# Patient Record
Sex: FEMALE | Race: WHITE | NOT HISPANIC OR LATINO | Employment: OTHER | URBAN - METROPOLITAN AREA
[De-identification: names, ages, dates, MRNs, and addresses within clinical notes are randomized per-mention and may not be internally consistent; named-entity substitution may affect disease eponyms.]

---

## 2017-01-11 ENCOUNTER — OFFICE VISIT (OUTPATIENT)
Dept: INTERNAL MEDICINE | Facility: CLINIC | Age: 72
End: 2017-01-11
Payer: MEDICARE

## 2017-01-11 VITALS
SYSTOLIC BLOOD PRESSURE: 116 MMHG | HEIGHT: 62 IN | DIASTOLIC BLOOD PRESSURE: 78 MMHG | HEART RATE: 56 BPM | WEIGHT: 155.19 LBS | BODY MASS INDEX: 28.56 KG/M2

## 2017-01-11 DIAGNOSIS — E03.9 PRIMARY HYPOTHYROIDISM: ICD-10-CM

## 2017-01-11 DIAGNOSIS — I25.10 CORONARY ARTERY DISEASE INVOLVING NATIVE CORONARY ARTERY WITHOUT ANGINA PECTORIS, UNSPECIFIED WHETHER NATIVE OR TRANSPLANTED HEART: Primary | ICD-10-CM

## 2017-01-11 DIAGNOSIS — R73.03 PREDIABETES: ICD-10-CM

## 2017-01-11 DIAGNOSIS — E78.5 HYPERLIPIDEMIA, UNSPECIFIED HYPERLIPIDEMIA TYPE: ICD-10-CM

## 2017-01-11 DIAGNOSIS — R73.9 HYPERGLYCEMIA: ICD-10-CM

## 2017-01-11 DIAGNOSIS — Z12.11 SCREEN FOR COLON CANCER: ICD-10-CM

## 2017-01-11 DIAGNOSIS — M19.90 OSTEOARTHRITIS, UNSPECIFIED OSTEOARTHRITIS TYPE, UNSPECIFIED SITE: ICD-10-CM

## 2017-01-11 DIAGNOSIS — Z79.899 ON STATIN THERAPY DUE TO RISK OF FUTURE CARDIOVASCULAR EVENT: ICD-10-CM

## 2017-01-11 DIAGNOSIS — R94.39 POSITIVE CARDIAC STRESS TEST: ICD-10-CM

## 2017-01-11 DIAGNOSIS — M85.80 OSTEOPENIA: ICD-10-CM

## 2017-01-11 DIAGNOSIS — H53.8 BLURRY VISION: ICD-10-CM

## 2017-01-11 PROCEDURE — 99213 OFFICE O/P EST LOW 20 MIN: CPT | Mod: PBBFAC | Performed by: INTERNAL MEDICINE

## 2017-01-11 PROCEDURE — 99214 OFFICE O/P EST MOD 30 MIN: CPT | Mod: S$PBB,,, | Performed by: INTERNAL MEDICINE

## 2017-01-11 PROCEDURE — 99999 PR PBB SHADOW E&M-EST. PATIENT-LVL III: CPT | Mod: PBBFAC,,, | Performed by: INTERNAL MEDICINE

## 2017-01-11 RX ORDER — METFORMIN HYDROCHLORIDE 500 MG/1
500 TABLET, EXTENDED RELEASE ORAL
Qty: 90 TABLET | Refills: 3 | Status: SHIPPED | OUTPATIENT
Start: 2017-01-11 | End: 2018-02-16 | Stop reason: SDUPTHER

## 2017-01-11 NOTE — MR AVS SNAPSHOT
[facility]  [address]  [facility] [address]  Phone: [phone]  Fax: [phone]                  Lissy Palencia   2017 8:00 AM   Office Visit    Description:  Female : 1945   Provider:  Deya Luevano MD   Department:  [facility]           Reason for Visit     Follow-up           Diagnoses this Visit        Comments    Coronary artery disease involving native coronary artery without angina pectoris, unspecified whether native or transplanted heart    -  Primary     Primary hypothyroidism         Prediabetes         Positive cardiac stress test         Osteopenia         Osteoarthritis, unspecified osteoarthritis type, unspecified site         On statin therapy due to risk of future cardiovascular event         Hyperlipidemia, unspecified hyperlipidemia type         Hyperglycemia         Blurry vision         Screen for colon cancer                To Do List           Goals (5 Years of Data)     None      Follow-Up and Disposition     Return in about 6 months (around 2017).       These Medications        Disp Refills Start End    metformin (GLUCOPHAGE-XR) 500 MG 24 hr tablet 90 tablet 3 2017    Take 1 tablet (500 mg total) by mouth daily with breakfast. - Oral    Pharmacy: [facility] - [address] #: [phone]         [facility]     [facility] On Call Nurse Care Line -  Assistance  Registered nurses in the [facility] Center provide clinical advisement, health education, appointment booking, and other advisory services.  Call for this free service at [phone].             Medications           Message regarding Medications     Verify the changes and/or additions to your medication regime listed below are the same as discussed with your clinician today.  If any of these changes or additions are incorrect, please notify your healthcare provider.       "       Verify that the below list of medications is an accurate representation of the medications you are currently taking.  If none reported, the list may be blank. If incorrect, please contact your healthcare provider. Carry this list with you in case of emergency.           Current Medications     aspirin 81 MG Chew Take 81 mg by mouth once daily.      atorvastatin (LIPITOR) 40 MG tablet Take 1 tablet (40 mg total) by mouth once daily.    co-enzyme Q-10 30 mg capsule Take 30 mg by mouth 3 (three) times daily.    conjugated estrogens (PREMARIN) vaginal cream Place 0.5 g vaginally twice a week.    levothyroxine (SYNTHROID) 88 MCG tablet TAKE 1 TABLET EVERY MORNING BEFORE BREAKFAST    melatonin 3 mg Tab Take 1 tablet (3 mg total) by mouth nightly.    metformin (GLUCOPHAGE-XR) 500 MG 24 hr tablet Take 1 tablet (500 mg total) by mouth daily with breakfast.    nitroGLYCERIN (NITROSTAT) 0.4 MG SL tablet Place 1 tablet (0.4 mg total) under the tongue every 5 (five) minutes as needed for Chest pain.           Clinical Reference Information           Vital Signs - Last Recorded  Most recent update: 1/11/2017  8:11 AM by Dunia Simpson MA    BP Pulse Ht Wt BMI    116/78 (!) 56 5' 2" (1.575 m) 70.4 kg (155 lb 3.3 oz) 28.39 kg/m2      Blood Pressure          Most Recent Value    BP  116/78      Allergies as of 1/11/2017     Compazine  [Prochlorperazine Edisylate]    Parafon Forte      Immunizations Administered on Date of Encounter - 1/11/2017     None      Orders Placed During Today's Visit      Normal Orders This Visit    Ambulatory referral to Optometry     Future Labs/Procedures Expected by Expires    Hemoglobin A1c  4/11/2017 3/12/2018      [facility] Sign-Up     Activating your [facility] account is as easy as 1-2-3!     1) Visit [email or web address], select Sign Up Now, enter this activation code and your date of birth, then select Next.  [email or web address]  Expires: 2/25/2017  9:06 AM      2) Create a username and " password to use when you visit [facility] in the future and select a security question in case you lose your password and select Next.    3) Enter your e-mail address and click Sign Up!    Additional Information  If you have questions, please e-mail [email or web address] or call [phone] to talk to our [facility] staff. Remember, [facility] is NOT to be used for urgent needs. For medical emergencies, dial 911.         Instructions      Recognizing a Heart Attack or Angina  If you have risk factors for heart problems, you should always watch for signs of angina or a heart attack. If you have a sudden heart problem, getting treatment right away could save your life.   Risk factors for a heart attack include advanced age, high cholesterol, high blood pressure, having a family member who had a heart attack before the age of 50, diabetes, smoking, and stress. There are other risk factors including eating a high-fat diet and getting minimal exercise.  Understanding angina and heart attack  · Angina is a painful burning, tightness, or pressure in the chest, back, neck, throat, or jaw. It means not enough blood is getting to the heart. This is usually from a blocked artery in the heart. Angina is a sign that you may be having, or are about to have, a heart attack. It needs to be addressed by a healthcare provider right away.  · A heart attack, also known as acute myocardial infarction, or AMI, is what happens when blood can't get to part of the heart muscle. That part of the heart muscle is damaged and starts to die. If enough of the heart is affected, it will severely reduce its ability to provide blood to the rest of the body. It may cause death. It is vital to get help as soon as possible for a heart attack.  Stable angina versus unstable angina  Stable angina, also known as chronic angina, has a typical pattern. It occurs predictably with physical exertion or strong emotion. Nitroglycerin, rest, or both, will easily  relieve stable angina symptoms. Stable angina symptoms will most likely feel the same each time you have them. It is important to discuss these symptoms with your healthcare provider. They can be a warning sign for a future heart attack.  Unstable angina causes unexpected or unpredictable symptoms, commonly occurring at rest, and is a medical emergency. Angina is also considered unstable if resting and nitroglycerin doesn't relieve symptoms, It's also unstable if symptoms are worsening, occurring more often, or are lasting longer. These symptoms suggest a severe blockage or a spasm of a heart artery. Unstable angina is commonly a sign of an active heart attack. Remember the following tips:  · Stable angina symptoms should go away with rest or medicine. If they dont go away, call 911!  · Stable angina symptoms last for only a few minutes. If they last longer than that, or if they go away and come back, you may be having a heart attack. Call 911!  · If you have shortness of breath, cold sweat, nausea, or lightheadedness, call 911!  For new-onset angina, there is only one response: ?call 911! You should never diagnose angina by yourself. If these symptoms are new, or worse than usual, call 911!  Warning signs of a heart attack  If you have symptoms that you cant explain, call 911 right away. Do not drive yourself to the emergency room. The following are warning signs of a possible heart attack:  · Chest discomfort. Most heart attacks involve discomfort in the center of the chest that lasts more than a few minutes, or that goes away and comes back. It can feel like uncomfortable pressure, squeezing, fullness or pain.  · Discomfort in other areas of the upper body. Symptoms can include pain or discomfort in one or both arms, the back, neck, jaw, or stomach.  · Shortness of breath with or without chest discomfort.  · Other signs may include breaking out in a cold sweat, nausea, or lightheadedness.  Note for women: Like  men, women commonly have chest pain or discomfort as a heart attack symptom. But women are somewhat more likely than men to have other common symptoms, such as shortness of breath, nausea and vomiting, back pain, or jaw pain.  Note for older adults: Older people may also have atypical symptoms of a heart attack. These symptoms include fainting, weakness, or confusion. Ignoring these symptoms can lead to critical illness or death. They should be investigated right away.  If you've had a heart attack: People who have had one heart attack are at risk for having another. Your provider may prescribe medicine such as nitroglycerin to take when chest pain starts. Or you may need medicines to lower your heart rate and blood pressure to prevent angina and another heart attack. Remember to take any medicines your provider has given and do not stop them without speaking with him or her first.     If you have diabetes: silent heart problems  Over time, high blood sugar can damage nerves in your body. This may keep you from feeling pain caused by a heart problem, leading to a silent heart problem. If you dont feel symptoms, you are less able to recognize that you may be having a heart attack and get treatment right away. Talk to your healthcare provider about how to lower your risk for silent heart problems.   © 4073-5896 [employer]. [address] [place] [address], [place] [address]. All rights reserved. This information is not intended as a substitute for professional medical care. Always follow your healthcare professional's instructions.        Fast-Acting Nitroglycerin  Nitroglycerin eases chest pain (angina) by getting more blood and oxygen to your heart. Fast-acting nitroglycerin can stop an angina attack. Follow the steps below for taking this medicine. Note: Your healthcare provider may give you slightly different instructions. If so, follow them carefully.  To stop an angina attack    Sit down before you take  your nitroglycerin. The medicine may make you feel dizzy because it lowers your blood pressure rapidly.  If you use tablets  · Place 1 tablet under your tongue. Or place it between your lip and gum. Or put it between your cheek and gum.  · Let the tablet dissolve all the way. Do not swallow or chew the tablet.  · Do not eat, drink, smoke, or chew tobacco as the tablet is dissolving.  If you use spray  · Open your mouth and hold the sprayer just in front of your mouth.  · Press the button on the top. Spray once on or under your tongue. Do not inhale.  · Close your mouth. Then wait a few seconds before you swallow.  After taking 1 tablet or spraying once  · Continue sitting for 5 minutes.   · If the angina goes away completely, rest for a while. Then continue your normal routine.  Call 911 if your angina lasts longer than 5 minutes and 1 tablet or 1 spray has not relieved it. Do not delay. You may be having a heart attack!  After you call 911, take a second tablet, or spray a second time. Wait another 5 minutes. If the angina still does not go away, take a third tablet, or spray a third time. Do not take more than 3 tablets, or spray more than 3 times, within 15 minutes. Stay on the phone with 911 for further instructions.   Precautions  · Limit how much alcohol you drink. Too much alcohol can cause dizziness or fainting.  · Tell your healthcare provider about any medicines, supplements, or herbs you use. Nitroglycerin can interact with other medicines. This can cause serious problems.   · NOTE: Do not take phosphodiesterase inhibitors such as sildenafil. These are medicines that help sexual function in men. The combination of nitroglycerin with these medicines can cause a severe drop in blood pressure. This can lead to dizziness, fainting, heart attack, or stroke.  · Check when your medicine expires. Nitroglycerin can become less effective over time.  · Tell your healthcare provider if your angina attacks last  longer, occur more often, or are more severe.  © 6336-6095 [employer]. [address] [place] [address], [place] [address]. All rights reserved. This information is not intended as a substitute for professional medical care. Always follow your healthcare professional's instructions.

## 2017-01-11 NOTE — PATIENT INSTRUCTIONS
Recognizing a Heart Attack or Angina  If you have risk factors for heart problems, you should always watch for signs of angina or a heart attack. If you have a sudden heart problem, getting treatment right away could save your life.   Risk factors for a heart attack include advanced age, high cholesterol, high blood pressure, having a family member who had a heart attack before the age of 50, diabetes, smoking, and stress. There are other risk factors including eating a high-fat diet and getting minimal exercise.  Understanding angina and heart attack  · Angina is a painful burning, tightness, or pressure in the chest, back, neck, throat, or jaw. It means not enough blood is getting to the heart. This is usually from a blocked artery in the heart. Angina is a sign that you may be having, or are about to have, a heart attack. It needs to be addressed by a healthcare provider right away.  · A heart attack, also known as acute myocardial infarction, or AMI, is what happens when blood can't get to part of the heart muscle. That part of the heart muscle is damaged and starts to die. If enough of the heart is affected, it will severely reduce its ability to provide blood to the rest of the body. It may cause death. It is vital to get help as soon as possible for a heart attack.  Stable angina versus unstable angina  Stable angina, also known as chronic angina, has a typical pattern. It occurs predictably with physical exertion or strong emotion. Nitroglycerin, rest, or both, will easily relieve stable angina symptoms. Stable angina symptoms will most likely feel the same each time you have them. It is important to discuss these symptoms with your healthcare provider. They can be a warning sign for a future heart attack.  Unstable angina causes unexpected or unpredictable symptoms, commonly occurring at rest, and is a medical emergency. Angina is also considered unstable if resting and nitroglycerin doesn't relieve  symptoms, It's also unstable if symptoms are worsening, occurring more often, or are lasting longer. These symptoms suggest a severe blockage or a spasm of a heart artery. Unstable angina is commonly a sign of an active heart attack. Remember the following tips:  · Stable angina symptoms should go away with rest or medicine. If they dont go away, call 911!  · Stable angina symptoms last for only a few minutes. If they last longer than that, or if they go away and come back, you may be having a heart attack. Call 911!  · If you have shortness of breath, cold sweat, nausea, or lightheadedness, call 911!  For new-onset angina, there is only one response: ?call 911! You should never diagnose angina by yourself. If these symptoms are new, or worse than usual, call 911!  Warning signs of a heart attack  If you have symptoms that you cant explain, call 911 right away. Do not drive yourself to the emergency room. The following are warning signs of a possible heart attack:  · Chest discomfort. Most heart attacks involve discomfort in the center of the chest that lasts more than a few minutes, or that goes away and comes back. It can feel like uncomfortable pressure, squeezing, fullness or pain.  · Discomfort in other areas of the upper body. Symptoms can include pain or discomfort in one or both arms, the back, neck, jaw, or stomach.  · Shortness of breath with or without chest discomfort.  · Other signs may include breaking out in a cold sweat, nausea, or lightheadedness.  Note for women: Like men, women commonly have chest pain or discomfort as a heart attack symptom. But women are somewhat more likely than men to have other common symptoms, such as shortness of breath, nausea and vomiting, back pain, or jaw pain.  Note for older adults: Older people may also have atypical symptoms of a heart attack. These symptoms include fainting, weakness, or confusion. Ignoring these symptoms can lead to critical illness or death.  They should be investigated right away.  If you've had a heart attack: People who have had one heart attack are at risk for having another. Your provider may prescribe medicine such as nitroglycerin to take when chest pain starts. Or you may need medicines to lower your heart rate and blood pressure to prevent angina and another heart attack. Remember to take any medicines your provider has given and do not stop them without speaking with him or her first.     If you have diabetes: silent heart problems  Over time, high blood sugar can damage nerves in your body. This may keep you from feeling pain caused by a heart problem, leading to a silent heart problem. If you dont feel symptoms, you are less able to recognize that you may be having a heart attack and get treatment right away. Talk to your healthcare provider about how to lower your risk for silent heart problems.   © 4702-3762 [employer]. [address] [place] [address]. All rights reserved. This information is not intended as a substitute for professional medical care. Always follow your healthcare professional's instructions.        Fast-Acting Nitroglycerin  Nitroglycerin eases chest pain (angina) by getting more blood and oxygen to your heart. Fast-acting nitroglycerin can stop an angina attack. Follow the steps below for taking this medicine. Note: Your healthcare provider may give you slightly different instructions. If so, follow them carefully.  To stop an angina attack    Sit down before you take your nitroglycerin. The medicine may make you feel dizzy because it lowers your blood pressure rapidly.  If you use tablets  · Place 1 tablet under your tongue. Or place it between your lip and gum. Or put it between your cheek and gum.  · Let the tablet dissolve all the way. Do not swallow or chew the tablet.  · Do not eat, drink, smoke, or chew tobacco as the tablet is dissolving.  If you use spray  · Open your mouth and hold  the sprayer just in front of your mouth.  · Press the button on the top. Spray once on or under your tongue. Do not inhale.  · Close your mouth. Then wait a few seconds before you swallow.  After taking 1 tablet or spraying once  · Continue sitting for 5 minutes.   · If the angina goes away completely, rest for a while. Then continue your normal routine.  Call 911 if your angina lasts longer than 5 minutes and 1 tablet or 1 spray has not relieved it. Do not delay. You may be having a heart attack!  After you call 911, take a second tablet, or spray a second time. Wait another 5 minutes. If the angina still does not go away, take a third tablet, or spray a third time. Do not take more than 3 tablets, or spray more than 3 times, within 15 minutes. Stay on the phone with 911 for further instructions.   Precautions  · Limit how much alcohol you drink. Too much alcohol can cause dizziness or fainting.  · Tell your healthcare provider about any medicines, supplements, or herbs you use. Nitroglycerin can interact with other medicines. This can cause serious problems.   · NOTE: Do not take phosphodiesterase inhibitors such as sildenafil. These are medicines that help sexual function in men. The combination of nitroglycerin with these medicines can cause a severe drop in blood pressure. This can lead to dizziness, fainting, heart attack, or stroke.  · Check when your medicine expires. Nitroglycerin can become less effective over time.  · Tell your healthcare provider if your angina attacks last longer, occur more often, or are more severe.  © 4296-1667 The [employer] [address] [place] [address], [place] [address]. All rights reserved. This information is not intended as a substitute for professional medical care. Always follow your healthcare professional's instructions.

## 2017-01-12 ENCOUNTER — TELEPHONE (OUTPATIENT)
Dept: UROGYNECOLOGY | Facility: CLINIC | Age: 72
End: 2017-01-12

## 2017-01-12 NOTE — TELEPHONE ENCOUNTER
Spoke with pt she wanted to be scheduled to see  for her fu visit. Pt was scheduled on preferred day.

## 2017-01-12 NOTE — TELEPHONE ENCOUNTER
----- Message from An Whitney sent at 1/11/2017  9:20 AM CST -----  Patient would like to schedule a follow-up.  Please contact at [phone].  Thanks

## 2017-01-13 NOTE — PROGRESS NOTES
Subjective:       Patient ID: Lissy Palencia is a 71 y.o. female.    Chief Complaint: Follow-up  multiple concerns. CP, afraid to try sl NTG  Eyes blurry, new glasses of no benefit.  Headache, neck pain, shoulder stiffness, but not new    HPI  Review of Systems   Constitutional: Positive for fatigue. Negative for activity change, appetite change, chills, fever and unexpected weight change.   HENT: Negative for dental problem, hearing loss, tinnitus, trouble swallowing and voice change.    Eyes: Positive for visual disturbance.   Respiratory: Negative for cough, chest tightness, shortness of breath and wheezing.    Cardiovascular: Negative for chest pain, palpitations and leg swelling.   Gastrointestinal: Negative for abdominal pain, blood in stool, constipation, diarrhea and nausea.   Genitourinary: Negative for difficulty urinating, dysuria, frequency and urgency.   Musculoskeletal: Positive for arthralgias, neck pain and neck stiffness. Negative for back pain, gait problem, joint swelling and myalgias.   Skin: Negative for rash.   Neurological: Negative for dizziness, tremors, speech difficulty, weakness, light-headedness and headaches.   Psychiatric/Behavioral: Negative for dysphoric mood and sleep disturbance. The patient is not nervous/anxious.        Objective:      Physical Exam   Constitutional: She is oriented to person, place, and time. She appears well-developed and well-nourished. No distress.   HENT:   Head: Normocephalic and atraumatic.   Right Ear: External ear normal.   Left Ear: External ear normal.   Nose: Nose normal.   Mouth/Throat: Oropharynx is clear and moist. No oropharyngeal exudate.   Eyes: Conjunctivae and EOM are normal. Pupils are equal, round, and reactive to light. Right eye exhibits no discharge. No scleral icterus.   Neck: Normal range of motion and full passive range of motion without pain. Neck supple. No spinous process tenderness and no muscular tenderness present. Carotid bruit  is not present. No thyromegaly present.   Cardiovascular: Normal rate, regular rhythm, S1 normal, S2 normal, normal heart sounds and intact distal pulses.  Exam reveals no gallop and no friction rub.    No murmur heard.  Pulmonary/Chest: Effort normal and breath sounds normal. No respiratory distress. She has no wheezes. She has no rales. She exhibits no tenderness.   Abdominal: Soft. Bowel sounds are normal. She exhibits no distension and no mass. There is no tenderness. There is no rebound and no guarding.   Genitourinary: Pelvic exam was performed with patient supine. Uterus is not deviated, not enlarged, not fixed and not tender. Cervix exhibits no motion tenderness, no discharge and no friability. Right adnexum displays no mass, no tenderness and no fullness. Left adnexum displays no mass, no tenderness and no fullness.   Musculoskeletal: Normal range of motion. She exhibits no edema or tenderness.   Lymphadenopathy:        Head (right side): No submental and no submandibular adenopathy present.        Head (left side): No submental and no submandibular adenopathy present.     She has no cervical adenopathy.        Right cervical: No superficial cervical, no deep cervical and no posterior cervical adenopathy present.       Left cervical: No superficial cervical, no deep cervical and no posterior cervical adenopathy present.        Right axillary: No pectoral and no lateral adenopathy present.        Left axillary: No pectoral and no lateral adenopathy present.       Right: No supraclavicular adenopathy present.        Left: No supraclavicular adenopathy present.   Neurological: She is alert and oriented to person, place, and time. She has normal reflexes. No cranial nerve deficit. She exhibits normal muscle tone. Coordination normal.   Skin: Skin is warm and dry. No rash noted.   Psychiatric: She has a normal mood and affect. Her behavior is normal. Her mood appears not anxious. Her speech is not rapid and/or  pressured. She is not agitated. She does not exhibit a depressed mood.   Normal behavior, thought content, insight and judgement.       Assessment:       1. Coronary artery disease involving native coronary artery without angina pectoris, unspecified whether native or transplanted heart    2. Primary hypothyroidism    3. Prediabetes    4. Positive cardiac stress test    5. Osteopenia    6. Osteoarthritis, unspecified osteoarthritis type, unspecified site    7. On statin therapy due to risk of future cardiovascular event    8. Hyperlipidemia, unspecified hyperlipidemia type    9. Hyperglycemia    10. Blurry vision    11. Screen for colon cancer, polyp 2015, next scope 2020        Plan:   Lissy was seen today for follow-up.    Diagnoses and all orders for this visit:    Coronary artery disease involving native coronary artery without angina pectoris, unspecified whether native or transplanted heart. Recent w/u neg for ischemia.    Primary hypothyroidism. compliant    Prediabetes. She is exercising daily and changing her diet  -     Hemoglobin A1c; Future    Osteopenia. Follow.    Osteoarthritis, unspecified osteoarthritis type, unspecified site. Advise aquatic exercise program    On statin therapy due to risk of future cardiovascular event    Hyperlipidemia, unspecified hyperlipidemia type. monitor  -     Hemoglobin A1c; Future    Hyperglycemia. recheck   -     Hemoglobin A1c; Future    Blurry vision  -     Ambulatory referral to Optometry    Screen for colon cancer, polyp 2015, next scope 2020    Other orders. Start   -     metformin (GLUCOPHAGE-XR) 500 MG 24 hr tablet; Take 1 tablet (500 mg total) by mouth daily with breakfast.  Return in about 6 months (around 7/11/2017).

## 2017-01-18 ENCOUNTER — OFFICE VISIT (OUTPATIENT)
Dept: OPTOMETRY | Facility: CLINIC | Age: 72
End: 2017-01-18
Payer: MEDICARE

## 2017-01-18 DIAGNOSIS — H04.123 BILATERAL DRY EYES: Primary | ICD-10-CM

## 2017-01-18 DIAGNOSIS — H25.13 NUCLEAR SCLEROTIC CATARACT OF BOTH EYES: ICD-10-CM

## 2017-01-18 PROCEDURE — 99999 PR PBB SHADOW E&M-EST. PATIENT-LVL II: CPT | Mod: PBBFAC,,, | Performed by: OPTOMETRIST

## 2017-01-18 PROCEDURE — 92012 INTRM OPH EXAM EST PATIENT: CPT | Mod: S$PBB,,, | Performed by: OPTOMETRIST

## 2017-01-18 PROCEDURE — 99212 OFFICE O/P EST SF 10 MIN: CPT | Mod: PBBFAC | Performed by: OPTOMETRIST

## 2017-01-18 NOTE — PROGRESS NOTES
HPI     Patient complains of having blurry near vision with glasses on since pt   got new glasses in October 2016  Pt states that while reading words on the page become blurry and string   together   Pt states that it may happen when she is reading for long periods, pt   states that if she blinks and refocuses things look better but minutes   later vision blurs again  Pt reports vision comes in and out of focus when reading, vision changes   with blinking. This also occurs when she is watching TV.     +Drops: systane gtt in qAM and systane gel QHS OU  -Pain  -Tearing  -Sensitivity to light  -Flashes  -Floaters    OCULAR HISTORY  Last Eye Exam 10/19/16 Dr. Burroughs   (-)eye surgery   (-)eye injury   (+) glaucoma suspect OS  +dry eye   +cataracts     FAMILY HISTORY  (-)Glaucoma none   (-)Macular Degeneration none          Last edited by Brittni Burroughs, OD on 1/18/2017  8:56 AM.         Assessment /Plan     For exam results, see Encounter Report.    Bilateral dry eyes   Ocular health improved with Systane drops qAM and Systane Gel QHS OU, but pt still experiencing vision fluctuation. SRx does not need to be changed.   Recommended pt discontinue Systane Gel. Continue Systane drops, but increase to QID OU.    Nuclear sclerotic cataract of both eyes   Pt may be interested in referral for cataract surgery at next visit in October 2017. Discussed risks/benefits of cataract surgery, surgery not yet indicated at this time.    Improper head position when reading (pt tilting chin down) also contributing to blurred vision. Advised pt to remember to keep chin up when reading. Recommended single vision reading glasses for sustained near tasks (either +4.00 OTC readers or prescription reading glasses); last SRx from 10/2016 re-printed for pt.      RTC October 2017 for annual exam

## 2017-01-18 NOTE — MR AVS SNAPSHOT
[facility]  [address]  [facility] [address]  Phone: [phone]                  Lissy Palencia   2017 8:00 AM   Office Visit    Description:  Female : 1945   Provider:  Brittni Burroughs OD   Department:  [facility]           Reason for Visit     Eye Problem                To Do List           Future Appointments        Provider Department Dept Phone    2017 8:45 AM Rehana Canales MD [facility] [phone]      Goals (5 Years of Data)     None      Follow-Up and Disposition     Return if symptoms worsen or fail to improve.      [facility] On Call     [facility] On Call Nurse Care Line -  Assistance  Registered nurses in the [facility] On Call Center provide clinical advisement, health education, appointment booking, and other advisory services.  Call for this free service at [phone].             Medications           Message regarding Medications     Verify the changes and/or additions to your medication regime listed below are the same as discussed with your clinician today.  If any of these changes or additions are incorrect, please notify your healthcare provider.             Verify that the below list of medications is an accurate representation of the medications you are currently taking.  If none reported, the list may be blank. If incorrect, please contact your healthcare provider. Carry this list with you in case of emergency.           Current Medications     aspirin 81 MG Chew Take 81 mg by mouth once daily.      atorvastatin (LIPITOR) 40 MG tablet Take 1 tablet (40 mg total) by mouth once daily.    co-enzyme Q-10 30 mg capsule Take 30 mg by mouth 3 (three) times daily.    conjugated estrogens (PREMARIN) vaginal cream Place 0.5 g vaginally twice a week.    levothyroxine (SYNTHROID) 88 MCG tablet TAKE 1 TABLET EVERY MORNING BEFORE BREAKFAST    melatonin 3 mg Tab Take 1 tablet (3 mg total) by mouth nightly.    metformin  (GLUCOPHAGE-XR) 500 MG 24 hr tablet Take 1 tablet (500 mg total) by mouth daily with breakfast.    nitroGLYCERIN (NITROSTAT) 0.4 MG SL tablet Place 1 tablet (0.4 mg total) under the tongue every 5 (five) minutes as needed for Chest pain.           Clinical Reference Information           Allergies as of 1/18/2017     Compazine  [Prochlorperazine Edisylate]    Parafon Forte      Immunizations Administered on Date of Encounter - 1/18/2017     None      [facility] Sign-Up     Activating your [facility] account is as easy as 1-2-3!     1) Visit [email or web address], select Sign Up Now, enter this activation code and your date of birth, then select Next.  [email or web address]  Expires: 2/25/2017  9:06 AM      2) Create a username and password to use when you visit [facility] in the future and select a security question in case you lose your password and select Next.    3) Enter your e-mail address and click Sign Up!    Additional Information  If you have questions, please e-mail [email or web address] or call [phone] to talk to our [facility] staff. Remember, [facility] is NOT to be used for urgent needs. For medical emergencies, dial 911.         Instructions    Thank you very much for coming in for your eye examination. It was a pleasure working with you today. Below is some information you might find helpful.     Please use the artificial tears 4 times per day in both eyes and discontinue using the ointment at night-time.    ==============================================    DRY EYES     Dry eyes is a common condition. It can be caused by an insufficient volume of tears, or by tears that do not spread evenly over the cornea. An uneven tear film can also cause vision to blur intermittently.   Dry eyes are often associated with burning, stinging, and/or red eyes.As we age, the eyes usually produce fewer tears and result in decreased normal eye lubrication. Paradoxically, dry eye can make eyes water. When they water, that watery  "production actually makes the dry eye situation worse because the watery tears do not lubricate the eye well at all. Watery tears really serve to flush foreign material out of the eye, not to lubricate. Unfortunately, when the eyes get really dry they become irritated and stimulate the formation of watery tears.   Lubricants, in the form of drops or ointments, are the principal treatment for dry eyes. The following are recommendations for managing your dry eye condition:    1) Use over-the-counter artificial tears or lubricants (such as Systane, Refresh, Blink, or Genteal), 1 drop in each eye 3 to 4 times a day. Please avoid drops that advertise redness relief since these can be unhealthy for your eyes and can cause permanent redness if used long-term.     It is best to take artificial tears on a schedule, like you would for a medication. Taking them routinely at breakfast, lunch, and dinner for example will provide better relief and better use of the tear drop than taking them whenever your eyes "feel" dry.    2) Optional use of over-the-counter lubricating gels (such as Genteal Gel, Systane Gel, or Refresh PM) applied to the inside of the lower lid of both eyes at bedtime. This gel is very thick and may cause blurred vision, so we recommend you use it before bedtime. In the morning you can gently wipe your eyes with a damp washcloth to remove any residual gel.    3) Warm compresses applied to the closed eyelids twice per day, followed with lid massage.    4) Always drink an adequate amount of water daily (4-6 cups a day).    5) 1000 mg of good quality fish oil (labeled DHA and/or EPA). Flaxseed oil can also be beneficial.    6) Use a humidifier in your bedroom.    7) If the dryness continues there may be additional options of punctal plugs and Restasis. Punctal plugs are medical devices inserted into the puncta or the drain of the eye to block some of your natural tears from draining off the eye. Restasis is a " prescription eye drop that, over time, may help your body make more tears naturally.    It is important to maintain this treatment plan until your symptoms have improved. Once improved, you can reduce the frequency of lubricants and warm compresses. If the symptoms recur, then apply as needed for comfort.    Brittni Burroughs, OD

## 2017-01-18 NOTE — PATIENT INSTRUCTIONS
"Thank you very much for coming in for your eye examination. It was a pleasure working with you today. Below is some information you might find helpful.     Please use the artificial tears 4 times per day in both eyes and discontinue using the ointment at night-time.    ==============================================    DRY EYES     Dry eyes is a common condition. It can be caused by an insufficient volume of tears, or by tears that do not spread evenly over the cornea. An uneven tear film can also cause vision to blur intermittently.   Dry eyes are often associated with burning, stinging, and/or red eyes.As we age, the eyes usually produce fewer tears and result in decreased normal eye lubrication. Paradoxically, dry eye can make eyes water. When they water, that watery production actually makes the dry eye situation worse because the watery tears do not lubricate the eye well at all. Watery tears really serve to flush foreign material out of the eye, not to lubricate. Unfortunately, when the eyes get really dry they become irritated and stimulate the formation of watery tears.   Lubricants, in the form of drops or ointments, are the principal treatment for dry eyes. The following are recommendations for managing your dry eye condition:    1) Use over-the-counter artificial tears or lubricants (such as Systane, Refresh, Blink, or Genteal), 1 drop in each eye 3 to 4 times a day. Please avoid drops that advertise redness relief since these can be unhealthy for your eyes and can cause permanent redness if used long-term.     It is best to take artificial tears on a schedule, like you would for a medication. Taking them routinely at breakfast, lunch, and dinner for example will provide better relief and better use of the tear drop than taking them whenever your eyes "feel" dry.    2) Optional use of over-the-counter lubricating gels (such as Genteal Gel, Systane Gel, or Refresh PM) applied to the inside of the lower lid " of both eyes at bedtime. This gel is very thick and may cause blurred vision, so we recommend you use it before bedtime. In the morning you can gently wipe your eyes with a damp washcloth to remove any residual gel.    3) Warm compresses applied to the closed eyelids twice per day, followed with lid massage.    4) Always drink an adequate amount of water daily (4-6 cups a day).    5) 1000 mg of good quality fish oil (labeled DHA and/or EPA). Flaxseed oil can also be beneficial.    6) Use a humidifier in your bedroom.    7) If the dryness continues there may be additional options of punctal plugs and Restasis. Punctal plugs are medical devices inserted into the puncta or the drain of the eye to block some of your natural tears from draining off the eye. Restasis is a prescription eye drop that, over time, may help your body make more tears naturally.    It is important to maintain this treatment plan until your symptoms have improved. Once improved, you can reduce the frequency of lubricants and warm compresses. If the symptoms recur, then apply as needed for comfort.    Brittni Burroughs, OD

## 2017-01-18 NOTE — LETTER
January 18, 2017      Deya Luevano MD  [address]  [facility] [address]           [facility]  [address]  [facility] [address]  Phone: [phone]          Patient: Lissy Palencia   MR Number: [id number]   YOB: 1945   Date of Visit: 1/18/2017       Dear Dr. Deya Luevano:    Thank you for referring Lissy Palencia to me for evaluation. Attached you will find relevant portions of my assessment and plan of care.    If you have questions, please do not hesitate to call me. I look forward to following Lissy Palencia along with you.    Sincerely,    Brittni Burroughs, OD    Enclosure  CC:  No Recipients    If you would like to receive this communication electronically, please contact [email or web address] or [phone] to request more information on Virtual Web Link access.    For providers and/or their staff who would like to refer a patient to [facility], please contact us through our one-stop-shop provider referral line, [facility], at [phone].    If you feel you have received this communication in error or would no longer like to receive these types of communications, please e-mail [email or web address]

## 2017-01-27 ENCOUNTER — TELEPHONE (OUTPATIENT)
Dept: DERMATOLOGY | Facility: CLINIC | Age: 72
End: 2017-01-27

## 2017-01-27 NOTE — TELEPHONE ENCOUNTER
Spoke w/pt's spouse. Informed him that we have an appointment on Monday 1/30/17 at 8:15 am. Pt's spouse accepted that. Confirmed our location with spouse.    ----- Message from Eden Gilman sent at 1/27/2017 11:29 AM CST -----  Contact: pt   NORMApt- pts  is calling to speak with the nurse pt wife has a consult appt on  2-21 pt needs to be seen before pt said she has a lump on her hand that is getting worse its rapidly getting worse pt said she can't touch her hand at all pt said she needs to be seen ASAP. Can you please call pt  Mr. Hector Palencia at [phone].    CHON

## 2017-01-30 ENCOUNTER — OFFICE VISIT (OUTPATIENT)
Dept: DERMATOLOGY | Facility: CLINIC | Age: 72
End: 2017-01-30
Payer: MEDICARE

## 2017-01-30 DIAGNOSIS — D18.00 ANGIOMA: ICD-10-CM

## 2017-01-30 DIAGNOSIS — D22.9 MULTIPLE BENIGN NEVI: ICD-10-CM

## 2017-01-30 DIAGNOSIS — L91.0 KELOID: ICD-10-CM

## 2017-01-30 DIAGNOSIS — L91.8 SKIN TAG: ICD-10-CM

## 2017-01-30 DIAGNOSIS — D48.5 NEOPLASM OF UNCERTAIN BEHAVIOR OF SKIN: Primary | ICD-10-CM

## 2017-01-30 PROCEDURE — 99999 PR PBB SHADOW E&M-EST. PATIENT-LVL III: CPT | Mod: PBBFAC,,, | Performed by: DERMATOLOGY

## 2017-01-30 PROCEDURE — 11900 INJECT SKIN LESIONS </W 7: CPT | Mod: PBBFAC | Performed by: DERMATOLOGY

## 2017-01-30 PROCEDURE — 99214 OFFICE O/P EST MOD 30 MIN: CPT | Mod: 25,S$PBB,, | Performed by: DERMATOLOGY

## 2017-01-30 PROCEDURE — 11100 PR BIOPSY OF SKIN LESION: CPT | Mod: 59,S$PBB,, | Performed by: DERMATOLOGY

## 2017-01-30 PROCEDURE — 88305 TISSUE EXAM BY PATHOLOGIST: CPT | Performed by: PATHOLOGY

## 2017-01-30 PROCEDURE — 11900 INJECT SKIN LESIONS </W 7: CPT | Mod: 59,S$PBB,, | Performed by: DERMATOLOGY

## 2017-01-30 PROCEDURE — 99213 OFFICE O/P EST LOW 20 MIN: CPT | Mod: PBBFAC | Performed by: DERMATOLOGY

## 2017-01-30 PROCEDURE — 11100 PR BIOPSY OF SKIN LESION: CPT | Mod: PBBFAC | Performed by: DERMATOLOGY

## 2017-01-30 RX ADMIN — TRIAMCINOLONE ACETONIDE 10 MG: 10 INJECTION, SUSPENSION INTRA-ARTICULAR; INTRALESIONAL at 08:01

## 2017-01-30 NOTE — LETTER
January 30, 2017      Deya Luevano MD  [address]  [facility] [address]           [facility]  [address]  [facility] [address]  Phone: [phone]  Fax: [phone]          Patient: Lissy Palencia   MR Number: [id number]   YOB: 1945   Date of Visit: 1/30/2017       Dear Dr. Deya Luevano:    Thank you for referring Lissy Palencia to me for evaluation. Attached you will find relevant portions of my assessment and plan of care.    If you have questions, please do not hesitate to call me. I look forward to following Lissy Palencia along with you.    Sincerely,    Rehana Canales MD    Enclosure  CC:  No Recipients    If you would like to receive this communication electronically, please contact [email or web address] or [phone] to request more information on [employer] Link access.    For providers and/or their staff who would like to refer a patient to [facility], please contact us through our one-stop-shop provider referral line, [facility], at [phone].    If you feel you have received this communication in error or would no longer like to receive these types of communications, please e-mail [email or web address]

## 2017-01-30 NOTE — PROGRESS NOTES
Subjective:       Patient ID:  Lissy Palencia is a 71 y.o. female who presents for   Chief Complaint   Patient presents with    Lesion     on R hand x 1 mo     Lesion  - Initial  Affected locations: right hand  Duration: 1 month  Signs / symptoms: pain  Relieving factors/Treatments tried: nothing    C/o painful bump on right hand x 1 month.  States that she thinks she was pricked by a bush in her garden ~ 1 month ago.  Admits to picking at area with needle to try and get suspected splinter out.  States that she has another similar resolving lesion on her left neck.  She would also like to have the scar on her abdomen injected again since it seemed to help last time it was injected    Past Medical History   Diagnosis Date    Allergy     Arthritis     Coronary artery disease      in the past    GERD (gastroesophageal reflux disease)     Hemorrhoids     History of cholelithiasis     Hyperlipidemia     Hypothyroidism     Right shoulder pain 10/9/2012    Trigger finger, right 3rd finger 7/30/2014     Review of Systems   Constitutional: Negative for fever, chills and fatigue.   Skin: Negative for itching, rash, dry skin and recent sunburn.        Objective:    Physical Exam   Constitutional: She appears well-developed and well-nourished. No distress.   Neurological: She is alert and oriented to person, place, and time. She is not disoriented.   Psychiatric: She has a normal mood and affect.   Skin:   Areas Examined (abnormalities noted in diagram):   Scalp / Hair Palpated and Inspected  Head / Face Inspection Performed  Neck Inspection Performed  Chest / Axilla Inspection Performed  Abdomen Inspection Performed  Back Inspection Performed  RUE Inspected  LUE Inspection Performed                           Diagram Legend     Erythematous scaling macule/papule c/w actinic keratosis       Vascular papule c/w angioma      Pigmented verrucoid papule/plaque c/w seborrheic keratosis      Yellow umbilicated papule c/w  sebaceous hyperplasia      Irregularly shaped tan macule c/w lentigo     1-2 mm smooth white papules consistent with Milia      Movable subcutaneous cyst with punctum c/w epidermal inclusion cyst      Subcutaneous movable cyst c/w pilar cyst      Firm pink to brown papule c/w dermatofibroma      Pedunculated fleshy papule(s) c/w skin tag(s)      Evenly pigmented macule c/w junctional nevus     Mildly variegated pigmented, slightly irregular-bordered macule c/w mildly atypical nevus      Flesh colored to evenly pigmented papule c/w intradermal nevus       Pink pearly papule/plaque c/w basal cell carcinoma      Erythematous hyperkeratotic cursted plaque c/w SCC      Surgical scar with no sign of skin cancer recurrence      Open and closed comedones      Inflammatory papules and pustules      Verrucoid papule consistent consistent with wart     Erythematous eczematous patches and plaques     Dystrophic onycholytic nail with subungual debris c/w onychomycosis     Umbilicated papule    Erythematous-base heme-crusted tan verrucoid plaque consistent with inflamed seborrheic keratosis     Erythematous Silvery Scaling Plaque c/w Psoriasis     See annotation      Assessment / Plan:      Pathology Orders:      Normal Orders This Visit    Tissue Specimen To Pathology, Dermatology     Questions:    Directional Terms:  Other(comment)    Clinical information:  r/o NMSC vs foreign body (h/o thorn pentration while working in the garden) vs other    Specific Site:  R dorsal hand        Neoplasm of uncertain behavior of skin  -     Tissue Specimen To Pathology, Dermatology  Shave biopsy procedure note:    Shave biopsy performed after verbal consent including risk of infection, scar, recurrence, need for additional treatment of site. Area prepped with alcohol, anesthetized with approximately 1.0cc of 1% lidocaine with epinephrine. Lesional tissue shaved with razor blade. Hemostasis achieved with application of aluminum chloride. No  complications. Dressing applied. Wound care explained.    Keloid  Intralesional Kenalog 10 mg/cc (0.4 cc total) injected into 1 lesion on the umbilicus today after obtaining verbal consent including risk of surrounding hypopigmentation. Patient tolerated procedure well.    Multiple Benign Nevi  Reassurance    Skin tag  Reassurance    Angioma  This is a benign vascular lesion. Reassurance given. No treatment required.          Return for pending Pathology.

## 2017-02-07 ENCOUNTER — PROCEDURE VISIT (OUTPATIENT)
Dept: DERMATOLOGY | Facility: CLINIC | Age: 72
End: 2017-02-07
Payer: MEDICARE

## 2017-02-07 VITALS
WEIGHT: 155 LBS | SYSTOLIC BLOOD PRESSURE: 127 MMHG | HEIGHT: 62 IN | DIASTOLIC BLOOD PRESSURE: 71 MMHG | HEART RATE: 64 BPM | BODY MASS INDEX: 28.52 KG/M2

## 2017-02-07 DIAGNOSIS — C44.622 SQUAMOUS CELL CARCINOMA OF SKIN OF DORSUM OF RIGHT HAND: Primary | ICD-10-CM

## 2017-02-07 PROCEDURE — 17312 MOHS ADDL STAGE: CPT | Mod: PBBFAC | Performed by: DERMATOLOGY

## 2017-02-07 PROCEDURE — 99499 UNLISTED E&M SERVICE: CPT | Mod: S$PBB,,, | Performed by: DERMATOLOGY

## 2017-02-07 PROCEDURE — 17312 MOHS ADDL STAGE: CPT | Mod: S$PBB,,, | Performed by: DERMATOLOGY

## 2017-02-07 PROCEDURE — 13132 CMPLX RPR F/C/C/M/N/AX/G/H/F: CPT | Mod: PBBFAC | Performed by: DERMATOLOGY

## 2017-02-07 PROCEDURE — 17311 MOHS 1 STAGE H/N/HF/G: CPT | Mod: PBBFAC | Performed by: DERMATOLOGY

## 2017-02-07 PROCEDURE — 13132 CMPLX RPR F/C/C/M/N/AX/G/H/F: CPT | Mod: 51,S$PBB,, | Performed by: DERMATOLOGY

## 2017-02-07 PROCEDURE — 17311 MOHS 1 STAGE H/N/HF/G: CPT | Mod: S$PBB,,, | Performed by: DERMATOLOGY

## 2017-02-07 RX ORDER — OXYCODONE AND ACETAMINOPHEN 5; 325 MG/1; MG/1
1 TABLET ORAL
Qty: 20 TABLET | Refills: 0 | Status: SHIPPED | OUTPATIENT
Start: 2017-02-07 | End: 2017-02-21

## 2017-02-07 NOTE — PROGRESS NOTES
PROCEDURE: Mohs' Micrographic Surgery    INDICATION: Biopsy-proven skin cancer of cosmetically and functionally important areas, including head, neck, genital, hand, foot, or areas known for having difficulty in healing, such as the lower anterior legs. Tumor with ill-defined borders.    REFERRING MD: Rehana Canales MD    CASE NUMBER:     ANESTHETIC: 3.5 cc 0.5% Lidocaine with Epi 1:200,000 mixed 1:1 with 0.5% Bupivacaine    SURGICAL PREP: Hibiclens    SURGEON: Rashad Ospina MD    ASSISTANTS: Juliette Mauricio PA-C, Cami Dallas MA and Michelle Solorazno, Surg Tech    PREOPERATIVE DIAGNOSIS: squamous cell carcinoma    POSTOPERATIVE DIAGNOSIS: squamous cell carcinoma    PATHOLOGIC DIAGNOSIS: squamous cell carcinoma- invasive, well differentiated, superficial    HISTOLOGY OF SPECIMENS IN FIRST STAGE:   Tumor Type: Tumor seen. Superficial squamous cell carcinoma: Proliferation of squamous cells exhibiting atypia and infiltrating within the superficial papillary dermis.   Depth of Invasion: epidermis, dermis and subcutaneous tissue  Perineural Invasion: No    HISTOLOGY OF SPECIMENS IN SUBSEQUENT STAGES:  · Tumor Type: No tumor seen.    STAGES OF MOHS' SURGERY PERFORMED: 2    TUMOR-FREE PLANE ACHIEVED: Yes    HEMOSTASIS: electrocoagulation     SPECIMENS: 3 (2 in stage A and 1 in stage B)    LOCATION: right dorsal hand    INITIAL LESION SIZE: 0.8 x 0.9 cm    FINAL DEFECT SIZE: 1.2 x 1.5 cm    WOUND REPAIR/DISPOSITION: The patient tolerated Mohs' Micrographic Surgery for a squamous cell carcinoma very well. When the tumor was completely removed, a repair of the surgical defect was undertaken.      PROCEDURE: Complex Linear Repair    INDICATION: Status post Mohs' Micrographic Surgery for squamous cell carcinoma.    CASE NUMBER:     SURGEON: Rashad Ospina MD    ASSISTANTS: Juliette Mauricio PA-C and Michelle Solorzano Surg Tech    ANESTHETIC: 2 cc 1% Lidocaine with Epinephrine 1:100,000, buffered    SURGICAL PREP:  "Hibiclens    LOCATION: right dorsal hand    DEFECT SIZE: 1.2 x 1.5 cm    WOUND REPAIR/DISPOSITION:  After the patient's carcinoma had been completely removed with Mohs' Micrographic Surgery, a repair of the surgical defect was undertaken. The patient was returned to the operating suite where the area of right dorsal hand was prepped, draped, and anesthetized in the usual sterile fashion. The wound was widely undermined in all directions. Then, electrocoagulation was used to obtain meticulous hemostasis. 4-0 Vicryl buried vertical mattress sutures were placed into the subcutaneous and dermal plane to close the wound and shawn the cutaneous wound edge. Bilateral dog ears were identified and were removed by a standard Burow's triangle technique. The cutaneous wound edges were closed using interrupted 4-0 Prolene suture.    The patient tolerated the procedure well.    The area was cleaned and dressed appropriately and the patient was given wound care instructions, as well as appointment for follow-up evaluation. Patient was placed on Percocet 5 prn postop pain.    LENGTH OF REPAIR: 3 cm    Vitals:    02/07/17 0800 02/07/17 1002   BP: (!) 150/66 127/71   BP Location: Left arm Left arm   Patient Position: Sitting Sitting   BP Method: Automatic Automatic   Pulse: (!) 51 64   Weight: 70.3 kg (155 lb)    Height: 5' 2" (1.575 m)          "

## 2017-02-07 NOTE — MR AVS SNAPSHOT
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 - Oral    Pharmacy: [facility] - [address] #: [phone]         [facility] On Call     [facility] On Call Nurse Care Line -  Assistance  Registered nurses in the [facility] On Call Center provide clinical advisement, health education, appointment booking, and other advisory services.  Call for this free service at [phone].             Medications           Message regarding Medications     Verify the changes and/or additions to your medication regime listed below are the same as discussed with your clinician today.  If any of these changes or additions are incorrect, please notify your healthcare provider.        START taking these NEW medications        Refills    oxycodone-acetaminophen (PERCOCET) 5-325 mg per tablet 0    Sig: Take 1 tablet by mouth every 4 to 6 hours as needed for Pain.    Class: Print    Route: Oral           Verify that the below list of medications is an accurate representation of the medications you are currently taking.  If none reported, the list may be blank. If incorrect, please contact your healthcare provider. Carry this list with you in case of emergency.           Current Medications      "aspirin 81 MG Chew Take 81 mg by mouth once daily.      atorvastatin (LIPITOR) 40 MG tablet Take 1 tablet (40 mg total) by mouth once daily.    co-enzyme Q-10 30 mg capsule Take 30 mg by mouth 3 (three) times daily.    conjugated estrogens (PREMARIN) vaginal cream Place 0.5 g vaginally twice a week.    levothyroxine (SYNTHROID) 88 MCG tablet TAKE 1 TABLET EVERY MORNING BEFORE BREAKFAST    melatonin 3 mg Tab Take 1 tablet (3 mg total) by mouth nightly.    metformin (GLUCOPHAGE-XR) 500 MG 24 hr tablet Take 1 tablet (500 mg total) by mouth daily with breakfast.    nitroGLYCERIN (NITROSTAT) 0.4 MG SL tablet Place 1 tablet (0.4 mg total) under the tongue every 5 (five) minutes as needed for Chest pain.    oxycodone-acetaminophen (PERCOCET) 5-325 mg per tablet Take 1 tablet by mouth every 4 to 6 hours as needed for Pain.           Clinical Reference Information           Your Vitals Were     BP Pulse Height Weight BMI    127/71 (BP Location: Left arm, Patient Position: Sitting, BP Method: Automatic) 64 5' 2" (1.575 m) 70.3 kg (155 lb) 28.35 kg/m2      Blood Pressure          Most Recent Value    BP  127/71      Allergies as of 2/7/2017     Compazine  [Prochlorperazine Edisylate]    Parafon Forte      Immunizations Administered on Date of Encounter - 2/7/2017     None      [facility] Sign-Up     Activating your [facility] account is as easy as 1-2-3!     1) Visit [email or web address], select Sign Up Now, enter this activation code and your date of birth, then select Next.  [email or web address]  Expires: 2/25/2017  9:06 AM      2) Create a username and password to use when you visit [facility] in the future and select a security question in case you lose your password and select Next.    3) Enter your e-mail address and click Sign Up!    Additional Information  If you have questions, please e-mail [email or web address] or call [phone] to talk to our [facility] staff. Remember, [facility] is NOT to be used for urgent needs. For " medical emergencies, dial 911.         Language Assistance Services     ATTENTION: Language assistance services are available, free of charge. Please call [phone].      ATENCIÓN: Si habla frances, tiene a woody disposición servicios gratuitos de asistencia lingüística. Llame al [phone].     CHÚ Ý: N?u b?n nói Ti?ng Vi?t, có các d?ch v? h? tr? ngôn ng? mi?n phí dành cho b?n. G?i s? [phone].         [facility] complies with applicable Federal civil rights laws and does not discriminate on the basis of race, color, national origin, age, disability, or sex.

## 2017-02-10 RX ORDER — ESOMEPRAZOLE MAGNESIUM 40 MG/1
CAPSULE, DELAYED RELEASE ORAL
Qty: 90 CAPSULE | Refills: 0 | OUTPATIENT
Start: 2017-02-10

## 2017-02-21 ENCOUNTER — OFFICE VISIT (OUTPATIENT)
Dept: DERMATOLOGY | Facility: CLINIC | Age: 72
End: 2017-02-21
Payer: MEDICARE

## 2017-02-21 DIAGNOSIS — Z09 POSTOP CHECK: Primary | ICD-10-CM

## 2017-02-21 PROCEDURE — 99999 PR PBB SHADOW E&M-EST. PATIENT-LVL II: CPT | Mod: PBBFAC,,, | Performed by: DERMATOLOGY

## 2017-02-21 PROCEDURE — 99024 POSTOP FOLLOW-UP VISIT: CPT | Mod: ,,, | Performed by: DERMATOLOGY

## 2017-02-21 PROCEDURE — 99212 OFFICE O/P EST SF 10 MIN: CPT | Mod: PBBFAC | Performed by: DERMATOLOGY

## 2017-02-21 NOTE — PROGRESS NOTES
71 y.o. female patient is here for suture removal following Mohs' surgery.    Patient reports no problems.    WOUND PE:  The R dorsal hand sutures intact. Wound healing well. Good skin edges. No signs or symptoms of infection.    IMPRESSION:  Healing operative site from Mohs' surgery, SCC R dorsal hand s/p Mohs with CLC, postop day #14.    PLAN:  Sutures removed today. Steri-strips applied.  Continue wound care.  Keep moist with Aquaphor.    RTC:  In 3-6 months with Rehana Canales MD for skin check or sooner if new concern arises.

## 2017-05-01 ENCOUNTER — OFFICE VISIT (OUTPATIENT)
Dept: CARDIOLOGY | Facility: CLINIC | Age: 72
End: 2017-05-01
Payer: MEDICARE

## 2017-05-01 VITALS
HEART RATE: 65 BPM | SYSTOLIC BLOOD PRESSURE: 126 MMHG | DIASTOLIC BLOOD PRESSURE: 67 MMHG | WEIGHT: 157.44 LBS | BODY MASS INDEX: 28.97 KG/M2 | HEIGHT: 62 IN

## 2017-05-01 DIAGNOSIS — R53.83 FATIGUE, UNSPECIFIED TYPE: ICD-10-CM

## 2017-05-01 DIAGNOSIS — E78.2 MIXED HYPERLIPIDEMIA: ICD-10-CM

## 2017-05-01 DIAGNOSIS — R29.818 SUSPECTED SLEEP APNEA: ICD-10-CM

## 2017-05-01 DIAGNOSIS — I25.10 CORONARY ARTERY DISEASE INVOLVING NATIVE CORONARY ARTERY WITHOUT ANGINA PECTORIS, UNSPECIFIED WHETHER NATIVE OR TRANSPLANTED HEART: ICD-10-CM

## 2017-05-01 DIAGNOSIS — E03.9 PRIMARY HYPOTHYROIDISM: ICD-10-CM

## 2017-05-01 DIAGNOSIS — R07.9 CHEST PAIN OF UNKNOWN ETIOLOGY: Primary | ICD-10-CM

## 2017-05-01 PROCEDURE — 99214 OFFICE O/P EST MOD 30 MIN: CPT | Mod: S$PBB,,, | Performed by: NURSE PRACTITIONER

## 2017-05-01 PROCEDURE — 99999 PR PBB SHADOW E&M-EST. PATIENT-LVL III: CPT | Mod: PBBFAC,,, | Performed by: NURSE PRACTITIONER

## 2017-05-01 PROCEDURE — 99213 OFFICE O/P EST LOW 20 MIN: CPT | Mod: PBBFAC | Performed by: NURSE PRACTITIONER

## 2017-05-01 RX ORDER — HYDROGEN PEROXIDE 3 %
20 SOLUTION, NON-ORAL MISCELLANEOUS
COMMUNITY
End: 2018-05-11

## 2017-05-01 NOTE — PROGRESS NOTES
"Ms. Palencia is a patient of Dr. White and was last seen in [facility] 8/2/16.    Subjective:   Patient ID:  Lissy Palencia is a 72 y.o. female who presents for follow-up of Coronary Artery Disease (6 month f/u ); Chest Pain (tightness); Fatigue; and Leg Problem (cramping)    HPI   Ms. Palencia is a 72y.o.  female with h/o CAD with atherectomy and possible stent placement in 1997.  Now c/o chestpain starting below breasts and going around to back assoc with gas and flatus. Gets mild dyspnea with it.  Lasts up to an hour and occurs 2-3 times a week. Not associated with other sx or exertion. She does note that stress can make it worse or bring the pain on.  The pain that she had prior to stent was a sharp sticking pain under left breast.  She is exercising 3 times a week, walking a mile and using resistance machines.  Patient denies, palpitations, SOB, VALLE, dizziness, syncope, edema, orthopnea, PND, or claudication.  She had an abnormal stress echo on 7/27/16: "Positive stress echocardiographic study demonstrating an ischemic response involving the mid anteroseptum, apical septum, apical inferior wall, mid inferior wall, basal inferior wall. Non-specific finding demonstrating failure to augment involving the anterolateral wall which may be associated with ischemia; clinical correlation required."  Her follow up PET stress test on 8/16/16 was negative for ischemia.  She reports significant fatigue that she has to push herself to get things done during the day. She can fall asleep watching tv during the day very easily.  says that she snores. She does have a h/o hypothyroidism but her last TSH was normal. She also continues to grieve her mother's passing and reports some regrets.  Her mother passed 2 years ago. Patient became teary with talking about her mother. States that Dr. Luevano did prescribe something for depression a year ago but she didn't take it.  She was also recommended to take 3mg " melatonin for sleep by Dr. Luevano but she was nervous that it was going to be habit forming and hasn't tried it. States that she has no trouble falling asleep but does wake frequently and has difficulty falling back to sleep.      Cardiac risk factors: hyperlipidemia, positive family history, sedentary lifestyle, a history of coronary artery disease    Review of Systems   Constitution: Positive for malaise/fatigue. Negative for decreased appetite, diaphoresis, weakness, weight gain and weight loss.   HENT: Negative for headaches.    Eyes: Negative for visual disturbance.   Cardiovascular: Positive for chest pain. Negative for claudication, dyspnea on exertion, irregular heartbeat, leg swelling, near-syncope, orthopnea, palpitations, paroxysmal nocturnal dyspnea and syncope.        Denies chest pressure   Respiratory: Positive for snoring. Negative for cough, hemoptysis, shortness of breath and sleep disturbances due to breathing.    Endocrine: Negative for cold intolerance and heat intolerance.   Hematologic/Lymphatic: Negative for bleeding problem. Does not bruise/bleed easily.   Musculoskeletal: Negative for myalgias.   Gastrointestinal: Negative for bloating, abdominal pain, anorexia, change in bowel habit, constipation, diarrhea, nausea and vomiting.   Neurological: Positive for excessive daytime sleepiness. Negative for difficulty with concentration, disturbances in coordination, dizziness, light-headedness, loss of balance and numbness.   Psychiatric/Behavioral: The patient does not have insomnia.      Allergies and current medications updated and reviewed:  Review of patient's allergies indicates:   Allergen Reactions    Compazine  [prochlorperazine edisylate]      Other reaction(s): SPASMS    Parafon forte      Other reaction(s): Hives     Current Outpatient Prescriptions   Medication Sig    aspirin 81 MG Chew Take 81 mg by mouth once daily.      atorvastatin (LIPITOR) 40 MG tablet Take 1 tablet (40  "mg total) by mouth once daily.    co-enzyme Q-10 30 mg capsule Take 30 mg by mouth 3 (three) times daily.    esomeprazole (NEXIUM) 20 MG capsule Take 20 mg by mouth before breakfast.    levothyroxine (SYNTHROID) 88 MCG tablet TAKE 1 TABLET EVERY MORNING BEFORE BREAKFAST    metformin (GLUCOPHAGE-XR) 500 MG 24 hr tablet Take 1 tablet (500 mg total) by mouth daily with breakfast.    nitroGLYCERIN (NITROSTAT) 0.4 MG SL tablet Place 1 tablet (0.4 mg total) under the tongue every 5 (five) minutes as needed for Chest pain.    conjugated estrogens (PREMARIN) vaginal cream Place 0.5 g vaginally twice a week.    melatonin 3 mg Tab Take 1 tablet (3 mg total) by mouth nightly.     No current facility-administered medications for this visit.      Objective:     Right Arm BP - Sittin/67 (17 1059)  Left Arm BP - Sittin/67 (17 1059)    /67 (BP Location: Left arm, Patient Position: Sitting, BP Method: Automatic)  Pulse 65  Ht 5' 2" (1.575 m)  Wt 71.4 kg (157 lb 6.5 oz)  BMI 28.79 kg/m2    Physical Exam   Constitutional: She is oriented to person, place, and time. Vital signs are normal. She appears well-developed and well-nourished. She is active. No distress.   HENT:   Head: Normocephalic and atraumatic.   Eyes: Conjunctivae and lids are normal. No scleral icterus.   Neck: Neck supple. Normal carotid pulses, no hepatojugular reflux and no JVD present. Carotid bruit is not present.   Cardiovascular: Normal rate, regular rhythm, S1 normal, S2 normal and intact distal pulses.  PMI is not displaced.  Exam reveals no gallop and no friction rub.    No murmur heard.  Pulses:       Carotid pulses are 2+ on the right side, and 2+ on the left side.       Radial pulses are 2+ on the right side, and 2+ on the left side.        Dorsalis pedis pulses are 2+ on the right side, and 2+ on the left side.        Posterior tibial pulses are 1+ on the right side, and 1+ on the left side.   Pulmonary/Chest: " Effort normal and breath sounds normal. No respiratory distress. She has no decreased breath sounds. She has no wheezes. She has no rhonchi. She has no rales. She exhibits no tenderness.   Abdominal: Soft. Normal appearance and bowel sounds are normal. She exhibits no distension, no fluid wave, no abdominal bruit, no ascites and no pulsatile midline mass. There is no hepatosplenomegaly. There is no tenderness.   Musculoskeletal: She exhibits no edema.   Neurological: She is alert and oriented to person, place, and time. Gait normal.   Skin: Skin is warm, dry and intact. No rash noted. She is not diaphoretic. Nails show no clubbing.   Numerous spider veins and a varicose vein located on left anterior tibialis. Numerous spider veins on right anterior tibialis.    Psychiatric: She has a normal mood and affect. Her speech is normal and behavior is normal. Judgment and thought content normal. Cognition and memory are normal.   Nursing note and vitals reviewed.      Chemistry        Component Value Date/Time     09/28/2016 0702    K 4.7 09/28/2016 0702     09/28/2016 0702    CO2 24 09/28/2016 0702    BUN 20 09/28/2016 0702    CREATININE 0.8 09/28/2016 0702     (H) 09/28/2016 0702        Component Value Date/Time    CALCIUM 9.4 09/28/2016 0702    ALKPHOS 113 09/28/2016 0702    AST 20 09/28/2016 0702    ALT 17 09/28/2016 0702    BILITOT 0.5 09/28/2016 0702          Recent Labs  Lab 09/28/16  0702   WHITE BLOOD CELL COUNT 7.77   HEMOGLOBIN 14.5   HEMATOCRIT 42.2   MCV 83   PLATELETS 172   TSH 1.296   CHOLESTEROL 177   HDL 45   LDL CHOLESTEROL 109.0   TRIGLYCERIDES 115   HDL/CHOLESTEROL RATIO 25.4          Lab Results   Component Value Date    TSH 1.296 09/28/2016     Test(s) Reviewed  I have reviewed the following in detail:  [x] Stress test   [] Angiography   [x] Echocardiogram   [x] Labs   [] Other:       Assessment:     1. Chest pain of unknown etiology  Non-cardiac chest pain. Pain has not changed in  the last year, is not associated with exertion, and she had a normal PET stress test.   2. Coronary artery disease involving native coronary artery without angina pectoris, unspecified whether native or transplanted heart  GDMT: Statin and ASA   3. Mixed hyperlipidemia  Taking high intensity statin therapy, Atorvastatin 40mg. LDL not at goal on 9/28/16, .  LDL goal<100   4. Suspected sleep apnea  Reports multiple sx associated with HEMAL.    5. Fatigue, unspecified type  Likely r/t HEMAL. TSH wnl 9/2016 so it's not likely to be hypothyroidism. Poor sleep hygiene and wakes frequently. Able to get to sleep but not stay asleep.   6. Primary hypothyroidism  stable      Plan:     Chest pain of unknown etiology    Coronary artery disease involving native coronary artery without angina pectoris, unspecified whether native or transplanted heart    Mixed hyperlipidemia    Suspected sleep apnea  -     Ambulatory consult to Sleep Disorders    Fatigue, unspecified type    Primary hypothyroidism    Ms. Palencia is continuing to have intermittent chest pain that has not changed since August 2016.  It is not likely to be cardiac in origin as her PET stress test was negative in 8/2016.  She is on GDMT for her CAD and hyperlipidemia.  She should have a lipid panel and bmp drawn in 6 months when she returns.  Discussed sleep hygiene and use of melatonin to return to sleep.  She was given written information to support teaching. Encouraged to continue to exercise and to increase exercise to 4-5 times a week if possible.  Discussed sleep apnea and need for sleep clinic referral.  Sleep apnea results in sudden drops in blood oxygen levels and increases blood pressure and strains the cardiovascular system.  HEMAL may increase the risk of recurrent heart attack and arrhythmias such as atrial fibrillation.  Stroke risk is increased by untreated HEMAL.  If there's underlying heart disease, these multiple episodes hypoxia or hypoxemia can lead  to sudden death from an irregular heartbeat.  Additionally, the repeated night time awakenings contribute to daytime sleepiness, irritability, and fatigue. Poor sleep contributes to decreased concentration and increased risk for motor vehicle accidents.  Return in about 6 months (around 11/1/2017).

## 2017-05-01 NOTE — MR AVS SNAPSHOT
[facility] - Cardiology  [address]  [facility] [address]  Phone: [phone]                  Lissy Palencia   2017 11:00 AM   Office Visit    Description:  Female : 1945   Provider:  Dai Anaya NP   Department:  [facility]           Reason for Visit     Coronary Artery Disease     Chest Pain     Fatigue     Leg Problem           Diagnoses this Visit        Comments    Suspected sleep apnea    -  Primary     Coronary artery disease involving native coronary artery without angina pectoris, unspecified whether native or transplanted heart         Mixed hyperlipidemia         Chest pain of unknown etiology         Fatigue, unspecified type         Primary hypothyroidism                To Do List           Goals (5 Years of Data)     None      Follow-Up and Disposition     Return in about 6 months (around 2017).      [facility]     [facility] Nurse Care Line -  Assistance  Unless otherwise directed by your provider, please contact [facility], our nurse care line that is available for  assistance.     Registered nurses in the [facility] On Call Center provide: appointment scheduling, clinical advisement, health education, and other advisory services.  Call: [phone] (toll free)               Medications           Message regarding Medications     Verify the changes and/or additions to your medication regime listed below are the same as discussed with your clinician today.  If any of these changes or additions are incorrect, please notify your healthcare provider.             Verify that the below list of medications is an accurate representation of the medications you are currently taking.  If none reported, the list may be blank. If incorrect, please contact your healthcare provider. Carry this list with you in case of emergency.           Current Medications     aspirin 81 MG Chew Take 81 mg by mouth once daily.      atorvastatin (LIPITOR) 40 MG  "tablet Take 1 tablet (40 mg total) by mouth once daily.    co-enzyme Q-10 30 mg capsule Take 30 mg by mouth 3 (three) times daily.    esomeprazole (NEXIUM) 20 MG capsule Take 20 mg by mouth before breakfast.    levothyroxine (SYNTHROID) 88 MCG tablet TAKE 1 TABLET EVERY MORNING BEFORE BREAKFAST    metformin (GLUCOPHAGE-XR) 500 MG 24 hr tablet Take 1 tablet (500 mg total) by mouth daily with breakfast.    nitroGLYCERIN (NITROSTAT) 0.4 MG SL tablet Place 1 tablet (0.4 mg total) under the tongue every 5 (five) minutes as needed for Chest pain.    conjugated estrogens (PREMARIN) vaginal cream Place 0.5 g vaginally twice a week.    melatonin 3 mg Tab Take 1 tablet (3 mg total) by mouth nightly.           Clinical Reference Information           Your Vitals Were     BP Pulse Height Weight BMI    126/67 (BP Location: Left arm, Patient Position: Sitting, BP Method: Automatic) 65 5' 2" (1.575 m) 71.4 kg (157 lb 6.5 oz) 28.79 kg/m2      Blood Pressure          Most Recent Value    Right Arm BP - Sitting  125/67    Left Arm BP - Sitting  126/67    BP  126/67      Allergies as of 5/1/2017     Compazine  [Prochlorperazine Edisylate]    Parafon Forte      Immunizations Administered on Date of Encounter - 5/1/2017     None      Orders Placed During Today's Visit      Normal Orders This Visit    Ambulatory consult to Sleep Disorders       [facility] Sign-Up     Activating your [facility] account is as easy as 1-2-3!     1) Visit [email or web address], select Sign Up Now, enter this activation code and your date of birth, then select Next.  [email or web address]  Expires: 6/15/2017 10:53 AM      2) Create a username and password to use when you visit [facility] in the future and select a security question in case you lose your password and select Next.    3) Enter your e-mail address and click Sign Up!    Additional Information  If you have questions, please e-mail [email or web address] or call [phone] to talk to our [facility] staff. " Remember, [facility] is NOT to be used for urgent needs. For medical emergencies, dial 911.         Language Assistance Services     ATTENTION: Language assistance services are available, free of charge. Please call [phone].      ATENCIÓN: Si habla frances, tiene a woody disposición servicios gratuitos de asistencia lingüística. Llame al [phone].     SHERWIN Ý: N?u b?n nói Ti?ng Vi?t, có các d?ch v? h? tr? ngôn ng? mi?n phí dành cho b?n. G?i s? [phone].         [facility] complies with applicable Federal civil rights laws and does not discriminate on the basis of race, color, national origin, age, disability, or sex.

## 2017-05-23 RX ORDER — ATORVASTATIN CALCIUM 40 MG/1
TABLET, FILM COATED ORAL
Qty: 90 TABLET | Refills: 10 | Status: SHIPPED | OUTPATIENT
Start: 2017-05-23 | End: 2019-01-15 | Stop reason: SDUPTHER

## 2017-06-01 RX ORDER — LEVOTHYROXINE SODIUM 88 UG/1
TABLET ORAL
Qty: 90 TABLET | Refills: 3 | Status: SHIPPED | OUTPATIENT
Start: 2017-06-01 | End: 2018-08-02 | Stop reason: SDUPTHER

## 2017-07-07 ENCOUNTER — TELEPHONE (OUTPATIENT)
Dept: INTERNAL MEDICINE | Facility: CLINIC | Age: 72
End: 2017-07-07

## 2017-07-07 DIAGNOSIS — Z12.31 ENCOUNTER FOR SCREENING MAMMOGRAM FOR MALIGNANT NEOPLASM OF BREAST: Primary | ICD-10-CM

## 2017-07-07 NOTE — TELEPHONE ENCOUNTER
----- Message from Debby Baldwin sent at 7/7/2017 10:04 AM CDT -----  Contact: Bryon/Hector/[phone] home/[phone] cell  Type: Orders Request    What orders/ testing are being requested?Mammogram    Is there a future appointment scheduled for the patient with PCP?Yes    When?8/1/2017    Comments:pt would like to have her Mammogram done the first available Wednesday in the morning. Please call and advise        Thanks!!!

## 2017-07-19 ENCOUNTER — HOSPITAL ENCOUNTER (OUTPATIENT)
Dept: RADIOLOGY | Facility: HOSPITAL | Age: 72
Discharge: HOME | End: 2017-07-19
Attending: INTERNAL MEDICINE
Payer: MEDICARE

## 2017-07-19 VITALS — BODY MASS INDEX: 29.44 KG/M2 | HEIGHT: 62 IN | WEIGHT: 160 LBS

## 2017-07-19 DIAGNOSIS — Z12.31 ENCOUNTER FOR SCREENING MAMMOGRAM FOR MALIGNANT NEOPLASM OF BREAST: ICD-10-CM

## 2017-07-19 PROCEDURE — 77067 SCR MAMMO BI INCL CAD: CPT | Mod: 26,,, | Performed by: RADIOLOGY

## 2017-07-19 PROCEDURE — 77067 SCR MAMMO BI INCL CAD: CPT | Mod: TC

## 2017-07-19 PROCEDURE — 77063 BREAST TOMOSYNTHESIS BI: CPT | Mod: 26,,, | Performed by: RADIOLOGY

## 2017-07-26 ENCOUNTER — OFFICE VISIT (OUTPATIENT)
Dept: SLEEP MEDICINE | Facility: CLINIC | Age: 72
End: 2017-07-26
Payer: MEDICARE

## 2017-07-26 VITALS
HEART RATE: 60 BPM | SYSTOLIC BLOOD PRESSURE: 121 MMHG | HEIGHT: 62 IN | WEIGHT: 160 LBS | DIASTOLIC BLOOD PRESSURE: 72 MMHG | BODY MASS INDEX: 29.44 KG/M2

## 2017-07-26 DIAGNOSIS — G47.30 SLEEP APNEA, UNSPECIFIED TYPE: Primary | ICD-10-CM

## 2017-07-26 PROCEDURE — 1159F MED LIST DOCD IN RCRD: CPT | Mod: ,,, | Performed by: PSYCHIATRY & NEUROLOGY

## 2017-07-26 PROCEDURE — 99213 OFFICE O/P EST LOW 20 MIN: CPT | Mod: PBBFAC,PO | Performed by: PSYCHIATRY & NEUROLOGY

## 2017-07-26 PROCEDURE — 99204 OFFICE O/P NEW MOD 45 MIN: CPT | Mod: S$PBB,,, | Performed by: PSYCHIATRY & NEUROLOGY

## 2017-07-26 PROCEDURE — 1126F AMNT PAIN NOTED NONE PRSNT: CPT | Mod: ,,, | Performed by: PSYCHIATRY & NEUROLOGY

## 2017-07-26 PROCEDURE — 99999 PR PBB SHADOW E&M-EST. PATIENT-LVL III: CPT | Mod: PBBFAC,,, | Performed by: PSYCHIATRY & NEUROLOGY

## 2017-07-26 NOTE — PROGRESS NOTES
Lissy Palencia  was seen at the request of  Dai Anaya NP for sleep evaluation.    07/26/2017 INITIAL HISTORY OF PRESENT ILLNESS:  Lissy Palencia is a 72 y.o. female is here to be evaluated for a sleep disorder.       CHIEF COMPLAINT:      The patient's complaints include excessive daytime sleepiness, excessive daytime fatigue, snoring and interrupted sleep since  Several years ago - worse in the last 2 years.  Reports very interrupted sleep. Gets out of bed when unable to sleep. Bedroom is cool and quit.    Denies  dry mouth and sore throat  Denies nasal congestion   Denies  morning headaches  Reports  interrupted sleep  Denies frequent leg movements  Denies symptoms concerning for parasomnia    The ESS ([place] Sleepiness Score) taken on initial visit is 6 /24    The patient never had tonsillectomy, adenoidectomy or UPPP     Wearing a retainer to keep her teeth straight      SLEEP ROUTINE AND LIFESTYLE 07/26/2017 :    Occupation:    Bed partner: her      Time to bed - wake up time on a workday : 11 PM to 6 AM  Time to bed - wake up time on a day off: 11 PM to  6 AM  Sleep onset latency: 1-2  Disruptions or awakenings: 1-3  Time to fall back into sleep: 1   Perceived sleep quality: 1/5  Perceived total sleep time:  5-6  hours.  Daytime naps: 0 - but would sometimes fall asleep accidentally during the day    Exercise routine: yes  Caffeine:  1 cup in AM and 1 cup 8 PM sometimes     PREVIOUS SLEEP STUDIES:     None      DME:       PAST MEDICAL HISTORY:    Active Ambulatory Problems     Diagnosis Date Noted    Hyperlipidemia, low HDL, high triglycerides.     Chronic low back pain 09/26/2012    Osteoarthritis 04/10/2013    CAD (coronary artery disease), native coronary artery, s/p PCI atherectomy to LAD 1997. 04/10/2013    Hand arthritis 07/30/2014    Trigger finger, right 3rd finger 07/30/2014    Fatigue 02/10/2015    Headache(784.0) 02/10/2015    Right groin pain 06/30/2015    Right sciatic  nerve pain 06/30/2015    Primary hypothyroidism 11/05/2015    Cystocele 01/19/2016    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    Rectocele 01/19/2016    Vaginal atrophy 01/19/2016    Constipation - functional 03/17/2016    Osteopenia 06/22/2016    Positive cardiac stress test 07/27/2016    Chest pain of unknown etiology 07/27/2016    Prediabetes 10/05/2016    Hyperglycemia 01/11/2017    Screen for colon cancer, polyp 2015, next scope 2020 01/11/2017    Suspected sleep apnea 05/01/2017     Resolved Ambulatory Problems     Diagnosis Date Noted    Right shoulder pain 10/09/2012    Hyperglycemia 04/09/2013    Hypothyroidism, subclinical. 04/09/2013    Dry skin 04/10/2013    Screening for breast cancer 04/10/2013    RUQ abdominal pain 07/10/2013    Chronic cholecystitis without calculus 07/22/2013    Biliary colic 08/16/2013    Allergic dermatitis due to poison ivy 02/26/2014    Leg pain 07/30/2014    Scalp itch 07/30/2014    Screen for colon cancer 02/10/2015    Screening 04/10/2015    Annual physical exam 06/30/2015    Need for pneumococcal vaccination 06/30/2015    Breast pain 06/30/2015    Skin lesion 06/30/2015    Nose abnormality 06/30/2015    Screening mammogram for high-risk patient 06/30/2015    Hemorrhoid prolapse 01/19/2016    Chronic constipation 01/19/2016    HLD (hyperlipidemia) 03/16/2016    Prolapsed internal hemorrhoids, grade 3 03/17/2016    Sunburn 05/09/2016    On statin therapy due to risk of future cardiovascular event 10/05/2016     Past Medical History:   Diagnosis Date    Allergy     Arthritis     Coronary artery disease     GERD (gastroesophageal reflux disease)     Hemorrhoids     History of cholelithiasis     Hyperlipidemia     Hypothyroidism     Right shoulder pain 10/9/2012    Trigger finger, right 3rd finger 7/30/2014                PAST SURGICAL HISTORY:    Past Surgical History:   Procedure Laterality Date    ABLATION COLPOCLESIS       ADENOIDECTOMY      CHOLECYSTECTOMY      CORONARY STENT PLACEMENT      heart cath  2005    non-obstructive disease    OOPHORECTOMY      SHOULDER ARTHROSCOPY W/ ROTATOR CUFF REPAIR      Right    TONSILLECTOMY      TOTAL ABDOMINAL HYSTERECTOMY      with BSO    TUBAL LIGATION           FAMILY HISTORY:                Family History   Problem Relation Age of Onset    Breast cancer Neg Hx     Ovarian cancer Neg Hx     Glaucoma Neg Hx     Cataracts Neg Hx     Diabetes Neg Hx     Macular degeneration Neg Hx     Retinal detachment Neg Hx     Melanoma Neg Hx     Cervical cancer Neg Hx     Endometrial cancer Neg Hx     Vaginal cancer Neg Hx        SOCIAL HISTORY:          Tobacco:   History   Smoking Status    Never Smoker   Smokeless Tobacco    Never Used       alcohol use:    History   Alcohol Use No                   ALLERGIES:    Review of patient's allergies indicates:   Allergen Reactions    Compazine  [prochlorperazine edisylate]      Other reaction(s): SPASMS    Parafon forte      Other reaction(s): Hives       CURRENT MEDICATIONS:    Current Outpatient Prescriptions   Medication Sig Dispense Refill    aspirin 81 MG Chew Take 81 mg by mouth once daily.        atorvastatin (LIPITOR) 40 MG tablet TAKE 1 TABLET DAILY 90 tablet 10    co-enzyme Q-10 30 mg capsule Take 30 mg by mouth 3 (three) times daily.      conjugated estrogens (PREMARIN) vaginal cream Place 0.5 g vaginally twice a week. 30 g 12    esomeprazole (NEXIUM) 20 MG capsule Take 20 mg by mouth before breakfast.      levothyroxine (SYNTHROID) 88 MCG tablet TAKE 1 TABLET EVERY MORNING BEFORE BREAKFAST 90 tablet 3    melatonin 3 mg Tab Take 1 tablet (3 mg total) by mouth nightly. 30 tablet 0    metformin (GLUCOPHAGE-XR) 500 MG 24 hr tablet Take 1 tablet (500 mg total) by mouth daily with breakfast. 90 tablet 3    nitroGLYCERIN (NITROSTAT) 0.4 MG SL tablet Place 1 tablet (0.4 mg total) under the tongue every 5 (five) minutes as needed  "for Chest pain. 25 tablet 12     No current facility-administered medications for this visit.                       REVIEW OF SYSTEMS:   Sleep related symptoms as per HPI    reports weight gain  Denies dyspnea  Denies palpitations  Denies acid reflux   Denies polyuria  Denies  mood diturbance  Denies  anemia  Denies  muscle pain  Denies  Gait imbalance    Otherwise, a balance of 10 systems reviewed is negative.    PHYSICAL EXAM:  /72 (BP Location: Left arm, Patient Position: Lying, BP Method: Automatic)   Pulse 60   Ht 5' 2" (1.575 m)   Wt 72.6 kg (160 lb)   BMI 29.26 kg/m²   GENERAL: Overweight body habitus, well groomed.  HEENT:   HEENT:  Conjunctivae are non-erythematous; Pupils equal, round, and reactive to light; Nose is symmetrical; Nasal mucosa is pink and moist; Septum is midline; Inferior turbinates are normal; Nasal airflow is normal; Posterior pharynx is pink; Modified Mallampati:III-IV; Posterior palate is low; Tonsils not visualized; Uvula is wide and elongated;Tongue is enlarged; Dentition is fair; No TMJ tenderness; Jaw opening and protrusion without click and without discomfort.  NECK: Supple. Neck circumference is 16 inches. No thyromegaly. No palpable nodes.     SKIN: On face and neck: No abrasions, no rashes, no lesions.  No subcutaneous nodules are palpable.  RESPIRATORY: Chest is clear to auscultation.  Normal chest expansion and non-labored breathing at rest.  CARDIOVASCULAR: Normal S1, S2.  No murmurs, gallops or rubs. No carotid bruits bilaterally.  No edema. No clubbing. No cyanosis.    NEURO: Oriented to time, place and person. Normal attention span and concentration. Gait normal.    PSYCH: Affect is full. Mood is normal  MUSCULOSKELETAL: Moves 4 extremities. Gait normal.         Using [facility]: pending      ASSESSMENT:    1. Sleep Apnea NEC. The patient symptomatically has  excessive daytime sleepiness, snoring, excessive daytime fatigue and interrupted sleep  with exam " "findings of "a crowded oral airway and elevated body mass index. The patient has medical co-morbidities of hyperlipidemia, borderline blood sugar  which can be worsened by HEMAL. This warrants .  2. Insomnia NEC. Multi-factorial -  excess time in bed, poor sleep hygiene, and likely paradoxical insomnia play a role            PLAN:    Diagnostic: Polysomnogram in lab. The nature of this procedure and its indication was discussed with the patient. she would  like to come discuss PSG results.    Weight loss strategies were discussed in detail       Following recommendations were given in the AVS:     PJ's African Nectar and Hibiscus instead of coffee in the evening, or a glass of hot milk. Can use honey with it. Please avoid coffee after 4 PM.     Put Night Shift on settings -- to go orange after 6 PM or use Orange - blue light filters    Please avoid daylight lamps at night.    SLEEP LAB (Paige or Ricky) will contact you to schedulethe sleep study. Their number is [phone] (ext 1). Please call them if you do not hear from them in 10 business days from now.  The [facility] is located on 7th floor of the [facility]; [place] lab is located in [facility].    SLEEP CLINIC (my assistant) will call you when the sleep study results are ready - if you have not heard from us by 2 weeks from the date of the study, please call [phone] (ext 2) or you can use [facility] to contact me.    You are advised to abstain from driving should you feel sleepy or drowsy.    More than 25 minutes of this 45 minutes visit was spent in counseling: during our discussion today, we talked about the etiology of  HEMAL as well as the potential ramifications of untreated sleep apnea, which could include daytime sleepiness, hypertension, heart disease and/or stroke.  We discussed potential treatment options, which could include weight loss, body positioning, continuous positive airway pressure (CPAP), or referral for surgical " consideration. Meanwhile, she  is urged to avoid supine sleep, weight gain and alcoholic beverages since all of these can worsen HEMAL.     Precautions: The patient was advised to abstain from driving should he feel sleepy or drowsy.    Follow up: MD/NP  after the sleep study has been completed.     Thank you for allowing me the opportunity to participate in the care of your patient.    This visit summary will be sent to referring provider via inbasket

## 2017-07-26 NOTE — PATIENT INSTRUCTIONS
PJ's  Nectar and Hibiscus instead of coffee in the evening, or a glass of hot milk. Can use honey with it. Please avoid coffee after 4 PM.     Put Night Shift on settings -- to go orange after 6 PM or use Orange - blue light filters    Please avoid daylight lamps at night.    SLEEP LAB (Paige or Ricky) will contact you to schedulethe sleep study. Their number is [phone] (ext 1). Please call them if you do not hear from them in 10 business days from now.  The [facility] Lab is located on 7th floor of the [facility]; [place] lab is located in [facility].    SLEEP CLINIC (my assistant) will call you when the sleep study results are ready - if you have not heard from us by 2 weeks from the date of the study, please call [phone] (ext 2) or you can use [facility] to contact me.    You are advised to abstain from driving should you feel sleepy or drowsy.

## 2017-07-31 ENCOUNTER — TELEPHONE (OUTPATIENT)
Dept: SLEEP MEDICINE | Facility: OTHER | Age: 72
End: 2017-07-31

## 2017-08-01 ENCOUNTER — LAB VISIT (OUTPATIENT)
Dept: LAB | Facility: HOSPITAL | Age: 72
End: 2017-08-01
Attending: INTERNAL MEDICINE
Payer: MEDICARE

## 2017-08-01 ENCOUNTER — OFFICE VISIT (OUTPATIENT)
Dept: INTERNAL MEDICINE | Facility: CLINIC | Age: 72
End: 2017-08-01
Payer: MEDICARE

## 2017-08-01 VITALS
BODY MASS INDEX: 30.43 KG/M2 | DIASTOLIC BLOOD PRESSURE: 68 MMHG | WEIGHT: 161.19 LBS | HEIGHT: 61 IN | SYSTOLIC BLOOD PRESSURE: 132 MMHG | HEART RATE: 66 BPM

## 2017-08-01 DIAGNOSIS — R73.03 PREDIABETES: ICD-10-CM

## 2017-08-01 DIAGNOSIS — I25.10 CORONARY ARTERY DISEASE INVOLVING NATIVE CORONARY ARTERY WITHOUT ANGINA PECTORIS, UNSPECIFIED WHETHER NATIVE OR TRANSPLANTED HEART: ICD-10-CM

## 2017-08-01 DIAGNOSIS — R73.03 PREDIABETES: Primary | ICD-10-CM

## 2017-08-01 DIAGNOSIS — R73.9 HYPERGLYCEMIA: ICD-10-CM

## 2017-08-01 DIAGNOSIS — R29.818 SUSPECTED SLEEP APNEA: ICD-10-CM

## 2017-08-01 DIAGNOSIS — E78.2 MIXED HYPERLIPIDEMIA: ICD-10-CM

## 2017-08-01 DIAGNOSIS — R07.9 CHEST PAIN OF UNKNOWN ETIOLOGY: ICD-10-CM

## 2017-08-01 DIAGNOSIS — E55.9 VITAMIN D DEFICIENCY: ICD-10-CM

## 2017-08-01 DIAGNOSIS — E03.9 PRIMARY HYPOTHYROIDISM: ICD-10-CM

## 2017-08-01 LAB
ESTIMATED AVG GLUCOSE: 120 MG/DL
HBA1C MFR BLD HPLC: 5.8 %
TSH SERPL DL<=0.005 MIU/L-ACNC: 0.48 UIU/ML

## 2017-08-01 PROCEDURE — 99999 PR PBB SHADOW E&M-EST. PATIENT-LVL III: CPT | Mod: PBBFAC,,, | Performed by: INTERNAL MEDICINE

## 2017-08-01 PROCEDURE — 1159F MED LIST DOCD IN RCRD: CPT | Mod: ,,, | Performed by: INTERNAL MEDICINE

## 2017-08-01 PROCEDURE — 36415 COLL VENOUS BLD VENIPUNCTURE: CPT

## 2017-08-01 PROCEDURE — 1126F AMNT PAIN NOTED NONE PRSNT: CPT | Mod: ,,, | Performed by: INTERNAL MEDICINE

## 2017-08-01 PROCEDURE — 99214 OFFICE O/P EST MOD 30 MIN: CPT | Mod: S$PBB,,, | Performed by: INTERNAL MEDICINE

## 2017-08-01 PROCEDURE — 84443 ASSAY THYROID STIM HORMONE: CPT

## 2017-08-01 PROCEDURE — 83036 HEMOGLOBIN GLYCOSYLATED A1C: CPT

## 2017-08-01 RX ORDER — NAPROXEN SODIUM 220 MG/1
81 TABLET, FILM COATED ORAL DAILY
Qty: 100 TABLET | Refills: 4 | COMMUNITY
Start: 2017-08-01 | End: 2022-09-12

## 2017-08-01 NOTE — PROGRESS NOTES
Subjective:       Patient ID: Lissy Palencia is a 72 y.o. female.    Chief Complaint: Follow-up  First she said she didn't try metformin, then she said she did and that she has been taking 1 tablet daily for about 2 or 3 months she's not sure.  She still concerned about chest pain although she's had a negative PET stress  She is going to have sleep study done tomorrow  She continues to have pain and occasional swelling in her right ankle which has been a chronic intermittent problem  She's noticed a slight tremor occasionally in her head which she thinks is anxiety related  She did not check her labs before this visit  She had her right breast on a car door, she thinks it's okay and doesn't want a breast exam  HPI  Review of Systems   Constitutional: Negative for activity change, appetite change, chills, fatigue, fever and unexpected weight change.   HENT: Negative for hearing loss.    Eyes: Negative for visual disturbance.   Respiratory: Negative for cough, chest tightness, shortness of breath and wheezing.    Cardiovascular: Negative for chest pain, palpitations and leg swelling.   Gastrointestinal: Negative for abdominal pain, constipation, nausea and vomiting.   Genitourinary: Negative for dysuria, frequency and urgency.   Musculoskeletal: Negative for arthralgias, back pain, gait problem, joint swelling and myalgias.   Skin: Negative for rash.   Neurological: Negative for light-headedness and headaches.   Psychiatric/Behavioral: Negative for dysphoric mood and sleep disturbance. The patient is not nervous/anxious.        Objective:      Physical Exam   Constitutional: She is oriented to person, place, and time. She appears well-developed and well-nourished. No distress.   HENT:   Head: Normocephalic and atraumatic.   Right Ear: External ear normal.   Left Ear: External ear normal.   Nose: Nose normal.   Mouth/Throat: Oropharynx is clear and moist. No oropharyngeal exudate.   Eyes: Conjunctivae and EOM are  normal. Pupils are equal, round, and reactive to light. Right eye exhibits no discharge. No scleral icterus.   Neck: Normal range of motion and full passive range of motion without pain. Neck supple. No spinous process tenderness and no muscular tenderness present. Carotid bruit is not present. No thyromegaly present.   Cardiovascular: Normal rate, regular rhythm, S1 normal, S2 normal, normal heart sounds and intact distal pulses.  Exam reveals no gallop and no friction rub.    No murmur heard.  Pulmonary/Chest: Effort normal and breath sounds normal. No respiratory distress. She has no wheezes. She has no rales. She exhibits no tenderness.   Abdominal: Soft. Bowel sounds are normal. She exhibits no distension and no mass. There is no tenderness. There is no rebound and no guarding.   Genitourinary: Pelvic exam was performed with patient supine. Uterus is not deviated, not enlarged, not fixed and not tender. Cervix exhibits no motion tenderness, no discharge and no friability. Right adnexum displays no mass, no tenderness and no fullness. Left adnexum displays no mass, no tenderness and no fullness.   Musculoskeletal: Normal range of motion. She exhibits no edema or tenderness.   Lymphadenopathy:        Head (right side): No submental and no submandibular adenopathy present.        Head (left side): No submental and no submandibular adenopathy present.     She has no cervical adenopathy.        Right cervical: No superficial cervical, no deep cervical and no posterior cervical adenopathy present.       Left cervical: No superficial cervical, no deep cervical and no posterior cervical adenopathy present.        Right axillary: No pectoral and no lateral adenopathy present.        Left axillary: No pectoral and no lateral adenopathy present.       Right: No supraclavicular adenopathy present.        Left: No supraclavicular adenopathy present.   Neurological: She is alert and oriented to person, place, and time. She  has normal reflexes. No cranial nerve deficit. She exhibits normal muscle tone. Coordination normal.   Skin: Skin is warm and dry. No rash noted.   Psychiatric: She has a normal mood and affect. Her behavior is normal. Her mood appears not anxious. Her speech is not rapid and/or pressured. She is not agitated. She does not exhibit a depressed mood.   Normal behavior, thought content, insight and judgement.       Assessment:       1. Prediabetes    2. Primary hypothyroidism    3. Hyperglycemia    4. Mixed hyperlipidemia    5. Coronary artery disease involving native coronary artery without angina pectoris, unspecified whether native or transplanted heart    6. Chest pain of unknown etiology, negative cardiac PET stress [id number]    7. Suspected sleep apnea    8. Vitamin D deficiency        Plan:   Lissy was seen today for follow-up.    Diagnoses and all orders for this visit:    Prediabetes.  She understands that lifestyle is critical and she is working on this.  Encouraged to take metformin daily and check A1c once every 3 months and schedule these blood tests today.  -     Hemoglobin A1c; Future  -     CBC auto differential; Future  -     Comprehensive metabolic panel; Future  -     Hemoglobin A1c; Future    Primary hypothyroidism.  She is taking levothyroxine.  -     TSH; Future  -     CBC auto differential; Future  -     TSH; Future    Hyperglycemia, As above.  -     Hemoglobin A1c; Future  -     Comprehensive metabolic panel; Future  -     Hemoglobin A1c; Future    Mixed hyperlipidemia.  Statin compliant.  -     Comprehensive metabolic panel; Future  -     Lipid panel; Future    Coronary artery disease involving native coronary artery without angina pectoris, unspecified whether native or transplanted heart.  Taking an aspirin 81 mg daily.  -     CBC auto differential; Future    Chest pain of unknown etiology, negative cardiac PET stress [id number]  -     CBC auto differential; Future    Suspected sleep apnea.   Scheduled for sleep study tomorrow  -     Comprehensive metabolic panel; Future    Vitamin D deficiency.  Encouraged to take daily supplement  -     Vitamin D; Future    Other orders  -     aspirin 81 MG Chew; Take 1 tablet (81 mg total) by mouth once daily.

## 2017-08-02 ENCOUNTER — HOSPITAL ENCOUNTER (OUTPATIENT)
Dept: SLEEP MEDICINE | Facility: HOSPITAL | Age: 72
Discharge: HOME | End: 2017-08-02
Attending: PSYCHIATRY & NEUROLOGY
Payer: MEDICARE

## 2017-08-02 DIAGNOSIS — G47.33 OSA (OBSTRUCTIVE SLEEP APNEA): ICD-10-CM

## 2017-08-02 DIAGNOSIS — G47.30 SLEEP APNEA, UNSPECIFIED TYPE: ICD-10-CM

## 2017-08-02 PROCEDURE — 95810 POLYSOM 6/> YRS 4/> PARAM: CPT

## 2017-08-02 PROCEDURE — 95810 PR POLYSOMNOGRAPHY, 4 OR MORE: ICD-10-PCS | Mod: 26,,, | Performed by: PSYCHIATRY & NEUROLOGY

## 2017-08-02 PROCEDURE — 95810 POLYSOM 6/> YRS 4/> PARAM: CPT | Mod: 26,,, | Performed by: PSYCHIATRY & NEUROLOGY

## 2017-08-16 ENCOUNTER — TELEPHONE (OUTPATIENT)
Dept: SLEEP MEDICINE | Facility: CLINIC | Age: 72
End: 2017-08-16

## 2017-08-16 NOTE — TELEPHONE ENCOUNTER
Please schedule for the follow up with MD / NP  to review sleep study results.  Positive for singificant sleep apnea.    Thanks!

## 2017-11-03 ENCOUNTER — TELEPHONE (OUTPATIENT)
Dept: INTERNAL MEDICINE | Facility: CLINIC | Age: 72
End: 2017-11-03

## 2017-11-03 NOTE — TELEPHONE ENCOUNTER
----- Message from Lynn [place] sent at 11/3/2017 12:04 PM CDT -----  Contact: Hector/ / [phone] cell  Type: Sooner appointment than  is able to schedule    When is the first available appointment? 01/2018    What is the nature of the appointment? Upper respiratory symptoms/ chest congestion    What appointment type: urgent    Comments: Pt has been having symptoms for about 2-3 days and would like access to an appt with her own PCP, if possible.  She has symptoms of congestion, cough, and runny nose.  She would prefer a morning appt.  Please call and advise.    Thank you

## 2017-11-05 ENCOUNTER — OFFICE VISIT (OUTPATIENT)
Dept: INTERNAL MEDICINE | Facility: CLINIC | Age: 72
End: 2017-11-05
Payer: MEDICARE

## 2017-11-05 VITALS
HEART RATE: 68 BPM | HEIGHT: 62 IN | SYSTOLIC BLOOD PRESSURE: 122 MMHG | BODY MASS INDEX: 29.82 KG/M2 | DIASTOLIC BLOOD PRESSURE: 66 MMHG | WEIGHT: 162.06 LBS | OXYGEN SATURATION: 97 %

## 2017-11-05 DIAGNOSIS — J45.40 MODERATE PERSISTENT ASTHMA WITHOUT STATUS ASTHMATICUS WITHOUT COMPLICATION: Primary | ICD-10-CM

## 2017-11-05 PROCEDURE — 99999 PR PBB SHADOW E&M-EST. PATIENT-LVL III: CPT | Mod: PBBFAC,,, | Performed by: FAMILY MEDICINE

## 2017-11-05 PROCEDURE — 99213 OFFICE O/P EST LOW 20 MIN: CPT | Mod: PBBFAC | Performed by: FAMILY MEDICINE

## 2017-11-05 PROCEDURE — 99214 OFFICE O/P EST MOD 30 MIN: CPT | Mod: S$PBB,,, | Performed by: FAMILY MEDICINE

## 2017-11-05 RX ORDER — ALBUTEROL SULFATE 90 UG/1
2 AEROSOL, METERED RESPIRATORY (INHALATION) EVERY 6 HOURS PRN
Qty: 1 EACH | Refills: 11 | Status: SHIPPED | OUTPATIENT
Start: 2017-11-05 | End: 2018-01-03

## 2017-11-05 RX ORDER — METHYLPREDNISOLONE 4 MG/1
TABLET ORAL
Qty: 21 TABLET | Refills: 0 | Status: SHIPPED | OUTPATIENT
Start: 2017-11-05 | End: 2018-05-11

## 2017-11-05 NOTE — PROGRESS NOTES
Subjective:       Patient ID: Lissy Palencia is a 72 y.o. female.    Chief Complaint: Chest Congestion and Cough  Lissy Palencia 72 y.o. 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  Has sinus drip, pressure noted.      HPI  Review of Systems   Constitutional: Negative for activity change, fatigue, fever and unexpected weight change.   HENT: Positive for postnasal drip, rhinorrhea and sinus pressure. Negative for congestion and hearing loss.    Eyes: Negative for redness and visual disturbance.   Respiratory: Positive for cough and shortness of breath. Negative for chest tightness and wheezing.    Cardiovascular: Negative for chest pain, palpitations and leg swelling.   Gastrointestinal: Negative for abdominal distention.   Genitourinary: Negative for decreased urine volume, dysuria, flank pain, hematuria, pelvic pain and urgency.   Musculoskeletal: Negative for back pain, gait problem, joint swelling and neck stiffness.   Skin: Negative for color change, rash and wound.   Neurological: Negative for dizziness, syncope, weakness and headaches.   Psychiatric/Behavioral: Negative for behavioral problems, confusion and sleep disturbance. The patient is not nervous/anxious.        Objective:      Physical Exam   Constitutional: She is oriented to person, place, and time. She appears well-developed and well-nourished. No distress.   HENT:   Head: Normocephalic.   Mouth/Throat: No oropharyngeal exudate.   Eyes: EOM are normal. Pupils are equal, round, and reactive to light. No scleral icterus.   Neck: Neck supple. No JVD present. No thyromegaly present.   Cardiovascular: Normal rate, regular rhythm and normal heart sounds.  Exam reveals no gallop and no friction rub.    No murmur heard.  Pulmonary/Chest: Effort normal. She has wheezes. She has no rales.   Abdominal: Soft. Bowel sounds are  normal. She exhibits no distension and no mass. There is no tenderness. There is no guarding.   Musculoskeletal: Normal range of motion. She exhibits no edema.   Lymphadenopathy:     She has no cervical adenopathy.   Neurological: She is alert and oriented to person, place, and time. She has normal reflexes. She displays normal reflexes. No cranial nerve deficit. She exhibits normal muscle tone.   Skin: Skin is warm. No rash noted. No erythema.   Psychiatric: She has a normal mood and affect. Thought content normal.       Assessment:       1. Moderate persistent asthma without status asthmaticus without complication        Plan:       Lissy was seen today for chest congestion and cough.    Diagnoses and all orders for this visit:    Moderate persistent asthma without status asthmaticus without complication  -     albuterol 90 mcg/actuation inhaler; Inhale 2 puffs into the lungs every 6 (six) hours as needed for Wheezing.  -     methylPREDNISolone (MEDROL DOSEPACK) 4 mg tablet; Take as directed

## 2017-11-06 ENCOUNTER — TELEPHONE (OUTPATIENT)
Dept: INTERNAL MEDICINE | Facility: CLINIC | Age: 72
End: 2017-11-06

## 2017-11-06 NOTE — TELEPHONE ENCOUNTER
Spoke with pt and she stated that she received new medication from a doctor and will give the office a call back to schedule an appt when needed. Pt is aware of her appt in January if she needs one before then she will reschedule

## 2018-01-03 ENCOUNTER — IMMUNIZATION (OUTPATIENT)
Dept: INTERNAL MEDICINE | Facility: CLINIC | Age: 73
End: 2018-01-03
Payer: MEDICARE

## 2018-01-03 ENCOUNTER — LAB VISIT (OUTPATIENT)
Dept: LAB | Facility: HOSPITAL | Age: 73
End: 2018-01-03
Attending: INTERNAL MEDICINE
Payer: MEDICARE

## 2018-01-03 ENCOUNTER — OFFICE VISIT (OUTPATIENT)
Dept: INTERNAL MEDICINE | Facility: CLINIC | Age: 73
End: 2018-01-03
Payer: MEDICARE

## 2018-01-03 VITALS
WEIGHT: 162.25 LBS | DIASTOLIC BLOOD PRESSURE: 72 MMHG | HEART RATE: 76 BPM | BODY MASS INDEX: 29.86 KG/M2 | HEIGHT: 62 IN | SYSTOLIC BLOOD PRESSURE: 138 MMHG

## 2018-01-03 DIAGNOSIS — E03.9 PRIMARY HYPOTHYROIDISM: ICD-10-CM

## 2018-01-03 DIAGNOSIS — R53.83 FATIGUE, UNSPECIFIED TYPE: ICD-10-CM

## 2018-01-03 DIAGNOSIS — M79.642 PAIN IN BOTH HANDS: ICD-10-CM

## 2018-01-03 DIAGNOSIS — M25.569 KNEE PAIN, UNSPECIFIED CHRONICITY, UNSPECIFIED LATERALITY: ICD-10-CM

## 2018-01-03 DIAGNOSIS — R73.03 PREDIABETES: ICD-10-CM

## 2018-01-03 DIAGNOSIS — R29.818 SUSPECTED SLEEP APNEA: ICD-10-CM

## 2018-01-03 DIAGNOSIS — G47.9 SLEEP DISTURBANCE: ICD-10-CM

## 2018-01-03 DIAGNOSIS — J02.9 SORETHROAT: ICD-10-CM

## 2018-01-03 DIAGNOSIS — E78.2 MIXED HYPERLIPIDEMIA: ICD-10-CM

## 2018-01-03 DIAGNOSIS — K76.0 FATTY LIVER: ICD-10-CM

## 2018-01-03 DIAGNOSIS — M79.641 PAIN IN BOTH HANDS: ICD-10-CM

## 2018-01-03 DIAGNOSIS — M19.049 CMC ARTHRITIS: ICD-10-CM

## 2018-01-03 DIAGNOSIS — M19.90 OSTEOARTHRITIS, UNSPECIFIED OSTEOARTHRITIS TYPE, UNSPECIFIED SITE: ICD-10-CM

## 2018-01-03 DIAGNOSIS — Z23 NEEDS FLU SHOT: ICD-10-CM

## 2018-01-03 DIAGNOSIS — R10.9 ABDOMINAL PAIN, UNSPECIFIED ABDOMINAL LOCATION: ICD-10-CM

## 2018-01-03 DIAGNOSIS — M54.31 RIGHT SCIATIC NERVE PAIN: ICD-10-CM

## 2018-01-03 DIAGNOSIS — R10.9 ABDOMINAL PAIN, UNSPECIFIED ABDOMINAL LOCATION: Primary | ICD-10-CM

## 2018-01-03 DIAGNOSIS — Z23 NEED FOR DIPHTHERIA-TETANUS-PERTUSSIS (TDAP) VACCINE: ICD-10-CM

## 2018-01-03 DIAGNOSIS — I25.10 CORONARY ARTERY DISEASE INVOLVING NATIVE CORONARY ARTERY WITHOUT ANGINA PECTORIS, UNSPECIFIED WHETHER NATIVE OR TRANSPLANTED HEART: ICD-10-CM

## 2018-01-03 LAB
ALBUMIN SERPL BCP-MCNC: 4.2 G/DL
ALP SERPL-CCNC: 105 U/L
ALT SERPL W/O P-5'-P-CCNC: 20 U/L
ANION GAP SERPL CALC-SCNC: 9 MMOL/L
AST SERPL-CCNC: 20 U/L
BILIRUB SERPL-MCNC: 1 MG/DL
BUN SERPL-MCNC: 14 MG/DL
CALCIUM SERPL-MCNC: 9.7 MG/DL
CHLORIDE SERPL-SCNC: 105 MMOL/L
CHOLEST SERPL-MCNC: 179 MG/DL
CHOLEST/HDLC SERPL: 3.8 {RATIO}
CO2 SERPL-SCNC: 27 MMOL/L
CREAT SERPL-MCNC: 0.8 MG/DL
EST. GFR  (AFRICAN AMERICAN): >60 ML/MIN/1.73 M^2
EST. GFR  (NON AFRICAN AMERICAN): >60 ML/MIN/1.73 M^2
ESTIMATED AVG GLUCOSE: 117 MG/DL
GLUCOSE SERPL-MCNC: 100 MG/DL
HBA1C MFR BLD HPLC: 5.7 %
HDLC SERPL-MCNC: 47 MG/DL
HDLC SERPL: 26.3 %
LDLC SERPL CALC-MCNC: 84.6 MG/DL
NONHDLC SERPL-MCNC: 132 MG/DL
POTASSIUM SERPL-SCNC: 4.4 MMOL/L
PROT SERPL-MCNC: 7.4 G/DL
SODIUM SERPL-SCNC: 141 MMOL/L
T4 FREE SERPL-MCNC: 1.25 NG/DL
TRIGL SERPL-MCNC: 237 MG/DL
TSH SERPL DL<=0.005 MIU/L-ACNC: 0.38 UIU/ML

## 2018-01-03 PROCEDURE — 90472 IMMUNIZATION ADMIN EACH ADD: CPT | Mod: PBBFAC

## 2018-01-03 PROCEDURE — 90662 IIV NO PRSV INCREASED AG IM: CPT | Mod: PBBFAC

## 2018-01-03 PROCEDURE — 36415 COLL VENOUS BLD VENIPUNCTURE: CPT

## 2018-01-03 PROCEDURE — 99214 OFFICE O/P EST MOD 30 MIN: CPT | Mod: PBBFAC,25 | Performed by: INTERNAL MEDICINE

## 2018-01-03 PROCEDURE — 83036 HEMOGLOBIN GLYCOSYLATED A1C: CPT

## 2018-01-03 PROCEDURE — 84439 ASSAY OF FREE THYROXINE: CPT

## 2018-01-03 PROCEDURE — 99999 PR PBB SHADOW E&M-EST. PATIENT-LVL IV: CPT | Mod: PBBFAC,,, | Performed by: INTERNAL MEDICINE

## 2018-01-03 PROCEDURE — 84443 ASSAY THYROID STIM HORMONE: CPT

## 2018-01-03 PROCEDURE — 99214 OFFICE O/P EST MOD 30 MIN: CPT | Mod: S$PBB,,, | Performed by: INTERNAL MEDICINE

## 2018-01-03 PROCEDURE — 80053 COMPREHEN METABOLIC PANEL: CPT

## 2018-01-03 PROCEDURE — 80061 LIPID PANEL: CPT

## 2018-01-04 NOTE — PROGRESS NOTES
Subjective:       Patient ID: Lissy Palencia is a 72 y.o. female.    Chief Complaint: Abdominal Pain; Knee Pain; and Hand Pain  She has multiple complaints  She's concerned about arthritic pains throughout her body  She's having a lot of trouble with right upper quadrant abdominal pain  She's had no nausea vomiting or weight loss  HPI  Review of Systems   Constitutional: Positive for activity change, fatigue and unexpected weight change. Negative for appetite change, chills and fever.   HENT: Negative for hearing loss.    Eyes: Negative for visual disturbance.   Respiratory: Negative for cough, chest tightness, shortness of breath and wheezing.    Cardiovascular: Negative for chest pain, palpitations and leg swelling.   Gastrointestinal: Negative for abdominal pain, constipation, nausea and vomiting.   Genitourinary: Negative for dysuria, frequency and urgency.   Musculoskeletal: Positive for arthralgias, back pain, gait problem, joint swelling, myalgias, neck pain and neck stiffness.   Skin: Negative for rash.   Neurological: Negative for light-headedness and headaches.   Psychiatric/Behavioral: Negative for dysphoric mood and sleep disturbance. The patient is nervous/anxious.        Objective:      Physical Exam   Constitutional: She is oriented to person, place, and time. She appears well-developed and well-nourished. No distress.   HENT:   Head: Normocephalic and atraumatic.   Right Ear: External ear normal.   Left Ear: External ear normal.   Nose: Nose normal.   Mouth/Throat: Oropharynx is clear and moist. No oropharyngeal exudate.   Eyes: Conjunctivae and EOM are normal. Pupils are equal, round, and reactive to light. Right eye exhibits no discharge. No scleral icterus.   Neck: Normal range of motion and full passive range of motion without pain. Neck supple. No spinous process tenderness and no muscular tenderness present. Carotid bruit is not present. No thyromegaly present.   Cardiovascular: Normal rate,  regular rhythm, S1 normal, S2 normal, normal heart sounds and intact distal pulses.  Exam reveals no gallop and no friction rub.    No murmur heard.  Pulmonary/Chest: Effort normal and breath sounds normal. No respiratory distress. She has no wheezes. She has no rales. She exhibits no tenderness.   Abdominal: Soft. Bowel sounds are normal. She exhibits no distension and no mass. There is no tenderness. There is no rebound and no guarding.   Genitourinary: Pelvic exam was performed with patient supine. Uterus is not deviated, not enlarged, not fixed and not tender. Cervix exhibits no motion tenderness, no discharge and no friability. Right adnexum displays no mass, no tenderness and no fullness. Left adnexum displays no mass, no tenderness and no fullness.   Musculoskeletal: Normal range of motion. She exhibits no edema or tenderness.   Lymphadenopathy:        Head (right side): No submental and no submandibular adenopathy present.        Head (left side): No submental and no submandibular adenopathy present.     She has no cervical adenopathy.        Right cervical: No superficial cervical, no deep cervical and no posterior cervical adenopathy present.       Left cervical: No superficial cervical, no deep cervical and no posterior cervical adenopathy present.        Right axillary: No pectoral and no lateral adenopathy present.        Left axillary: No pectoral and no lateral adenopathy present.       Right: No supraclavicular adenopathy present.        Left: No supraclavicular adenopathy present.   Neurological: She is alert and oriented to person, place, and time. She has normal reflexes. No cranial nerve deficit. She exhibits normal muscle tone. Coordination normal.   Skin: Skin is warm and dry. No rash noted.   Psychiatric: She has a normal mood and affect. Her behavior is normal. Her mood appears not anxious. Her speech is not rapid and/or pressured. She is not agitated. She does not exhibit a depressed mood.    Normal behavior, thought content, insight and judgement.       Assessment:       1. Abdominal pain, unspecified abdominal location    2. Knee pain, unspecified chronicity, unspecified laterality    3. Pain in both hands    4. Fatty liver    5. Right sciatic nerve pain    6. CMC arthritis    7. Sleep disturbance    8. Prediabetes    9. Primary hypothyroidism    10. Mixed hyperlipidemia    11. Suspected sleep apnea    12. Coronary artery disease involving native coronary artery without angina pectoris, unspecified whether native or transplanted heart    13. Fatigue, unspecified type    14. Osteoarthritis, unspecified osteoarthritis type, unspecified site    15. Sorethroat    16. Needs flu shot    17. Need for diphtheria-tetanus-pertussis (Tdap) vaccine        Plan:   Lissy was seen today for abdominal pain, knee pain and hand pain.    Diagnoses and all orders for this visit:    Abdominal pain, unspecified abdominal location  -     Comprehensive metabolic panel; Future  -     US Abdomen Complete; Future    Knee pain, unspecified chronicity, unspecified laterality    Pain in both hands    Fatty liver    Right sciatic nerve pain    CMC arthritis    Sleep disturbance  -     Ambulatory consult to Sleep Disorders    Prediabetes  -     Hemoglobin A1c; Future    Primary hypothyroidism  -     TSH; Future    Mixed hyperlipidemia  -     Lipid panel; Future    Suspected sleep apnea    Coronary artery disease involving native coronary artery without angina pectoris, unspecified whether native or transplanted heart    Fatigue, unspecified type    Osteoarthritis, unspecified osteoarthritis type, unspecified site    Sorethroat    Needs flu shot    Need for diphtheria-tetanus-pertussis (Tdap) vaccine    Other orders  -     (In Office Administered) Tdap Vaccine

## 2018-01-10 ENCOUNTER — HOSPITAL ENCOUNTER (OUTPATIENT)
Dept: RADIOLOGY | Facility: HOSPITAL | Age: 73
Discharge: HOME | End: 2018-01-10
Attending: INTERNAL MEDICINE
Payer: MEDICARE

## 2018-01-10 DIAGNOSIS — R10.9 ABDOMINAL PAIN, UNSPECIFIED ABDOMINAL LOCATION: ICD-10-CM

## 2018-01-10 PROCEDURE — 76700 US EXAM ABDOM COMPLETE: CPT | Mod: 26,GC,, | Performed by: RADIOLOGY

## 2018-01-10 PROCEDURE — 76700 US EXAM ABDOM COMPLETE: CPT | Mod: TC

## 2018-01-14 ENCOUNTER — TELEPHONE (OUTPATIENT)
Dept: INTERNAL MEDICINE | Facility: CLINIC | Age: 73
End: 2018-01-14

## 2018-02-16 RX ORDER — METFORMIN HYDROCHLORIDE 500 MG/1
500 TABLET, EXTENDED RELEASE ORAL
Qty: 90 TABLET | Refills: 3 | Status: SHIPPED | OUTPATIENT
Start: 2018-02-16 | End: 2018-07-10 | Stop reason: SDUPTHER

## 2018-02-16 NOTE — TELEPHONE ENCOUNTER
"----- Message from Paige Rivera sent at 2/16/2018  8:40 AM CST -----  Contact: [phone]  RX request - refill or new RX.  Is this a refill or new RX:  refill  RX name and strength: metformin (GLUCOPHAGE-XR) 500 MG 24 hr tablet   Directions:   Is this a 30 day or 90 day RX:    Pharmacy name and phone # (DON'T enter "on file" or "in chart"): [facility]  [phone] (Phone)  [phone] (Fax)  Comments:          "

## 2018-03-07 ENCOUNTER — OFFICE VISIT (OUTPATIENT)
Dept: SLEEP MEDICINE | Facility: CLINIC | Age: 73
End: 2018-03-07
Payer: MEDICARE

## 2018-03-07 VITALS
HEART RATE: 55 BPM | SYSTOLIC BLOOD PRESSURE: 128 MMHG | WEIGHT: 162 LBS | DIASTOLIC BLOOD PRESSURE: 69 MMHG | HEIGHT: 62 IN | BODY MASS INDEX: 29.81 KG/M2

## 2018-03-07 DIAGNOSIS — M79.2 NEUROPATHIC PAIN: ICD-10-CM

## 2018-03-07 DIAGNOSIS — G47.33 OSA (OBSTRUCTIVE SLEEP APNEA): Primary | ICD-10-CM

## 2018-03-07 PROCEDURE — 99213 OFFICE O/P EST LOW 20 MIN: CPT | Mod: PBBFAC,PO | Performed by: PSYCHIATRY & NEUROLOGY

## 2018-03-07 PROCEDURE — 99214 OFFICE O/P EST MOD 30 MIN: CPT | Mod: S$PBB,,, | Performed by: PSYCHIATRY & NEUROLOGY

## 2018-03-07 PROCEDURE — 99999 PR PBB SHADOW E&M-EST. PATIENT-LVL III: CPT | Mod: PBBFAC,,, | Performed by: PSYCHIATRY & NEUROLOGY

## 2018-03-07 RX ORDER — GABAPENTIN 100 MG/1
CAPSULE ORAL
Qty: 90 CAPSULE | Refills: 0 | Status: SHIPPED | OUTPATIENT
Start: 2018-03-07 | End: 2018-04-18 | Stop reason: SDUPTHER

## 2018-03-07 NOTE — PATIENT INSTRUCTIONS
List of DME    [facility] ([facility]) - Medicare  [address] [place] [address]  [phone] ()  [phone] (fax)  -----------------------------------    DME [facility]:  [phone] - [employer]:  or [phone] (ext [phone])- [place]  -----------------------------------------------------    Gabapentin - 1 pill up to 3 at bedtime as needed for pain    Can take it during the day for pain.

## 2018-03-07 NOTE — LETTER
March 7, 2018      Deya Luevano MD  [address]  [place] [address]           [facility]  [address] [address]  Phone: [phone]  Fax: [phone]          Patient: Lissy Palencia   MR Number: [id number]   YOB: 1945   Date of Visit: 3/7/2018       Dear Dr. Deya Luevano:    Thank you for referring Lissy Palencia to me for evaluation. Attached you will find relevant portions of my assessment and plan of care.    If you have questions, please do not hesitate to call me. I look forward to following Lissy Palencia along with you.    Sincerely,    Maria Del Rosario Diaz MD    Enclosure  CC:  No Recipients    If you would like to receive this communication electronically, please contact [email or web address] or [phone] to request more information on [employer] Link access.    For providers and/or their staff who would like to refer a patient to [facility], please contact us through our one-stop-shop provider referral line, [facility], at [phone].    If you feel you have received this communication in error or would no longer like to receive these types of communications, please e-mail [email or web address]

## 2018-03-07 NOTE — PROGRESS NOTES
Lissy Palencia  was seen at the request of  Deya Luevano MD for sleep evaluation.    07/26/2017 INITIAL HISTORY OF PRESENT ILLNESS:  Lissy Palencia is a 72 y.o. female is here to be evaluated for a sleep disorder.       CHIEF COMPLAINT:      The patient's complaints include excessive daytime sleepiness, excessive daytime fatigue, snoring and interrupted sleep since  Several years ago - worse in the last 2 years.  Reports very interrupted sleep. Gets out of bed when unable to sleep. Bedroom is cool and quit.    Denies  dry mouth and sore throat  Denies nasal congestion   Denies  morning headaches  Reports  interrupted sleep  Denies frequent leg movements  Denies symptoms concerning for parasomnia    The ESS ([place] Sleepiness Score) taken on initial visit is 6 /24    The patient never had tonsillectomy, adenoidectomy or UPPP     Wearing a retainer to keep her teeth straight      INTERVAL HISTORY:    03/07/2018:  The patient has not presented any new complaints since the previous visit. Comes to discuss PSG. Shooting nerve pain at the back and front of her right leg kept her up (this is an old problem but never worked up). Still feeling tired and snores. ESS 12/24. Always falls asleep while reading.  Tried Melatonin since last time.      SLEEP ROUTINE AND LIFESTYLE 03/07/2018 :    Occupation:    Bed partner: her      Time to bed - wake up time on a workday : 11 PM to 6 AM  Time to bed - wake up time on a day off: 11 PM to  6 AM  Sleep onset latency: 1-2  Disruptions or awakenings: 1-3  Time to fall back into sleep: 1   Perceived sleep quality: 1/5  Perceived total sleep time:  5-6  hours.  Daytime naps: 0 - but would sometimes fall asleep accidentally during the day    Exercise routine: yes  Caffeine:  1 cup in AM and 1 cup 8 PM sometimes     PREVIOUS SLEEP STUDIES:     PSG 8/2/17: Significant Obstructive sleep apnea (HEMAL) with AHI (apnea hypopnea Index) of 24.5 and SaO2 of 82% (weight  190  lbs).        DME:       PAST MEDICAL HISTORY:    Active Ambulatory Problems     Diagnosis Date Noted    Hyperlipidemia, low HDL, high triglycerides.     Chronic low back pain 09/26/2012    Osteoarthritis 04/10/2013    CAD (coronary artery disease), native coronary artery, s/p PCI atherectomy to LAD 1997. 04/10/2013    Hand arthritis 07/30/2014    Trigger finger, right 3rd finger 07/30/2014    Fatigue 02/10/2015    Headache(784.0) 02/10/2015    Right groin pain 06/30/2015    Right sciatic nerve pain 06/30/2015    Primary hypothyroidism 11/05/2015    Cystocele 01/19/2016    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    Rectocele 01/19/2016    Vaginal atrophy 01/19/2016    Constipation - functional 03/17/2016    Osteopenia 06/22/2016    Positive cardiac stress test 07/27/2016    Chest pain of unknown etiology, negative cardiac PET stress [id number] 07/27/2016    Prediabetes 10/05/2016    Hyperglycemia 01/11/2017    Screen for colon cancer, polyp 2015, next scope 2020 01/11/2017    Suspected sleep apnea 05/01/2017    HEMAL (obstructive sleep apnea)     Fatty liver 01/03/2018     Resolved Ambulatory Problems     Diagnosis Date Noted    Right shoulder pain 10/09/2012    Hyperglycemia 04/09/2013    Hypothyroidism, subclinical. 04/09/2013    Dry skin 04/10/2013    Screening for breast cancer 04/10/2013    RUQ abdominal pain 07/10/2013    Chronic cholecystitis without calculus 07/22/2013    Biliary colic 08/16/2013    Allergic dermatitis due to poison ivy 02/26/2014    Leg pain 07/30/2014    Scalp itch 07/30/2014    Screen for colon cancer 02/10/2015    Screening 04/10/2015    Annual physical exam 06/30/2015    Need for pneumococcal vaccination 06/30/2015    Breast pain 06/30/2015    Skin lesion 06/30/2015    Nose abnormality 06/30/2015    Screening mammogram for high-risk patient 06/30/2015    Hemorrhoid prolapse 01/19/2016    Chronic constipation 01/19/2016    HLD (hyperlipidemia)  03/16/2016    Prolapsed internal hemorrhoids, grade 3 03/17/2016    Sunburn 05/09/2016    On statin therapy due to risk of future cardiovascular event 10/05/2016     Past Medical History:   Diagnosis Date    Allergy     Arthritis     Coronary artery disease     GERD (gastroesophageal reflux disease)     Hemorrhoids     History of cholelithiasis     Hyperlipidemia     Hypothyroidism     Right shoulder pain 10/9/2012    Trigger finger, right 3rd finger 7/30/2014                PAST SURGICAL HISTORY:    Past Surgical History:   Procedure Laterality Date    ABLATION COLPOCLESIS      ADENOIDECTOMY      CHOLECYSTECTOMY      CORONARY STENT PLACEMENT      heart cath  2005    non-obstructive disease    OOPHORECTOMY      SHOULDER ARTHROSCOPY W/ ROTATOR CUFF REPAIR      Right    TONSILLECTOMY      TOTAL ABDOMINAL HYSTERECTOMY      with BSO    TUBAL LIGATION           FAMILY HISTORY:                Family History   Problem Relation Age of Onset    Breast cancer Neg Hx     Ovarian cancer Neg Hx     Glaucoma Neg Hx     Cataracts Neg Hx     Diabetes Neg Hx     Macular degeneration Neg Hx     Retinal detachment Neg Hx     Melanoma Neg Hx     Cervical cancer Neg Hx     Endometrial cancer Neg Hx     Vaginal cancer Neg Hx        SOCIAL HISTORY:          Tobacco:   History   Smoking Status    Never Smoker   Smokeless Tobacco    Never Used       alcohol use:    History   Alcohol Use No                   ALLERGIES:    Review of patient's allergies indicates:   Allergen Reactions    Compazine  [prochlorperazine edisylate]      Other reaction(s): SPASMS    Parafon forte      Other reaction(s): Hives       CURRENT MEDICATIONS:    Current Outpatient Prescriptions   Medication Sig Dispense Refill    aspirin 81 MG Chew Take 1 tablet (81 mg total) by mouth once daily. 100 tablet 4    atorvastatin (LIPITOR) 40 MG tablet TAKE 1 TABLET DAILY 90 tablet 10    co-enzyme Q-10 30 mg capsule Take 30 mg by mouth 3  "(three) times daily.      conjugated estrogens (PREMARIN) vaginal cream Place 0.5 g vaginally twice a week. 30 g 12    esomeprazole (NEXIUM) 20 MG capsule Take 20 mg by mouth before breakfast.      levothyroxine (SYNTHROID) 88 MCG tablet TAKE 1 TABLET EVERY MORNING BEFORE BREAKFAST 90 tablet 3    melatonin 3 mg Tab Take 1 tablet (3 mg total) by mouth nightly. 30 tablet 0    metFORMIN (GLUCOPHAGE-XR) 500 MG 24 hr tablet Take 1 tablet (500 mg total) by mouth daily with breakfast. 90 tablet 3    methylPREDNISolone (MEDROL DOSEPACK) 4 mg tablet Take as directed 21 tablet 0     No current facility-administered medications for this visit.                       REVIEW OF SYSTEMS:   Sleep related symptoms as per HPI    reports weight gain  Denies dyspnea  Denies palpitations  Denies acid reflux   Denies polyuria  Denies  mood diturbance  Denies  anemia  Denies  muscle pain  Denies  Gait imbalance    Otherwise, a balance of 10 systems reviewed is negative.    PHYSICAL EXAM:  /69   Pulse (!) 55   Ht 5' 2" (1.575 m)   Wt 73.5 kg (162 lb)   BMI 29.63 kg/m²   GENERAL: Overweight body habitus, well groomed.  HEENT:   HEENT:  Conjunctivae are non-erythematous; Pupils equal, round, and reactive to light; Nose is symmetrical; Nasal mucosa is pink and moist; Septum is midline; Inferior turbinates are normal; Nasal airflow is normal; Posterior pharynx is pink; Modified Mallampati:III-IV; Posterior palate is low; Tonsils not visualized; Uvula is wide and elongated;Tongue is enlarged; Dentition is fair; No TMJ tenderness; Jaw opening and protrusion without click and without discomfort.  NECK: Supple. Neck circumference is 16 inches. No thyromegaly. No palpable nodes.     SKIN: On face and neck: No abrasions, no rashes, no lesions.  No subcutaneous nodules are palpable.  RESPIRATORY: Chest is clear to auscultation.  Normal chest expansion and non-labored breathing at rest.  CARDIOVASCULAR: Normal S1, S2.  No murmurs, " "gallops or rubs. No carotid bruits bilaterally.  No edema. No clubbing. No cyanosis.    NEURO: Oriented to time, place and person. Normal attention span and concentration. Gait normal.    PSYCH: Affect is full. Mood is normal  MUSCULOSKELETAL: Moves 4 extremities. Gait normal.         Using [facility]: pending      ASSESSMENT:    1. HEMAL - moderate The patient symptomatically has  excessive daytime sleepiness, snoring, excessive daytime fatigue and interrupted sleep  with exam findings of "a crowded oral airway and elevated body mass index. The patient has medical co-morbidities of hyperlipidemia, borderline blood sugar  which can be worsened by HEMAL. This warrants .  2. Insomnia NEC. Multi-factorial -   sleep disordered breathing and neuropathic pain in her leg are playing a role. Significant improvement in sleep hygiene.  3. Radiating burning and shooting pain down the posterior aspect of her right leg (originating from lower back), calve cramps and significant burning pain in lateral thigh. 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 Will order APAP 5-18 cm H2O  2. Referral to neurology to work up for neuropathy/radiculathy. Will provide Gabapentin 100 mg small dose to take bedtime and PRN daytime.    Refer to Doreen MARTIN    You are advised to abstain from driving should you feel sleepy or drowsy.    More than 25 minutes of this 45 minutes visit was spent in counseling: during our discussion today, we talked about the etiology of  HEMAL as well as the potential ramifications of untreated sleep apnea, which could include daytime sleepiness, hypertension, heart disease and/or stroke.  We discussed potential treatment options, which could include weight loss, body positioning, continuous positive airway pressure (CPAP), or referral for surgical consideration. Meanwhile, she  is urged to avoid supine sleep, weight gain and alcoholic beverages since all of these can " worsen HEMAL.     Precautions: The patient was advised to abstain from driving should he feel sleepy or drowsy.    Follow up: MD/NP  after the sleep study has been completed.     Thank you for allowing me the opportunity to participate in the care of your patient.    This visit summary will be sent to referring provider via inbasket

## 2018-04-18 ENCOUNTER — OFFICE VISIT (OUTPATIENT)
Dept: INTERNAL MEDICINE | Facility: CLINIC | Age: 73
End: 2018-04-18
Payer: MEDICARE

## 2018-04-18 ENCOUNTER — LAB VISIT (OUTPATIENT)
Dept: LAB | Facility: HOSPITAL | Age: 73
End: 2018-04-18
Attending: INTERNAL MEDICINE
Payer: MEDICARE

## 2018-04-18 VITALS
DIASTOLIC BLOOD PRESSURE: 68 MMHG | OXYGEN SATURATION: 96 % | HEIGHT: 62 IN | SYSTOLIC BLOOD PRESSURE: 120 MMHG | HEART RATE: 67 BPM | BODY MASS INDEX: 30.67 KG/M2 | WEIGHT: 166.69 LBS

## 2018-04-18 DIAGNOSIS — H61.21 IMPACTED CERUMEN OF RIGHT EAR: ICD-10-CM

## 2018-04-18 DIAGNOSIS — E03.9 PRIMARY HYPOTHYROIDISM: ICD-10-CM

## 2018-04-18 DIAGNOSIS — K76.0 FATTY LIVER: ICD-10-CM

## 2018-04-18 DIAGNOSIS — Z12.11 SCREEN FOR COLON CANCER: ICD-10-CM

## 2018-04-18 DIAGNOSIS — Z12.39 BREAST CANCER SCREENING: ICD-10-CM

## 2018-04-18 DIAGNOSIS — G25.0 ESSENTIAL TREMOR: ICD-10-CM

## 2018-04-18 DIAGNOSIS — E78.2 MIXED HYPERLIPIDEMIA: ICD-10-CM

## 2018-04-18 DIAGNOSIS — Z11.59 SCREENING FOR VIRAL DISEASE: ICD-10-CM

## 2018-04-18 DIAGNOSIS — R73.9 HYPERGLYCEMIA: ICD-10-CM

## 2018-04-18 DIAGNOSIS — S90.219A CONTUSION OF GREAT TOE WITH DAMAGE TO NAIL, UNSPECIFIED LATERALITY, INITIAL ENCOUNTER: Primary | ICD-10-CM

## 2018-04-18 LAB
ALBUMIN SERPL BCP-MCNC: 4.1 G/DL
ALP SERPL-CCNC: 96 U/L
ALT SERPL W/O P-5'-P-CCNC: 27 U/L
ANION GAP SERPL CALC-SCNC: 10 MMOL/L
AST SERPL-CCNC: 23 U/L
BILIRUB SERPL-MCNC: 1.1 MG/DL
BUN SERPL-MCNC: 13 MG/DL
CALCIUM SERPL-MCNC: 9.7 MG/DL
CHLORIDE SERPL-SCNC: 105 MMOL/L
CHOLEST SERPL-MCNC: 166 MG/DL
CHOLEST/HDLC SERPL: 3.6 {RATIO}
CO2 SERPL-SCNC: 26 MMOL/L
CREAT SERPL-MCNC: 0.9 MG/DL
EST. GFR  (AFRICAN AMERICAN): >60 ML/MIN/1.73 M^2
EST. GFR  (NON AFRICAN AMERICAN): >60 ML/MIN/1.73 M^2
ESTIMATED AVG GLUCOSE: 126 MG/DL
GLUCOSE SERPL-MCNC: 104 MG/DL
HBA1C MFR BLD HPLC: 6 %
HDLC SERPL-MCNC: 46 MG/DL
HDLC SERPL: 27.7 %
LDLC SERPL CALC-MCNC: 81 MG/DL
NONHDLC SERPL-MCNC: 120 MG/DL
POTASSIUM SERPL-SCNC: 4.6 MMOL/L
PROT SERPL-MCNC: 7.3 G/DL
SODIUM SERPL-SCNC: 141 MMOL/L
T4 FREE SERPL-MCNC: 1.27 NG/DL
TRIGL SERPL-MCNC: 195 MG/DL
TSH SERPL DL<=0.005 MIU/L-ACNC: 0.2 UIU/ML

## 2018-04-18 PROCEDURE — 99215 OFFICE O/P EST HI 40 MIN: CPT | Mod: S$PBB,,, | Performed by: INTERNAL MEDICINE

## 2018-04-18 PROCEDURE — 84443 ASSAY THYROID STIM HORMONE: CPT

## 2018-04-18 PROCEDURE — 36415 COLL VENOUS BLD VENIPUNCTURE: CPT

## 2018-04-18 PROCEDURE — 80053 COMPREHEN METABOLIC PANEL: CPT

## 2018-04-18 PROCEDURE — 80061 LIPID PANEL: CPT

## 2018-04-18 PROCEDURE — 84439 ASSAY OF FREE THYROXINE: CPT

## 2018-04-18 PROCEDURE — 99999 PR PBB SHADOW E&M-EST. PATIENT-LVL V: CPT | Mod: PBBFAC,,, | Performed by: INTERNAL MEDICINE

## 2018-04-18 PROCEDURE — 99215 OFFICE O/P EST HI 40 MIN: CPT | Mod: PBBFAC | Performed by: INTERNAL MEDICINE

## 2018-04-18 PROCEDURE — 86803 HEPATITIS C AB TEST: CPT

## 2018-04-18 PROCEDURE — 83036 HEMOGLOBIN GLYCOSYLATED A1C: CPT

## 2018-04-18 RX ORDER — GABAPENTIN 100 MG/1
CAPSULE ORAL
Qty: 270 CAPSULE | Refills: 0 | Status: SHIPPED | OUTPATIENT
Start: 2018-04-18 | End: 2018-10-11

## 2018-04-19 LAB — HCV AB SERPL QL IA: NEGATIVE

## 2018-04-25 NOTE — PROGRESS NOTES
Subjective:       Patient ID: Lissy Palencia is a 73 y.o. female.    Chief Complaint: Follow-up (head twitch) and Toe Pain (toe nails on both feet pain for 6 weeks )  Worried about her big toes.  She dropped something heavy on them.  They hurt.  Her right leg burns and stings.  She tried physical therapy for 4-6 weeks  #3 she is using auto adjustable positive airway pressure at 5-18 cm of water for sleeping.  Not sure if it helps.  Needs a mammogram  Want to review her colonoscopy.  Not due until 2020.  #6 concerned about ear wax.  #7.  Would like to have fasting blood work scheduled.  Would like to effect weight loss  HPI  Review of Systems   Constitutional: Negative for activity change, appetite change, chills, fatigue, fever and unexpected weight change.   HENT: Negative for hearing loss.    Eyes: Negative for visual disturbance.   Respiratory: Negative for cough, chest tightness, shortness of breath and wheezing.    Cardiovascular: Negative for chest pain, palpitations and leg swelling.   Gastrointestinal: Negative for abdominal pain, constipation, nausea and vomiting.   Genitourinary: Negative for dysuria, frequency and urgency.   Musculoskeletal: Negative for arthralgias, back pain, gait problem, joint swelling and myalgias.   Skin: Negative for rash.   Neurological: Negative for light-headedness and headaches.   Psychiatric/Behavioral: Negative for dysphoric mood and sleep disturbance. The patient is not nervous/anxious.        Objective:      Physical Exam   Constitutional: She appears well-nourished.   HENT:   Head: Atraumatic.   Eyes: Conjunctivae are normal. No scleral icterus.   Neck: Neck supple.   Cardiovascular: Normal rate and regular rhythm.    Pulmonary/Chest: Effort normal and breath sounds normal.   Abdominal: Soft. There is no tenderness.   Musculoskeletal: She exhibits no edema.   Lymphadenopathy:     She has no cervical adenopathy.   Neurological: She is alert.   Skin: Skin is warm and dry.    Psychiatric: She has a normal mood and affect. Her behavior is normal.   Nursing note and vitals reviewed.      Assessment:       1. Contusion of great toe with damage to nail, unspecified laterality, initial encounter    2. Essential tremor, slight head vane    3. Screen for colon cancer, polyp 2015, next scope 2020    4. Screen for colon cancer    5. Breast cancer screening    6. Impacted cerumen of right ear    7. Hyperglycemia    8. Mixed hyperlipidemia    9. Fatty liver    10. Primary hypothyroidism    11. Screening for viral disease        Plan:   Lissy was seen today for follow-up and toe pain.    Diagnoses and all orders for this visit:    Contusion of great toe with damage to nail, unspecified laterality, initial encounter.  It looks like she is going to lose the big toenails.  -     Ambulatory referral to Podiatry    Essential tremor, slight head vane, Reassurance it doesn't seem to be progressive.  Don't recommend neurology referral at this time.    Screen for colon cancer, polyp 2015, next scope 2020  -     Case request GI: COLONOSCOPY    Screen for colon cancer  -     Case request GI: COLONOSCOPY    Breast cancer screening  -     Mammo Digital Screening Bilat with CAD; Future    Impacted cerumen of right ear  -     Ambulatory Referral to ENT    Hyperglycemia.  Weight loss strategies discussed.  -     Hemoglobin A1c; Future    Mixed hyperlipidemia  -     Lipid panel; Future    Fatty liver.  Weight loss strategies discussed.  -     Comprehensive metabolic panel; Future    Primary hypothyroidism  -     TSH; Future    Screening for viral disease  -     Hepatitis C antibody; Future    Other orders.  For right leg pain try  -     gabapentin (NEURONTIN) 100 MG capsule; 1 PRN and QHS for right sciatica discomfort up to 3 times a day  Follow-up in about 3 months (around 7/18/2018) for AFTER FASTING BLOOD TESTS.

## 2018-04-30 ENCOUNTER — HOSPITAL ENCOUNTER (OUTPATIENT)
Dept: RADIOLOGY | Facility: HOSPITAL | Age: 73
Discharge: HOME | End: 2018-04-30
Attending: PODIATRIST
Payer: MEDICARE

## 2018-04-30 ENCOUNTER — OFFICE VISIT (OUTPATIENT)
Dept: PODIATRY | Facility: CLINIC | Age: 73
End: 2018-04-30
Payer: MEDICARE

## 2018-04-30 VITALS
SYSTOLIC BLOOD PRESSURE: 126 MMHG | WEIGHT: 167 LBS | BODY MASS INDEX: 30.73 KG/M2 | DIASTOLIC BLOOD PRESSURE: 66 MMHG | HEART RATE: 71 BPM | HEIGHT: 62 IN

## 2018-04-30 DIAGNOSIS — M79.674 TOE PAIN, BILATERAL: ICD-10-CM

## 2018-04-30 DIAGNOSIS — M79.674 TOE PAIN, BILATERAL: Primary | ICD-10-CM

## 2018-04-30 DIAGNOSIS — M79.675 TOE PAIN, BILATERAL: Primary | ICD-10-CM

## 2018-04-30 DIAGNOSIS — M79.675 TOE PAIN, BILATERAL: ICD-10-CM

## 2018-04-30 DIAGNOSIS — S90.219A CONTUSION OF GREAT TOE WITH DAMAGE TO NAIL, UNSPECIFIED LATERALITY, INITIAL ENCOUNTER: ICD-10-CM

## 2018-04-30 PROCEDURE — 73630 X-RAY EXAM OF FOOT: CPT | Mod: 50,TC

## 2018-04-30 PROCEDURE — 99213 OFFICE O/P EST LOW 20 MIN: CPT | Mod: PBBFAC,25 | Performed by: PODIATRIST

## 2018-04-30 PROCEDURE — 73630 X-RAY EXAM OF FOOT: CPT | Mod: 26,50,, | Performed by: RADIOLOGY

## 2018-04-30 PROCEDURE — 99203 OFFICE O/P NEW LOW 30 MIN: CPT | Mod: S$PBB,,, | Performed by: PODIATRIST

## 2018-04-30 PROCEDURE — 99999 PR PBB SHADOW E&M-EST. PATIENT-LVL III: CPT | Mod: PBBFAC,,, | Performed by: PODIATRIST

## 2018-04-30 NOTE — LETTER
May 7, 2018      Deya Luevano MD  [address]  [facility] [address]           [facility] - Podiatry  [address]  [facility] [address]  Phone: [phone]          Patient: Lissy Palencia   MR Number: [id number]   YOB: 1945   Date of Visit: 4/30/2018       Dear Dr. Deya Luevano:    Thank you for referring Lissy Palencia to me for evaluation. Attached you will find relevant portions of my assessment and plan of care.    If you have questions, please do not hesitate to call me. I look forward to following Lissy Palencia along with you.    Sincerely,    Jarocho Olmedo  CC:  No Recipients    If you would like to receive this communication electronically, please contact [email or web address] or [phone] to request more information on [employer] access.    For providers and/or their staff who would like to refer a patient to [facility], please contact us through our one-stop-shop provider referral line, [facility], at [phone].    If you feel you have received this communication in error or would no longer like to receive these types of communications, please e-mail [email or web address]

## 2018-05-07 NOTE — PROGRESS NOTES
Subjective:      Patient ID: Lissy Palencia is a 73 y.o. female.    Chief Complaint: toenail injury (pre diabetic  dr marin 04/18/2018)     Lissy is a 73 y.o. female who presents to the clinic complaining of  discolored toenails on both feet. 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       Review of Systems   Constitution: Negative for chills, fever and malaise/fatigue.   HENT: Negative for hearing loss.    Cardiovascular: Negative for claudication and leg swelling.   Respiratory: Negative for shortness of breath.    Skin: Negative for flushing and rash.   Musculoskeletal: Negative for joint pain and myalgias.   Neurological: Negative for loss of balance, numbness, paresthesias and sensory change.   Psychiatric/Behavioral: Negative for altered mental status.           Objective:      Physical Exam   Constitutional: She is oriented to person, place, and time. She appears well-developed and well-nourished.   Cardiovascular:   Pulses:       Dorsalis pedis pulses are 2+ on the right side, and 2+ on the left side.        Posterior tibial pulses are 2+ on the right side, and 2+ on the left side.   No edema noted to b/L LEs   Musculoskeletal:        Right knee: She exhibits no swelling and no ecchymosis.        Left knee: She exhibits no swelling and no ecchymosis.        Right ankle: She exhibits normal range of motion, no swelling, no ecchymosis and normal pulse. No lateral malleolus, no medial malleolus and no head of 5th metatarsal tenderness found. Achilles tendon exhibits no pain, no defect and normal Rowe's test results.        Left ankle: She exhibits normal range of motion, no swelling, no ecchymosis and normal pulse. No lateral malleolus, no medial malleolus and no head of 5th metatarsal tenderness found. Achilles tendon exhibits no pain and normal Rowe's test results.        Right lower leg: She exhibits no tenderness, no bony tenderness, no  swelling, no edema and no deformity.        Left lower leg: She exhibits no tenderness, no swelling and no edema.        Right foot: There is normal range of motion and no deformity.        Left foot: There is normal range of motion and no deformity.   Adequate joint ROM noted to all lower extremity muscle groups with no pain or crepitation noted. Muscle strength is 5/5 in all groups bilaterally.  Pt has a normal angle and base of gait. Pt does not use an devices to aid in ambulation. No antalgic limp or any other gait abnormalities noted.      Feet:   Right Foot:   Protective Sensation: 10 sites tested. 10 sites sensed.   Left Foot:   Protective Sensation: 10 sites tested. 10 sites sensed.   Neurological: She is alert and oriented to person, place, and time.   Skin: Skin is warm. Capillary refill takes more than 3 seconds. No abrasion, no bruising, no burn and no ecchymosis noted.   B/L hallux nails non-adherent with dried up blood underneath, nails beds intact.   No open lesions noted to b/L lower extremities.   Skin temp is warm to warm from proximal to distal b/L.     Psychiatric: She has a normal mood and affect. Her speech is normal and behavior is normal. She is attentive.             Assessment:       Encounter Diagnosis   Name Primary?    Toe pain, bilateral Yes         Plan:       Lissy was seen today for diabetic foot exam and diabetes mellitus.    Diagnoses and all orders for this visit:    Toe pain, bilateral  -     X-Ray Foot Complete 3 view Bilateral; Future      I counseled the patient on her conditions, their implications and medical management.      X-rays reviewed with pt and pt was advised that there were no fractures found on either of the big toes.  B/L non-adherent hallux nails debrided w/o incident. Pt advised there was no injury to nail bed.   Call or return to clinic prn if these symptoms worsen or fail to improve as anticipated.    .

## 2018-05-11 ENCOUNTER — OFFICE VISIT (OUTPATIENT)
Dept: SLEEP MEDICINE | Facility: CLINIC | Age: 73
End: 2018-05-11
Payer: MEDICARE

## 2018-05-11 VITALS
HEIGHT: 62 IN | HEART RATE: 70 BPM | DIASTOLIC BLOOD PRESSURE: 72 MMHG | BODY MASS INDEX: 31.17 KG/M2 | SYSTOLIC BLOOD PRESSURE: 124 MMHG | WEIGHT: 169.38 LBS

## 2018-05-11 DIAGNOSIS — E66.09 CLASS 1 OBESITY DUE TO EXCESS CALORIES WITH SERIOUS COMORBIDITY AND BODY MASS INDEX (BMI) OF 30.0 TO 30.9 IN ADULT: ICD-10-CM

## 2018-05-11 DIAGNOSIS — G47.33 OSA (OBSTRUCTIVE SLEEP APNEA): ICD-10-CM

## 2018-05-11 DIAGNOSIS — I25.10 CORONARY ARTERY DISEASE INVOLVING NATIVE CORONARY ARTERY WITHOUT ANGINA PECTORIS, UNSPECIFIED WHETHER NATIVE OR TRANSPLANTED HEART: Primary | ICD-10-CM

## 2018-05-11 PROCEDURE — 99214 OFFICE O/P EST MOD 30 MIN: CPT | Mod: S$PBB,,, | Performed by: NURSE PRACTITIONER

## 2018-05-11 PROCEDURE — 99999 PR PBB SHADOW E&M-EST. PATIENT-LVL III: CPT | Mod: PBBFAC,,, | Performed by: NURSE PRACTITIONER

## 2018-05-11 PROCEDURE — 99213 OFFICE O/P EST LOW 20 MIN: CPT | Mod: PBBFAC,PO | Performed by: NURSE PRACTITIONER

## 2018-05-11 NOTE — PROGRESS NOTES
Lissy Palencia  was seen as f/u mgt of HEMAL. Last seen by MD 3/7/18    Since seen got setup apap 4/3/18. Not sure it is helping or how it helps her breathe. Denies pressure intolerance. Denies oral drying. Denies nasal drying. Denies significant mask leaks. Using View which she dislikes, may want to trial nasal mask. Snoring presumably resolved. Falls asleep w/o mask on then goes to bed and may or not put it on. More consistent use initially after setup then limited recent weeks.   Encore review:   DATE RANGE: 4/3/2018 - 5/4/2018     90% tile 10.7      Compliance Summary  Days with Device Usage: 22 days  Percentage of Days >=4 Hours: 46.9%  Average Usage (Days Used): 4 hrs. 43 mins. 13 secs.  Average Usage (All Days): 3 hrs. 14 mins. 43 secs.  Apnea Indices  Average AHI: 5.6    HISTORY  07/26/2017 INITIAL HISTORY OF PRESENT ILLNESS:  Lissy Palencia is a 73 y.o. female is here to be evaluated for a sleep disorder.       CHIEF COMPLAINT:      The patient's complaints include excessive daytime sleepiness, excessive daytime fatigue, snoring and interrupted sleep since  Several years ago - worse in the last 2 years.  Reports very interrupted sleep. Gets out of bed when unable to sleep. Bedroom is cool and quit.    Denies  dry mouth and sore throat  Denies nasal congestion   Denies  morning headaches  Reports  interrupted sleep  Denies frequent leg movements  Denies symptoms concerning for parasomnia    The ESS ([place] Sleepiness Score) taken on initial visit is 6 /24    The patient never had tonsillectomy, adenoidectomy or UPPP     Wearing a retainer to keep her teeth straight      03/07/2018:  The patient has not presented any new complaints since the previous visit. Comes to discuss PSG. Shooting nerve pain at the back and front of her right leg kept her up (this is an old problem but never worked up). Still feeling tired and snores. ESS 12/24. Always falls asleep while reading.  Tried Melatonin since last  "time.      SLEEP ROUTINE AND LIFESTYLE 05/11/2018 :    Occupation:    Bed partner: her      Time to bed - wake up time on a workday : 11 PM to 6 AM  Time to bed - wake up time on a day off: 11 PM to  6 AM  Sleep onset latency: 1-2  Disruptions or awakenings: 1-3  Time to fall back into sleep: 1   Perceived sleep quality: 1/5  Perceived total sleep time:  5-6  hours.  Daytime naps: 0 - but would sometimes fall asleep accidentally during the day    Exercise routine: yes  Caffeine:  1 cup in AM and 1 cup 8 PM sometimes     PREVIOUS SLEEP STUDIES:     PSG 8/2/17: Significant Obstructive sleep apnea (HEMAL) with AHI (apnea hypopnea Index) of 24.5 and SaO2 of 82% (weight  190 lbs).      REVIEW OF SYSTEMS:   Sleep related symptoms as per HPI, 7# gain. Otherwise a balance review of 10-systems is negative.     PHYSICAL EXAM:  /72   Pulse 70   Ht 5' 2.4" (1.585 m)   Wt 76.8 kg (169 lb 6.4 oz)   BMI 30.59 kg/m²   GENERAL: obese body habitus, well groomed.      ASSESSMENT:    1. HEMAL - moderate. Adherent with apap since setup with mild residual ahi/mild mask leak, snoring and sleep continuity resolved. Needs re-education about purpose of therapy and wants alternative mask.   She has medical co-morbidities of hyperlipidemia, obesity, CAD s/p PCI, borderline blood sugar  which can be worsened by HEMAL. This warrants treatment.  2. Insomnia NEC. Multi-factorial -   sleep disordered breathing and neuropathic pain in her leg are playing a role. Significant improvement in sleep hygiene.  3. Radiating burning and shooting pain down the posterior aspect of her right leg (originating from lower back), calve cramps and significant burning pain in lateral thigh. 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 Day new mask hopefully or continue Desiree View and nasal mask in July when eligible  2. Discussed etiology of HEMAL, purpose of " therapy, effectiveness of therapy (improved ahi) and potential ramifications of untreated HEMAL, including heart disease, hypertension, cognitive difficulties, stroke, and diabetes.    3. Encouraged weight loss efforts for potential improvement of HEMAL and overall health benefits  4. See PCP re: lipid control and cardiology H[place]ctor re: CAD  5. RTC 4 wks

## 2018-06-08 ENCOUNTER — OFFICE VISIT (OUTPATIENT)
Dept: SLEEP MEDICINE | Facility: CLINIC | Age: 73
End: 2018-06-08
Payer: MEDICARE

## 2018-06-08 VITALS
SYSTOLIC BLOOD PRESSURE: 127 MMHG | WEIGHT: 170.81 LBS | BODY MASS INDEX: 31.43 KG/M2 | HEIGHT: 62 IN | HEART RATE: 66 BPM | DIASTOLIC BLOOD PRESSURE: 72 MMHG

## 2018-06-08 DIAGNOSIS — G47.33 OBSTRUCTIVE SLEEP APNEA: Primary | ICD-10-CM

## 2018-06-08 PROCEDURE — 99213 OFFICE O/P EST LOW 20 MIN: CPT | Mod: S$PBB,,, | Performed by: NURSE PRACTITIONER

## 2018-06-08 PROCEDURE — 99999 PR PBB SHADOW E&M-EST. PATIENT-LVL III: CPT | Mod: PBBFAC,,, | Performed by: NURSE PRACTITIONER

## 2018-06-08 PROCEDURE — 99213 OFFICE O/P EST LOW 20 MIN: CPT | Mod: PBBFAC,PO | Performed by: NURSE PRACTITIONER

## 2018-06-08 NOTE — PROGRESS NOTES
Lissy Palencia  was seen as f/u mgt of HEMAL. Last seen 5/11/18    Continued use. Sleep more consolidated. Snoring improved. Some mask leaks, equipment use can disrupt her sleep. Nose burning. She has never added water to her unit and has humidity level at 4. Wants to trial nasal mask. Sore under nose from mask has improved.   Encore:   Date Range: 5/8/2018 - 6/6/2018     Hide     Compliance Summary  Apnea Indices    Days with Device Usage:  26 days  Average AHI:  5.0    Percentage of Days >=4 Hours:  76.7%     Average Usage (Days Used):  5 hrs. 57 mins. 35 secs.     Average Usage (All Days):  5 hrs. 9 mins. 54 secs.       90% tile 12.4cm    83-91% mask leak.      HISTORY  07/26/2017 INITIAL HISTORY OF PRESENT ILLNESS:  Lissy Palencia is a 73 y.o. female is here to be evaluated for a sleep disorder.       CHIEF COMPLAINT:      The patient's complaints include excessive daytime sleepiness, excessive daytime fatigue, snoring and interrupted sleep since  Several years ago - worse in the last 2 years.  Reports very interrupted sleep. Gets out of bed when unable to sleep. Bedroom is cool and quit.    Denies  dry mouth and sore throat  Denies nasal congestion   Denies  morning headaches  Reports  interrupted sleep  Denies frequent leg movements  Denies symptoms concerning for parasomnia    The ESS ([place] Sleepiness Score) taken on initial visit is 6 /24    The patient never had tonsillectomy, adenoidectomy or UPPP     Wearing a retainer to keep her teeth straight      03/07/2018:  The patient has not presented any new complaints since the previous visit. Comes to discuss PSG. Shooting nerve pain at the back and front of her right leg kept her up (this is an old problem but never worked up). Still feeling tired and snores. ESS 12/24. Always falls asleep while reading.  Tried Melatonin since last time.      SLEEP ROUTINE AND LIFESTYLE 06/08/2018 :    Occupation:    Bed partner: her      Time to bed - wake up time on  "a workday : 11 PM to 6 AM  Time to bed - wake up time on a day off: 11 PM to  6 AM  Sleep onset latency: 1-2  Disruptions or awakenings: 1-3  Time to fall back into sleep: 1   Perceived sleep quality: 1/5  Perceived total sleep time:  5-6  hours.  Daytime naps: 0 - but would sometimes fall asleep accidentally during the day    Exercise routine: yes  Caffeine:  1 cup in AM and 1 cup 8 PM sometimes    5/11/18:  Since seen got setup apap 4/3/18. Not sure it is helping or how it helps her breathe. Denies pressure intolerance. Denies oral drying. Denies nasal drying. Denies significant mask leaks. Using View which she dislikes, may want to trial nasal mask. Snoring presumably resolved. Falls asleep w/o mask on then goes to bed and may or not put it on. More consistent use initially after setup then limited recent weeks.   Encore review:   DATE RANGE: 4/3/2018 - 5/4/2018     90% tile 10.7      Compliance Summary  Days with Device Usage: 22 days  Percentage of Days >=4 Hours: 46.9%  Average Usage (Days Used): 4 hrs. 43 mins. 13 secs.  Average Usage (All Days): 3 hrs. 14 mins. 43 secs.  Apnea Indices  Average AHI: 5.6      PSG 8/2/17: Significant Obstructive sleep apnea (HEMAL) with AHI (apnea hypopnea Index) of 24.5 and SaO2 of 82% (weight  190 lbs).      REVIEW OF SYSTEMS:   Sleep related symptoms as per HPI, wgt stable. Otherwise a balance review of 10-systems is negative.     PHYSICAL EXAM:  /72   Pulse 66   Ht 5' 2" (1.575 m)   Wt 77.5 kg (170 lb 12.8 oz)   BMI 31.24 kg/m²   GENERAL: obese body habitus, well groomed.      ASSESSMENT:    1. HEMAL - moderate. Adherent with apap since setup with mild residual ahi/mild mask leak, snoring and sleep continuity resolved. Needs re-education about purpose of therapy and wants alternative mask. 6/8/18: Continued adherence, meeting her medicare use guidelines, AHI<5 to mild due to mask leak. Benefiting from therapy. Wants switch to nasal mask  She has medical co-morbidities " of hyperlipidemia, obesity, CAD s/p PCI, borderline blood sugar  which can be worsened by HEMAL. This warrants treatment.  2. Insomnia NEC. Multi-factorial -   sleep disordered breathing and neuropathic pain in her leg are playing a role. Significant improvement in sleep hygiene.     PLAN:    1.APAP continue 8-15cm. Day new mask when eligible,add water to unit and adjust humidity prn  2. Discussed etiology of HEMAL, effectiveness of therapy (improved ahi) and potential ramifications of untreated HEMAL, including heart disease, hypertension, cognitive difficulties, stroke, and diabetes.    3. Encouraged continued weight loss efforts for potential improvement of HEMAL and overall health benefits  4. See PCP re: lipid control and cardiology [place] re: CAD  5. RTC 3-mos

## 2018-06-29 ENCOUNTER — TELEPHONE (OUTPATIENT)
Dept: SPINE | Facility: CLINIC | Age: 73
End: 2018-06-29

## 2018-06-29 ENCOUNTER — OFFICE VISIT (OUTPATIENT)
Dept: SPINE | Facility: CLINIC | Age: 73
End: 2018-06-29
Payer: MEDICARE

## 2018-06-29 VITALS
DIASTOLIC BLOOD PRESSURE: 66 MMHG | BODY MASS INDEX: 31.28 KG/M2 | WEIGHT: 170 LBS | HEART RATE: 62 BPM | HEIGHT: 62 IN | SYSTOLIC BLOOD PRESSURE: 134 MMHG

## 2018-06-29 DIAGNOSIS — M54.14 THORACIC AND LUMBOSACRAL NEURITIS: Primary | ICD-10-CM

## 2018-06-29 DIAGNOSIS — M54.17 THORACIC AND LUMBOSACRAL NEURITIS: Primary | ICD-10-CM

## 2018-06-29 DIAGNOSIS — M54.42 CHRONIC BILATERAL LOW BACK PAIN WITH BILATERAL SCIATICA: ICD-10-CM

## 2018-06-29 DIAGNOSIS — M54.2 CERVICALGIA: Primary | ICD-10-CM

## 2018-06-29 DIAGNOSIS — G89.29 CHRONIC BILATERAL LOW BACK PAIN WITH BILATERAL SCIATICA: ICD-10-CM

## 2018-06-29 DIAGNOSIS — M54.41 CHRONIC BILATERAL LOW BACK PAIN WITH BILATERAL SCIATICA: ICD-10-CM

## 2018-06-29 DIAGNOSIS — M54.6 PAIN IN THORACIC SPINE: ICD-10-CM

## 2018-06-29 PROCEDURE — 99204 OFFICE O/P NEW MOD 45 MIN: CPT | Mod: S$PBB,,, | Performed by: PHYSICIAN ASSISTANT

## 2018-06-29 PROCEDURE — 99214 OFFICE O/P EST MOD 30 MIN: CPT | Mod: PBBFAC | Performed by: PHYSICIAN ASSISTANT

## 2018-06-29 PROCEDURE — 99999 PR PBB SHADOW E&M-EST. PATIENT-LVL IV: CPT | Mod: PBBFAC,,, | Performed by: PHYSICIAN ASSISTANT

## 2018-06-29 RX ORDER — TIZANIDINE 4 MG/1
4 TABLET ORAL EVERY 8 HOURS PRN
Qty: 90 TABLET | Refills: 1 | Status: SHIPPED | OUTPATIENT
Start: 2018-06-29 | End: 2018-10-11

## 2018-06-29 NOTE — PROGRESS NOTES
Subjective:     Patient ID:  Lissy Palencia is a 73 y.o. female.      Chief Complaint: Right leg pain    HPI    Lissy Palencia is a 73 y.o. female who presents with the above CC.  Has had right lateral and anterior thigh pain mainly to the knee and occasionally to the shin for the past 10-15 years.  This has progressively gotten worse and feels like a sunburn and burning senstation.  It is also hypersensitive to touch if something rubs on it.  This comes and goes rated 9/10 and is worse with walking and standing and better with keeping still.  In the last year she has gained 20 pounds.  She has some left lateral leg pain to the knee but very mild.  Back pain across the low back comes and goes and is not a chronic problem.  She has some pain in the bilateral shoulder blade region.    Patient has not had PT or ESIs.  No spine surgery.  Patient is currently taking Neurontin 100mg PRN which does not help.    Patient denies any recent accidents or trauma, no saddle anesthesias, and no bowel or bladder incontinence.      Review of Systems:  Please refer to page three of the spine center intake form for a complete review of systems.    Past Medical History:   Diagnosis Date    Allergy     Arthritis     Coronary artery disease     in the past    GERD (gastroesophageal reflux disease)     Hemorrhoids     History of cholelithiasis     Hyperlipidemia     Hypothyroidism     Right shoulder pain 10/9/2012    Trigger finger, right 3rd finger 7/30/2014     Past Surgical History:   Procedure Laterality Date    ABLATION COLPOCLESIS      ADENOIDECTOMY      CHOLECYSTECTOMY      CORONARY STENT PLACEMENT      heart cath  2005    non-obstructive disease    OOPHORECTOMY      SHOULDER ARTHROSCOPY W/ ROTATOR CUFF REPAIR      Right    TONSILLECTOMY      TOTAL ABDOMINAL HYSTERECTOMY      with BSO    TUBAL LIGATION       Current Outpatient Prescriptions on File Prior to Visit   Medication Sig Dispense Refill    aspirin 81  "MG Chew Take 1 tablet (81 mg total) by mouth once daily. 100 tablet 4    atorvastatin (LIPITOR) 40 MG tablet TAKE 1 TABLET DAILY 90 tablet 10    co-enzyme Q-10 30 mg capsule Take 30 mg by mouth 3 (three) times daily.      conjugated estrogens (PREMARIN) vaginal cream Place 0.5 g vaginally twice a week. 30 g 12    gabapentin (NEURONTIN) 100 MG capsule 1 PRN and QHS for right sciatica discomfort up to 3 times a day 270 capsule 0    levothyroxine (SYNTHROID) 88 MCG tablet TAKE 1 TABLET EVERY MORNING BEFORE BREAKFAST 90 tablet 3    metFORMIN (GLUCOPHAGE-XR) 500 MG 24 hr tablet Take 1 tablet (500 mg total) by mouth daily with breakfast. 90 tablet 3     No current facility-administered medications on file prior to visit.      Review of patient's allergies indicates:   Allergen Reactions    Compazine  [prochlorperazine edisylate]      Other reaction(s): SPASMS    Parafon forte      Other reaction(s): Hives     Social History     Social History    Marital status:      Spouse name: N/A    Number of children: N/A    Years of education: N/A     Occupational History     [employer]     Social History Main Topics    Smoking status: Never Smoker    Smokeless tobacco: Never Used    Alcohol use No    Drug use: No    Sexual activity: Not Currently     Partners: Male     Birth control/ protection: None     Other Topics Concern    Not on file     Social History Narrative    No narrative on file     Family History   Problem Relation Age of Onset    Breast cancer Neg Hx     Ovarian cancer Neg Hx     Glaucoma Neg Hx     Cataracts Neg Hx     Diabetes Neg Hx     Macular degeneration Neg Hx     Retinal detachment Neg Hx     Melanoma Neg Hx     Cervical cancer Neg Hx     Endometrial cancer Neg Hx     Vaginal cancer Neg Hx        Objective:      Vitals:    06/29/18 0841   BP: 134/66   Pulse: 62   Weight: 77.1 kg (170 lb)   Height: 5' 2" (1.575 m)   PainSc:   7   PainLoc: Shoulder         Physical " Exam:    General:  Lissy Palencia is well-developed, well-nourished, appears stated age, in no acute distress, alert and oriented to person, place, and time.    Pulmonary/Chest:  Respiratory effort normal  Abdominal: Exhibits no distension  Psychiatric:  Normal mood and affect.  Behavior is normal.  Judgement and thought content normal    Musculoskeletal:    Patient arises from a sitting to standing position without difficulty.  Patient walks to the door without evidence of limp, pain, or abnormality of gait. Patient is able to walk on heels and toes without difficulty.    Lumbar ROM:   Mild pain in lumbar flexion, extension, right lateral bending, and left lateral bending.    Lumbar Spine Inspection:  Normal with no surgical scars and no visible rashes.    Lumbar Spine Palpation:  No tenderness to low back palpation.    SI Joint Palpation:  Mild tenderness to bilateral SI Joint palpation.    Straight Leg Raise:  Negative right and left SLR.    Neurological: Alert and oriented to person, place, and time    Muscle strength against resistance:     Right Left   Hip flexion  5 / 5 5 / 5   Hip extension 5 / 5 5 / 5   Hip abduction 5 / 5 5 / 5   Hip adduction  5 / 5 5 / 5   Knee extension  5 / 5 5 / 5   Knee flexion 5 / 5 5 / 5   Dorsiflexion  5 / 5 5 / 5   EHL  5 / 5 5 / 5   Plantar flexion  5 / 5 5 / 5   Inversion of the feet 5 / 5 5 / 5   Eversion of the feet  5 / 5 5 / 5     Reflexes:     Right Left   Patellar 2+ 2+   Achilles 2+ 2+     Clonus:  Negative bilaterally  Sensitive to touch in the right anterior and lateral thighs    On gross examination of the bilateral upper extremities, patient has full painfree ROM with no signs of clubbing, cyanosis, edema, or weakness.     No imaging to review      Assessment:          1. Thoracic and lumbosacral neuritis    2. Chronic bilateral low back pain with bilateral sciatica            Plan:          Orders Placed This Encounter    MRI LUMBAR SPINE WITHOUT CONTRAST     tiZANidine (ZANAFLEX) 4 MG tablet       Patient either with right L2-3 radiculopathy or right lateral femoral cutaneous neuropathy    -Will get MRI lumbar spine  -Neurontin 300mg to increase to TID over three weeks.  She will use her 100mg she has at home to do this  -Zanaflex PRN for shoulder blade spasms  -FU after MRIs    Follow-Up:  Follow-up in 2 weeks (on 7/13/2018). If there are any questions prior to this, the patient was instructed to contact the office.       MILA Solis, PA-C  Neurosurgery  [facility]  [facility]

## 2018-07-03 ENCOUNTER — HOSPITAL ENCOUNTER (OUTPATIENT)
Dept: RADIOLOGY | Facility: HOSPITAL | Age: 73
Discharge: HOME | End: 2018-07-03
Attending: PHYSICIAN ASSISTANT
Payer: MEDICARE

## 2018-07-03 DIAGNOSIS — M54.14 THORACIC AND LUMBOSACRAL NEURITIS: ICD-10-CM

## 2018-07-03 DIAGNOSIS — M54.42 CHRONIC BILATERAL LOW BACK PAIN WITH BILATERAL SCIATICA: ICD-10-CM

## 2018-07-03 DIAGNOSIS — M54.17 THORACIC AND LUMBOSACRAL NEURITIS: ICD-10-CM

## 2018-07-03 DIAGNOSIS — G89.29 CHRONIC BILATERAL LOW BACK PAIN WITH BILATERAL SCIATICA: ICD-10-CM

## 2018-07-03 DIAGNOSIS — M54.41 CHRONIC BILATERAL LOW BACK PAIN WITH BILATERAL SCIATICA: ICD-10-CM

## 2018-07-03 PROCEDURE — 72148 MRI LUMBAR SPINE W/O DYE: CPT | Mod: TC

## 2018-07-03 PROCEDURE — 72148 MRI LUMBAR SPINE W/O DYE: CPT | Mod: 26,,, | Performed by: RADIOLOGY

## 2018-07-10 RX ORDER — METFORMIN HYDROCHLORIDE 500 MG/1
500 TABLET, EXTENDED RELEASE ORAL
Qty: 90 TABLET | Refills: 3 | Status: SHIPPED | OUTPATIENT
Start: 2018-07-10 | End: 2018-07-10 | Stop reason: SDUPTHER

## 2018-07-10 RX ORDER — METFORMIN HYDROCHLORIDE 500 MG/1
500 TABLET, EXTENDED RELEASE ORAL
Qty: 90 TABLET | Refills: 3 | Status: SHIPPED | OUTPATIENT
Start: 2018-07-10 | End: 2019-07-02 | Stop reason: SDUPTHER

## 2018-07-10 NOTE — TELEPHONE ENCOUNTER
----- Message from Silvana Julian sent at 7/10/2018  3:32 PM CDT -----  Contact: Hector/   [phone]  Pt has been out of diabetes medication x 3 weeks. She needs a 2 week supply of metformin sent to the local pharmacy until [facility] mails the rx. Please send a 2 week supply to [facility] [place] [phone]    [facility] has been telling pt that this was on the way for over 2 weeks and they didn't even have the rx. Please call this in asap and notify pt.

## 2018-07-10 NOTE — TELEPHONE ENCOUNTER
Contact: Hector/   [phone]  Pt has been out of diabetes medication x 3 weeks. She needs a 2 week supply of metformin sent to the local pharmacy until [facility] mails the rx. Please send a 2 week supply to [facility] [phone]     [facility] has been telling pt that this was on the way for over 2 weeks and they didn't even have the rx. Please call this in asap and notify pt.        Please advise  Patient has been without meds for 3weeks and need a 30day supply sent to [facility] on [address]    Thank you  DIANNE Ledesma

## 2018-07-18 ENCOUNTER — OFFICE VISIT (OUTPATIENT)
Dept: SLEEP MEDICINE | Facility: CLINIC | Age: 73
End: 2018-07-18
Payer: MEDICARE

## 2018-07-18 VITALS
HEART RATE: 69 BPM | DIASTOLIC BLOOD PRESSURE: 65 MMHG | SYSTOLIC BLOOD PRESSURE: 118 MMHG | HEIGHT: 62 IN | BODY MASS INDEX: 31.47 KG/M2 | WEIGHT: 171 LBS

## 2018-07-18 DIAGNOSIS — G47.33 OSA (OBSTRUCTIVE SLEEP APNEA): Primary | ICD-10-CM

## 2018-07-18 PROCEDURE — 99212 OFFICE O/P EST SF 10 MIN: CPT | Mod: PBBFAC,PO | Performed by: PSYCHIATRY & NEUROLOGY

## 2018-07-18 PROCEDURE — 99999 PR PBB SHADOW E&M-EST. PATIENT-LVL II: CPT | Mod: PBBFAC,,, | Performed by: PSYCHIATRY & NEUROLOGY

## 2018-07-18 PROCEDURE — 99214 OFFICE O/P EST MOD 30 MIN: CPT | Mod: S$PBB,,, | Performed by: PSYCHIATRY & NEUROLOGY

## 2018-07-18 NOTE — PROGRESS NOTES
Lissy Palencia  was seen as f/u mgt of HEMAL. Last seen 5/11/18    Continued use. Sleep more consolidated. Snoring improved. Some mask leaks, equipment use can disrupt her sleep. Nose burning. She has never added water to her unit and has humidity level at 4. Wants to trial nasal mask. Sore under nose from mask has improved.   Encore:   Date Range: 5/8/2018 - 6/6/2018     Hide     Compliance Summary  Apnea Indices    Days with Device Usage:  26 days  Average AHI:  5.0    Percentage of Days >=4 Hours:  76.7%     Average Usage (Days Used):  5 hrs. 57 mins. 35 secs.     Average Usage (All Days):  5 hrs. 9 mins. 54 secs.       90% tile 12.4cm    83-91% mask leak.      INTERVAL HISTORY:    07/18/2018:  The patient has not presented any new complaints since the previous visit.   Could not use her CPAP because Day still has not replaced her mask.   APAP 8-15 cm H2O   90% - 11-12.5 cm H2O. Ceiling effect noted at 15 cm H2O  Frequent awakenings from mask leak kept her from using her machine more lately.  Therapy Event Summary Date Range: 6/18/2018 - 7/17/2018     Hide      Compliance Summary  Apnea Indices  Ventilator Statistics    Days with Device Usage:  4 days  Average AHI:  7.0  Average Breath Rate:  16.8 bpm    Percentage of Days >=4 Hours:  10.0%  Average OA Index:  1.8  Average % Patient Triggered Breaths:  N/A    Average Usage (Days Used):  6 hrs. 9 mins. 33 secs.  Average CA Index:  1.3  Average Tidal Volume:  279.3 ml    Average Usage (All Days):  49 mins. 16 secs.    Average Minute Vent:  N/A        Large Leak  Periodic Breathing     Average Time in Large Leak:  22 mins. 45 secs.  Average % of Night in PB:  0.7%     Average % of Night in Large Leak:  6.2%                [facility] ([facility]) - Medicare [address]  [place] [address]  [phone] (ph)  [phone] (fax)          HISTORY  07/26/2017 INITIAL HISTORY OF PRESENT ILLNESS:  Lissy Palencia is a 73 y.o. female is here  to be evaluated for a sleep disorder.       CHIEF COMPLAINT:      The patient's complaints include excessive daytime sleepiness, excessive daytime fatigue, snoring and interrupted sleep since  Several years ago - worse in the last 2 years.  Reports very interrupted sleep. Gets out of bed when unable to sleep. Bedroom is cool and quit.    Denies  dry mouth and sore throat  Denies nasal congestion   Denies  morning headaches  Reports  interrupted sleep  Denies frequent leg movements  Denies symptoms concerning for parasomnia    The ESS (Welcome Sleepiness Score) taken on initial visit is 6 /24    The patient never had tonsillectomy, adenoidectomy or UPPP     Wearing a retainer to keep her teeth straight      03/07/2018:  The patient has not presented any new complaints since the previous visit. Comes to discuss PSG. Shooting nerve pain at the back and front of her right leg kept her up (this is an old problem but never worked up). Still feeling tired and snores. ESS 12/24. Always falls asleep while reading.  Tried Melatonin since last time.      SLEEP ROUTINE AND LIFESTYLE 07/18/2018 :    Occupation:    Bed partner: her      Time to bed - wake up time on a workday : 11 PM to 6 AM  Time to bed - wake up time on a day off: 11 PM to  6 AM  Sleep onset latency: 1-2  Disruptions or awakenings: 1-3  Time to fall back into sleep: 1   Perceived sleep quality: 1/5  Perceived total sleep time:  5-6  hours.  Daytime naps: 0 - but would sometimes fall asleep accidentally during the day    Exercise routine: yes  Caffeine:  1 cup in AM and 1 cup 8 PM sometimes    5/11/18:  Since seen got setup apap 4/3/18. Not sure it is helping or how it helps her breathe. Denies pressure intolerance. Denies oral drying. Denies nasal drying. Denies significant mask leaks. Using View which she dislikes, may want to trial nasal mask. Snoring presumably resolved. Falls asleep w/o mask on then goes to bed and may or not put it on. More  "consistent use initially after setup then limited recent weeks.   Encore review:   DATE RANGE: 4/3/2018 - 5/4/2018     90% tile 10.7      Compliance Summary  Days with Device Usage: 22 days  Percentage of Days >=4 Hours: 46.9%  Average Usage (Days Used): 4 hrs. 43 mins. 13 secs.  Average Usage (All Days): 3 hrs. 14 mins. 43 secs.  Apnea Indices  Average AHI: 5.6      PSG 8/2/17: Significant Obstructive sleep apnea (HEMAL) with AHI (apnea hypopnea Index) of 24.5 and SaO2 of 82% (weight  190 lbs).      REVIEW OF SYSTEMS:   Sleep related symptoms as per HPI, wgt stable. Otherwise a balance review of 10-systems is negative.     PHYSICAL EXAM:  /65 (BP Location: Left arm, Patient Position: Sitting, BP Method: Large (Automatic))   Pulse 69   Ht 5' 2" (1.575 m)   Wt 77.6 kg (171 lb)   BMI 31.28 kg/m²   GENERAL: obese body habitus, well groomed.      ASSESSMENT:    1. HEMAL - moderate. Adherent with apap since setup with mild residual ahi/mild mask leak, snoring and sleep continuity resolved. Needs re-education about purpose of therapy and wants alternative mask. 6/8/18: Continued adherence, meeting her medicare use guidelines, AHI<5 to mild due to mask leak. Benefiting from therapy. Wants switch to nasal mask  She has medical co-morbidities of hyperlipidemia, obesity, CAD s/p PCI, borderline blood sugar  which can be worsened by HEMAL. This warrants treatment.  2. Insomnia NEC. Multi-factorial -   sleep disordered breathing and neuropathic pain in her leg are playing a role. Significant improvement in sleep hygiene.     PLAN:    1.APAP 8-15cm - will increase to 8-18 given "ceiling effect" on the download.   2. I talked to Day today and will send email Jonnie; replacing her mask for a nasal kind.    1. Discussed etiology of HEMAL, effectiveness of therapy (improved ahi) and potential ramifications of untreated HEMAL, including heart disease, hypertension, cognitive difficulties, stroke, and diabetes.    3. Encouraged " continued weight loss efforts for potential improvement of HEMAL and overall health benefits  4. See PCP re: lipid control and cardiology Héctor re: CAD  5. RTC 3-mos

## 2018-07-19 ENCOUNTER — TELEPHONE (OUTPATIENT)
Dept: SPINE | Facility: CLINIC | Age: 73
End: 2018-07-19

## 2018-07-20 ENCOUNTER — OFFICE VISIT (OUTPATIENT)
Dept: SPINE | Facility: CLINIC | Age: 73
End: 2018-07-20
Payer: MEDICARE

## 2018-07-20 VITALS
WEIGHT: 171 LBS | HEIGHT: 62 IN | BODY MASS INDEX: 31.47 KG/M2 | DIASTOLIC BLOOD PRESSURE: 66 MMHG | HEART RATE: 60 BPM | SYSTOLIC BLOOD PRESSURE: 146 MMHG

## 2018-07-20 DIAGNOSIS — M47.819 SPONDYLOSIS WITHOUT MYELOPATHY: ICD-10-CM

## 2018-07-20 DIAGNOSIS — G89.29 CHRONIC BILATERAL LOW BACK PAIN WITH BILATERAL SCIATICA: ICD-10-CM

## 2018-07-20 DIAGNOSIS — M54.17 THORACIC AND LUMBOSACRAL NEURITIS: ICD-10-CM

## 2018-07-20 DIAGNOSIS — M54.41 CHRONIC BILATERAL LOW BACK PAIN WITH BILATERAL SCIATICA: ICD-10-CM

## 2018-07-20 DIAGNOSIS — M54.42 CHRONIC BILATERAL LOW BACK PAIN WITH BILATERAL SCIATICA: ICD-10-CM

## 2018-07-20 DIAGNOSIS — G57.11 LATERAL FEMORAL CUTANEOUS NEUROPATHY, RIGHT: Primary | ICD-10-CM

## 2018-07-20 DIAGNOSIS — M54.14 THORACIC AND LUMBOSACRAL NEURITIS: ICD-10-CM

## 2018-07-20 DIAGNOSIS — M51.37 DDD (DEGENERATIVE DISC DISEASE), LUMBOSACRAL: ICD-10-CM

## 2018-07-20 PROCEDURE — 99213 OFFICE O/P EST LOW 20 MIN: CPT | Mod: PBBFAC | Performed by: PHYSICIAN ASSISTANT

## 2018-07-20 PROCEDURE — 99999 PR PBB SHADOW E&M-EST. PATIENT-LVL III: CPT | Mod: PBBFAC,,, | Performed by: PHYSICIAN ASSISTANT

## 2018-07-20 PROCEDURE — 99214 OFFICE O/P EST MOD 30 MIN: CPT | Mod: S$PBB,,, | Performed by: PHYSICIAN ASSISTANT

## 2018-07-20 NOTE — PROGRESS NOTES
"Subjective:     Patient ID:  Lissy Palencia is a 73 y.o. female.      Chief Complaint: Right leg pain    HPI    Lissy Palencia is a 73 y.o. female who presents for MRI follow up.    Has had right lateral and anterior thigh pain mainly to the knee and occasionally to the shin for the past 10-15 years.  This has progressively gotten worse and feels like a sunburn and burning senstation.  It is also hypersensitive to touch if something rubs on it.  This comes and goes rated 9/10 and is worse with walking and standing and better with keeping still.  In the last year she has gained 20 pounds.  She has some left lateral leg pain to the knee but very mild.  Back pain across the low back comes and goes and is not a chronic problem.  She has some pain in the bilateral shoulder blade region.  Some new pain in the anterior left thigh.     Patient has not had PT or ESIs.  No spine surgery.  Patient tried to take Neurontin 200mg TID but she could not tolerate it.     Patient denies any recent accidents or trauma, no saddle anesthesias, and no bowel or bladder incontinence.      Review of Systems:  Constitution: Negative for chills, fever, night sweats and weight loss.   Musculoskeletal: Negative for falls.   Gastrointestinal: Negative for bowel incontinence, nausea and vomiting.   Genitourinary: Negative for bladder incontinence.   Neurological: Negative for disturbances in coordination and loss of balance.      Objective:      Vitals:    07/20/18 0749   BP: (!) 146/66   Pulse: 60   Weight: 77.6 kg (171 lb)   Height: 5' 2" (1.575 m)   PainSc:   4   PainLoc: Leg         Physical Exam:    General:  Lissy Palencia is well-developed, well-nourished, appears stated age, in no acute distress, alert and oriented to person, place, and time.    Patient sits comfortably in the exam room and answers questions appropriately. Grossly patient is able to move bilateral lower extremities without difficulty.     MRI Interpretation:     Lumbar " spine MRI was personally reviewed today.  Mild BBDD at L4-5 with mild to moderate bilateral NFS.  No HNP or central stenosis.    Assessment:          1. Lateral femoral cutaneous neuropathy, right    2. Spondylosis without myelopathy    3. DDD (degenerative disc disease), lumbosacral    4. Thoracic and lumbosacral neuritis    5. Chronic bilateral low back pain with bilateral sciatica            Plan:          Orders Placed This Encounter    Procedure Order to [facility]    Procedure Order to [facility]       MRI lumbar spine with mild findings mainly at L4-5 with BBDD and bilateral NFS    I think her pain many be from right lateral femoral cutaneous neuropathy    -Will order right lateral femoral cutaneous nerve block with Dr. Nolan for diagnostic and therapeutic purposes  -Get back on Neurontin 100mg TID  -FU with me in clinic in 2 months.  If this does not help with order right L4 TESI.    Follow-Up:  Follow-up in about 2 months (around 9/20/2018). If there are any questions prior to this, the patient was instructed to contact the office.       MILA Solis, ISMA  Neurosurgery  [facility]  [facility]    Addendum:  09/11/18    Injection reordered.  FU two months from injection.    MILA Solis, ISMA  Neurosurgery  [facility]  [facility]

## 2018-07-23 ENCOUNTER — HOSPITAL ENCOUNTER (OUTPATIENT)
Dept: RADIOLOGY | Facility: HOSPITAL | Age: 73
Discharge: HOME | End: 2018-07-23
Attending: INTERNAL MEDICINE
Payer: MEDICARE

## 2018-07-23 DIAGNOSIS — Z12.39 BREAST CANCER SCREENING: ICD-10-CM

## 2018-07-23 PROCEDURE — 77067 SCR MAMMO BI INCL CAD: CPT | Mod: TC

## 2018-07-23 PROCEDURE — 77067 SCR MAMMO BI INCL CAD: CPT | Mod: 26,,, | Performed by: RADIOLOGY

## 2018-08-02 RX ORDER — LEVOTHYROXINE SODIUM 88 UG/1
TABLET ORAL
Qty: 90 TABLET | Refills: 3 | Status: SHIPPED | OUTPATIENT
Start: 2018-08-02 | End: 2019-07-02 | Stop reason: SDUPTHER

## 2018-08-06 NOTE — TELEPHONE ENCOUNTER
Order in   Referred To Oculoplastics For Closure Text (Leave Blank If You Do Not Want): After obtaining clear surgical margins the patient was sent to oculoplastics for surgical repair.  The patient understands they will receive post-surgical care and follow-up from the referring physician's office.

## 2018-09-11 ENCOUNTER — TELEPHONE (OUTPATIENT)
Dept: SPINE | Facility: CLINIC | Age: 73
End: 2018-09-11

## 2018-09-11 DIAGNOSIS — G57.11 LATERAL FEMORAL CUTANEOUS NEUROPATHY, RIGHT: Primary | ICD-10-CM

## 2018-09-11 NOTE — TELEPHONE ENCOUNTER
----- Message from Catie Babin sent at 9/11/2018  8:10 AM CDT -----            Name of Who is Calling: WILBERT MAYEN [[id number]]    What is the request in detail: please call pt, she was under the impression that she was having injection on 9/28/18 and it is just a f/u visit   Pt right side is so bad that she can not move or even get dress without pain    Can the clinic reply by [facility]: no      What Number to Call Back if not in [facility]:[phone]

## 2018-09-11 NOTE — TELEPHONE ENCOUNTER
----- Message from Marina Ng LPN sent at 9/11/2018  9:33 AM CDT -----  Patient has been scheduled for Right lateral femoral cutaneous nerve block with Dr. Kalli Nolan on 9-14-18.        Thank You,  Marina

## 2018-09-11 NOTE — TELEPHONE ENCOUNTER
Called and spoke with patient and informed her that the order was in for the injection.  I told her that we have a message into them to get her scheduled.  She was instructed that if she does not hear from them then she will need to call me back today.  Patient verbalized understanding.

## 2018-09-11 NOTE — TELEPHONE ENCOUNTER
----- Message from Vika Wang sent at 9/11/2018  8:55 AM CDT -----  Can you please place new order for patient to have injection.  She never was called to have the first one done.  Thank you  ----- Message -----  From: Marina Ng LPN  Sent: 9/11/2018   8:46 AM  To: Vika Wang    I can schedule her but I need a new order please.    Thank You,  Marina  ----- Message -----  From: Vika Wang  Sent: 9/11/2018   8:21 AM  To: Marina Simmons LPN    Good morning   Patient had an order in to get an injection in July.  She was not scheduled.  Can you please contact patient and schedule for injection.  Thank you

## 2018-09-11 NOTE — TELEPHONE ENCOUNTER
Injection reordered.    Avis change her fu with me to two months from now.    Danielle Hinojosa, MILA, PA-C  Neurosurgery  [facility]  [facility]

## 2018-09-14 ENCOUNTER — HOSPITAL ENCOUNTER (OUTPATIENT)
Facility: OTHER | Age: 73
Discharge: HOME | End: 2018-09-14
Attending: ANESTHESIOLOGY | Admitting: ANESTHESIOLOGY
Payer: MEDICARE

## 2018-09-14 VITALS
HEART RATE: 58 BPM | RESPIRATION RATE: 18 BRPM | DIASTOLIC BLOOD PRESSURE: 64 MMHG | SYSTOLIC BLOOD PRESSURE: 145 MMHG | OXYGEN SATURATION: 94 %

## 2018-09-14 DIAGNOSIS — G57.11 LATERAL FEMORAL CUTANEOUS NEUROPATHY, RIGHT: Primary | ICD-10-CM

## 2018-09-14 PROCEDURE — 76942 ECHO GUIDE FOR BIOPSY: CPT | Performed by: ANESTHESIOLOGY

## 2018-09-14 PROCEDURE — S0020 INJECTION, BUPIVICAINE HYDRO: HCPCS | Performed by: ANESTHESIOLOGY

## 2018-09-14 PROCEDURE — 64450 NJX AA&/STRD OTHER PN/BRANCH: CPT | Mod: RT,,, | Performed by: ANESTHESIOLOGY

## 2018-09-14 PROCEDURE — 25000003 PHARM REV CODE 250: Performed by: ANESTHESIOLOGY

## 2018-09-14 PROCEDURE — 76942 ECHO GUIDE FOR BIOPSY: CPT | Mod: 26,,, | Performed by: ANESTHESIOLOGY

## 2018-09-14 PROCEDURE — 64447 NJX AA&/STRD FEMORAL NRV IMG: CPT | Performed by: ANESTHESIOLOGY

## 2018-09-14 RX ORDER — BUPIVACAINE HYDROCHLORIDE 5 MG/ML
INJECTION, SOLUTION EPIDURAL; INTRACAUDAL
Status: DISCONTINUED | OUTPATIENT
Start: 2018-09-14 | End: 2018-09-14 | Stop reason: HOSPADM

## 2018-09-14 RX ORDER — LIDOCAINE HYDROCHLORIDE 10 MG/ML
INJECTION INFILTRATION; PERINEURAL
Status: DISCONTINUED | OUTPATIENT
Start: 2018-09-14 | End: 2018-09-14 | Stop reason: HOSPADM

## 2018-09-14 NOTE — OP NOTE
Date of Procedure: 09/14/2018    Procedure: Right LFCN block    Referring Provider: Danielle Hinojosa PA-C    Pre-op diagnosis: Lateral femoral cutaneous neuropathy, right [G57.11]    Post-op diagnosis: Lateral femoral cutaneous neuropathy, right [G57.11]     Physician: Dr. Kalli Nolan     Assistant: Dr. Benavides    Anesthestia: local    EBL: None    Specimens: None    All medications, allergies, and relevant histories were reviewed. No recent antibiotics or infections.  A time-out was taken to verify the correct patient, procedure, laterality, and appropriate medications/allergies.        Lateral Femoral Cutaneous Nerve Block using US guidance, right:   The procedure was discussed with the patient including complications of nerve damage, spinal headache, bleeding, infection, and failure of pain relief.     All medications, allergies, and relevant histories were reviewed. No recent antibiotics or infections. A time-out was taken to verify the correct patient, procedure, laterality, and appropriate medications/allergies.     The patient was placed in the supine position. Inguinal area was prepped with chlorhexidine x 3 and draped. Sterile precautions were observed throughout the procedure. After identifying the lateral femoral cutaneous nerve superficial to the sartorious muscle on the right using US guidance, Xylocaine 1% was infiltrated locally 1.5 cm. A 22-gauge B level needle was introduced and advanced to the lateral femoral cutaneous nerve. Stimulation with 0.3 mA reproduced her pain in her right leg.  A 10 mL mixture of 9cc of bupivacaine 0.5% with 10 cc of dexamethasone was injected around the nerve after repeated negative aspirations. Needle was removed and a Band-Aid dressing applied.     The patient tolerated the procedure well without any complications and was released in excellent condition.    Future Management:   If helpful, can repeat as needed.    Follow up with Danielle Hinojosa PA-C    I certify  that I provided the above services.  I was present for the entire procedure, which was performed by myself with the assistance of the resident physician.  There were no parts of the procedure that were performed not by myself or without my direct supervision.

## 2018-09-14 NOTE — PLAN OF CARE
PATIENT TOLERATED PROCEDURE WELL. PT COMPLAINS OF 0/10 PAIN. ASSISTED PATIENT UP FOR FIRST TIME. STEADY ON FEET AND DISCHARGE INSTRUCTIONS GIVEN.

## 2018-09-14 NOTE — DISCHARGE INSTRUCTIONS
Adult Procedural Sedation Instructions    Recovery After Procedural Sedation (Adult)  You have been given medicine by vein to make you sleep during your surgery. This may have included both a pain medicine and sleeping medicine. Most of the effects have worn off. But you may still have some drowsiness for the next 6 to 8 hours.  Home care  Follow these guidelines when you get home:  · For the next 8 hours, you should be watched by a responsible adult. This person should make sure your condition is not getting worse.  · Don't drink any alcohol for the next 24 hours.  · Don't drive, operate dangerous machinery, or make important business or personal decisions during the next 24 hours.  Note: Your healthcare provider may tell you not to take any medicine by mouth for pain or sleep in the next 4 hours. These medicines may react with the medicines you were given in the hospital. This could cause a much stronger response than usual.  Follow-up care  Follow up with your healthcare provider if you are not alert and back to your usual level of activity within 12 hours.  When to seek medical advice  Call your healthcare provider right away if any of these occur:  · Drowsiness gets worse  · Weakness or dizziness gets worse  · Repeated vomiting  · You can't be awakened   Date Last Reviewed: 10/18/2016  © 1120-3651 [employer]. [address] [place] [address], [place] [address]. All rights reserved. This information is not intended as a substitute for professional medical care. Always follow your healthcare professional's instructions.

## 2018-09-17 ENCOUNTER — PES CALL (OUTPATIENT)
Dept: ADMINISTRATIVE | Facility: CLINIC | Age: 73
End: 2018-09-17

## 2018-09-18 ENCOUNTER — HOSPITAL ENCOUNTER (OUTPATIENT)
Dept: RADIOLOGY | Facility: HOSPITAL | Age: 73
Discharge: HOME | End: 2018-09-18
Attending: NURSE PRACTITIONER
Payer: MEDICARE

## 2018-09-18 ENCOUNTER — TELEPHONE (OUTPATIENT)
Dept: SPINE | Facility: CLINIC | Age: 73
End: 2018-09-18

## 2018-09-18 ENCOUNTER — OFFICE VISIT (OUTPATIENT)
Dept: INTERNAL MEDICINE | Facility: CLINIC | Age: 73
End: 2018-09-18
Payer: MEDICARE

## 2018-09-18 VITALS
TEMPERATURE: 98 F | SYSTOLIC BLOOD PRESSURE: 118 MMHG | WEIGHT: 172.38 LBS | HEIGHT: 62 IN | DIASTOLIC BLOOD PRESSURE: 70 MMHG | HEART RATE: 64 BPM | OXYGEN SATURATION: 96 % | BODY MASS INDEX: 31.72 KG/M2

## 2018-09-18 DIAGNOSIS — R10.9 ABDOMINAL PAIN, UNSPECIFIED ABDOMINAL LOCATION: ICD-10-CM

## 2018-09-18 DIAGNOSIS — H65.93 FLUID LEVEL BEHIND TYMPANIC MEMBRANE OF BOTH EARS: ICD-10-CM

## 2018-09-18 DIAGNOSIS — R10.9 ABDOMINAL PAIN, UNSPECIFIED ABDOMINAL LOCATION: Primary | ICD-10-CM

## 2018-09-18 LAB
BILIRUB SERPL-MCNC: NORMAL MG/DL
BLOOD URINE, POC: NORMAL
COLOR, POC UA: YELLOW
GLUCOSE UR QL STRIP: NORMAL
KETONES UR QL STRIP: NORMAL
LEUKOCYTE ESTERASE URINE, POC: NORMAL
NITRITE, POC UA: NORMAL
PH, POC UA: 6
PROTEIN, POC: NORMAL
SPECIFIC GRAVITY, POC UA: 1
UROBILINOGEN, POC UA: NORMAL

## 2018-09-18 PROCEDURE — 81002 URINALYSIS NONAUTO W/O SCOPE: CPT | Mod: PBBFAC | Performed by: NURSE PRACTITIONER

## 2018-09-18 PROCEDURE — 99214 OFFICE O/P EST MOD 30 MIN: CPT | Mod: PBBFAC | Performed by: NURSE PRACTITIONER

## 2018-09-18 PROCEDURE — 99999 PR PBB SHADOW E&M-EST. PATIENT-LVL IV: CPT | Mod: PBBFAC,,, | Performed by: NURSE PRACTITIONER

## 2018-09-18 PROCEDURE — 74019 RADEX ABDOMEN 2 VIEWS: CPT | Mod: 26,,, | Performed by: RADIOLOGY

## 2018-09-18 PROCEDURE — 74019 RADEX ABDOMEN 2 VIEWS: CPT | Mod: TC

## 2018-09-18 PROCEDURE — 99214 OFFICE O/P EST MOD 30 MIN: CPT | Mod: S$PBB,,, | Performed by: NURSE PRACTITIONER

## 2018-09-18 RX ORDER — FLUTICASONE PROPIONATE 50 MCG
1 SPRAY, SUSPENSION (ML) NASAL 2 TIMES DAILY
Qty: 1 G | Refills: 12 | Status: SHIPPED | OUTPATIENT
Start: 2018-09-18 | End: 2018-10-18

## 2018-09-18 NOTE — TELEPHONE ENCOUNTER
Called and spoke with patient and she stated that she received the injection on the 14th.  She got home and had a headache and then some nausea and vomiting.  By Sunday she was feeling a bit better.  Yesterday she was just laying around and didn't do anything.  This morning she woke again with a headache and sick to her stomach.  She does have an appointment with her PCP today at 2:30 but wanted to know if there was anything that she should be doing?

## 2018-09-18 NOTE — TELEPHONE ENCOUNTER
----- Message from Miriam Tavera sent at 9/18/2018  8:25 AM CDT -----  Contact: saúl  Name of Who is Calling: saúl      What is the request in detail: Patient is requesting a call back she states she is having nausea,stomach and headaches she does not if it is coming from her injection or not      Can the clinic reply by [facility]: no      What Number to Call Back if not in [facility]:[phone] or [phone]

## 2018-09-18 NOTE — PATIENT INSTRUCTIONS
Xray of your belly today    When using any nasal spray,  shake bottle  before use, place tip in nose, point tip toward your ear, spray the Flonase and then gently sniff.      Use the nasal spray the next 3-6 weeks to get the inflammation out of your eustachian tube and get your ears to drain    I will call you about your xray results    To ER for any worsening

## 2018-09-18 NOTE — TELEPHONE ENCOUNTER
Please let patient know that I do not think this is related to her injection.    Thanks,  MILA Solis, PA-C  Neurosurgery  [facility]  [facility]

## 2018-09-18 NOTE — PROGRESS NOTES
Subjective:       Patient ID: Lissy Palencia is a 73 y.o. female.    Chief Complaint: Abdominal Pain (since 9/15) and Headache (since 9/12)    Pt here c/o frontal headache started over weekend.  Pressure on and off.  Some dizziness with movement.  Pt also c/o abd pain.  Started on 14th, cramping lower abd.  A little better after a BM.  Pt has hx of constipation.  Some nausea, no vomiting.  No blood in stool.        Abdominal Pain   Associated symptoms include constipation and headaches. Pertinent negatives include no arthralgias, diarrhea, fever, myalgias, nausea or vomiting.   Headache    Associated symptoms include abdominal pain, dizziness and sinus pressure. Pertinent negatives include no coughing, fever, nausea, neck pain, numbness, photophobia, rhinorrhea, seizures, vomiting or weakness.     Review of Systems   Constitutional: Positive for fatigue. Negative for chills, fever and unexpected weight change.   HENT: Positive for sinus pressure. Negative for congestion, postnasal drip, rhinorrhea, sinus pain, trouble swallowing and voice change.    Eyes: Negative for photophobia and visual disturbance.   Respiratory: Negative for cough, choking and shortness of breath.    Cardiovascular: Negative for chest pain and palpitations.   Gastrointestinal: Positive for abdominal pain and constipation. Negative for blood in stool, diarrhea, nausea, rectal pain and vomiting.   Musculoskeletal: Negative for arthralgias, myalgias, neck pain and neck stiffness.   Skin: Negative for rash.   Neurological: Positive for dizziness and headaches. Negative for tremors, seizures, syncope, speech difficulty, weakness, light-headedness and numbness.   Hematological: Negative for adenopathy.   Psychiatric/Behavioral: Negative for sleep disturbance.         Past Medical History:   Diagnosis Date    Allergy     Arthritis     Coronary artery disease     in the past    GERD (gastroesophageal reflux disease)     Hemorrhoids     History  of cholelithiasis     Hyperlipidemia     Hypothyroidism     Right shoulder pain 10/9/2012    Trigger finger, right 3rd finger 7/30/2014     Past Surgical History:   Procedure Laterality Date    ABLATION COLPOCLESIS      ADENOIDECTOMY      CHOLECYSTECTOMY      CHOLECYSTECTOMY, LAPAROSCOPIC, WITH CHOLANGIOGRAM N/A 8/16/2013    [facility] 2ND FLR    COLONOSCOPY N/A 4/10/2015    Performed by Cesar Lu [facility] at [facility] ENDO (4TH FLR)    COLPOCLEISIS N/A 5/9/2016    Performed by Bart [facility]h [facility] at [facility] OR 2ND FLR    CORONARY STENT PLACEMENT      CYSTOSCOPY N/A 5/9/2016    Performed by [facility] at [facility] OR 2ND FLR    heart cath  2005    non-obstructive disease    Injection  Right lateral femoral cutaneous nerve block Right 9/14/2018    [facility]    OOPHORECTOMY      SHOULDER ARTHROSCOPY W/ ROTATOR CUFF REPAIR      Right    SLING-MID URETHRAL N/A 5/9/2016    Performed by [facility] at [facility] OR 2ND FLR    TONSILLECTOMY      TOTAL ABDOMINAL HYSTERECTOMY      with BSO    TUBAL LIGATION       Social History     Social History Narrative    Not on file     Family History   Problem Relation Age of Onset    Breast cancer Neg Hx     Ovarian cancer Neg Hx     Glaucoma Neg Hx     Cataracts Neg Hx     Diabetes Neg Hx     Macular degeneration Neg Hx     Retinal detachment Neg Hx     Melanoma Neg Hx     Cervical cancer Neg Hx     Endometrial cancer Neg Hx     Vaginal cancer Neg Hx      Outpatient Encounter Medications as of 9/18/2018   Medication Sig Dispense Refill    aspirin 81 MG Chew Take 1 tablet (81 mg total) by mouth once daily. 100 tablet 4    atorvastatin (LIPITOR) 40 MG tablet TAKE 1 TABLET DAILY 90 tablet 10    co-enzyme Q-10 30 mg capsule Take 30 mg by mouth 3 (three) times daily.      conjugated estrogens (PREMARIN) vaginal cream Place 0.5 g vaginally twice a week. 30 g 12    gabapentin  "(NEURONTIN) 100 MG capsule 1 PRN and QHS for right sciatica discomfort up to 3 times a day 270 capsule 0    levothyroxine (SYNTHROID) 88 MCG tablet TAKE 1 TABLET EVERY MORNING BEFORE BREAKFAST 90 tablet 3    metFORMIN (GLUCOPHAGE-XR) 500 MG 24 hr tablet Take 1 tablet (500 mg total) by mouth daily with breakfast. 90 tablet 3    tiZANidine (ZANAFLEX) 4 MG tablet Take 1 tablet (4 mg total) by mouth every 8 (eight) hours as needed. 90 tablet 1    fluticasone (FLONASE) 50 mcg/actuation nasal spray 1 spray (50 mcg total) by Each Nare route 2 (two) times daily. 1 g 12     No facility-administered encounter medications on file as of 9/18/2018.      Last 3 sets of Vitals  Vitals - 1 value per visit 7/20/2018 9/14/2018 9/18/2018   SYSTOLIC 146 145 118   DIASTOLIC 66 64 70   PULSE 60 58 64   TEMPERATURE - - 97.9   RESPIRATIONS - 18 -   SPO2 - 94 96   Weight (lb) 171 - 172.4   Weight (kg) 77.565 - 78.2   HEIGHT 5' 2" - 5' 2"   BODY MASS INDEX 31.28 - 31.53   VISIT REPORT - - -   Pain Score  4 - 0   Some recent data might be hidden         Objective:      Physical Exam   Constitutional: She is oriented to person, place, and time. She appears well-developed and well-nourished. No distress.   HENT:   Head: Normocephalic and atraumatic.   Right Ear: Hearing, external ear and ear canal normal. A middle ear effusion is present.   Left Ear: Hearing, external ear and ear canal normal. A middle ear effusion is present.   Nose: Mucosal edema present.   Mouth/Throat: Uvula is midline, oropharynx is clear and moist and mucous membranes are normal. No tonsillar exudate.   Eyes: Conjunctivae are normal. Pupils are equal, round, and reactive to light. Right eye exhibits no discharge. Left eye exhibits no discharge.   Neck: Normal range of motion. Neck supple.   Cardiovascular: Normal rate, regular rhythm and normal heart sounds.   Pulmonary/Chest: Effort normal and breath sounds normal. No stridor. No respiratory distress. She has no " wheezes. She has no rales.   Abdominal: Soft. She exhibits no mass. Bowel sounds are decreased. There is no tenderness. There is no rebound and no guarding. No hernia.   Abdomen soft but mildly distended     Lymphadenopathy:     She has no cervical adenopathy.   Neurological: She is alert and oriented to person, place, and time. No cranial nerve deficit.   Skin: Skin is warm and dry. Capillary refill takes less than 2 seconds. She is not diaphoretic.   Psychiatric: She has a normal mood and affect. Her behavior is normal. Judgment and thought content normal.   Nursing note and vitals reviewed.      Dip Ua negative      Lab Results   Component Value Date    WBC 7.77 09/28/2016    RBC 5.09 09/28/2016    HGB 14.5 09/28/2016    HCT 42.2 09/28/2016    MCV 83 09/28/2016    MCH 28.5 09/28/2016    MCHC 34.4 09/28/2016    RDW 13.9 09/28/2016     09/28/2016    MPV 10.0 09/28/2016    GRAN 3.5 09/28/2016    GRAN 44.6 09/28/2016    LYMPH 3.0 09/28/2016    LYMPH 38.0 09/28/2016    MONO 0.7 09/28/2016    MONO 9.4 09/28/2016    EOS 0.6 (H) 09/28/2016    BASO 0.06 09/28/2016    EOSINOPHIL 7.1 09/28/2016    BASOPHIL 0.8 09/28/2016     Lab Results   Component Value Date    WBC 7.77 09/28/2016    HGB 14.5 09/28/2016    HCT 42.2 09/28/2016     09/28/2016    CHOL 166 04/18/2018    TRIG 195 (H) 04/18/2018    HDL 46 04/18/2018    ALT 27 04/18/2018    AST 23 04/18/2018     04/18/2018    K 4.6 04/18/2018     04/18/2018    CREATININE 0.9 04/18/2018    BUN 13 04/18/2018    CO2 26 04/18/2018    TSH 0.201 (L) 04/18/2018    INR 1.0 07/27/2005    HGBA1C 6.0 (H) 04/18/2018       Assessment:       1. Abdominal pain, unspecified abdominal location    2. Fluid level behind tympanic membrane of both ears        Plan:           Lissy was seen today for abdominal pain and headache.    Diagnoses and all orders for this visit:    Abdominal pain, unspecified abdominal location  -     POCT urine dipstick without microscope  -      X-Ray Abdomen Flat And Erect; Future    Fluid level behind tympanic membrane of both ears  -     fluticasone (FLONASE) 50 mcg/actuation nasal spray; 1 spray (50 mcg total) by Each Nare route 2 (two) times daily.      Patient Instructions   Xray of your belly today    When using any nasal spray,  shake bottle  before use, place tip in nose, point tip toward your ear, spray the Flonase and then gently sniff.      Use the nasal spray the next 3-6 weeks to get the inflammation out of your eustachian tube and get your ears to drain    I will call you about your xray results    To ER for any worsening

## 2018-09-18 NOTE — TELEPHONE ENCOUNTER
Called and informed patient that Danielle did not think that the symptoms are related to the injection.  Santos did have appointment today with her PCP care team.

## 2018-09-19 ENCOUNTER — TELEPHONE (OUTPATIENT)
Dept: INTERNAL MEDICINE | Facility: CLINIC | Age: 73
End: 2018-09-19

## 2018-09-19 NOTE — TELEPHONE ENCOUNTER
Informed pt of xray results and advised her per Bethanie Morales orders to take 1-2 drops of peppermint pil in water daily or 400mg Mg daily

## 2018-09-19 NOTE — TELEPHONE ENCOUNTER
----- Message from MARTHA Florian sent at 9/19/2018  2:36 PM CDT -----  Please call pt and tell her that her xray just showed a lot of stool in her colon.  She can try 1-2 drops of peppermint oil in water daily or take Magnesium 400 mg a day to help with constipation

## 2018-10-11 ENCOUNTER — OFFICE VISIT (OUTPATIENT)
Dept: PAIN MEDICINE | Facility: CLINIC | Age: 73
End: 2018-10-11
Attending: ANESTHESIOLOGY
Payer: MEDICARE

## 2018-10-11 ENCOUNTER — TELEPHONE (OUTPATIENT)
Dept: PAIN MEDICINE | Facility: CLINIC | Age: 73
End: 2018-10-11

## 2018-10-11 VITALS
RESPIRATION RATE: 18 BRPM | WEIGHT: 169 LBS | HEIGHT: 62 IN | DIASTOLIC BLOOD PRESSURE: 61 MMHG | SYSTOLIC BLOOD PRESSURE: 133 MMHG | HEART RATE: 64 BPM | TEMPERATURE: 98 F | BODY MASS INDEX: 31.1 KG/M2

## 2018-10-11 DIAGNOSIS — M51.36 DDD (DEGENERATIVE DISC DISEASE), LUMBAR: ICD-10-CM

## 2018-10-11 DIAGNOSIS — M47.816 LUMBAR SPONDYLOSIS: ICD-10-CM

## 2018-10-11 DIAGNOSIS — M70.61 GREATER TROCHANTERIC BURSITIS OF RIGHT HIP: Primary | ICD-10-CM

## 2018-10-11 DIAGNOSIS — M76.31 ILIOTIBIAL BAND SYNDROME AFFECTING LOWER LEG, RIGHT: ICD-10-CM

## 2018-10-11 PROCEDURE — 20610 DRAIN/INJ JOINT/BURSA W/O US: CPT | Mod: S$PBB,RT,GC, | Performed by: ANESTHESIOLOGY

## 2018-10-11 PROCEDURE — 99214 OFFICE O/P EST MOD 30 MIN: CPT | Mod: 25,S$PBB,GC, | Performed by: ANESTHESIOLOGY

## 2018-10-11 PROCEDURE — 20610 DRAIN/INJ JOINT/BURSA W/O US: CPT | Mod: PBBFAC,RT,GC | Performed by: ANESTHESIOLOGY

## 2018-10-11 PROCEDURE — 99213 OFFICE O/P EST LOW 20 MIN: CPT | Mod: PBBFAC | Performed by: ANESTHESIOLOGY

## 2018-10-11 PROCEDURE — 99999 PR PBB SHADOW E&M-EST. PATIENT-LVL III: CPT | Mod: PBBFAC,,, | Performed by: ANESTHESIOLOGY

## 2018-10-11 RX ORDER — BUPIVACAINE HYDROCHLORIDE 5 MG/ML
6 INJECTION, SOLUTION EPIDURAL; INTRACAUDAL
Status: COMPLETED | OUTPATIENT
Start: 2018-10-11 | End: 2018-10-11

## 2018-10-11 RX ORDER — BETAMETHASONE SODIUM PHOSPHATE AND BETAMETHASONE ACETATE 3; 3 MG/ML; MG/ML
6 INJECTION, SUSPENSION INTRA-ARTICULAR; INTRALESIONAL; INTRAMUSCULAR; SOFT TISSUE
Status: COMPLETED | OUTPATIENT
Start: 2018-10-11 | End: 2018-10-11

## 2018-10-11 RX ORDER — MELOXICAM 7.5 MG/1
7.5 TABLET ORAL DAILY PRN
Qty: 30 TABLET | Refills: 5 | Status: SHIPPED | OUTPATIENT
Start: 2018-10-11 | End: 2018-11-10

## 2018-10-11 RX ADMIN — BETAMETHASONE SODIUM PHOSPHATE AND BETAMETHASONE ACETATE 6 MG: 3; 3 INJECTION, SUSPENSION INTRA-ARTICULAR; INTRALESIONAL; INTRAMUSCULAR at 12:10

## 2018-10-11 RX ADMIN — BUPIVACAINE HYDROCHLORIDE 30 MG: 5 INJECTION, SOLUTION EPIDURAL; INTRACAUDAL; PERINEURAL at 12:10

## 2018-10-11 NOTE — PROGRESS NOTES
Subjective:     Patient ID: Lissy Palencia is a 73 y.o. female.    Chief Complaint: Pain    Consulted by: Danielle Hinojosa PA-C     Disclaimer: This note was generated using voice recognition software.  There may be a typographical errors that were missed during proofreading.      HPI:    Lissy Palencia is a 73 y.o. female who presents today with right lateral thigh pain. Pain has been present for > 10 years.   This pain is described in detail below. Pain described as a sunburn, tingling, numb, burning. Aggravated with clothes or touch. Alleviated with nothing.     Physical Therapy: No     Non-pharmacologic Treatment:     · Ice/Heat: Ice helps  · TENS: No  · Massage: Yes, but doesn't help  · Chiropractic care: No  · Acupuncture: No  · Other: No         Pain Medications:         · Currently taking: Neurontin 100mg (hasn't noticed benefit, got drunk feeling with higher doses)    · Has tried in the past:  Tizanidine (not sure if helps, no side effects)    · Has not tried: Opioids, NSAIDs, Tylenol, Muscle relaxants, TCAs, SNRIs, anticonvulsants, topical creams    Blood thinners: No, only ASA 81    Interventional Therapies:   9/14/18: Right lateral femoral cutaneous n block: Provided numbness in the area which she did not like, but only lasted 2 days    Relevant Surgeries: No    Affecting sleep? Yes    Affecting daily activities? No    Depressive symptoms? Irritable and agitated          · SI/HI? No    Work status: Retired, babysits [address]    Prescription Monitoring Program database:  Not applicable    Last 3 PDI Scores 10/11/2018   Pain Disability Index (PDI) 30       Opioid Risk Score     None          GENERAL:  No weight loss, malaise or fevers.  HEENT:   States she has had recent changes in vision & hearing  NECK:  Negative for lumps, no difficulty with swallowing.  RESPIRATORY:  (+) Cough. Negative for wheezing or shortness of breath, patient denies any recent URI.  CARDIOVASCULAR:  (+) Chest pain,  palpitations. Negative for leg swelling   GI:  (+) for abdominal discomfort and change in bowel habits (+) GERD. Negative for blood in stools or black stools.  MUSCULOSKELETAL:  See HPI.  SKIN:  (+) Itching. Negative for lesions, rash.   PSYCH:  Patients sleep is disturbed secondary to pain.  No mood disorder or recent psychosocial stressors.    HEMATOLOGY/LYMPHOLOGY:  Negative for prolonged bleeding, bruising easily or swollen nodes.  Patient is not currently taking any anti-coagulants  ENDO: (+) history of pre-diabetes. On levothyroxine for hypothyroid   NEURO:   (+) History of headaches. No history of syncope, paralysis, seizures or tremors.  All other reviewed and negative other than HPI.          Past Medical History:   Diagnosis Date    Allergy     Arthritis     Coronary artery disease     in the past    GERD (gastroesophageal reflux disease)     Hemorrhoids     History of cholelithiasis     Hyperlipidemia     Hypothyroidism     Right shoulder pain 10/9/2012    Trigger finger, right 3rd finger 7/30/2014       Past Surgical History:   Procedure Laterality Date    ABLATION COLPOCLESIS      ADENOIDECTOMY      CHOLECYSTECTOMY      CHOLECYSTECTOMY, LAPAROSCOPIC, WITH CHOLANGIOGRAM N/A 8/16/2013    [facility] 2ND FLR    COLONOSCOPY N/A 4/10/2015    Performed by Cesar Lu [facility] at [facility] ENDO (4TH FLR)    COLPOCLEISIS N/A 5/9/2016    Performed by Bart [facility] at [facility] OR 2ND FLR    CORONARY STENT PLACEMENT      CYSTOSCOPY N/A 5/9/2016    [facility] by [facility] at [facility] OR 2ND FLR    heart cath  2005    non-obstructive disease    Injection  Right lateral femoral cutaneous nerve block Right 9/14/2018    [facility] PAIN MGT    OOPHORECTOMY      SHOULDER ARTHROSCOPY W/ ROTATOR CUFF REPAIR      Right    SLING-MID URETHRAL N/A 5/9/2016    Performed by [facility] at [facility] OR 2ND FLR    TONSILLECTOMY       TOTAL ABDOMINAL HYSTERECTOMY      with BSO    TUBAL LIGATION         Review of patient's allergies indicates:   Allergen Reactions    Compazine  [prochlorperazine edisylate]      Other reaction(s): SPASMS    Parafon forte      Other reaction(s): Hives       Current Outpatient Medications   Medication Sig Dispense Refill    aspirin 81 MG Chew Take 1 tablet (81 mg total) by mouth once daily. 100 tablet 4    atorvastatin (LIPITOR) 40 MG tablet TAKE 1 TABLET DAILY 90 tablet 10    co-enzyme Q-10 30 mg capsule Take 30 mg by mouth 3 (three) times daily.      conjugated estrogens (PREMARIN) vaginal cream Place 0.5 g vaginally twice a week. 30 g 12    fluticasone (FLONASE) 50 mcg/actuation nasal spray 1 spray (50 mcg total) by Each Nare route 2 (two) times daily. 1 g 12    levothyroxine (SYNTHROID) 88 MCG tablet TAKE 1 TABLET EVERY MORNING BEFORE BREAKFAST 90 tablet 3    metFORMIN (GLUCOPHAGE-XR) 500 MG 24 hr tablet Take 1 tablet (500 mg total) by mouth daily with breakfast. 90 tablet 3    meloxicam (MOBIC) 7.5 MG tablet Take 1 tablet (7.5 mg total) by mouth daily as needed for Pain. 30 tablet 5     No current facility-administered medications for this visit.        Family History   Problem Relation Age of Onset    Breast cancer Neg Hx     Ovarian cancer Neg Hx     Glaucoma Neg Hx     Cataracts Neg Hx     Diabetes Neg Hx     Macular degeneration Neg Hx     Retinal detachment Neg Hx     Melanoma Neg Hx     Cervical cancer Neg Hx     Endometrial cancer Neg Hx     Vaginal cancer Neg Hx        Social History     Socioeconomic History    Marital status:      Spouse name: Not on file    Number of children: Not on file    Years of education: Not on file    Highest education level: Not on file   Social Needs    Financial resource strain: Not on file    Food insecurity - worry: Not on file    Food insecurity - inability: Not on file    Transportation needs - medical: Not on file    Transportation  "needs - non-medical: Not on file   Occupational History     Employer: [employer]   Tobacco Use    Smoking status: Never Smoker    Smokeless tobacco: Never Used   Substance and Sexual Activity    Alcohol use: No     Alcohol/week: 0.0 oz    Drug use: No    Sexual activity: Not Currently     Partners: Male     Birth control/protection: None   Other Topics Concern    Are you pregnant or think you may be? Not Asked    Breast-feeding Not Asked   Social History Narrative    Not on file       Objective:     Vitals:    10/11/18 1114   BP: 133/61   Pulse: 64   Resp: 18   Temp: 98.2 °F (36.8 °C)   TempSrc: Oral   Weight: 76.7 kg (169 lb)   Height: 5' 2" (1.575 m)   PainSc:   5     GEN:  Well developed, well nourished.  No acute distress. No significant pain behavior.  HEENT:  No trauma.  Mucous membranes moist.  Nares patent bilaterally.  PSYCH: Normal affect. Thought content appropriate.  CHEST:  Breathing symmetric.  No audible wheezing.  ABD: Soft, non-distended.  SKIN:  Warm, pink, dry.  No rash on exposed areas.    EXT:  No cyanosis, clubbing, or edema.  No color change or changes in nail or hair growth.  NEURO/MUSCULOSKELETAL:  Fully alert, oriented, and appropriate. Speech fluent and appropriate. No cranial nerve deficits.   Gait: Independent, stable.  Neg trendelenburg sign bilaterally.   Motor Strength: 4/5 in right hip flexion with some giveway weakness due to pain, otherwise 5/5 motor strength throughout lower extremities.   Sensory: Abnormal sensation to LT in right lateral anterior thigh.   Reflexes:  2+ and symmetric throughout.  Downgoing Babinski's bilaterally.  No clonus or spasticity.  L-Spine:  Normal ROM with a little pain on extension. (+) facet loading on the right with pain noted on the right.  (-) SLR bilaterally.  (+) femoral stretch bilaterally.   SI Joint/Hip: Tightness ELDER bilaterally.  Tightness/stiffness with FADIR bilaterally.  No TTP over lumbar paraspinals, bilateral SI joints, " piriformis muscles. (+) TTP at right anterior hip/psoas and right GTB.        Imaging:        The imaging studies listed below were independently reviewed by me, and I agree with the findings as documented below.     Narrative     EXAMINATION:  MRI LUMBAR SPINE WITHOUT CONTRAST    CLINICAL HISTORY:  back and bilateral leg pain; Radiculopathy, thoracic region    TECHNIQUE:  Multiplanar, multisequence MR images were acquired from the thoracolumbar junction to the sacrum without the administration of contrast.    COMPARISON:  None.    FINDINGS:  Lumbar spine alignment is within normal limits. The vertebral body heights are well maintained, with no fracture.  No marrow signal abnormality suspicious for an infiltrative process.    The conus is normal in appearance, and terminates at the  level.  The adjacent soft tissue structures show no significant abnormalities.    There are findings of multi-level  lumbar spondylosis, as below.    L1-L2: There is no focal disc herniation. No significant central canal or neural foraminal narrowing.    L2-L3: There is no focal disc herniation. No significant central canal or neural foraminal narrowing.Mild hypertrophic changes of facets with broad-based bulging of disc material    L3-L4: There is no focal disc herniation. Hypertrophic changes of facets with minimal central canal narrowing.  Mild broad-based bulging of disc material.    L4-L5: There is no focal disc herniation. Mild broad-based bulging of disc material associated with hypertrophic changes of facets result in moderate central canal narrowing.  Associated annular fissure.    L5-S1: There is no focal disc herniation. No significant central canal or neural foraminal narrowing.      Impression       Degenerative changes L2-L3 through L5 are-L5 as above, with broad-based bulging of disc material and facet hypertrophy, worst at L4-L5 as above.      Electronically signed by: Carlos Muhammad MD  Date: 07/03/2018  Time: 08:13          Assessment:     Encounter Diagnoses   Name Primary?    Greater trochanteric bursitis of right hip Yes    Iliotibial band syndrome affecting lower leg, right     DDD (degenerative disc disease), lumbar     Lumbar spondylosis        Plan:     Diagnoses and all orders for this visit:    Greater trochanteric bursitis of right hip  -     meloxicam (MOBIC) 7.5 MG tablet; Take 1 tablet (7.5 mg total) by mouth daily as needed for Pain.  -     Ambulatory consult to Physical Therapy  -     bupivacaine (PF) injection 30 mg; Inject 6 mLs (30 mg total) into the muscle one time.  -     betamethasone acetate-betamethasone sodium phosphate injection 6 mg; Inject 1 mL (6 mg total) into the muscle one time.    Iliotibial band syndrome affecting lower leg, right  -     Ambulatory consult to Physical Therapy    DDD (degenerative disc disease), lumbar  -     Ambulatory consult to Physical Therapy    Lumbar spondylosis  -     Ambulatory consult to Physical Therapy         Her pain is consistent with the above.    We discussed the assessment and recommendations.  All available images were reviewed. We discussed the disease process, prognosis, treatment plan, and risks and benefits. The patient is aware of the risks and benefits of the medications being prescribed, common side effects, and proper usage. The following is the plan we agreed on:     1. Referral to PT for IT band release and core muscle strength  2. Rx for meloxicam 7.5-15mg qD   3. Perform right GTB injection in clinic today.   4. Compounding cream for topical use.  Rx sent to [facility] pharmacy  5. RTC in 6-8 weeks or sooner if needed.    Thank you for allowing me to participate in the care of this patient.   Please do not hesitate to call me at [phone] with any questions or concerns.    Ale Mireles MD     Right Greater Trochanteric Bursa Injection-    All medications, allergies, and relevant histories were reviewed. No recent antibiotics or  infections.  A time-out was taken to verify the correct patient, procedure, laterality, and appropriate medications/allergies.    After informed consent was taken patient is placed in left lateral decubitus position and the right hip area exposed. Patient was prepped in sterile fashion and then using a 27-gauge 1.5 inch needle local anesthetic was injected to make a skin wheal. Then a 22-gauge 1.5 inch needle was introduced into the greater trochanter. After negative aspiration, and no paresthesia, a 7 ml solution containing 0.5% bupivacaine and betamethasone 6 mg was intermittently injected. Patient tolerated procedure well with no complications.    I certify that I provided the above services.  I was present for the entire procedure, which was performed by the fellow physician under my supervision.  There were no parts of the procedure that were performed not by myself or without my direct supervision.    I have seen the patient with the fellow physician.  I have performed my own history and physical exam and we have come up with the above plan.  The patient is in agreement with our plan.    Kalli Nolan MD  10/11/2018     The above plan and management options were discussed at length with patient. Patient is in agreement with the above and verbalized understanding. It will be communicated with the referring physician via electronic record, fax, or mail.

## 2018-10-11 NOTE — TELEPHONE ENCOUNTER
Contacted and spoke with pt regarding increased pain. Pt stated she is now experiencing more pain since injection. Pt explained she would prefer to see the provider who performed the injection.  Pt was scheduled with next available with Dr. Nolan.    Pt verbalized understanding

## 2018-10-11 NOTE — TELEPHONE ENCOUNTER
----- Message from Chitra Bernstein sent at 10/11/2018  9:22 AM CDT -----  Contact: Pt  Name of Who is Calling:WILBERT MAYEN [[id number]]    What is the request in detail: patient would like a call back patient states that the injection did not help it made it worst Please contact to further discuss and advise  Can the clinic reply by [facility]: No    What [facility]: [phone]

## 2018-10-18 ENCOUNTER — TELEPHONE (OUTPATIENT)
Dept: INTERNAL MEDICINE | Facility: CLINIC | Age: 73
End: 2018-10-18

## 2018-10-23 ENCOUNTER — IMMUNIZATION (OUTPATIENT)
Dept: INTERNAL MEDICINE | Facility: CLINIC | Age: 73
End: 2018-10-23
Payer: MEDICARE

## 2018-10-23 ENCOUNTER — OFFICE VISIT (OUTPATIENT)
Dept: INTERNAL MEDICINE | Facility: CLINIC | Age: 73
End: 2018-10-23
Payer: MEDICARE

## 2018-10-23 VITALS
HEART RATE: 65 BPM | DIASTOLIC BLOOD PRESSURE: 68 MMHG | HEIGHT: 62 IN | BODY MASS INDEX: 31.28 KG/M2 | OXYGEN SATURATION: 99 % | SYSTOLIC BLOOD PRESSURE: 130 MMHG | WEIGHT: 170 LBS

## 2018-10-23 DIAGNOSIS — Z12.11 SCREEN FOR COLON CANCER: ICD-10-CM

## 2018-10-23 DIAGNOSIS — E03.9 PRIMARY HYPOTHYROIDISM: ICD-10-CM

## 2018-10-23 DIAGNOSIS — K21.9 GASTROESOPHAGEAL REFLUX DISEASE, ESOPHAGITIS PRESENCE NOT SPECIFIED: Primary | ICD-10-CM

## 2018-10-23 DIAGNOSIS — I25.10 CORONARY ARTERY DISEASE INVOLVING NATIVE CORONARY ARTERY WITHOUT ANGINA PECTORIS, UNSPECIFIED WHETHER NATIVE OR TRANSPLANTED HEART: ICD-10-CM

## 2018-10-23 DIAGNOSIS — M54.31 RIGHT SCIATIC NERVE PAIN: ICD-10-CM

## 2018-10-23 DIAGNOSIS — K59.00 CONSTIPATION, UNSPECIFIED CONSTIPATION TYPE: ICD-10-CM

## 2018-10-23 DIAGNOSIS — R73.03 PREDIABETES: ICD-10-CM

## 2018-10-23 DIAGNOSIS — E78.2 MIXED HYPERLIPIDEMIA: ICD-10-CM

## 2018-10-23 PROCEDURE — 90662 IIV NO PRSV INCREASED AG IM: CPT | Mod: PBBFAC

## 2018-10-23 PROCEDURE — 99215 OFFICE O/P EST HI 40 MIN: CPT | Mod: PBBFAC,25 | Performed by: INTERNAL MEDICINE

## 2018-10-23 PROCEDURE — 99999 PR PBB SHADOW E&M-EST. PATIENT-LVL V: CPT | Mod: PBBFAC,,, | Performed by: INTERNAL MEDICINE

## 2018-10-23 PROCEDURE — 99214 OFFICE O/P EST MOD 30 MIN: CPT | Mod: S$PBB,,, | Performed by: INTERNAL MEDICINE

## 2018-10-24 NOTE — PROGRESS NOTES
Subjective:       Patient ID: Lissy Palencia is a 73 y.o. female.    Chief Complaint: Follow-up (symptoms since 9-); Abdominal Pain; and Heartburn   For well over a month she has been having acid reflux every day.  On a few occasions it has been so uncomfortable and distressing that she almost went to the emergency room.  Because this has been bothering her so much and does not respond well to the remedies she has tried she would like to have this investigated with an EGD.  She is not taking any nonsteroidal anti-inflammatory agents other than an aspirin 81 mg daily.    There has been a definite change in her bowel movements, she has become constipated, a problem she has never previously had and she is concerned because she had a polyp removed at her last colonoscopy and she wants to repeat a colonoscopy now preferably at the same time that she has an EGD.  So that she only has 1 preparation to go through    She is exercising regularly and does not have any exertional chest pain.    She has been compliant with daily levothyroxine.  HPI  Review of Systems   Constitutional: Negative for activity change, appetite change, chills, fatigue, fever and unexpected weight change.   HENT: Negative for hearing loss.    Eyes: Negative for visual disturbance.   Respiratory: Negative for cough, chest tightness, shortness of breath and wheezing.    Cardiovascular: Negative for chest pain, palpitations and leg swelling.   Gastrointestinal: Positive for abdominal pain, constipation and nausea. Negative for vomiting.   Genitourinary: Negative for dysuria, frequency and urgency.   Musculoskeletal: Positive for back pain. Negative for arthralgias, gait problem, joint swelling and myalgias.   Skin: Negative for rash.   Neurological: Negative for light-headedness and headaches.   Psychiatric/Behavioral: Negative for dysphoric mood and sleep disturbance. The patient is not nervous/anxious.        Objective:      Physical Exam    Constitutional: She is oriented to person, place, and time. She appears well-nourished.   HENT:   Head: Atraumatic.   Eyes: Conjunctivae are normal. No scleral icterus.   Neck: Neck supple.   Cardiovascular: Normal rate and regular rhythm.   Pulmonary/Chest: Effort normal and breath sounds normal.   Abdominal: Soft. Bowel sounds are normal. She exhibits no distension and no mass. There is no tenderness. There is no rebound and no guarding.   She complains of mild tenderness throughout her entire abdomen but there is no guarding or rebound tenderness appreciated on exam.   Musculoskeletal: She exhibits no edema.   Lymphadenopathy:     She has no cervical adenopathy.   Neurological: She is alert and oriented to person, place, and time.   Skin: Skin is warm and dry.   Psychiatric: She has a normal mood and affect. Her behavior is normal.   Nursing note and vitals reviewed.      Assessment:       1. Gastroesophageal reflux disease, esophagitis presence not specified    2. Coronary artery disease involving native coronary artery without angina pectoris, unspecified whether native or transplanted heart    3. Primary hypothyroidism    4. Right sciatic nerve pain    5. Prediabetes    6. Mixed hyperlipidemia    7. Constipation, unspecified constipation type    8. Screen for colon cancer        Plan:   Lissy was seen today for follow-up, abdominal pain and heartburn.    Diagnoses and all orders for this visit:    Gastroesophageal reflux disease, esophagitis presence not specified  -     Case request GI: ESOPHAGOGASTRODUODENOSCOPY (EGD)  -     CBC auto differential; Future  -     Comprehensive metabolic panel; Future    Coronary artery disease involving native coronary artery without angina pectoris, unspecified whether native or transplanted heart  -         Primary hypothyroidism  -     TSH; Future    Right sciatic nerve pain, this has dissipated.    Prediabetes    Mixed hyperlipidemia    Constipation, unspecified  constipation type, she reports a healthy diet with plenty of fruits and vegetables.    Screen for colon cancer  -     Case request GI: COLONOSCOPY       Medication List           Accurate as of 10/23/18  7:51 PM. If you have any questions, ask your nurse or doctor.               CONTINUE taking these medications    aspirin 81 MG Chew  Take 1 tablet (81 mg total) by mouth once daily.     atorvastatin 40 MG tablet  Commonly known as:  LIPITOR  TAKE 1 TABLET DAILY     co-enzyme Q-10 30 mg capsule     levothyroxine 88 MCG tablet  Commonly known as:  SYNTHROID  TAKE 1 TABLET EVERY MORNING BEFORE BREAKFAST     meloxicam 7.5 MG tablet  Commonly known as:  MOBIC  Take 1 tablet (7.5 mg total) by mouth daily as needed for Pain.     metFORMIN 500 MG 24 hr tablet  Commonly known as:  GLUCOPHAGE-XR  Take 1 tablet (500 mg total) by mouth daily with breakfast.        STOP taking these medications    conjugated estrogens vaginal cream  Commonly known as:  PREMARIN  Stopped by:  Deya Munroe MD

## 2018-10-30 ENCOUNTER — TELEPHONE (OUTPATIENT)
Dept: PHARMACY | Facility: CLINIC | Age: 73
End: 2018-10-30

## 2018-10-30 DIAGNOSIS — Z12.11 SPECIAL SCREENING FOR MALIGNANT NEOPLASMS, COLON: Primary | ICD-10-CM

## 2018-10-30 RX ORDER — POLYETHYLENE GLYCOL 3350, SODIUM SULFATE ANHYDROUS, SODIUM BICARBONATE, SODIUM CHLORIDE, POTASSIUM CHLORIDE 236; 22.74; 6.74; 5.86; 2.97 G/4L; G/4L; G/4L; G/4L; G/4L
POWDER, FOR SOLUTION ORAL
Qty: 4000 ML | Refills: 0 | Status: SHIPPED | OUTPATIENT
Start: 2018-10-30 | End: 2019-06-27 | Stop reason: ALTCHOICE

## 2018-11-23 ENCOUNTER — OFFICE VISIT (OUTPATIENT)
Dept: PAIN MEDICINE | Facility: CLINIC | Age: 73
End: 2018-11-23
Payer: MEDICARE

## 2018-11-23 VITALS
SYSTOLIC BLOOD PRESSURE: 122 MMHG | HEART RATE: 73 BPM | WEIGHT: 171.94 LBS | TEMPERATURE: 98 F | HEIGHT: 62 IN | DIASTOLIC BLOOD PRESSURE: 63 MMHG | BODY MASS INDEX: 31.64 KG/M2

## 2018-11-23 DIAGNOSIS — M79.2 NEURITIS: ICD-10-CM

## 2018-11-23 DIAGNOSIS — M76.31 ILIOTIBIAL BAND SYNDROME AFFECTING LOWER LEG, RIGHT: ICD-10-CM

## 2018-11-23 DIAGNOSIS — M47.816 LUMBAR SPONDYLOSIS: ICD-10-CM

## 2018-11-23 DIAGNOSIS — M70.61 GREATER TROCHANTERIC BURSITIS OF RIGHT HIP: ICD-10-CM

## 2018-11-23 DIAGNOSIS — G57.11 LATERAL FEMORAL CUTANEOUS NEUROPATHY, RIGHT: Primary | ICD-10-CM

## 2018-11-23 DIAGNOSIS — M51.36 DDD (DEGENERATIVE DISC DISEASE), LUMBAR: ICD-10-CM

## 2018-11-23 PROCEDURE — 99213 OFFICE O/P EST LOW 20 MIN: CPT | Mod: PBBFAC | Performed by: NURSE PRACTITIONER

## 2018-11-23 PROCEDURE — 99214 OFFICE O/P EST MOD 30 MIN: CPT | Mod: S$PBB,,, | Performed by: NURSE PRACTITIONER

## 2018-11-23 PROCEDURE — 99999 PR PBB SHADOW E&M-EST. PATIENT-LVL III: CPT | Mod: PBBFAC,,, | Performed by: NURSE PRACTITIONER

## 2018-11-23 NOTE — PROGRESS NOTES
Subjective:     Patient ID: Lissy Palencia is a 73 y.o. female.    Chief Complaint: Pain    Consulted by: Danielle Hinojosa PA-C     Disclaimer: This note was generated using voice recognition software.  There may be a typographical errors that were missed during proofreading.      HPI:    Lissy Palencia is a 73 y.o. female who presents today with right lateral thigh pain. Pain has been present for > 10 years.   This pain is described in detail below. Pain described as a sunburn, tingling, numb, burning. Aggravated with clothes or touch. Alleviated with nothing.     Interval History (11/23/2018):  The patient presents for follow up of right thigh pain.  The pain is present to right anterior and lateral thigh with associated numbness, burning and sensitivity.  She is now s/p right GT bursa injection with no benefit.  She also had no benefit with lateral femoral cutaneous nerve block.  She has had a lumbar MRI in the past.  She has not had an EMG.  She is adamant that she does not want to take medications or creams.  She is nervous about any possible side effects.  She did not try the compounding cream because of this.  She has not yet started PT.  She would like me to place another referral.  Her pain today is 4/10.    Physical Therapy: No     Non-pharmacologic Treatment:     · Ice/Heat: Ice helps  · TENS: No  · Massage: Yes, but doesn't help  · Chiropractic care: No  · Acupuncture: No  · Other: No         Pain Medications:         · Currently taking: Nothing    · Has tried in the past:  Tizanidine (not sure if helps, no side effects). Gabapentin    · Has not tried: Opioids, NSAIDs, Tylenol, Muscle relaxants, TCAs, SNRIs, topical creams    Blood thinners: No, only ASA 81    Interventional Therapies:   9/14/18: Right lateral femoral cutaneous n block: Provided numbness in the area which she did not like, but only lasted 2 days  10/11/18 Right GTB- no relief    Relevant Surgeries: No    Affecting sleep?  Yes    Affecting daily activities? No    Depressive symptoms? Irritable and agitated          · SI/HI? 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  HEENT:   States she has had recent changes in vision & hearing  NECK:  Negative for lumps, no difficulty with swallowing.  RESPIRATORY:  (+) Cough. Negative for wheezing or shortness of breath, patient denies any recent URI.  CARDIOVASCULAR:  (+) Chest pain, palpitations. Negative for leg swelling   GI:  (+) for abdominal discomfort and change in bowel habits (+) GERD. Negative for blood in stools or black stools.  MUSCULOSKELETAL:  See HPI.  SKIN:  (+) Itching. Negative for lesions, rash.   PSYCH:  Patients sleep is disturbed secondary to pain.  No mood disorder or recent psychosocial stressors.    HEMATOLOGY/LYMPHOLOGY:  Negative for prolonged bleeding, bruising easily or swollen nodes.  Patient is not currently taking any anti-coagulants  ENDO: (+) history of pre-diabetes. On levothyroxine for hypothyroid   NEURO:   (+) History of headaches. No history of syncope, paralysis, seizures or tremors.  All other reviewed and negative other than HPI.          Past Medical History:   Diagnosis Date    Allergy     Arthritis     Coronary artery disease     in the past    GERD (gastroesophageal reflux disease)     Hemorrhoids     History of cholelithiasis     Hyperlipidemia     Hypothyroidism     Right shoulder pain 10/9/2012    Trigger finger, right 3rd finger 7/30/2014       Past Surgical History:   Procedure Laterality Date    ABLATION COLPOCLESIS      ADENOIDECTOMY      CHOLECYSTECTOMY      CHOLECYSTECTOMY, LAPAROSCOPIC, WITH CHOLANGIOGRAM N/A 8/16/2013    Performed [facility] at [facility] OR [address] [place] [address]    COLONOSCOPY N/A  4/10/2015    Performed by [facility] at [facility] ENDO (4TH FLR)    COLPOCLEISIS N/A 5/9/2016    Performed by Bart [facility]h [facility] at [facility] OR 2ND FLR    CORONARY STENT PLACEMENT      CYSTOSCOPY N/A 5/9/2016    Performed by [facility] at [facility] OR 2ND FLR    heart cath  2005    non-obstructive disease    Injection  Right lateral femoral cutaneous nerve block Right 9/14/2018    [facility] at [facility] PAIN MGT    OOPHORECTOMY      SHOULDER ARTHROSCOPY W/ ROTATOR CUFF REPAIR      Right    SLING-MID URETHRAL N/A 5/9/2016    Performed by [facility] at [facility] OR 2ND FLR    TONSILLECTOMY      TOTAL ABDOMINAL HYSTERECTOMY      with BSO    TUBAL LIGATION         Review of patient's allergies indicates:   Allergen Reactions    Compazine  [prochlorperazine edisylate]      Other reaction(s): SPASMS    Parafon forte      Other reaction(s): Hives       Current Outpatient Medications   Medication Sig Dispense Refill    aspirin 81 MG Chew Take 1 tablet (81 mg total) by mouth once daily. 100 tablet 4    atorvastatin (LIPITOR) 40 MG tablet TAKE 1 TABLET DAILY 90 tablet 10    co-enzyme Q-10 30 mg capsule Take 30 mg by mouth 3 (three) times daily.      levothyroxine (SYNTHROID) 88 MCG tablet TAKE 1 TABLET EVERY MORNING BEFORE BREAKFAST 90 tablet 3    metFORMIN (GLUCOPHAGE-XR) 500 MG 24 hr tablet Take 1 tablet (500 mg total) by mouth daily with breakfast. 90 tablet 3    polyethylene glycol (GOLYTELY,NULYTELY) 236-22.74-6.74 -5.86 gram suspension Use as directed. 4000 mL 0     No current facility-administered medications for this visit.        Family History   Problem Relation Age of Onset    Breast cancer Neg Hx     Ovarian cancer Neg Hx     Glaucoma Neg Hx     Cataracts Neg Hx     Diabetes Neg Hx     Macular degeneration Neg Hx     Retinal detachment Neg Hx     Melanoma Neg Hx     Cervical cancer Neg Hx     Endometrial cancer Neg Hx     Vaginal cancer Neg Hx        Social  "History     Socioeconomic History    Marital status:      Spouse name: Not on file    Number of children: Not on file    Years of education: Not on file    Highest education level: Not on file   Social Needs    Financial resource strain: Not on file    Food insecurity - worry: Not on file    Food insecurity - inability: Not on file    Transportation needs - medical: Not on file    Transportation needs - non-medical: Not on file   Occupational History     Employer: [employer]   Tobacco Use    Smoking status: Never Smoker    Smokeless tobacco: Never Used   Substance and Sexual Activity    Alcohol use: No     Alcohol/week: 0.0 oz    Drug use: No    Sexual activity: Not Currently     Partners: Male     Birth control/protection: None   Other Topics Concern    Are you pregnant or think you may be? Not Asked    Breast-feeding Not Asked   Social History Narrative    Not on file       Objective:     Vitals:    11/23/18 1034   Weight: 78 kg (171 lb 15.3 oz)   Height: 5' 2" (1.575 m)   PainSc:   4   PainLoc: Back     GEN:  Well developed, well nourished.  No acute distress. No significant pain behavior.  HEENT:  No trauma.  Mucous membranes moist.  Nares patent bilaterally.  PSYCH: Normal affect. Thought content appropriate.  CHEST:  Breathing symmetric.  No audible wheezing.  ABD: Soft, non-distended.  SKIN:  Warm, pink, dry.  No rash on exposed areas.    EXT:  No cyanosis, clubbing, or edema.  No color change or changes in nail or hair growth.  NEURO/MUSCULOSKELETAL:  Fully alert, oriented, and appropriate. Speech fluent and appropriate. No cranial nerve deficits.   Gait: Independent, stable.  Neg trendelenburg sign bilaterally.   Motor Strength: 4/5 in right hip flexion with some giveway weakness due to pain, otherwise 5/5 motor strength throughout lower extremities.   Sensory: Abnormal sensation to LT in right lateral anterior thigh.   Reflexes:  2+ and symmetric throughout.  Downgoing " Babinski's bilaterally.  No clonus or spasticity.  L-Spine:  Normal ROM with a little pain on extension. (+) facet loading on the right with pain noted on the right.  (-) SLR bilaterally.  (+) femoral stretch bilaterally.   SI Joint/Hip: Negative ELDER bilaterally.  Tightness/stiffness with FADIR bilaterally.  No TTP over lumbar paraspinals, piriformis muscles. (+) TTP at right anterior hip and right SI joint.      Imaging:        The imaging studies listed below were independently reviewed by me, and I agree with the findings as documented below.     Narrative     EXAMINATION:  MRI LUMBAR SPINE WITHOUT CONTRAST    CLINICAL HISTORY:  back and bilateral leg pain; Radiculopathy, thoracic region    TECHNIQUE:  Multiplanar, multisequence MR images were acquired from the thoracolumbar junction to the sacrum without the administration of contrast.    COMPARISON:  None.    FINDINGS:  Lumbar spine alignment is within normal limits. The vertebral body heights are well maintained, with no fracture.  No marrow signal abnormality suspicious for an infiltrative process.    The conus is normal in appearance, and terminates at the  level.  The adjacent soft tissue structures show no significant abnormalities.    There are findings of multi-level  lumbar spondylosis, as below.    L1-L2: There is no focal disc herniation. No significant central canal or neural foraminal narrowing.    L2-L3: There is no focal disc herniation. No significant central canal or neural foraminal narrowing.Mild hypertrophic changes of facets with broad-based bulging of disc material    L3-L4: There is no focal disc herniation. Hypertrophic changes of facets with minimal central canal narrowing.  Mild broad-based bulging of disc material.    L4-L5: There is no focal disc herniation. Mild broad-based bulging of disc material associated with hypertrophic changes of facets result in moderate central canal narrowing.  Associated annular fissure.    L5-S1: There  is no focal disc herniation. No significant central canal or neural foraminal narrowing.      Impression       Degenerative changes L2-L3 through L5 are-L5 as above, with broad-based bulging of disc material and facet hypertrophy, worst at L4-L5 as above.      Electronically signed by: Carlos Muhammad MD  Date: 07/03/2018  Time: 08:13         Assessment:     Encounter Diagnoses   Name Primary?    Lateral femoral cutaneous neuropathy, right Yes    DDD (degenerative disc disease), lumbar     Neuritis     Iliotibial band syndrome affecting lower leg, right     Greater trochanteric bursitis of right hip     Lumbar spondylosis        Plan:     Lissy was seen today for follow-up.    Diagnoses and all orders for this visit:    Lateral femoral cutaneous neuropathy, right  -     EMG W/ ULTRASOUND AND NERVE CONDUCTION TEST 1 Extremity; Future  -     Ambulatory consult to Physical Therapy    DDD (degenerative disc disease), lumbar  -     EMG W/ ULTRASOUND AND NERVE CONDUCTION TEST 1 Extremity; Future  -     Ambulatory consult to Physical Therapy    Neuritis  -     EMG W/ ULTRASOUND AND NERVE CONDUCTION TEST 1 Extremity; Future  -     Ambulatory consult to Physical Therapy    Iliotibial band syndrome affecting lower leg, right  -     Ambulatory consult to Physical Therapy    Greater trochanteric bursitis of right hip  -     Ambulatory consult to Physical Therapy    Lumbar spondylosis  -     Ambulatory consult to Physical Therapy         Her pain is consistent with the above.    We discussed the assessment and recommendations.  All available images were reviewed. We discussed the disease process, prognosis, treatment plan, and risks and benefits. The patient is aware of the risks and benefits of the medications being prescribed, common side effects, and proper usage. The following is the plan we agreed on:     1. I will place another referral to PT for IT band release and core muscle strength  2. Order RLE EMG/NCS.  She  does not wish to pursue any further procedures or medications without further diagnostic testing.  Can consider lumbar KEITH vs MBB pending results.  3. The patient will continue a home exercise routine to help with pain and strengthening.    4. RTC in 6-8 weeks or sooner if needed.  I will call her with EMG results.      The above plan and management options were discussed at length with patient. Patient is in agreement with the above and verbalized understanding.     KEITH Jefferson  11/23/2018

## 2018-11-28 ENCOUNTER — HOSPITAL ENCOUNTER (OUTPATIENT)
Facility: HOSPITAL | Age: 73
Discharge: HOME | End: 2018-11-28
Attending: INTERNAL MEDICINE | Admitting: INTERNAL MEDICINE
Payer: MEDICARE

## 2018-11-28 ENCOUNTER — ANESTHESIA EVENT (OUTPATIENT)
Dept: ENDOSCOPY | Facility: HOSPITAL | Age: 73
End: 2018-11-28
Payer: MEDICARE

## 2018-11-28 ENCOUNTER — ANESTHESIA (OUTPATIENT)
Dept: ENDOSCOPY | Facility: HOSPITAL | Age: 73
End: 2018-11-28
Payer: MEDICARE

## 2018-11-28 VITALS
HEART RATE: 71 BPM | TEMPERATURE: 98 F | HEIGHT: 62 IN | BODY MASS INDEX: 31.28 KG/M2 | SYSTOLIC BLOOD PRESSURE: 112 MMHG | OXYGEN SATURATION: 95 % | DIASTOLIC BLOOD PRESSURE: 65 MMHG | WEIGHT: 170 LBS | RESPIRATION RATE: 16 BRPM

## 2018-11-28 LAB — POCT GLUCOSE: 101 MG/DL (ref 70–110)

## 2018-11-28 PROCEDURE — 82962 GLUCOSE BLOOD TEST: CPT | Performed by: INTERNAL MEDICINE

## 2018-11-28 PROCEDURE — 37000009 HC ANESTHESIA EA ADD 15 MINS: Performed by: INTERNAL MEDICINE

## 2018-11-28 PROCEDURE — 37000008 HC ANESTHESIA 1ST 15 MINUTES: Performed by: INTERNAL MEDICINE

## 2018-11-28 PROCEDURE — 45378 DIAGNOSTIC COLONOSCOPY: CPT | Performed by: INTERNAL MEDICINE

## 2018-11-28 PROCEDURE — 45378 DIAGNOSTIC COLONOSCOPY: CPT | Mod: ,,, | Performed by: INTERNAL MEDICINE

## 2018-11-28 PROCEDURE — 63600175 PHARM REV CODE 636 W HCPCS: Performed by: NURSE ANESTHETIST, CERTIFIED REGISTERED

## 2018-11-28 PROCEDURE — 43239 EGD BIOPSY SINGLE/MULTIPLE: CPT | Performed by: INTERNAL MEDICINE

## 2018-11-28 PROCEDURE — 43239 EGD BIOPSY SINGLE/MULTIPLE: CPT | Mod: 51,,, | Performed by: INTERNAL MEDICINE

## 2018-11-28 PROCEDURE — 25000003 PHARM REV CODE 250: Performed by: INTERNAL MEDICINE

## 2018-11-28 PROCEDURE — 88305 TISSUE EXAM BY PATHOLOGIST: CPT | Mod: 26,,, | Performed by: PATHOLOGY

## 2018-11-28 PROCEDURE — E9220 PRA ENDO ANESTHESIA: HCPCS | Mod: ,,, | Performed by: NURSE ANESTHETIST, CERTIFIED REGISTERED

## 2018-11-28 PROCEDURE — 88305 TISSUE EXAM BY PATHOLOGIST: CPT | Mod: 59 | Performed by: PATHOLOGY

## 2018-11-28 RX ORDER — LIDOCAINE HCL/PF 100 MG/5ML
SYRINGE (ML) INTRAVENOUS
Status: DISCONTINUED | OUTPATIENT
Start: 2018-11-28 | End: 2018-11-28

## 2018-11-28 RX ORDER — PROPOFOL 10 MG/ML
VIAL (ML) INTRAVENOUS
Status: DISCONTINUED | OUTPATIENT
Start: 2018-11-28 | End: 2018-11-28

## 2018-11-28 RX ORDER — SODIUM CHLORIDE 9 MG/ML
INJECTION, SOLUTION INTRAVENOUS CONTINUOUS
Status: DISCONTINUED | OUTPATIENT
Start: 2018-11-28 | End: 2018-11-28 | Stop reason: HOSPADM

## 2018-11-28 RX ADMIN — LIDOCAINE HYDROCHLORIDE 100 MG: 20 INJECTION, SOLUTION INTRAVENOUS at 09:11

## 2018-11-28 RX ADMIN — PROPOFOL 50 MG: 10 INJECTION, EMULSION INTRAVENOUS at 09:11

## 2018-11-28 RX ADMIN — PROPOFOL 100 MG: 10 INJECTION, EMULSION INTRAVENOUS at 09:11

## 2018-11-28 RX ADMIN — SODIUM CHLORIDE: 0.9 INJECTION, SOLUTION INTRAVENOUS at 09:11

## 2018-11-28 RX ADMIN — SODIUM CHLORIDE: 0.9 INJECTION, SOLUTION INTRAVENOUS at 08:11

## 2018-11-28 NOTE — [employer] PATIENT INSTRUCTIONS
Discharge Summary/Instructions after an Endoscopic Procedure  Patient Name: Lissy Palencia  Patient MRN: [id number]  Patient YOB: 1945 Wednesday, November 28, 2018  Richi Mcintosh MD  RESTRICTIONS:  During your procedure today, you received medications for sedation.  These   medications may affect your judgment, balance and coordination.  Therefore,   for 24 hours, you have the following restrictions:   - DO NOT drive a car, operate machinery, make legal/financial decisions,   sign important papers or drink alcohol.    ACTIVITY:  Today: no heavy lifting, straining or running due to procedural   sedation/anesthesia.  The following day: return to full activity including work.  DIET:  Eat and drink normally unless instructed otherwise.     TREATMENT FOR COMMON SIDE EFFECTS:  - Mild abdominal pain, nausea, belching, bloating or excessive gas:  rest,   eat lightly and use a heating pad.  - Sore Throat: treat with throat lozenges and/or gargle with warm salt   water.  - Because air was used during the procedure, expelling large amounts of air   from your rectum or belching is normal.  - If a bowel prep was taken, you may not have a bowel movement for 1-3 days.    This is normal.  SYMPTOMS TO WATCH FOR AND REPORT TO YOUR PHYSICIAN:  1. Abdominal pain or bloating, other than gas cramps.  2. Chest pain.  3. Back pain.  4. Signs of infection such as: chills or fever occurring within 24 hours   after the procedure.  5. Rectal bleeding, which would show as bright red, maroon, or black stools.   (A tablespoon of blood from the rectum is not serious, especially if   hemorrhoids are present.)  6. Vomiting.  7. Weakness or dizziness.  GO DIRECTLY TO THE NEAREST EMERGENCY ROOM IF YOU HAVE ANY OF THE FOLLOWING:      Difficulty breathing              Chills and/or fever over 101 F   Persistent vomiting and/or vomiting blood   Severe abdominal pain   Severe chest pain   Black, tarry stools   Bleeding- more than one  tablespoon   Any other symptom or condition that you feel may need urgent attention  Your doctor recommends these additional instructions:  If any biopsies were taken, your doctors clinic will contact you in 1 to 2   weeks with any results.  - Discharge patient to home.   - Patient has a contact number available for emergencies.  The signs and   symptoms of potential delayed complications were discussed with the   patient.  Return to normal activities tomorrow.  Written discharge   instructions were provided to the patient.   - Resume previous diet.   - Continue present medications.   - Repeat colonoscopy in 10 years for screening purposes.   - Return to referring physician.   For questions, problems or results please call your physician - Richi Mcintosh MD at Work:  [phone].  [facility] [place], EMERGENCY ROOM PHONE NUMBER: [phone]  IF A COMPLICATION OR EMERGENCY SITUATION ARISES AND YOU ARE UNABLE TO REACH   YOUR PHYSICIAN - GO DIRECTLY TO THE EMERGENCY ROOM.  Richi Mcintosh MD  11/28/2018 9:30:25 AM  This report has been verified and signed electronically.  [employer]

## 2018-11-28 NOTE — TRANSFER OF CARE
"Anesthesia Transfer of Care Note    Patient: Lissy Palencia    Procedure(s) Performed: Procedure(s) (LRB):  ESOPHAGOGASTRODUODENOSCOPY (EGD) (N/A)  COLONOSCOPY (N/A)    Patient location: GI    Anesthesia Type: general    Transport from OR: Transported from OR on room air with adequate spontaneous ventilation    Post pain: adequate analgesia    Post assessment: no apparent anesthetic complications    Post vital signs: stable    Level of consciousness: sedated    Nausea/Vomiting: no nausea/vomiting    Complications: none    Transfer of care protocol was followed      Last vitals:   Visit Vitals  /63 (BP Location: Left arm, Patient Position: Lying)   Pulse 93   Temp 36.7 °C (98.1 °F) (Temporal)   Resp 16   Ht 5' 2" (1.575 m)   Wt 77.1 kg (170 lb)   SpO2 (!) 93%   Breastfeeding? No   BMI 31.09 kg/m²     "

## 2018-11-28 NOTE — PROVATION PATIENT INSTRUCTIONS
Discharge Summary/Instructions after an Endoscopic Procedure  Patient Name: Lissy Palencia  Patient MRN: [id number]  Patient YOB: 1945 Wednesday, November 28, 2018  Richi Mcintosh MD  RESTRICTIONS:  During your procedure today, you received medications for sedation.  These   medications may affect your judgment, balance and coordination.  Therefore,   for 24 hours, you have the following restrictions:   - DO NOT drive a car, operate machinery, make legal/financial decisions,   sign important papers or drink alcohol.    ACTIVITY:  Today: no heavy lifting, straining or running due to procedural   sedation/anesthesia.  The following day: return to full activity including work.  DIET:  Eat and drink normally unless instructed otherwise.     TREATMENT FOR COMMON SIDE EFFECTS:  - Mild abdominal pain, nausea, belching, bloating or excessive gas:  rest,   eat lightly and use a heating pad.  - Sore Throat: treat with throat lozenges and/or gargle with warm salt   water.  - Because air was used during the procedure, expelling large amounts of air   from your rectum or belching is normal.  - If a bowel prep was taken, you may not have a bowel movement for 1-3 days.    This is normal.  SYMPTOMS TO WATCH FOR AND REPORT TO YOUR PHYSICIAN:  1. Abdominal pain or bloating, other than gas cramps.  2. Chest pain.  3. Back pain.  4. Signs of infection such as: chills or fever occurring within 24 hours   after the procedure.  5. Rectal bleeding, which would show as bright red, maroon, or black stools.   (A tablespoon of blood from the rectum is not serious, especially if   hemorrhoids are present.)  6. Vomiting.  7. Weakness or dizziness.  GO DIRECTLY TO THE NEAREST EMERGENCY ROOM IF YOU HAVE ANY OF THE FOLLOWING:      Difficulty breathing              Chills and/or fever over 101 F   Persistent vomiting and/or vomiting blood   Severe abdominal pain   Severe chest pain   Black, tarry stools   Bleeding- more than one  tablespoon   Any other symptom or condition that you feel may need urgent attention  Your doctor recommends these additional instructions:  If any biopsies were taken, your doctors clinic will contact you in 1 to 2   weeks with any results.  - Discharge patient to home.   - Patient has a contact number available for emergencies.  The signs and   symptoms of potential delayed complications were discussed with the   patient.  Return to normal activities tomorrow.  Written discharge   instructions were provided to the patient.   - Resume previous diet.   - Continue present medications.   - Await pathology results.   - Return to referring physician.   For questions, problems or results please call your physician - Richi Mcintosh MD at Work:  [phone].  [facility], EMERGENCY ROOM PHONE NUMBER: [phone]  IF A COMPLICATION OR EMERGENCY SITUATION ARISES AND YOU ARE UNABLE TO REACH   YOUR PHYSICIAN - GO DIRECTLY TO THE EMERGENCY ROOM.  Richi Mcintosh MD  11/28/2018 9:13:59 AM  This report has been verified and signed electronically.  [employer]ATION

## 2018-11-28 NOTE — ANESTHESIA PREPROCEDURE EVALUATION
11/28/2018  Lissy Palencia is a 73 y.o., female.  Past Medical History:   Diagnosis Date    Allergy     Arthritis     Coronary artery disease     in the past    GERD (gastroesophageal reflux disease)     Hemorrhoids     History of cholelithiasis     Hyperlipidemia     Hypothyroidism     Right shoulder pain 10/9/2012    Trigger finger, right 3rd finger 7/30/2014     Past Surgical History:   Procedure Laterality Date    ABLATION COLPOCLESIS      ADENOIDECTOMY      CHOLECYSTECTOMY      CHOLECYSTECTOMY, LAPAROSCOPIC, WITH CHOLANGIOGRAM N/A 8/16/2013    Performed by Leon [facility]ourke [facility] at [facility] OR 2ND FLR    COLONOSCOPY N/A 4/10/2015    Performed by Cesar Lu MD at [facility] ENDO (4TH FLR)    COLPOCLEISIS N/A 5/9/2016    Performed by Bart Saleh MD at [facility] OR 2ND FLR    CORONARY STENT PLACEMENT      CYSTOSCOPY N/A 5/9/2016    Performed by Bart [facility]h [facility] at [facility] OR 2ND FLR    heart cath  2005    non-obstructive disease    Injection  Right lateral femoral cutaneous nerve block Right 9/14/2018    Per[facility] at [facility] PAIN MGT    OOPHORECTOMY      SHOULDER ARTHROSCOPY W/ ROTATOR CUFF REPAIR      Right    SLING-MID URETHRAL N/A 5/9/2016    Performed by Bart [facility]h MD at [facility] OR 2ND FLR    TONSILLECTOMY      TOTAL ABDOMINAL HYSTERECTOMY      with BSO    TUBAL LIGATION           Anesthesia Evaluation    I have reviewed the Patient Summary Reports.     I have reviewed the Medications.     Review of Systems  Anesthesia Hx:  Neg history of prior surgery. Denies Family Hx of Anesthesia complications.   Denies Personal Hx of Anesthesia complications.   Hematology/Oncology:  Hematology Normal   Oncology Normal     EENT/Dental:EENT/Dental Normal   Cardiovascular:  Cardiovascular Normal Exercise tolerance: good     Pulmonary:  Pulmonary Normal     Renal/:  Renal/ Normal     Hepatic/GI:  Hepatic/GI Normal    Musculoskeletal:  Musculoskeletal Normal    Neurological:  Neurology Normal    Endocrine:  Endocrine Normal    Dermatological:  Skin Normal    Psych:  Psychiatric Normal           Physical Exam  General:  Well nourished    Airway/Jaw/Neck:  Airway Findings: Mouth Opening: Normal Tongue: Normal  General Airway Assessment: Adult  Mallampati: II  Improves to II with phonation.  TM Distance: Normal, at least 6 cm  Jaw/Neck Findings:     Neck ROM: Normal ROM     Eyes/Ears/Nose:  EYES/EARS/NOSE FINDINGS: Normal    Chest/Lungs:  Chest/Lungs Findings: Clear to auscultation     Heart/Vascular:  Heart Findings: Rate: Normal  Rhythm: Regular Rhythm  Sounds: Normal  Heart murmur: negative Vascular Findings: Normal     Musculoskeletal:  Musculoskeletal Findings: Normal   Skin:  Skin Findings: Normal    Mental Status:  Mental Status Findings:  Cooperative, Alert and Oriented         Anesthesia Plan  Type of Anesthesia, risks & benefits discussed:  Anesthesia Type:  general  Patient's Preference: GENERAL  Intra-op Monitoring Plan: standard ASA monitors  Intra-op Monitoring Plan Comments:   Post Op Pain Control Plan:   Post Op Pain Control Plan Comments:   Induction:   IV  Beta Blocker:  Patient is not currently on a Beta-Blocker (No further documentation required).       Informed Consent: Patient understands risks and agrees with Anesthesia plan.  Questions answered. Anesthesia consent signed with patient.  ASA Score: 2     Day of Surgery Review of History & Physical: I have interviewed and examined the patient. I have reviewed the patient's H&P dated:  There are no significant changes.  H&P update referred to the surgeon.         Ready For Surgery From Anesthesia Perspective.

## 2018-11-28 NOTE — H&P
Short Stay Endoscopy History and Physical    PCP - Deya Luevano MD  Referring Physician - Deya Luevano MD  [address] [place] [address]    Procedure - EGD and Colonoscopy  ASA - per anesthesia  Mallampati - per anesthesia  History of Anesthesia problems - no  Family history Anesthesia problems -  no   Plan of anesthesia - General    HPI  73 y.o. female    Reason for procedure: GERD, RUQ abdominal pain    ROS:  Constitutional: No fevers, chills, No weight loss  CV: No chest pain  Pulm: No cough, No shortness of breath  GI: see HPI    Medical History:  has a past medical history of Allergy, Arthritis, Coronary artery disease, GERD (gastroesophageal reflux disease), Hemorrhoids, History of cholelithiasis, Hyperlipidemia, Hypothyroidism, Right shoulder pain (10/9/2012), and Trigger finger, right 3rd finger (7/30/2014).    Surgical History:  has a past surgical history that includes Tonsillectomy; Shoulder arthroscopy w/ rotator cuff repair; Total abdominal hysterectomy; Adenoidectomy; Tubal ligation; heart cath (2005); Cholecystectomy; Oophorectomy; Ablation colpoclesis; Coronary stent placement; Injection  Right lateral femoral cutaneous nerve block (Right, 9/14/2018); COLPOCLEISIS (N/A, 5/9/2016); SLING-MID URETHRAL (N/A, 5/9/2016); CYSTOSCOPY (N/A, 5/9/2016); COLONOSCOPY (N/A, 4/10/2015); and CHOLECYSTECTOMY, LAPAROSCOPIC, WITH CHOLANGIOGRAM (N/A, 8/16/2013).    Family History: family history is not on file., see HPI    Social History:  reports that  has never smoked. she has never used smokeless tobacco. She reports that she drinks alcohol. She reports that she does not use drugs.    Review of patient's allergies indicates:   Allergen Reactions    Compazine  [prochlorperazine edisylate]      Other reaction(s): SPASMS    Parafon forte      Other reaction(s): Hives       Medications:   Medications Prior to Admission   Medication Sig Dispense Refill Last Dose    atorvastatin (LIPITOR) 40 MG  tablet TAKE 1 TABLET DAILY 90 tablet 10 11/28/2018 at Unknown time    levothyroxine (SYNTHROID) 88 MCG tablet TAKE 1 TABLET EVERY MORNING BEFORE BREAKFAST 90 tablet 3 11/28/2018 at Unknown time    metFORMIN (GLUCOPHAGE-XR) 500 MG 24 hr tablet Take 1 tablet (500 mg total) by mouth daily with breakfast. 90 tablet 3 11/28/2018 at Unknown time    aspirin 81 MG Chew Take 1 tablet (81 mg total) by mouth once daily. 100 tablet 4 11/26/2018    co-enzyme Q-10 30 mg capsule Take 30 mg by mouth 3 (three) times daily.   Taking    polyethylene glycol (GOLYTELY,NULYTELY) 236-22.74-6.74 -5.86 gram suspension Use as directed. 4000 mL 0 Not Taking       Physical Exam:    Vital Signs:   Vitals:    11/28/18 0825   BP: 129/63   Pulse: 93   Resp: 16   Temp: 98.1 °F (36.7 °C)       General Appearance: Well appearing in no acute distress  Abdomen: Soft, non tender, non distended with normal bowel sounds, no masses    Labs:  Lab Results   Component Value Date    WBC 9.42 10/23/2018    HGB 15.0 10/23/2018    HCT 45.4 10/23/2018     10/23/2018    CHOL 166 04/18/2018    TRIG 195 (H) 04/18/2018    HDL 46 04/18/2018    ALT 29 10/23/2018    AST 24 10/23/2018     10/23/2018    K 4.1 10/23/2018     10/23/2018    CREATININE 0.8 10/23/2018    BUN 11 10/23/2018    CO2 26 10/23/2018    TSH 0.410 10/23/2018    INR 1.0 07/27/2005    HGBA1C 6.0 (H) 04/18/2018       I have explained the risks and benefits of this endoscopic procedure to the patient including but not limited to bleeding, inflammation, infection, perforation, and death.      Richi Mcintosh MD

## 2018-11-28 NOTE — ANESTHESIA POSTPROCEDURE EVALUATION
"Anesthesia Post Evaluation    Patient: Lissy Palencia    Procedure(s) Performed: Procedure(s) (LRB):  ESOPHAGOGASTRODUODENOSCOPY (EGD) (N/A)  COLONOSCOPY (N/A)    Final Anesthesia Type: general  Patient location during evaluation: PACU  Patient participation: Yes- Able to Participate  Level of consciousness: awake and alert  Post-procedure vital signs: reviewed and stable  Pain management: adequate  Airway patency: patent  PONV status at discharge: No PONV  Anesthetic complications: no      Cardiovascular status: blood pressure returned to baseline  Respiratory status: unassisted, spontaneous ventilation and room air  Hydration status: euvolemic  Follow-up not needed.        Visit Vitals  BP (!) 95/52 (BP Location: Left arm, Patient Position: Lying)   Pulse 68   Temp 36.5 °C (97.7 °F) (Temporal)   Resp 16   Ht 5' 2" (1.575 m)   Wt 77.1 kg (170 lb)   SpO2 (!) 93%   Breastfeeding? No   BMI 31.09 kg/m²       Pain/Sahil Score: Pain Assessment Performed: Yes (11/28/2018  9:35 AM)  Presence of Pain: denies (11/28/2018  9:35 AM)  Sahil Score: 7 (11/28/2018  9:35 AM)        "

## 2018-12-04 ENCOUNTER — TELEPHONE (OUTPATIENT)
Dept: INTERNAL MEDICINE | Facility: CLINIC | Age: 73
End: 2018-12-04

## 2018-12-04 RX ORDER — PANTOPRAZOLE SODIUM 40 MG/1
TABLET, DELAYED RELEASE ORAL
Qty: 90 TABLET | Refills: 1 | Status: SHIPPED | OUTPATIENT
Start: 2018-12-04 | End: 2019-11-14 | Stop reason: SDUPTHER

## 2018-12-05 ENCOUNTER — TELEPHONE (OUTPATIENT)
Dept: ENDOSCOPY | Facility: HOSPITAL | Age: 73
End: 2018-12-05

## 2018-12-05 NOTE — TELEPHONE ENCOUNTER
Dear Rhoda,  Please call patient  She had an EGD.  Her biopsies show acid reflux irritating her esophagus.  PPIs are the best remedy for this problem.  Her insurance does not cover Nexium, which she took in the past.  I have sent pantoprazole to her mail order pharmacy for her to take daily , a 90 day supply with one refill. She may need to take this daily to control acid reflux.   Sincerely, Dr. Deya Luevano

## 2019-01-15 RX ORDER — ATORVASTATIN CALCIUM 40 MG/1
40 TABLET, FILM COATED ORAL DAILY
Qty: 30 TABLET | Refills: 0 | Status: SHIPPED | OUTPATIENT
Start: 2019-01-15 | End: 2019-01-17 | Stop reason: SDUPTHER

## 2019-01-15 NOTE — TELEPHONE ENCOUNTER
----- Message from Silvana Smith sent at 1/15/2019  1:28 PM CST -----  Contact: aashish/wilfred/[phone]  Pt called in regards to getting a Rx refill for atorvastatin (LIPITOR) 40 MG tablet 90 tablet 10 5/23/2017 No  TAKE 1 TABLET DAILY. Pt has been out for 2 weeks so they would like a 30 to go to a local  And the rest [facility].     [facility] THE [facility]  [facility] SENT A 30 DAY  Please advise

## 2019-01-15 NOTE — TELEPHONE ENCOUNTER
Patient is requesting a refill of Atrovastatin 40MG, she has been out of meds for 2 weeks.    Please send 30 supply to [facility] on [address].    Then patient will call back to request 90 supply for [facility].    Please advise  Thank you

## 2019-01-17 RX ORDER — ATORVASTATIN CALCIUM 40 MG/1
40 TABLET, FILM COATED ORAL DAILY
Qty: 90 TABLET | Refills: 3 | Status: SHIPPED | OUTPATIENT
Start: 2019-01-17 | End: 2019-12-30

## 2019-01-17 NOTE — TELEPHONE ENCOUNTER
----- Message from Jayne Hamilton sent at 1/17/2019 10:37 AM CST -----  Contact: [phone]  RX request - refill or new RX.  Is this a refill or new RX: refill    RX name and strength: atorvastatin (LIPITOR) 40 MG tablet    Local pharmacy or mail order pharmacy:  [facility] [place] [address] AT [facility] [address]   [phone] (Phone)  [phone] (Fax)        Comments:  Please advise ,thanks

## 2019-01-18 NOTE — TELEPHONE ENCOUNTER
Thirty day supply sent to [facility]     and 90 day supply of atorvastatin 40 mg sent to [facility] for 1 year with 3 refills

## 2019-01-23 ENCOUNTER — TELEPHONE (OUTPATIENT)
Dept: INTERNAL MEDICINE | Facility: CLINIC | Age: 74
End: 2019-01-23

## 2019-01-23 RX ORDER — BENZONATATE 200 MG/1
200 CAPSULE ORAL 3 TIMES DAILY PRN
Qty: 30 CAPSULE | Refills: 2 | Status: SHIPPED | OUTPATIENT
Start: 2019-01-23 | End: 2019-01-24 | Stop reason: SDUPTHER

## 2019-01-23 NOTE — TELEPHONE ENCOUNTER
Jayne Luevano Deya CALDERA Staff   Caller: [phone] (Today,  3:19 PM)             Patient is requesting a prescription sent to the pharmacy. patient stated she has a dry cough.     Please advise, thanks     [facility] [place] [address] [address] AT [facility] [address]   [phone] (Phone)   [phone] (Fax)      Please advise  Thank you

## 2019-01-23 NOTE — TELEPHONE ENCOUNTER
----- Message from Jayne Hamilton sent at 1/23/2019  3:19 PM CST -----  Contact: [phone]  Patient is requesting a prescription sent to the pharmacy. patient stated she has a dry cough.    Please advise, thanks     [facility] [address] [place] [address] [address] AT [facility] [address]   [phone] (Phone)  [phone] (Fax)

## 2019-01-24 RX ORDER — BENZONATATE 200 MG/1
200 CAPSULE ORAL 3 TIMES DAILY PRN
Qty: 30 CAPSULE | Refills: 0 | Status: SHIPPED | OUTPATIENT
Start: 2019-01-24 | End: 2019-02-03

## 2019-01-24 NOTE — TELEPHONE ENCOUNTER
Cough syrup was sent to the new pharmacy. Patient is requesting medication is [facility] to [facility] [address]      Please advise  Thank you

## 2019-03-01 ENCOUNTER — OFFICE VISIT (OUTPATIENT)
Dept: PODIATRY | Facility: CLINIC | Age: 74
End: 2019-03-01
Payer: MEDICARE

## 2019-03-01 VITALS
WEIGHT: 170 LBS | HEART RATE: 66 BPM | HEIGHT: 62 IN | SYSTOLIC BLOOD PRESSURE: 142 MMHG | DIASTOLIC BLOOD PRESSURE: 83 MMHG | BODY MASS INDEX: 31.28 KG/M2

## 2019-03-01 DIAGNOSIS — M77.9 BONE SPUR: Primary | ICD-10-CM

## 2019-03-01 PROCEDURE — 99213 OFFICE O/P EST LOW 20 MIN: CPT | Mod: S$PBB,,, | Performed by: PODIATRIST

## 2019-03-01 PROCEDURE — 99213 OFFICE O/P EST LOW 20 MIN: CPT | Mod: PBBFAC | Performed by: PODIATRIST

## 2019-03-01 PROCEDURE — 99999 PR PBB SHADOW E&M-EST. PATIENT-LVL III: ICD-10-PCS | Mod: PBBFAC,,, | Performed by: PODIATRIST

## 2019-03-01 PROCEDURE — 99999 PR PBB SHADOW E&M-EST. PATIENT-LVL III: CPT | Mod: PBBFAC,,, | Performed by: PODIATRIST

## 2019-03-01 PROCEDURE — 99213 PR OFFICE/OUTPT VISIT, EST, LEVL III, 20-29 MIN: ICD-10-PCS | Mod: S$PBB,,, | Performed by: PODIATRIST

## 2019-03-01 NOTE — PROGRESS NOTES
Subjective:      Patient ID: Lissy Palencia is a 73 y.o. female.    Chief Complaint: Toe Pain (pain at home no pain now )    Lissy is a 73 y.o. female who presents to the podiatry clinic  with complaint of  left foot pain. Onset of the symptoms was several weeks ago. Precipitating event: none known. Current symptoms include: pain with inversion of the foot. Aggravating factors: any weight bearing. Symptoms have been basically asymptomatic. Patient has had no prior foot problems. Evaluation to date: none. Treatment to date: none. Patients rates pain 1/10 on pain scale.        Review of Systems   Constitution: Negative for chills and fever.   HENT: Negative for congestion and tinnitus.    Eyes: Negative for double vision and visual disturbance.   Cardiovascular: Negative for chest pain and claudication.   Respiratory: Negative for hemoptysis and shortness of breath.    Endocrine: Negative for cold intolerance and heat intolerance.   Hematologic/Lymphatic: Negative for adenopathy and bleeding problem.   Skin: Negative for color change and nail changes.   Musculoskeletal: Positive for arthritis and joint pain. Negative for myalgias and stiffness.   Gastrointestinal: Negative for nausea and vomiting.   Genitourinary: Negative for dysuria and hematuria.   Neurological: Positive for paresthesias. Negative for numbness.   Psychiatric/Behavioral: Negative for altered mental status and suicidal ideas.   Allergic/Immunologic: Negative for environmental allergies and persistent infections.           Objective:      Physical Exam   Constitutional: She is oriented to person, place, and time. She appears well-developed and well-nourished.   Cardiovascular:   Pulses:       Dorsalis pedis pulses are 2+ on the right side, and 2+ on the left side.        Posterior tibial pulses are 2+ on the right side, and 2+ on the left side.   Pulmonary/Chest: Effort normal.   Musculoskeletal: Normal range of motion.   Inspection and palpation of  the muscles joints and bones of both lower extremities reveal that muscle strength for the anterior lateral and posterior muscle groups and intrinsic muscle groups of the foot are all 5 over 5 symmetrical.  Ankle subtalar midtarsal and digital joint range of motion are within normal limits, nonpainful, without crepitus or effusion.  Patient exhibits a normal angle and base of gait.  Palpation of the tendons reveal no defects.  Nontender palpable exostosis noted to the 1st metatarsocuneiform joint of the left foot.   Neurological: She is alert and oriented to person, place, and time.   Sharp dull light touch vibratory proprioceptive sensation are intact bilaterally.  Deep tendon reflexes to patellar and Achilles tendon are symmetrical 2 over 4 bilaterally.  No ankle clonus or Babinski reflexes noted bilaterally.  Coordination is normal to both feet and lower extremities.  Positive Tinel sign mild tenderness deep peroneal nerve dorsal left foot.   Skin: Skin is warm and dry. Capillary refill takes 2 to 3 seconds.   Skin turgor is normal bilaterally.  Skin texture is well hydrated to both lower extremities.  No lesions or rashes or wounds appreciated bilaterally.  Nail plates 1 through 5 bilaterally are within normal limits for length and thickness.  No nail clubbing or incurvation noted.   Psychiatric: She has a normal mood and affect.   Vitals reviewed.            Assessment:       Encounter Diagnosis   Name Primary?    Bone spur - Left Foot Yes         Plan:       Lissy was seen today for toe pain.    Diagnoses and all orders for this visit:    Bone spur - Left Foot      I counseled the patient on her conditions, their implications and medical management.      Discussed conservative and surgical options for bone spur treatment. We discussed appropriate shoe gear Agustina, topical icing and anti-inflammatory gel, as well as potential surgical intervention or corticosteroid injections.  We will begin with icing, topical  anti-inflammatory medicine, shoe gear changes, and will follow up in 2 months to re-evaluate.  May consider corticosteroid for deep peroneal nerve, radiographs and potential excision of bone spur with nerve decompression if symptoms continue.  .

## 2019-06-26 ENCOUNTER — TELEPHONE (OUTPATIENT)
Dept: INTERNAL MEDICINE | Facility: CLINIC | Age: 74
End: 2019-06-26

## 2019-06-26 NOTE — TELEPHONE ENCOUNTER
LVM informing of AWV 06/27/19 @ 3pm [facility] location, left [phone] to cancel/reschedule

## 2019-06-27 ENCOUNTER — OFFICE VISIT (OUTPATIENT)
Dept: INTERNAL MEDICINE | Facility: CLINIC | Age: 74
End: 2019-06-27
Payer: MEDICARE

## 2019-06-27 VITALS
SYSTOLIC BLOOD PRESSURE: 118 MMHG | WEIGHT: 170.88 LBS | BODY MASS INDEX: 31.45 KG/M2 | DIASTOLIC BLOOD PRESSURE: 66 MMHG | HEART RATE: 74 BPM | HEIGHT: 62 IN

## 2019-06-27 DIAGNOSIS — I25.10 CORONARY ARTERY DISEASE INVOLVING NATIVE CORONARY ARTERY OF NATIVE HEART WITHOUT ANGINA PECTORIS: ICD-10-CM

## 2019-06-27 DIAGNOSIS — E78.5 HYPERLIPIDEMIA, UNSPECIFIED HYPERLIPIDEMIA TYPE: ICD-10-CM

## 2019-06-27 DIAGNOSIS — I70.0 AORTIC ATHEROSCLEROSIS: ICD-10-CM

## 2019-06-27 DIAGNOSIS — G57.11 LATERAL FEMORAL CUTANEOUS NEUROPATHY, RIGHT: ICD-10-CM

## 2019-06-27 DIAGNOSIS — G25.0 ESSENTIAL TREMOR: ICD-10-CM

## 2019-06-27 DIAGNOSIS — K21.9 GASTROESOPHAGEAL REFLUX DISEASE, ESOPHAGITIS PRESENCE NOT SPECIFIED: ICD-10-CM

## 2019-06-27 DIAGNOSIS — R73.03 PREDIABETES: ICD-10-CM

## 2019-06-27 DIAGNOSIS — K76.0 FATTY LIVER: ICD-10-CM

## 2019-06-27 DIAGNOSIS — Z12.39 SCREENING FOR BREAST CANCER: ICD-10-CM

## 2019-06-27 DIAGNOSIS — M85.80 OSTEOPENIA, UNSPECIFIED LOCATION: ICD-10-CM

## 2019-06-27 DIAGNOSIS — Z00.00 ENCOUNTER FOR PREVENTIVE HEALTH EXAMINATION: Primary | ICD-10-CM

## 2019-06-27 DIAGNOSIS — M54.31 RIGHT SCIATIC NERVE PAIN: ICD-10-CM

## 2019-06-27 DIAGNOSIS — G89.29 CHRONIC LOW BACK PAIN, UNSPECIFIED BACK PAIN LATERALITY, WITH SCIATICA PRESENCE UNSPECIFIED: ICD-10-CM

## 2019-06-27 DIAGNOSIS — M54.5 CHRONIC LOW BACK PAIN, UNSPECIFIED BACK PAIN LATERALITY, WITH SCIATICA PRESENCE UNSPECIFIED: ICD-10-CM

## 2019-06-27 DIAGNOSIS — E03.9 PRIMARY HYPOTHYROIDISM: ICD-10-CM

## 2019-06-27 PROBLEM — Z11.59 SCREENING FOR VIRAL DISEASE: Status: RESOLVED | Noted: 2018-04-18 | Resolved: 2019-06-27

## 2019-06-27 PROBLEM — H61.21 IMPACTED CERUMEN OF RIGHT EAR: Status: RESOLVED | Noted: 2018-04-18 | Resolved: 2019-06-27

## 2019-06-27 PROBLEM — Z12.11 SCREEN FOR COLON CANCER: Status: RESOLVED | Noted: 2018-04-18 | Resolved: 2019-06-27

## 2019-06-27 PROBLEM — S90.219A CONTUSION OF GREAT TOE WITH DAMAGE TO NAIL: Status: RESOLVED | Noted: 2018-04-18 | Resolved: 2019-06-27

## 2019-06-27 PROCEDURE — G0439 PPPS, SUBSEQ VISIT: HCPCS | Mod: S$GLB,,, | Performed by: NURSE PRACTITIONER

## 2019-06-27 PROCEDURE — 99213 OFFICE O/P EST LOW 20 MIN: CPT | Mod: PBBFAC | Performed by: NURSE PRACTITIONER

## 2019-06-27 PROCEDURE — G0439 PR MEDICARE ANNUAL WELLNESS SUBSEQUENT VISIT: ICD-10-PCS | Mod: S$GLB,,, | Performed by: NURSE PRACTITIONER

## 2019-06-27 PROCEDURE — 99999 PR PBB SHADOW E&M-EST. PATIENT-LVL III: CPT | Mod: PBBFAC,,, | Performed by: NURSE PRACTITIONER

## 2019-06-27 PROCEDURE — 99999 PR PBB SHADOW E&M-EST. PATIENT-LVL III: ICD-10-PCS | Mod: PBBFAC,,, | Performed by: NURSE PRACTITIONER

## 2019-06-27 NOTE — PROGRESS NOTES
"Lissy Palencia presented for a  Medicare AWV and comprehensive Health Risk Assessment today. The following components were reviewed and updated:    · Medical history  · Family History  · Social history  · Allergies and Current Medications  · Health Risk Assessment  · Health Maintenance  · Care Team     ** See Completed Assessments for Annual Wellness Visit within the encounter summary.**       The following assessments were completed:  · Living Situation  · CAGE  · Depression Screening  · Timed Get Up and Go  · Whisper Test  · Cognitive Function Screening  ·   ·   ·   · Nutrition Screening  · ADL Screening  · PAQ Screening    Vitals:    06/27/19 1516   BP: 118/66   BP Location: Right arm   Pulse: 74   Weight: 77.5 kg (170 lb 13.7 oz)   Height: 5' 2" (1.575 m)     Body mass index is 31.25 kg/m².  Physical Exam   Constitutional: She is oriented to person, place, and time.   Obese   HENT:   Head: Normocephalic.   Cardiovascular: Normal rate and regular rhythm.   Pulmonary/Chest: Effort normal and breath sounds normal.   Abdominal: Soft. Bowel sounds are normal.   Musculoskeletal: Normal range of motion. She exhibits no edema.   Neurological: She is alert and oriented to person, place, and time.   Skin: Skin is warm and dry.   Psychiatric: She has a normal mood and affect.   Nursing note and vitals reviewed.        Diagnoses and health risks identified today and associated recommendations/orders:    1. Encounter for preventive health examination  Here for Health Risk Assessment/Annual Wellness Visit.  Health maintenance reviewed and updated. Follow up in one year.    2. Screening for breast cancer  - Mammo Digital Screening Bilat without CA; Future    3. Aortic atherosclerosis  Chronic, stable on current medications. Noted CXR 2/18/15. Followed by PCP, Cardiology.    4. Coronary artery disease involving native coronary artery of native heart without angina pectoris  Chronic, stable on current medications. Followed by " Cardiology, PCP.    5. Hyperlipidemia, unspecified hyperlipidemia type  Chronic, stable on current medication. Followed by PCP.    6. Essential tremor, slight head vane  Chronic, stable. Followed by PCP.    7. Lateral femoral cutaneous neuropathy, right  Chronic, stable. Followed by PCP.    8. Prediabetes  Chronic, stable on current medication. Last A1c 6.0. Followed by PCP.    9. Primary hypothyroidism  Chronic, stable on current medication. Followed by PCP.    10. Gastroesophageal reflux disease, esophagitis presence not specified  Chronic, stable on current medication. Followed by PCP.    11. Fatty liver  Chronic, stable. Followed by PCP.    12. Osteopenia, unspecified location  Chronic, stable. Followed by PCP.    13. Right sciatic nerve pain  Chronic, reporting persistent back pain with radiation down right leg. Followed by PCP, Pain Management.    14. Chronic low back pain, unspecified back pain laterality, with sciatica presence unspecified  Chronic, reporting persistent back pain with radiation down right leg. Followed by PCP, Pain Management.      Provided Lissy with a 5-10 year written screening schedule and personal prevention plan. Recommendations were developed using the USPSTF age appropriate recommendations. Education, counseling, and referrals were provided as needed. After Visit Summary printed and given to patient which includes a list of additional screenings\tests needed.    Follow up in about 3 months (around 9/27/2019).with PCP    Dai Knapp NP

## 2019-06-27 NOTE — PATIENT INSTRUCTIONS
Counseling and Referral of Other Preventative  (Italic type indicates deductible and co-insurance are waived)    Patient Name: Lissy Palencia  Today's Date: 6/27/2019    Health Maintenance       Date Due Completion Date    Shingles Vaccine (2 of 3) 02/20/2015 12/26/2014 Obtain new shingles vaccine - SHINGRIX - when available    Lipid Panel 04/18/2019 4/18/2018 -due when see Dr. Luevano    Mammogram 07/23/2019 7/23/2018    Influenza Vaccine 08/01/2019 10/23/2018    High Dose Statin 03/01/2020 3/1/2019    Aspirin/Antiplatelet Therapy 03/01/2020 3/1/2019    DEXA SCAN 02/25/2021 2/25/2016    Override on 11/25/2009: Done    TETANUS VACCINE 01/03/2028 1/3/2018    Colonoscopy 11/28/2028 11/28/2018        Orders Placed This Encounter   Procedures    Mammo Digital Screening Bilat without CA     The following information is provided to all patients.  This information is to help you find resources for any of the problems found today that may be affecting your health:                Living healthy guide: www.[facility].[place].gov      Understanding Diabetes: www.diabetes.org      Eating healthy: www.cdc.gov/healthyweight      CDC home safety checklist: www.cdc.gov/steadi/patient.html      Agency on Aging: www.goea.[place].gov      Alcoholics anonymous (AA): www.aa.org      Physical Activity: www.armida.nih.gov/du9ufhn      Tobacco use: www.quitwithusla.org

## 2019-07-02 RX ORDER — METFORMIN HYDROCHLORIDE 500 MG/1
TABLET, EXTENDED RELEASE ORAL
Qty: 90 TABLET | Refills: 3 | Status: SHIPPED | OUTPATIENT
Start: 2019-07-02 | End: 2019-10-18 | Stop reason: SDUPTHER

## 2019-07-02 RX ORDER — LEVOTHYROXINE SODIUM 88 UG/1
TABLET ORAL
Qty: 90 TABLET | Refills: 3 | Status: SHIPPED | OUTPATIENT
Start: 2019-07-02 | End: 2020-06-26

## 2019-07-26 ENCOUNTER — OFFICE VISIT (OUTPATIENT)
Dept: DERMATOLOGY | Facility: CLINIC | Age: 74
End: 2019-07-26
Payer: MEDICARE

## 2019-07-26 DIAGNOSIS — D48.9 NEOPLASM OF UNCERTAIN BEHAVIOR: Primary | ICD-10-CM

## 2019-07-26 DIAGNOSIS — R20.2 NOTALGIA PARESTHETICA: ICD-10-CM

## 2019-07-26 PROCEDURE — 88305 TISSUE SPECIMEN TO PATHOLOGY, DERMATOLOGY: ICD-10-PCS | Mod: 26,,, | Performed by: PATHOLOGY

## 2019-07-26 PROCEDURE — 88305 TISSUE EXAM BY PATHOLOGIST: CPT | Performed by: PATHOLOGY

## 2019-07-26 PROCEDURE — 99213 PR OFFICE/OUTPT VISIT, EST, LEVL III, 20-29 MIN: ICD-10-PCS | Mod: 25,S$PBB,, | Performed by: PHYSICIAN ASSISTANT

## 2019-07-26 PROCEDURE — 88305 TISSUE EXAM BY PATHOLOGIST: CPT | Mod: 26,,, | Performed by: PATHOLOGY

## 2019-07-26 PROCEDURE — 99213 OFFICE O/P EST LOW 20 MIN: CPT | Mod: 25,S$PBB,, | Performed by: PHYSICIAN ASSISTANT

## 2019-07-26 PROCEDURE — 11102 TANGNTL BX SKIN SINGLE LES: CPT | Mod: S$PBB,,, | Performed by: PHYSICIAN ASSISTANT

## 2019-07-26 PROCEDURE — 99999 PR PBB SHADOW E&M-EST. PATIENT-LVL III: ICD-10-PCS | Mod: PBBFAC,,, | Performed by: PHYSICIAN ASSISTANT

## 2019-07-26 PROCEDURE — 99999 PR PBB SHADOW E&M-EST. PATIENT-LVL III: CPT | Mod: PBBFAC,,, | Performed by: PHYSICIAN ASSISTANT

## 2019-07-26 PROCEDURE — 99213 OFFICE O/P EST LOW 20 MIN: CPT | Mod: PBBFAC,25 | Performed by: PHYSICIAN ASSISTANT

## 2019-07-26 PROCEDURE — 11102 PR TANGENTIAL BIOPSY, SKIN, SINGLE LESION: ICD-10-PCS | Mod: S$PBB,,, | Performed by: PHYSICIAN ASSISTANT

## 2019-07-26 PROCEDURE — 11102 TANGNTL BX SKIN SINGLE LES: CPT | Mod: PBBFAC | Performed by: PHYSICIAN ASSISTANT

## 2019-07-26 NOTE — PATIENT INSTRUCTIONS
Recommend apply Sarna lotion to skin as often as needed for itching. May store in refrigerator for additional cooling properties.    XEROSIS (DRY SKIN)        1. Definition    Xerosis is the term for dry skin.  We all have a natural oil coating over our skin produced by the skin oil glands.  If this oil is removed, the skin becomes dry which can lead to cracking, which can lead to inflammation.  Xerosis is usually a long-term problem that recurs often, especially in the winter.    2. Cause     Long hot baths or showers can remove our natural oil and lead to xerosis.  One should never take more than one bath or shower a day and for no longer than ten minutes.   Use of harsh soaps such as Zest, Dial, and Ivory can worsen and cause xerosis.   Cold winter weather worsens xerosis because the amount of moisture contained in cold air is much less than the amount of moisture in warm air.    3. Treatment     Treatment is intended to restore the natural oil to your skin.  Keep the skin lubricated.     Do not take more than one bath or shower a day.  Use lukewarm water, not hot.  Hot water dries out the skin.     Use a gentle moisturizing soap such as Cetaphil soap, Oil of Olay, Dove, Basis, Ivory moisture care, Restoraderm cleanser.     When toweling dry, dont rub.  Blot the skin so there is still some water left on the skin.  You should apply a moisturizing cream to all of the skin such as Cerave cream, Cetaphil cream, Restoraderm or Eucerin Original Formula cream.   Alpha hydroxyacid lotions, i.e., AmLactin, also work very well for preventing dry skin, but may burn when used on inflamed or reddened skin.     If you like to swim during the winter months, you should not use soap when getting out of the pool.  When you have finished swimming, rinse off the chlorine with cool to warm water.  If this will be the only shower of the day, then you may use Cetaphil or another mild soap to cleanse your skin.  After the  "shower, apply a moisturizing cream to all of the skin as above.        [address] [facility], [place] [id number]/ [phone] FAX/ [email or web address]     Shave Biopsy Wound Care    Your doctor has performed a shave biopsy today.  A band aid and vaseline ointment has been placed over the site.  This should remain in place for 24 hours.  It is recommended that you keep the area dry for the first 24 hours.  After 24 hours, you may remove the band aid and wash the area with warm soap and water and apply Vaseline jelly.  Many patients prefer to use Neosporin or Bacitracin ointment.  This is acceptable; however, know that you can develop an allergy to this medication even if you have used it safely for years.  It is important to keep the area moist.  Letting it dry out and get air slows healing time, and will worsen the scar.  Band aid is optional after first 24 hours.      If you notice increasing redness, tenderness, pain, or yellow drainage at the biopsy site, please notify your doctor.  These are signs of an infection.    If your biopsy site is bleeding, apply firm pressure for 15 minutes straight.  Repeat for another 15 minutes, if it is still bleeding.   If the surgical site continues to bleed, then please contact your doctor.      For [facility] users:   You will receive a [facility] notification after the pathologist has finished reviewing your biopsy specimen. Pathology results, however, will not be released online so you will see a "no content" message. Once your dermatologist reviews and clinically correlates your biopsy results, you will either receive a letter in the mail with the results of a phone call from your doctor's office if further explanation or treatment is warranted.       [address] [place] [address]/ [phone] FAX/ [email or web address]          "

## 2019-07-26 NOTE — PROGRESS NOTES
"  Subjective:       Patient ID:  Lissy Palencia is a 74 y.o. female who presents for   Chief Complaint   Patient presents with    Itching     Face, back     Itching  - Initial  Affected locations: back and face (occasionally eyelids)  Duration: yrs.  Signs and Symptoms: no associated rash.  Timing: intermittent  Treatments tried: OTC "poison ivy crm"  Improvement on treatment: mild    Pt also c/o itching on BL hairline where glasses rub. Reports a new "mole" on the right side that was not there until 2-3 months ago.    Pt bathes once daily with extremely hot water. Soaks in epsom salt for about 20 minutes. Uses dial soap. Only moisturizes occasionally with olive oil or coconut oil.    Hx of SCC - right dorsal hand. Excised by Dr. Ospina 2/21/2017. Has not had a skin check since.    Review of Systems   Skin: Positive for itching, dry skin and recent sunburn (arms). Negative for rash, daily sunscreen use and activity-related sunscreen use.        No hx of AD   Hematologic/Lymphatic: Bruises/bleeds easily (on asa).   Allergic/Immunologic: Negative for environmental allergies.        Objective:    Physical Exam   Constitutional: She appears well-developed and well-nourished. No distress.   Neurological: She is alert and oriented to person, place, and time. She is not disoriented.   Psychiatric: She has a normal mood and affect.   Skin:   Areas Examined (abnormalities noted in diagram):   Scalp / Hair Palpated and Inspected  Head / Face Inspection Performed  Back Inspection Performed                   Diagram Legend     Erythematous scaling macule/papule c/w actinic keratosis       Vascular papule c/w angioma      Pigmented verrucoid papule/plaque c/w seborrheic keratosis      Yellow umbilicated papule c/w sebaceous hyperplasia      Irregularly shaped tan macule c/w lentigo     1-2 mm smooth white papules consistent with Milia      Movable subcutaneous cyst with punctum c/w epidermal inclusion cyst      Subcutaneous " movable cyst c/w pilar cyst      Firm pink to brown papule c/w dermatofibroma      Pedunculated fleshy papule(s) c/w skin tag(s)      Evenly pigmented macule c/w junctional nevus     Mildly variegated pigmented, slightly irregular-bordered macule c/w mildly atypical nevus      Flesh colored to evenly pigmented papule c/w intradermal nevus       Pink pearly papule/plaque c/w basal cell carcinoma      Erythematous hyperkeratotic cursted plaque c/w SCC      Surgical scar with no sign of skin cancer recurrence      Open and closed comedones      Inflammatory papules and pustules      Verrucoid papule consistent consistent with wart     Erythematous eczematous patches and plaques     Dystrophic onycholytic nail with subungual debris c/w onychomycosis     Umbilicated papule    Erythematous-base heme-crusted tan verrucoid plaque consistent with inflamed seborrheic keratosis     Erythematous Silvery Scaling Plaque c/w Psoriasis     See annotation        Assessment / Plan:      Pathology Orders:     Normal Orders This Visit    Tissue Specimen To Pathology, Dermatology     Questions:    Directional Terms:  Other(comment)    Clinical Information:  r/o BCC vs IDN vs other    Specific Site:  right temple        Neoplasm of uncertain behavior  -     Tissue Specimen To Pathology, Dermatology    Shave biopsy procedure note:    Shave biopsy performed after verbal consent including risk of infection, scar, recurrence, need for additional treatment of site. Area prepped with alcohol, anesthetized with approximately 1.0cc of 1% lidocaine with epinephrine. Lesional tissue shaved with razor blade. Hemostasis achieved with application of aluminum chloride followed by hyfrecation. No complications. Dressing applied. Wound care explained.    Notalgia paresthetica  Discussed with pt pathogenesis of dz.  Recommend apply Sarna lotion to skin as often as needed. May store in refrigerator for additional cooling properties.  Good skin care  regimen discussed including limiting to one bath or shower/day, using lukewarm water with mild soap and moisturizing cream to skin 1 - 2x/day. Brochure was provided and reviewed with patient.         Follow up for TBSE with physician.

## 2019-07-30 ENCOUNTER — HOSPITAL ENCOUNTER (OUTPATIENT)
Dept: RADIOLOGY | Facility: HOSPITAL | Age: 74
Discharge: HOME | End: 2019-07-30
Attending: NURSE PRACTITIONER
Payer: MEDICARE

## 2019-07-30 DIAGNOSIS — Z12.39 SCREENING FOR BREAST CANCER: ICD-10-CM

## 2019-07-30 PROCEDURE — 77063 MAMMO DIGITAL SCREENING BILAT WITH TOMOSYNTHESIS_CAD: ICD-10-PCS | Mod: 26,,, | Performed by: RADIOLOGY

## 2019-07-30 PROCEDURE — 77067 SCR MAMMO BI INCL CAD: CPT | Mod: 26,,, | Performed by: RADIOLOGY

## 2019-07-30 PROCEDURE — 77067 SCR MAMMO BI INCL CAD: CPT | Mod: TC

## 2019-07-30 PROCEDURE — 77063 BREAST TOMOSYNTHESIS BI: CPT | Mod: 26,,, | Performed by: RADIOLOGY

## 2019-07-30 PROCEDURE — 77067 MAMMO DIGITAL SCREENING BILAT WITH TOMOSYNTHESIS_CAD: ICD-10-PCS | Mod: 26,,, | Performed by: RADIOLOGY

## 2019-08-01 ENCOUNTER — TELEPHONE (OUTPATIENT)
Dept: DERMATOLOGY | Facility: CLINIC | Age: 74
End: 2019-08-01

## 2019-08-01 NOTE — PROGRESS NOTES
FINAL PATHOLOGIC DIAGNOSIS  Skin, right temple, biopsy:  -NEUROFIBROMA    Please inform pt of benign pathology - neurofibroma.

## 2019-08-23 ENCOUNTER — TELEPHONE (OUTPATIENT)
Dept: SPINE | Facility: CLINIC | Age: 74
End: 2019-08-23

## 2019-08-23 DIAGNOSIS — M54.5 LOW BACK PAIN, UNSPECIFIED BACK PAIN LATERALITY, UNSPECIFIED CHRONICITY, WITH SCIATICA PRESENCE UNSPECIFIED: Primary | ICD-10-CM

## 2019-08-23 DIAGNOSIS — M54.2 CERVICALGIA: ICD-10-CM

## 2019-08-23 DIAGNOSIS — M54.6 PAIN IN THORACIC SPINE: ICD-10-CM

## 2019-08-27 ENCOUNTER — HOSPITAL ENCOUNTER (OUTPATIENT)
Dept: RADIOLOGY | Facility: OTHER | Age: 74
Discharge: HOME | End: 2019-08-27
Attending: PHYSICIAN ASSISTANT
Payer: MEDICARE

## 2019-08-27 ENCOUNTER — OFFICE VISIT (OUTPATIENT)
Dept: SPINE | Facility: CLINIC | Age: 74
End: 2019-08-27
Payer: MEDICARE

## 2019-08-27 ENCOUNTER — TELEPHONE (OUTPATIENT)
Dept: SPINE | Facility: CLINIC | Age: 74
End: 2019-08-27

## 2019-08-27 VITALS
WEIGHT: 170.88 LBS | SYSTOLIC BLOOD PRESSURE: 138 MMHG | HEIGHT: 62 IN | BODY MASS INDEX: 31.45 KG/M2 | HEART RATE: 73 BPM | DIASTOLIC BLOOD PRESSURE: 85 MMHG | TEMPERATURE: 99 F

## 2019-08-27 DIAGNOSIS — G89.29 CHRONIC BILATERAL LOW BACK PAIN WITH RIGHT-SIDED SCIATICA: ICD-10-CM

## 2019-08-27 DIAGNOSIS — M54.5 LOW BACK PAIN, UNSPECIFIED BACK PAIN LATERALITY, UNSPECIFIED CHRONICITY, WITH SCIATICA PRESENCE UNSPECIFIED: ICD-10-CM

## 2019-08-27 DIAGNOSIS — M54.2 NECK PAIN: Primary | ICD-10-CM

## 2019-08-27 DIAGNOSIS — M54.16 LUMBAR RADICULOPATHY: ICD-10-CM

## 2019-08-27 DIAGNOSIS — M47.26 OTHER SPONDYLOSIS WITH RADICULOPATHY, LUMBAR REGION: ICD-10-CM

## 2019-08-27 DIAGNOSIS — G89.29 CHRONIC MIDLINE THORACIC BACK PAIN: ICD-10-CM

## 2019-08-27 DIAGNOSIS — M54.2 CERVICALGIA: ICD-10-CM

## 2019-08-27 DIAGNOSIS — M47.812 CERVICAL SPONDYLOSIS WITHOUT MYELOPATHY: ICD-10-CM

## 2019-08-27 DIAGNOSIS — M54.5 LOW BACK PAIN, UNSPECIFIED BACK PAIN LATERALITY, UNSPECIFIED CHRONICITY, WITH SCIATICA PRESENCE UNSPECIFIED: Primary | ICD-10-CM

## 2019-08-27 DIAGNOSIS — M51.36 DDD (DEGENERATIVE DISC DISEASE), LUMBAR: ICD-10-CM

## 2019-08-27 DIAGNOSIS — M54.41 CHRONIC BILATERAL LOW BACK PAIN WITH RIGHT-SIDED SCIATICA: ICD-10-CM

## 2019-08-27 DIAGNOSIS — M54.6 CHRONIC MIDLINE THORACIC BACK PAIN: ICD-10-CM

## 2019-08-27 DIAGNOSIS — M50.30 DDD (DEGENERATIVE DISC DISEASE), CERVICAL: ICD-10-CM

## 2019-08-27 DIAGNOSIS — M54.6 PAIN IN THORACIC SPINE: ICD-10-CM

## 2019-08-27 PROCEDURE — 72070 X-RAY EXAM THORAC SPINE 2VWS: CPT | Mod: 26,,, | Performed by: RADIOLOGY

## 2019-08-27 PROCEDURE — 99214 PR OFFICE/OUTPT VISIT, EST, LEVL IV, 30-39 MIN: ICD-10-PCS | Mod: S$PBB,,, | Performed by: PHYSICIAN ASSISTANT

## 2019-08-27 PROCEDURE — 72100 X-RAY EXAM L-S SPINE 2/3 VWS: CPT | Mod: TC,FY

## 2019-08-27 PROCEDURE — 72100 XR LUMBAR SPINE AP AND LAT WITH FLEX/EXT: ICD-10-PCS | Mod: 26,,, | Performed by: RADIOLOGY

## 2019-08-27 PROCEDURE — 99999 PR PBB SHADOW E&M-EST. PATIENT-LVL IV: CPT | Mod: PBBFAC,,, | Performed by: PHYSICIAN ASSISTANT

## 2019-08-27 PROCEDURE — 72070 X-RAY EXAM THORAC SPINE 2VWS: CPT | Mod: TC,FY

## 2019-08-27 PROCEDURE — 99214 OFFICE O/P EST MOD 30 MIN: CPT | Mod: PBBFAC,25 | Performed by: PHYSICIAN ASSISTANT

## 2019-08-27 PROCEDURE — 72050 X-RAY EXAM NECK SPINE 4/5VWS: CPT | Mod: TC,FY

## 2019-08-27 PROCEDURE — 72070 XR THORACIC SPINE AP LATERAL: ICD-10-PCS | Mod: 26,,, | Performed by: RADIOLOGY

## 2019-08-27 PROCEDURE — 72050 X-RAY EXAM NECK SPINE 4/5VWS: CPT | Mod: 26,,, | Performed by: RADIOLOGY

## 2019-08-27 PROCEDURE — 72120 X-RAY BEND ONLY L-S SPINE: CPT | Mod: 26,,, | Performed by: RADIOLOGY

## 2019-08-27 PROCEDURE — 99214 OFFICE O/P EST MOD 30 MIN: CPT | Mod: S$PBB,,, | Performed by: PHYSICIAN ASSISTANT

## 2019-08-27 PROCEDURE — 72100 X-RAY EXAM L-S SPINE 2/3 VWS: CPT | Mod: 26,,, | Performed by: RADIOLOGY

## 2019-08-27 PROCEDURE — 72120 XR LUMBAR SPINE AP AND LAT WITH FLEX/EXT: ICD-10-PCS | Mod: 26,,, | Performed by: RADIOLOGY

## 2019-08-27 PROCEDURE — 72050 XR CERVICAL SPINE AP LAT WITH FLEX EXTEN: ICD-10-PCS | Mod: 26,,, | Performed by: RADIOLOGY

## 2019-08-27 PROCEDURE — 99999 PR PBB SHADOW E&M-EST. PATIENT-LVL IV: ICD-10-PCS | Mod: PBBFAC,,, | Performed by: PHYSICIAN ASSISTANT

## 2019-08-27 NOTE — PROGRESS NOTES
"Subjective:     Patient ID:  Lissy Palencia is a 74 y.o. female.    [facility]    Chief Complaint:  Neck pain, Midback, Low back pain and right leg pain    HPI    Lissy Palencia is a 74 y.o. female who presents for follow up.  Neck pain that is described at stiffness.  No arm pain or paresthesias.  Bilateral hand tingling.  Midback pain that feels like a sharp pain on and off.  Low back pain that is worse with sitting too long.  Bilateral ankle pain.  Right anterior and lateral leg pain to the ankle that comes and goes.    Had right lateral femoral cutaneous nerve block in the past with Dr. Nolan that helped some with the right leg pain.  No PT.  No ESIs.  Take motrin PRN.    Patient denies any recent accidents or trauma, no saddle anesthesias, and no bowel or bladder incontinence.    Patient has some difficulty with balance or gait, some difficulty tying shoes or buttoning clothes, is dropping things, has difficulty opening containers, and has had no change in handwriting.    Review of Systems:  Constitution: Negative for chills, fever, night sweats and weight loss.   Musculoskeletal: Negative for falls.   Gastrointestinal: Negative for bowel incontinence, nausea and vomiting.   Genitourinary: Negative for bladder incontinence.   Neurological: Positive for disturbances in coordination and loss of balance.      Objective:      Vitals:    08/27/19 1326   BP: 138/85   Pulse: 73   Temp: 99 °F (37.2 °C)   Weight: 77.5 kg (170 lb 13.7 oz)   Height: 5' 2" (1.575 m)   PainSc:   5   PainLoc: Back       Physical Exam:    General:  Lissy Palencia is well-developed, well-nourished, appears stated age, in no acute distress, alert and oriented to person, place, and time.    Pulmonary/Chest:  Respiratory effort normal  Abdominal: Exhibits no distension  Psychiatric:  Normal mood and affect.  Behavior is normal.  Judgement and thought content normal    Musculoskeletal:    Patient arises from a sitting to standing position without " difficulty.  Patient walks to the door without evidence of limp, pain, or abnormality of gait. Patient is not able to walk heel to toe.     Cervical ROM:   No pain in cervical spine flexion, extension.  Some pain in right lateral bending, left lateral bending, right rotation, and left rotation.    Cervical Spine Inspection:  Normal with no surgical scars with no visible rashes.    Cervical Spine Palpation:  No tenderness to cervical spine palpation.    Lumbar ROM:   Mild pain in lumbar flexion, extension, right lateral bending, and left lateral bending.    Thoracic/Lumbar Spine Inspection:  Normal with no surgical scars with no visible rashes.    Lumbar/thoracic Spine Palpation:  No tenderness to low back palpation or thoracic spine palpation.    SI Joint Palpation:  No tenderness to SI Joint palpation.    Straight Leg Raise:  Positive right and negative left SLR.    Neurological: Alert and oriented to person, place, and time    Muscle strength against resistance:     Right Left   Deltoid  5 / 5 5 / 5   Biceps  5 / 5 5 / 5   Triceps 5 / 5 5 / 5   Wrist flexion  5 / 5 5 / 5   Wrist extension 5 / 5 5 / 5   Finger abduction 5 / 5 5 / 5   Thumb opposition 5 / 5 5 / 5   Handgrip 5 / 5 5 / 5   Hip flexion  5 / 5 5 / 5   Hip extension 5 / 5 5 / 5   Hip abduction 5 / 5 5 / 5   Hip adduction 5/ 5 5 / 5   Knee extension  5 / 5 5 / 5   Knee flexion  5 / 5 5 / 5   Dorsiflexion  5 / 5 5 / 5   EHL  5 / 5 5 / 5   Plantar flexion  5 / 5 5 / 5   Inversion of the feet 5 / 5 5 / 5   Eversion of the feet 5 / 5 5 / 5     Reflexes:     Right Left   Triceps 2+ 2+   Biceps 2+ 2+   Brachioradialis 2+ 2+   Patellar 2+ 2+   Achilles 2+ 2+     Babinski: Negative bilaterally  Clonus:  Negative bilaterally  Zamora: Negative bilaterally    On gross examination of the bilateral upper and lower extremities, patient has no signs of clubbing, cyanosis, or edema.     XRAY Interpretation:     Cervical spine ap/lateral/flexion/extension xrays were  personally reviewed today.  No fractures.  No movement on flexion and extension.  Multilevel DDD and spondylosis.    Thoracic xrays personally reviewed.  No fractures.  Normal alignment.    Lumbar spine ap/lateral/flexion/extension xrays were personally reviewed today.  No fractures.  No movement on flexion and extension.  Multilevel DDD and spondylosis.        Assessment:          1. Neck pain    2. Cervical spondylosis without myelopathy    3. DDD (degenerative disc disease), cervical    4. Chronic midline thoracic back pain    5. Chronic bilateral low back pain with right-sided sciatica    6. DDD (degenerative disc disease), lumbar    7. Other spondylosis with radiculopathy, lumbar region    8. Lumbar radiculopathy            Plan:          Orders Placed This Encounter    Ambulatory Referral to Physical/Occupational Therapy     Cervical and lumbar spondylosis.  Thoracic back pain    -PT through [facility]  -Fu in three months. Could consider lspine ESIs in the future if PT does not work  -Ankle pain not from her back      Follow-Up:  Follow up in about 3 months (around 11/27/2019). If there are any questions prior to this, the patient was instructed to contact the office.       Danielle Hinojosa, [facility], PA-C  Neurosurgery  [facility]  [place] [place]

## 2019-08-28 ENCOUNTER — OFFICE VISIT (OUTPATIENT)
Dept: INTERNAL MEDICINE | Facility: CLINIC | Age: 74
End: 2019-08-28
Payer: MEDICARE

## 2019-08-28 VITALS
OXYGEN SATURATION: 97 % | DIASTOLIC BLOOD PRESSURE: 82 MMHG | HEART RATE: 71 BPM | BODY MASS INDEX: 31.77 KG/M2 | SYSTOLIC BLOOD PRESSURE: 126 MMHG | WEIGHT: 172.63 LBS | HEIGHT: 62 IN

## 2019-08-28 DIAGNOSIS — J06.9 UPPER RESPIRATORY TRACT INFECTION, UNSPECIFIED TYPE: Primary | ICD-10-CM

## 2019-08-28 PROCEDURE — 99213 OFFICE O/P EST LOW 20 MIN: CPT | Mod: PBBFAC | Performed by: INTERNAL MEDICINE

## 2019-08-28 PROCEDURE — 99213 PR OFFICE/OUTPT VISIT, EST, LEVL III, 20-29 MIN: ICD-10-PCS | Mod: S$PBB,,, | Performed by: INTERNAL MEDICINE

## 2019-08-28 PROCEDURE — 99999 PR PBB SHADOW E&M-EST. PATIENT-LVL III: CPT | Mod: PBBFAC,,, | Performed by: INTERNAL MEDICINE

## 2019-08-28 PROCEDURE — 99999 PR PBB SHADOW E&M-EST. PATIENT-LVL III: ICD-10-PCS | Mod: PBBFAC,,, | Performed by: INTERNAL MEDICINE

## 2019-08-28 PROCEDURE — 99213 OFFICE O/P EST LOW 20 MIN: CPT | Mod: S$PBB,,, | Performed by: INTERNAL MEDICINE

## 2019-08-28 RX ORDER — BENZONATATE 200 MG/1
200 CAPSULE ORAL 3 TIMES DAILY PRN
Qty: 30 CAPSULE | Refills: 2
Start: 2019-08-28 | End: 2019-08-31

## 2019-08-28 NOTE — PATIENT INSTRUCTIONS
Try over the counter cough syrup with antihistamine doxylamine (an antihistamine to take at night).  You should be much better in 2-3 days and I would like to hear from you if you are not.  Orders Placed This Encounter    benzonatate (TESSALON) 200 MG capsule   Tylenol or acetaminophen 500-1000mg every 6-8 hours as needed for pain/fevers.    Also you can take ibuprofen or advil 200-400mg every 6-8 hours as needed for pain/fevers.

## 2019-08-28 NOTE — PROGRESS NOTES
Subjective:       Patient ID: Lissy Palencia is a 74 y.o. female.    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 Since then tickle in throat and cough. NR nose and eye gtts and shower steam. No f/c/ns. No significant sob/hall. Around grandkids that have colds at times as well.    Also occ ankle and forefoot pains bilat.    Review of Systems   Constitutional: Negative for activity change, chills and fatigue.   HENT: Positive for congestion, postnasal drip, rhinorrhea and sore throat. Negative for trouble swallowing and voice change.    Respiratory: Positive for cough. Negative for choking, chest tightness and shortness of breath.    Cardiovascular: Negative for chest pain, palpitations and leg swelling.   Musculoskeletal: Positive for arthralgias and back pain.       Objective:      Physical Exam   Constitutional: She appears well-developed and well-nourished. No distress.   HENT:   Head: Normocephalic and atraumatic.   Nose: Rhinorrhea present. No sinus tenderness. Right sinus exhibits no maxillary sinus tenderness and no frontal sinus tenderness. Left sinus exhibits no maxillary sinus tenderness and no frontal sinus tenderness.   Mouth/Throat: Mucous membranes are normal. No uvula swelling. Posterior oropharyngeal erythema present. No oropharyngeal exudate or tonsillar abscesses.   R TM cerumen filled, L clear, sinuses nontender, nasal mucosa w/o purulence.  OP with post nasal gtt seen   Eyes: Pupils are equal, round, and reactive to light. EOM are normal. No scleral icterus.   Neck: Normal range of motion. Neck supple. No thyromegaly present.   Cardiovascular: Normal rate and regular rhythm.   Pulmonary/Chest: Effort normal and breath sounds normal. No respiratory distress. She has no wheezes. She has no rales.   Musculoskeletal:   bilat feet and ankles normal rom and no swelling or redness or focal tenderness to deep  palpation     Lymphadenopathy:     She has no cervical adenopathy.   Skin: She is not diaphoretic.   Psychiatric: She has a normal mood and affect. Her behavior is normal.       Assessment:       1. Upper respiratory tract infection, unspecified type        Plan:       Lissy was seen today for sore throat and cough.    Diagnoses and all orders for this visit:    Upper respiratory tract infection, unspecified type  -     benzonatate (TESSALON) 200 MG capsule; Take 1 capsule (200 mg total) by mouth 3 (three) times daily as needed for Cough.  Could be from inhalation of insulation as well.  Patient Instructions     Try over the counter cough syrup with antihistamine doxylamine (an antihistamine to take at night).  You should be much better in 2-3 days and I would like to hear from you if you are not.  Orders Placed This Encounter    benzonatate (TESSALON) 200 MG capsule   Tylenol or acetaminophen 500-1000mg every 6-8 hours as needed for pain/fevers.    Also you can take ibuprofen or advil 200-400mg every 6-8 hours as needed for pain/fevers.        Explained that if not better in 1-2 weeks, pt should rtc/call PCP    Discussed occ ankle/foot pains, she was wearing heeled shoes today. She will try to see if symptoms correlate with heels vs tennis shoes.    cc Deya Munroe MD

## 2019-08-29 ENCOUNTER — CLINICAL SUPPORT (OUTPATIENT)
Dept: REHABILITATION | Facility: HOSPITAL | Age: 74
End: 2019-08-29
Payer: MEDICARE

## 2019-08-29 DIAGNOSIS — R29.3 POOR POSTURE: ICD-10-CM

## 2019-08-29 DIAGNOSIS — M54.2 NECK PAIN: ICD-10-CM

## 2019-08-29 DIAGNOSIS — R53.1 DECREASED STRENGTH: ICD-10-CM

## 2019-08-29 DIAGNOSIS — M54.50 LUMBAR PAIN: ICD-10-CM

## 2019-08-29 PROCEDURE — G8978 MOBILITY CURRENT STATUS: HCPCS | Mod: CK

## 2019-08-29 PROCEDURE — 97162 PT EVAL MOD COMPLEX 30 MIN: CPT

## 2019-08-29 PROCEDURE — G8979 MOBILITY GOAL STATUS: HCPCS | Mod: CJ

## 2019-08-29 NOTE — PLAN OF CARE
[facility]  Physical Therapy Initial Evaluation    Name: Lissy Palencia  Clinic Number: [id number]    Therapy Diagnosis:   Encounter Diagnoses   Name Primary?    Neck pain     Lumbar pain     Decreased strength     Poor posture      Physician: Danielle Hinojosa, *    Physician Orders: PT Eval and Treat: 2-3 times a week/ 6-8 weeks  Cervical protocol with home exercises. ROM, stretching, strengthening, traction, and E-stim, massage, ultrasound, tens and modalities.  2-3 times a week/ 6-8 weeks  Lumbar and thoracic protocol with home exercises.  ROM, stretching lumbar and abdominal musculature. Core muscle strengthening exercises and gait training. Aerobic conditioning, aqua therapy, with modalities including tens, ultrasound and massage PRN.  Light weight (<30 lbs)  Medical Diagnosis from Referral:   M54.2 (ICD-10-CM) - Neck pain   M47.812 (ICD-10-CM) - Cervical spondylosis without myelopathy   M50.30 (ICD-10-CM) - DDD (degenerative disc disease), cervical   M54.6,G89.29 (ICD-10-CM) - Chronic midline thoracic back pain   M54.41,G89.29 (ICD-10-CM) - Chronic bilateral low back pain with right-sided sciatica   M51.36 (ICD-10-CM) - DDD (degenerative disc disease), lumbar   M47.26 (ICD-10-CM) - Other spondylosis with radiculopathy, lumbar region   M54.16 (ICD-10-CM) - Lumbar radiculopathy       Evaluation Date: 8/29/2019  Authorization Period Expiration: TBD  Plan of Care Expiration: 10/11/19  Visit # / Visits authorized: 1/ TBD    Time In: 7:13 am   Time Out: 8:00 am   Total Billable Time: 47 minutes    Precautions: Standard    Subjective   Date of onset: chronic  History of current condition - Lissy reports: that she has been experiencing stiffness in her neck into (B) UT region for years. Pt denies radiating pain into (B) UE and stated that every now and then she will experience numbness in her (B) fingers. Pt stated that she also experiences stiffness/tightness across mid back region  "mostly after sleeping, but sometimes during the day as well. Pt stated that she has also been experiencing pain in (B) lower back and buttock region and experiencing off and on pain radiating into (R) LE for years. Pt denies particular injury to her neck and back.      Medical History:   Past Medical History:   Diagnosis Date    Allergy     Arthritis     Coronary artery disease     in the past    GERD (gastroesophageal reflux disease)     Hemorrhoids     History of cholelithiasis     Hyperlipidemia     Hypothyroidism     Obesity     Right shoulder pain 10/9/2012    Trigger finger, right 3rd finger 7/30/2014       Surgical History:   Lissy Palencia  has a past surgical history that includes Tonsillectomy; Shoulder arthroscopy w/ rotator cuff repair; Total abdominal hysterectomy; Adenoidectomy; Tubal ligation; heart cath (2005); Cholecystectomy; Oophorectomy; Ablation colpoclesis; Coronary stent placement; Esophagogastroduodenoscopy (N/A, 11/28/2018); and Colonoscopy (N/A, 11/28/2018).    Medications:   Lissy has a current medication list which includes the following prescription(s): aspirin, atorvastatin, benzonatate, co-enzyme q-10, levothyroxine, metformin, and pantoprazole.    Allergies:   Review of patient's allergies indicates:   Allergen Reactions    Compazine  [prochlorperazine edisylate]      Other reaction(s): SPASMS    Parafon forte      Other reaction(s): Hives        Imaging: see imaging section in pt chart review     Prior Therapy: yes - for her shoulder  Social History: Pt stated that she lives with her . Pt stated that she has no steps/stairs to enter her [facility].   Occupation: Pt stated that she is retired.   Prior Level of Function: independent   Current Level of Function: independent with c/o neck pain when twisting, turning her neck, "using it" and lumbar/buttock pain when standing    Pain:  Current 2/10, worst 5/10, best 2/10   Location: bilateral neck and UT region    Description: " "Aching and stiff   Aggravating Factors: twisting, turning her neck, "using it"   Easing Factors: soak in hot water, ice pack      Current 5/10, worst 8/10, best 3/10   Location: bilateral lumbar/buttock region with radiating pain into (R) LE   Description: Burning, Numb and pinching  Aggravating Factors: Standing  Easing Factors: sitting down in a reclined position, heat, ice, medicine occasionally if needed      Pts goals: learning how to manage pain/symptoms herself     Objective     Cervical AROM: Pain/Dysfunction with Movement:   Flexion 55 degrees Reporting of pulling/stretching in (B) neck and upper back   Extension 30 degrees Stretching in (B) UT/neck region   Right side bending 35 degrees Stretching in (L) UT region   Left side bending 35 degrees Stretching in (R) UT region    Right rotation 65 degrees "stiffness"    Left rotation 65 degrees "stiffness"      Shoulder Right  Left  Pain/Dysfunction with Movement    AROM MMT AROM MMT    flexion WFL 4+/5 WFL 4+/5    abduction WFL 4+/5 WFL 4+/5    Internal rotation WFL 4+/5 WFL 4+/5    ER at 90° abd WFL NT WFL NT    ER at 0° abd NT 4+/5 NT 4+/5        Lumbar AROM: Pain/Dysfunction with Movement:   Flexion 60 degrees    Extension 10 degrees C/o pain across (B) lumbar region   Right side bending 30 degrees C/o increased pain in (R) buttock region   Left side bending 30 degrees C/o increased pain across lumbar and radiating into (R) LE     Hip Right  Left  Pain/Dysfunction with Movement    AROM MMT AROM MMT    Flexion WFL 4/5 WFL 4/5    Extension limited 3-/5 limited 3-/5    Abduction WFL 4/5 WFL 4/5    External rotation WFL 4/5 WFL 4/5       Knee Right  Left  Pain/Dysfunction with Movement    AROM MMT AROM MMT    Flexion WFL 4+/5 WFL 4+/5    Extension WFL 4+/5 wFL 4+/5      Ankle Right  Left  Pain/Dysfunction with Movement    AROM MMT AROM MMT    Plantarflexion WFL 4+/5 WFL 4+/5    Dorsiflexion WFL 5/5 WFL 5/5        Palpation: pt c/o tenderness to palpation along " (B) piriformis/glute region, (R) thoracic paraspinals, (B) UT Region  Sensation: (B) LE grossly intact to light touch sensation  SLR test: (L) = c/o pain in (L) buttock region; (R) = c/o pain in (R) buttock region    Gait: antalgic gait with decreased mk, decreased (B) hip extension  Transfers: Mod I    CMS Impairment/Limitation/Restriction for FOTO lumbar Survey    Therapist reviewed FOTO scores for Lissy Palencia on 8/29/2019.   FOTO documents entered into [employer] - see Media section.    Limitation Score: 47%  Category: Mobility    Current : CK = at least 40% but < 60% impaired, limited or restricted  Goal: CJ = at least 20% but < 40% impaired, limited or restricted           TREATMENT   Treatment Time In:   Treatment Time Out:   Total Treatment time separate from Evaluation: 0 minutes      Home Exercises and Patient Education Provided    Education provided:   - role of therapy    Assessment   Lissy is a 74 y.o. female referred to outpatient Physical Therapy with a medical diagnosis of neck pain, cervical spondylosis without myelopathy, DDD (degenerative disc disease) cervical, chronic midline thoracic back pain, chronic bilateral low back pain with right-sided sciatica, DDD (degenerative disc disease) lumbar, Other spondylosis with radiculopathy lumbar region, and lumbar radiculopathy. Pt presents with c/o chronic neck pain/stiffness, decreased cervical AROM, c/o chronic thoracic pain, c/o chronic (B) lumbar/buttock pain with intermittent radiating pain into (R) LE, decreased/painful lumbar AROM, decreased (B) LE strength, decreased postural awareness, and decreased functional mobility. Pt will benefit from skilled PT to address the limitations listed above in order to return pt to her highest level of function with decreased pain and limitation.     Pt prognosis is Good.   Pt will benefit from skilled outpatient Physical Therapy to address the deficits stated above and in the chart below, provide pt/family  education, and to maximize pt's level of independence.     Plan of care discussed with patient: Yes  Pt's spiritual, cultural and educational needs considered and patient is agreeable to the plan of care and goals as stated below:     Anticipated Barriers for therapy: chronicity of pain, obesity    Medical Necessity is demonstrated by the following  History  Co-morbidities and personal factors that may impact the plan of care Co-morbidities:   obesity    Personal Factors:   no deficits     moderate   Examination  Body Structures and Functions, activity limitations and participation restrictions that may impact the plan of care Body Regions:   neck  back  lower extremities  upper extremities    Body Systems:    ROM  strength  gait  transfers    Participation Restrictions:   None mentioned    Activity limitations:   Learning and applying knowledge  no deficits    General Tasks and Commands  no deficits    Communication  no deficits    Mobility  walking  standing    Self care  no deficits    Domestic Life  cooking  doing house work (cleaning house, washing dishes, laundry)    Interactions/Relationships  no deficits    Life Areas  no deficits    Community and Social Life  no deficits         high   Clinical Presentation evolving clinical presentation with changing clinical characteristics moderate   Decision Making/ Complexity Score: moderate     Goals:  Short Term Goals (3 Weeks):   1. Pt will be independent with HEP to supplement PT in improving pain free mobility  2. Pt will improve (B) cervical rotation AROM 10 deg to improve cervical mobility for driving  3. Pt will improve impaired UE MMTs by 1/2 grade in all planes to improve strength for lifting and carrying tasks.  4. Pt will demonstrate improved sitting posture to decrease pain experienced in neck and back.  5. Pt will perform pallof press with good control to demonstrate improved core strength.  6. Pt will improve impaired LE MMTs by 1/2 grade to improve  strength for functional tasks.    Long Term Goals (6 Weeks):   1. Pt will improve FOTO to </=35% limitation to improve perceived limitation with changing and maintaining mobility.  2. Pt will improve impaired LE MMTs 1 grade in all planes to improve strength for functional tasks  3. Pt will report neck pain </= 3/10 at worst in order to improve functional mobility  4. Pt will report lumbar pain and pain radiating into (R) LE </= 4/10 at worst in order to be able to perform ADLs with less difficulty      Plan   Plan of care Certification: 8/29/2019 to 10/11/19.    Outpatient Physical Therapy 2 times weekly for 6 weeks to include the following interventions: Gait Training, Manual Therapy, Moist Heat/ Ice, Neuromuscular Re-ed, Patient Education, Self Care, Therapeutic Activites, Therapeutic Exercise and ASTYM, FDN, and modaliites prn.     Carmen Logan, PT

## 2019-08-30 ENCOUNTER — TELEPHONE (OUTPATIENT)
Dept: INTERNAL MEDICINE | Facility: CLINIC | Age: 74
End: 2019-08-30

## 2019-08-30 NOTE — TELEPHONE ENCOUNTER
Pt was seen by dr Vargas on 8/28/19 for URI symptoms, prescribed Tessalon pearls, still with symptoms, pt scheduled UC appt tomorrow morning, does pt need to be seen again or can you prescribe something?

## 2019-08-30 NOTE — TELEPHONE ENCOUNTER
----- Message from Carmen Cardozo sent at 8/30/2019 10:37 AM CDT -----  Contact: Patient [phone]  and [phone]  Patient has a bad cough, sore throat and ears.  Scheduled appt for tomorrow morning but would like a call back today from the doctor's office.    Please call and advise  Thank you

## 2019-08-30 NOTE — TELEPHONE ENCOUNTER
Would follow Dr. Vargas's advice formt  visit:    Upper respiratory tract infection, unspecified type  -     benzonatate (TESSALON) 200 MG capsule; Take 1 capsule (200 mg total) by mouth 3 (three) times daily as needed for Cough.  Could be from inhalation of insulation as well.  Patient Instructions      Try over the counter cough syrup with antihistamine doxylamine (an antihistamine to take at night).  You should be much better in 2-3 days and I would like to hear from you if you are not.      Orders Placed This Encounter    benzonatate (TESSALON) 200 MG capsule   Tylenol or acetaminophen 500-1000mg every 6-8 hours as needed for pain/fevers.     Also you can take ibuprofen or advil 200-400mg every 6-8 hours as needed for pain/fevers.           Explained that if not better in 1-2 weeks, pt should rtc/call PCP

## 2019-08-30 NOTE — TELEPHONE ENCOUNTER
Spoke with tp, she would like to get an antibiotic and will come in to the office for the appt tomorrow.

## 2019-08-31 ENCOUNTER — OFFICE VISIT (OUTPATIENT)
Dept: INTERNAL MEDICINE | Facility: CLINIC | Age: 74
End: 2019-08-31
Payer: MEDICARE

## 2019-08-31 VITALS
DIASTOLIC BLOOD PRESSURE: 84 MMHG | OXYGEN SATURATION: 98 % | WEIGHT: 170.88 LBS | BODY MASS INDEX: 31.45 KG/M2 | HEART RATE: 77 BPM | SYSTOLIC BLOOD PRESSURE: 122 MMHG | HEIGHT: 62 IN

## 2019-08-31 DIAGNOSIS — I70.0 AORTIC ATHEROSCLEROSIS: ICD-10-CM

## 2019-08-31 DIAGNOSIS — N81.6 RECTOCELE: ICD-10-CM

## 2019-08-31 DIAGNOSIS — E78.5 HYPERLIPIDEMIA, UNSPECIFIED HYPERLIPIDEMIA TYPE: ICD-10-CM

## 2019-08-31 DIAGNOSIS — J18.9 COMMUNITY ACQUIRED PNEUMONIA, UNSPECIFIED LATERALITY: Primary | ICD-10-CM

## 2019-08-31 DIAGNOSIS — G47.33 OSA (OBSTRUCTIVE SLEEP APNEA): ICD-10-CM

## 2019-08-31 DIAGNOSIS — K59.00 CONSTIPATION, UNSPECIFIED CONSTIPATION TYPE: ICD-10-CM

## 2019-08-31 DIAGNOSIS — M19.90 OSTEOARTHRITIS, UNSPECIFIED OSTEOARTHRITIS TYPE, UNSPECIFIED SITE: ICD-10-CM

## 2019-08-31 DIAGNOSIS — R53.1 DECREASED STRENGTH: ICD-10-CM

## 2019-08-31 DIAGNOSIS — I25.10 CORONARY ARTERY DISEASE INVOLVING NATIVE CORONARY ARTERY OF NATIVE HEART WITHOUT ANGINA PECTORIS: ICD-10-CM

## 2019-08-31 DIAGNOSIS — G89.29 CHRONIC LOW BACK PAIN, UNSPECIFIED BACK PAIN LATERALITY, WITH SCIATICA PRESENCE UNSPECIFIED: ICD-10-CM

## 2019-08-31 DIAGNOSIS — M19.049 HAND ARTHRITIS: ICD-10-CM

## 2019-08-31 DIAGNOSIS — R29.3 POOR POSTURE: ICD-10-CM

## 2019-08-31 DIAGNOSIS — K76.0 FATTY LIVER: ICD-10-CM

## 2019-08-31 DIAGNOSIS — E03.9 PRIMARY HYPOTHYROIDISM: ICD-10-CM

## 2019-08-31 DIAGNOSIS — M54.5 CHRONIC LOW BACK PAIN, UNSPECIFIED BACK PAIN LATERALITY, WITH SCIATICA PRESENCE UNSPECIFIED: ICD-10-CM

## 2019-08-31 DIAGNOSIS — R73.03 PREDIABETES: ICD-10-CM

## 2019-08-31 DIAGNOSIS — K21.9 GASTROESOPHAGEAL REFLUX DISEASE, ESOPHAGITIS PRESENCE NOT SPECIFIED: ICD-10-CM

## 2019-08-31 DIAGNOSIS — G57.11 LATERAL FEMORAL CUTANEOUS NEUROPATHY, RIGHT: ICD-10-CM

## 2019-08-31 DIAGNOSIS — M85.80 OSTEOPENIA, UNSPECIFIED LOCATION: ICD-10-CM

## 2019-08-31 DIAGNOSIS — G25.0 ESSENTIAL TREMOR: ICD-10-CM

## 2019-08-31 PROBLEM — M54.50 LUMBAR PAIN: Status: RESOLVED | Noted: 2019-08-29 | Resolved: 2019-08-31

## 2019-08-31 PROBLEM — M54.2 NECK PAIN: Status: RESOLVED | Noted: 2019-08-29 | Resolved: 2019-08-31

## 2019-08-31 PROCEDURE — 99214 PR OFFICE/OUTPT VISIT, EST, LEVL IV, 30-39 MIN: ICD-10-PCS | Mod: S$PBB,,, | Performed by: FAMILY MEDICINE

## 2019-08-31 PROCEDURE — 99213 OFFICE O/P EST LOW 20 MIN: CPT | Mod: PBBFAC | Performed by: FAMILY MEDICINE

## 2019-08-31 PROCEDURE — 99214 OFFICE O/P EST MOD 30 MIN: CPT | Mod: S$PBB,,, | Performed by: FAMILY MEDICINE

## 2019-08-31 PROCEDURE — 99999 PR PBB SHADOW E&M-EST. PATIENT-LVL III: CPT | Mod: PBBFAC,,, | Performed by: FAMILY MEDICINE

## 2019-08-31 PROCEDURE — 96372 THER/PROPH/DIAG INJ SC/IM: CPT | Mod: PBBFAC

## 2019-08-31 PROCEDURE — 99999 PR PBB SHADOW E&M-EST. PATIENT-LVL III: ICD-10-PCS | Mod: PBBFAC,,, | Performed by: FAMILY MEDICINE

## 2019-08-31 RX ORDER — CODEINE PHOSPHATE AND GUAIFENESIN 10; 100 MG/5ML; MG/5ML
5 SOLUTION ORAL 3 TIMES DAILY PRN
Qty: 118 ML | Refills: 0 | Status: SHIPPED | OUTPATIENT
Start: 2019-08-31 | End: 2019-09-10

## 2019-08-31 RX ORDER — CEFTRIAXONE 1 G/1
1 INJECTION, POWDER, FOR SOLUTION INTRAMUSCULAR; INTRAVENOUS
Status: COMPLETED | OUTPATIENT
Start: 2019-08-31 | End: 2019-08-31

## 2019-08-31 RX ORDER — TRIAMCINOLONE ACETONIDE 40 MG/ML
40 INJECTION, SUSPENSION INTRA-ARTICULAR; INTRAMUSCULAR
Status: COMPLETED | OUTPATIENT
Start: 2019-08-31 | End: 2019-08-31

## 2019-08-31 RX ORDER — AZITHROMYCIN 250 MG/1
TABLET, FILM COATED ORAL
Qty: 6 TABLET | Refills: 0 | Status: SHIPPED | OUTPATIENT
Start: 2019-08-31 | End: 2020-08-06

## 2019-08-31 RX ORDER — METHYLPREDNISOLONE 4 MG/1
TABLET ORAL
Qty: 1 PACKAGE | Refills: 0 | Status: SHIPPED | OUTPATIENT
Start: 2019-08-31 | End: 2020-08-06

## 2019-08-31 RX ADMIN — TRIAMCINOLONE ACETONIDE 40 MG: 40 INJECTION, SUSPENSION INTRA-ARTICULAR; INTRAMUSCULAR at 09:08

## 2019-08-31 RX ADMIN — CEFTRIAXONE SODIUM 1 G: 1 INJECTION, POWDER, FOR SOLUTION INTRAMUSCULAR; INTRAVENOUS at 09:08

## 2019-08-31 NOTE — PROGRESS NOTES
Subjective:   Patient ID: Lissy Palencia is a 74 y.o. female.    Chief Complaint: No chief complaint on file.      Cough   This is a new problem. The current episode started 1 to 4 weeks ago. The problem has been rapidly worsening. The cough is productive of sputum, productive of purulent sputum and productive of brown sputum. Associated symptoms include chills, ear congestion, ear pain, a fever, headaches, shortness of breath and wheezing. Pertinent negatives include no chest pain, eye redness, myalgias, postnasal drip, rash, rhinorrhea or sore throat. The symptoms are aggravated by dust, exercise and fumes. She has tried nothing for the symptoms. The treatment provided no relief. There is no history of asthma.       Patient queried and denies any further complaints.    LOCATION  DURATION  SEVERITY  QUALITY  TIMING  CAUSE  ASSOCIATED SYMPTOMS  MODIFIERS.    ALLERGIES AND MEDICATIONS: updated and reviewed.  Review of patient's allergies indicates:   Allergen Reactions    Compazine  [prochlorperazine edisylate]      Other reaction(s): SPASMS    Parafon forte      Other reaction(s): Hives       Current Outpatient Medications:     aspirin 81 MG Chew, Take 1 tablet (81 mg total) by mouth once daily., Disp: 100 tablet, Rfl: 4    atorvastatin (LIPITOR) 40 MG tablet, Take 1 tablet (40 mg total) by mouth once daily. Ask patient to call the office [phone] for refills, Disp: 90 tablet, Rfl: 3    co-enzyme Q-10 30 mg capsule, Take 30 mg by mouth once daily. , Disp: , Rfl:     levothyroxine (SYNTHROID) 88 MCG tablet, TAKE 1 TABLET EVERY MORNING BEFORE BREAKFAST, Disp: 90 tablet, Rfl: 3    metFORMIN (GLUCOPHAGE-XR) 500 MG 24 hr tablet, TAKE 1 TABLET DAILY WITH BREAKFAST, Disp: 90 tablet, Rfl: 3    pantoprazole (PROTONIX) 40 MG tablet, Take first thing in the morning on an empty stomach then eat 45 minutes later, Disp: 90 tablet, Rfl: 1    azithromycin (Z-MARIA L) 250 MG tablet, Take 2 tablets by mouth on day 1; Take 1  tablet by mouth on days 2-5, Disp: 6 tablet, Rfl: 0    guaifenesin-codeine 100-10 mg/5 ml (CHERATUSSIN AC)  mg/5 mL syrup, Take 5 mLs by mouth 3 (three) times daily as needed for Cough. Do not take and drive, Disp: 118 mL, Rfl: 0    methylPREDNISolone (MEDROL DOSEPACK) 4 mg tablet, use as directed, Disp: 1 Package, Rfl: 0  No current facility-administered medications for this visit.     Review of Systems   Constitutional: Positive for chills and fever. Negative for activity change, appetite change, diaphoresis, fatigue and unexpected weight change.   HENT: Positive for ear pain. Negative for congestion, ear discharge, facial swelling, hearing loss, nosebleeds, postnasal drip, rhinorrhea, sinus pressure, sneezing, sore throat, tinnitus, trouble swallowing and voice change.    Eyes: Negative for photophobia, pain, discharge, redness, itching and visual disturbance.   Respiratory: Positive for cough, shortness of breath and wheezing. Negative for chest tightness.    Cardiovascular: Negative for chest pain, palpitations and leg swelling.   Gastrointestinal: Negative for abdominal distention, abdominal pain, anal bleeding, blood in stool, constipation, diarrhea, nausea, rectal pain and vomiting.   Endocrine: Negative for cold intolerance, heat intolerance, polydipsia, polyphagia and polyuria.   Genitourinary: Negative for difficulty urinating, dysuria and flank pain.   Musculoskeletal: Negative for arthralgias, back pain, joint swelling, myalgias and neck pain.   Skin: Negative for rash.   Neurological: Positive for headaches. Negative for dizziness, tremors, seizures, syncope, speech difficulty, weakness, light-headedness and numbness.   Psychiatric/Behavioral: Negative for behavioral problems, confusion, decreased concentration, dysphoric mood, sleep disturbance and suicidal ideas. The patient is not nervous/anxious and is not hyperactive.        Objective:     Vitals:    08/31/19 0844   BP: 122/84   Pulse: 77  "  SpO2: 98%   Weight: 77.5 kg (170 lb 13.7 oz)   Height: 5' 2" (1.575 m)   PainSc: 0-No pain     Body mass index is 31.25 kg/m².    Physical Exam   Constitutional: She is oriented to person, place, and time. She appears well-developed and well-nourished. She is cooperative. She does not have a sickly appearance. No distress.   HENT:   Head: Normocephalic and atraumatic.   Right Ear: Hearing, tympanic membrane, external ear and ear canal normal. No tenderness.   Left Ear: Hearing, tympanic membrane, external ear and ear canal normal. No tenderness.   Nose: Nose normal.   Mouth/Throat: Oropharynx is clear and moist. Normal dentition. No oropharyngeal exudate, posterior oropharyngeal edema or posterior oropharyngeal erythema.   Eyes: Conjunctivae and lids are normal. Right eye exhibits no discharge. Left eye exhibits no discharge. Right conjunctiva is not injected. Left conjunctiva is not injected. No scleral icterus. Right eye exhibits normal extraocular motion. Left eye exhibits normal extraocular motion.   Neck: Normal range of motion. Neck supple. No JVD present. Carotid bruit is not present. No tracheal deviation and no edema present. No thyromegaly present.   Cardiovascular: Normal rate, regular rhythm, normal heart sounds and normal pulses. Exam reveals no friction rub.   No murmur heard.  Pulmonary/Chest: Effort normal. No accessory muscle usage. No respiratory distress. She has no wheezes. She has rhonchi in the left lower field. She has no rales.   Musculoskeletal: She exhibits no edema.   Lymphadenopathy:        Head (right side): No submandibular adenopathy present.        Head (left side): No submandibular adenopathy present.     She has no cervical adenopathy.   Neurological: She is alert and oriented to person, place, and time.   Skin: Skin is warm and dry. She is not diaphoretic.   Psychiatric: Her speech is normal and behavior is normal. Thought content normal. Her mood appears not anxious. Her affect " is not angry, not labile and not inappropriate. She does not exhibit a depressed mood.   Nursing note and vitals reviewed.      Assessment and Plan:   Diagnoses and all orders for this visit:    Community acquired pneumonia, unspecified laterality    Essential tremor, slight head vane    Lateral femoral cutaneous neuropathy, right    Hyperlipidemia, unspecified hyperlipidemia type    Coronary artery disease involving native coronary artery of native heart without angina pectoris    Aortic atherosclerosis    Primary hypothyroidism    Prediabetes    Rectocele    Fatty liver    Constipation, unspecified constipation type    Gastroesophageal reflux disease, esophagitis presence not specified    Chronic low back pain, unspecified back pain laterality, with sciatica presence unspecified    Osteoarthritis, unspecified osteoarthritis type, unspecified site    Hand arthritis    Osteopenia, unspecified location    HEMAL (obstructive sleep apnea)    Decreased strength    Poor posture    Other orders  -     guaifenesin-codeine 100-10 mg/5 ml (CHERATUSSIN AC)  mg/5 mL syrup; Take 5 mLs by mouth 3 (three) times daily as needed for Cough. Do not take and drive  -     cefTRIAXone injection 1 g  -     triamcinolone acetonide injection 40 mg  -     azithromycin (Z-MARIA L) 250 MG tablet; Take 2 tablets by mouth on day 1; Take 1 tablet by mouth on days 2-5  -     methylPREDNISolone (MEDROL DOSEPACK) 4 mg tablet; use as directed      dm2 stable. Cont w above    Hydrate, rest, OTC Mucinex Expectorant as directed, Nasal saline as needed.  OTC Zyrtec as directed.    Follow up in about 2 weeks (around 9/14/2019).    THIS NOTE WILL BE SHARED WITH THE PATIENT.

## 2019-09-05 ENCOUNTER — CLINICAL SUPPORT (OUTPATIENT)
Dept: REHABILITATION | Facility: HOSPITAL | Age: 74
End: 2019-09-05
Payer: MEDICARE

## 2019-09-05 DIAGNOSIS — R53.1 DECREASED STRENGTH: ICD-10-CM

## 2019-09-05 DIAGNOSIS — R29.3 POOR POSTURE: ICD-10-CM

## 2019-09-05 PROCEDURE — 97110 THERAPEUTIC EXERCISES: CPT

## 2019-09-05 PROCEDURE — 97140 MANUAL THERAPY 1/> REGIONS: CPT

## 2019-09-10 ENCOUNTER — CLINICAL SUPPORT (OUTPATIENT)
Dept: REHABILITATION | Facility: HOSPITAL | Age: 74
End: 2019-09-10
Payer: MEDICARE

## 2019-09-10 DIAGNOSIS — R53.1 DECREASED STRENGTH: ICD-10-CM

## 2019-09-10 DIAGNOSIS — R29.3 POOR POSTURE: ICD-10-CM

## 2019-09-10 PROCEDURE — 97110 THERAPEUTIC EXERCISES: CPT

## 2019-09-10 PROCEDURE — 97140 MANUAL THERAPY 1/> REGIONS: CPT

## 2019-09-10 NOTE — PROGRESS NOTES
Physical Therapy Daily Treatment Note     Name: Lissy Palencia  Clinic Number: [id number]    Therapy Diagnosis:   Encounter Diagnoses   Name Primary?    Decreased strength     Poor posture      Physician: Danielle Hinojosa, *    Visit Date: 9/10/2019    Physician Orders: PT Eval and Treat: 2-3 times a week/ 6-8 weeks  Medical Diagnosis:   M54.2 (ICD-10-CM) - Neck pain   M47.812 (ICD-10-CM) - Cervical spondylosis without myelopathy   M50.30 (ICD-10-CM) - DDD (degenerative disc disease), cervical   M54.6,G89.29 (ICD-10-CM) - Chronic midline thoracic back pain   M54.41,G89.29 (ICD-10-CM) - Chronic bilateral low back pain with right-sided sciatica   M51.36 (ICD-10-CM) - DDD (degenerative disc disease), lumbar   M47.26 (ICD-10-CM) - Other spondylosis with radiculopathy, lumbar region   M54.16 (ICD-10-CM) - Lumbar radiculopathy        Evaluation Date: 8/29/2019  Authorization Period Expiration: 12/31/2019  Plan of Care Certification Period: 10/11/2019  Visit #/Visits authorized: 3/ 20   FOTO: 3/5    Gcode: 3/10  Visit:  57.78  Total: 259.08    Time In: 9:03 am   Time Out: 10:00 am   Total Billable Time: 24 minutes    Precautions: Standard    Subjective     Pt reports: pain in (R) buttock region and across (B) lower back. Pt stated that when she woke up this morning had cramp around bra strap region, which eventually resolved. Pt stated that (L) buttock region is tender.   She was compliant with home exercise program.  Response to previous treatment: no adverse effects   Functional change: none    Pain: 5/10  Location: right buttocks and (B) lumbar region      Objective       Lissy received the following manual therapy techniques:  Soft tissue Mobilization were applied for 15 minutes, including:  Patient in alternating sidelying position for:  STM to right glut/piriformis region and (B) lumbar region    Lissy received therapeutic exercises to develop strength, endurance, ROM, flexibility, posture and core stabilization  "for 42 minutes including:  Nu step x 5'   Gastroc wedge stretch 30"x2  HS stretch with strap 30"x2  piriformis stretch 30"x2  TA activation 5"x15  TA with LTR 5"x10  TA with marching 2x10   Chin tucks 5"x10  TA with SLR 2x10 B - not performed  UT stretch 30"x2 B  LS stretch 30"X3  Mid thoracic stretch 3x30"   SL hip abduction 2x10  Scapular retraction 5"x10   Rows with PTB 2x10         Home Exercises Provided and Patient Education Provided     Education provided:   - HEP    Written Home Exercises Provided: yes.  Exercises were reviewed and Lissy was able to demonstrate them prior to the end of the session.  Lissy demonstrated good  understanding of the education provided.     See EMR under Patient Instructions for exercises provided 9/10/2019.      Assessment     Pt presented with tenderness to palpation along (R) glut/piriformis region. Pt was able to tolerate all therex including additional therex noted above without c/o increased pain during or after therapy session. Pt reported feeling less stiff at the end of therapy session.   Lissy is progressing well towards her goals.   Pt prognosis is Good.     Pt will continue to benefit from skilled outpatient physical therapy to address the deficits listed in the problem list box on initial evaluation, provide pt/family education and to maximize pt's level of independence in the home and community environment.     Pt's spiritual, cultural and educational needs considered and pt agreeable to plan of care and goals.    Anticipated barriers to physical therapy: chronicity of pain, obesity    Goals:     Short Term Goals (3 Weeks):   1. Pt will be independent with HEP to supplement PT in improving pain free mobility  2. Pt will improve (B) cervical rotation AROM 10 deg to improve cervical mobility for driving  3. Pt will improve impaired UE MMTs by 1/2 grade in all planes to improve strength for lifting and carrying tasks.  4. Pt will demonstrate improved sitting posture to " decrease pain experienced in neck and back.  5. Pt will perform pallof press with good control to demonstrate improved core strength.  6. Pt will improve impaired LE MMTs by 1/2 grade to improve strength for functional tasks.     Long Term Goals (6 Weeks):   1. Pt will improve FOTO to </=35% limitation to improve perceived limitation with changing and maintaining mobility.  2. Pt will improve impaired LE MMTs 1 grade in all planes to improve strength for functional tasks  3. Pt will report neck pain </= 3/10 at worst in order to improve functional mobility  4. Pt will report lumbar pain and pain radiating into (R) LE </= 4/10 at worst in order to be able to perform ADLs with less difficulty      Plan     Continue per POC, progress as tolerated    Carmen Logan, PT

## 2019-09-11 NOTE — PROGRESS NOTES
"  Physical Therapy Daily Treatment Note     Name: Lissy Palencia  Clinic Number: [id number]    Therapy Diagnosis:   Encounter Diagnoses   Name Primary?    Decreased strength     Poor posture      Physician: Danielle Hinojosa, *    Visit Date: 9/12/2019    Physician Orders: PT Eval and Treat: 2-3 times a week/ 6-8 weeks  Medical Diagnosis:   M54.2 (ICD-10-CM) - Neck pain   M47.812 (ICD-10-CM) - Cervical spondylosis without myelopathy   M50.30 (ICD-10-CM) - DDD (degenerative disc disease), cervical   M54.6,G89.29 (ICD-10-CM) - Chronic midline thoracic back pain   M54.41,G89.29 (ICD-10-CM) - Chronic bilateral low back pain with right-sided sciatica   M51.36 (ICD-10-CM) - DDD (degenerative disc disease), lumbar   M47.26 (ICD-10-CM) - Other spondylosis with radiculopathy, lumbar region   M54.16 (ICD-10-CM) - Lumbar radiculopathy        Evaluation Date: 8/29/2019  Authorization Period Expiration: 12/31/2019  Plan of Care Certification Period: 10/11/2019  Visit #/Visits authorized: 4/ 20   FOTO: 4/5    Gcode: 4/10  Visit:  121.28  Total: 380.36    Time In: 8:55 am   Time Out: 9:55 am   Total Billable Time: 55 minutes    Precautions: Standard    Subjective     Pt reports: she has gotten better overall but is still having pain across her low back but she is feeling it more in her R low back/hip today.   She was compliant with home exercise program.  Response to previous treatment: no adverse effects   Functional change: none    Pain: 5/10  Location: right buttocks and (B) lumbar region      Objective       Lissy received the following manual therapy techniques:  Soft tissue Mobilization were applied for 0 minutes, including:  Patient in alternating sidelying position for:  STM to right glut/piriformis region and (B) lumbar region    Lissy received therapeutic exercises to develop strength, endurance, ROM, flexibility, posture and core stabilization for 60 minutes including:    Nu step x 5'   Gastroc wedge stretch 30"x2  HS " "stretch with strap 30"x2  piriformis stretch 30"x2  TA activation 5"x20  TA with LTR 5"x10  TA with marching 2x10   TA with SLR 2x10 B  SL hip abduction 3x10  Chin tucks 5"x15  UT stretch 30"x2 B  LS stretch 30"X2  Mid thoracic stretch 3x30"   Scapular retraction 5"x15   Rows with PTB 3x10       Home Exercises Provided and Patient Education Provided     Education provided:   - HEP    Written Home Exercises Provided: yes.  Exercises were reviewed and Lissy was able to demonstrate them prior to the end of the session.  Lissy demonstrated good  understanding of the education provided.     See EMR under Patient Instructions for exercises provided 9/10/2019.      Assessment     Pt required cueing for correct form on SL abduction exercise. Pt was able to tolerate all therex including additional therex noted above without c/o increased pain during or after therapy session.    Lissy is progressing well towards her goals.   Pt prognosis is Good.     Pt will continue to benefit from skilled outpatient physical therapy to address the deficits listed in the problem list box on initial evaluation, provide pt/family education and to maximize pt's level of independence in the home and community environment.     Pt's spiritual, cultural and educational needs considered and pt agreeable to plan of care and goals.    Anticipated barriers to physical therapy: chronicity of pain, obesity    Goals:     Short Term Goals (3 Weeks):   1. Pt will be independent with HEP to supplement PT in improving pain free mobility  2. Pt will improve (B) cervical rotation AROM 10 deg to improve cervical mobility for driving  3. Pt will improve impaired UE MMTs by 1/2 grade in all planes to improve strength for lifting and carrying tasks.  4. Pt will demonstrate improved sitting posture to decrease pain experienced in neck and back.  5. Pt will perform pallof press with good control to demonstrate improved core strength.  6. Pt will improve impaired LE MMTs " by 1/2 grade to improve strength for functional tasks.     Long Term Goals (6 Weeks):   1. Pt will improve FOTO to </=35% limitation to improve perceived limitation with changing and maintaining mobility.  2. Pt will improve impaired LE MMTs 1 grade in all planes to improve strength for functional tasks  3. Pt will report neck pain </= 3/10 at worst in order to improve functional mobility  4. Pt will report lumbar pain and pain radiating into (R) LE </= 4/10 at worst in order to be able to perform ADLs with less difficulty      Plan     Continue per POC, progress as tolerated    Suellen Garza, PT

## 2019-09-12 ENCOUNTER — CLINICAL SUPPORT (OUTPATIENT)
Dept: REHABILITATION | Facility: HOSPITAL | Age: 74
End: 2019-09-12
Payer: MEDICARE

## 2019-09-12 DIAGNOSIS — R29.3 POOR POSTURE: ICD-10-CM

## 2019-09-12 DIAGNOSIS — R53.1 DECREASED STRENGTH: ICD-10-CM

## 2019-09-12 PROCEDURE — 97110 THERAPEUTIC EXERCISES: CPT

## 2019-10-10 ENCOUNTER — CLINICAL SUPPORT (OUTPATIENT)
Dept: REHABILITATION | Facility: HOSPITAL | Age: 74
End: 2019-10-10
Payer: MEDICARE

## 2019-10-10 DIAGNOSIS — R29.3 POOR POSTURE: ICD-10-CM

## 2019-10-10 DIAGNOSIS — R53.1 DECREASED STRENGTH: ICD-10-CM

## 2019-10-10 PROCEDURE — 97110 THERAPEUTIC EXERCISES: CPT

## 2019-10-10 NOTE — PROGRESS NOTES
Physical Therapy Daily Treatment Note/Discharge Note     Name: Lissy Palencia  Clinic Number: [id number]    Therapy Diagnosis:   Encounter Diagnoses   Name Primary?    Decreased strength     Poor posture      Physician: Danielle Hinojosa, *    Visit Date: 10/10/2019    Physician Orders: PT Eval and Treat: 2-3 times a week/ 6-8 weeks  Medical Diagnosis:   M54.2 (ICD-10-CM) - Neck pain   M47.812 (ICD-10-CM) - Cervical spondylosis without myelopathy   M50.30 (ICD-10-CM) - DDD (degenerative disc disease), cervical   M54.6,G89.29 (ICD-10-CM) - Chronic midline thoracic back pain   M54.41,G89.29 (ICD-10-CM) - Chronic bilateral low back pain with right-sided sciatica   M51.36 (ICD-10-CM) - DDD (degenerative disc disease), lumbar   M47.26 (ICD-10-CM) - Other spondylosis with radiculopathy, lumbar region   M54.16 (ICD-10-CM) - Lumbar radiculopathy        Evaluation Date: 8/29/2019  Authorization Period Expiration: 12/31/2019  Plan of Care Certification Period: 10/11/2019  Visit #/Visits authorized: 5/ 20   FOTO: 5/5    Gcode: 5/10  Visit:  60.64  Total: 440.36    Time In: 0955  Time Out: 1055   Total Billable Time: 30 minutes    Precautions: Standard    Subjective     Pt reports: that she was on her feet a lot this past week doing work on her sons house. Pt reports that her sciatica is irritated as of today and she has symptoms of burning from her R hip down the back of her R thigh. Pt reports that she does not take the pain medication that was prescribed to her. Pt would rather deal with the pain than take medication.   She was compliant with home exercise program.  Response to previous treatment: no adverse effects   Functional change: none    Pain: 8/10  Location: right buttocks and (B) lumbar region      Objective       Lissy received the following manual therapy techniques:  Soft tissue Mobilization were applied for 0 minutes, including:  Patient in alternating sidelying position for:  STM to right glut/piriformis  "region and (B) lumbar region    Lissy received therapeutic exercises to develop strength, endurance, ROM, flexibility, posture and core stabilization for 60 minutes including:    Nu step x 5'   Gastroc wedge stretch 30"x2  HS stretch with strap 30"x2  piriformis stretch 30"x2  TA activation 5"x20  TA with LTR 5"x20  TA with marching 2x10   TA with SLR 2x10 B  SL hip abduction 3x10  Chin tucks 5"x15  UT stretch 30"x2 B  LS stretch 30"X2  Mid thoracic stretch 3x30"   Scapular retraction 5"x15   Rows with PTB 3x10       FOTO: 40% limitation       Home Exercises Provided and Patient Education Provided     Education provided:   - HEP    Written Home Exercises Provided: yes.  Exercises were reviewed and Lissy was able to demonstrate them prior to the end of the session.  Lissy demonstrated good  understanding of the education provided.     See EMR under Patient Instructions for exercises provided 9/10/2019.      Assessment     Pt entered PT session with increased burning pain in R LE (glute, posterior thigh). Pt with increased difficulty performing HSS secondary to increased burning pain in L LE and was unable to complete on L side. As patient was leaving therapy session, she informed PT that she wished to be discharged today. Pt reports that she learned a lot from therapy and feels as if she can do everything on her own. PT did not perform any measurements secondary to time restrictions and last minute request from patient to be discharged. PT was unable to determine if pt met her goals for therapy secondary to request for discharge at the end of therapy session. Pt improved with FOTO score from 47% limitation to 40% limitation.       Lissy is progressing well towards her goals.   Pt prognosis is Good.     Pt's spiritual, cultural and educational needs considered and pt agreeable to plan of care and goals.    Anticipated barriers to physical therapy: chronicity of pain, obesity    Goals:     Short Term Goals (3 Weeks):   1. " Pt will be independent with HEP to supplement PT in improving pain free mobility  2. Pt will improve (B) cervical rotation AROM 10 deg to improve cervical mobility for driving  3. Pt will improve impaired UE MMTs by 1/2 grade in all planes to improve strength for lifting and carrying tasks.  4. Pt will demonstrate improved sitting posture to decrease pain experienced in neck and back.  5. Pt will perform pallof press with good control to demonstrate improved core strength.  6. Pt will improve impaired LE MMTs by 1/2 grade to improve strength for functional tasks.     Long Term Goals (6 Weeks):   1. Pt will improve FOTO to </=35% limitation to improve perceived limitation with changing and maintaining mobility. ( NOT MET 10/10/2019)   2. Pt will improve impaired LE MMTs 1 grade in all planes to improve strength for functional tasks  3. Pt will report neck pain </= 3/10 at worst in order to improve functional mobility  4. Pt will report lumbar pain and pain radiating into (R) LE </= 4/10 at worst in order to be able to perform ADLs with less difficulty      Plan     Discharge pt from Physical Therapy    Zach Ortega, PT               Outpatient Therapy Discharge Summary     Name: Lissy Palencia  Clinic Number: [id number]    Therapy Diagnosis:   Encounter Diagnoses   Name Primary?    Decreased strength     Poor posture      Physician: Danielle Hinojosa, *    Physician Orders: PT Eval and Treat: 2-3 times a week/ 6-8 weeks  Medical Diagnosis:   M54.2 (ICD-10-CM) - Neck pain   M47.812 (ICD-10-CM) - Cervical spondylosis without myelopathy   M50.30 (ICD-10-CM) - DDD (degenerative disc disease), cervical   M54.6,G89.29 (ICD-10-CM) - Chronic midline thoracic back pain   M54.41,G89.29 (ICD-10-CM) - Chronic bilateral low back pain with right-sided sciatica   M51.36 (ICD-10-CM) - DDD (degenerative disc disease), lumbar   M47.26 (ICD-10-CM) - Other spondylosis with radiculopathy, lumbar region   M54.16 (ICD-10-CM) - Lumbar  radiculopathy     Evaluation Date: 8/29/2019      Date of Last visit: 10/10/2019  Total Visits Received: 5  Cancelled Visits: 0  No Show Visits: 0    Assessment    GOALS: Unable to determine secondary to last minute discharge request from patient    Short Term Goals (3 Weeks):   1. Pt will be independent with HEP to supplement PT in improving pain free mobility  2. Pt will improve (B) cervical rotation AROM 10 deg to improve cervical mobility for driving  3. Pt will improve impaired UE MMTs by 1/2 grade in all planes to improve strength for lifting and carrying tasks.  4. Pt will demonstrate improved sitting posture to decrease pain experienced in neck and back.  5. Pt will perform pallof press with good control to demonstrate improved core strength.  6. Pt will improve impaired LE MMTs by 1/2 grade to improve strength for functional tasks.     Long Term Goals (6 Weeks):   1. Pt will improve FOTO to </=35% limitation to improve perceived limitation with changing and maintaining mobility. (NOT MET 10/10/2019)  2. Pt will improve impaired LE MMTs 1 grade in all planes to improve strength for functional tasks  3. Pt will report neck pain </= 3/10 at worst in order to improve functional mobility  4. Pt will report lumbar pain and pain radiating into (R) LE </= 4/10 at worst in order to be able to perform ADLs with less difficulty    Discharge reason: Patient requested discharge    Plan   This patient is discharged from Physical Therapy

## 2019-10-18 RX ORDER — METFORMIN HYDROCHLORIDE 500 MG/1
TABLET, EXTENDED RELEASE ORAL
Qty: 90 TABLET | Refills: 4 | Status: SHIPPED | OUTPATIENT
Start: 2019-10-18 | End: 2020-08-06

## 2019-11-14 ENCOUNTER — OFFICE VISIT (OUTPATIENT)
Dept: INTERNAL MEDICINE | Facility: CLINIC | Age: 74
End: 2019-11-14
Payer: MEDICARE

## 2019-11-14 ENCOUNTER — LAB VISIT (OUTPATIENT)
Dept: LAB | Facility: HOSPITAL | Age: 74
End: 2019-11-14
Payer: MEDICARE

## 2019-11-14 VITALS
DIASTOLIC BLOOD PRESSURE: 78 MMHG | HEART RATE: 76 BPM | BODY MASS INDEX: 31.68 KG/M2 | WEIGHT: 172.19 LBS | SYSTOLIC BLOOD PRESSURE: 134 MMHG | HEIGHT: 62 IN

## 2019-11-14 DIAGNOSIS — G89.29 CHRONIC LOW BACK PAIN WITH RIGHT-SIDED SCIATICA, UNSPECIFIED BACK PAIN LATERALITY: ICD-10-CM

## 2019-11-14 DIAGNOSIS — N30.00 ACUTE CYSTITIS WITHOUT HEMATURIA: ICD-10-CM

## 2019-11-14 DIAGNOSIS — R73.03 PREDIABETES: ICD-10-CM

## 2019-11-14 DIAGNOSIS — E03.9 PRIMARY HYPOTHYROIDISM: ICD-10-CM

## 2019-11-14 DIAGNOSIS — M54.41 CHRONIC LOW BACK PAIN WITH RIGHT-SIDED SCIATICA, UNSPECIFIED BACK PAIN LATERALITY: ICD-10-CM

## 2019-11-14 DIAGNOSIS — K21.9 GASTROESOPHAGEAL REFLUX DISEASE, ESOPHAGITIS PRESENCE NOT SPECIFIED: Primary | ICD-10-CM

## 2019-11-14 LAB
ALBUMIN SERPL BCP-MCNC: 4.5 G/DL (ref 3.5–5.2)
ALP SERPL-CCNC: 105 U/L (ref 55–135)
ALT SERPL W/O P-5'-P-CCNC: 37 U/L (ref 10–44)
ANION GAP SERPL CALC-SCNC: 10 MMOL/L (ref 8–16)
AST SERPL-CCNC: 26 U/L (ref 10–40)
BACTERIA #/AREA URNS AUTO: NORMAL /HPF
BASOPHILS # BLD AUTO: 0.05 K/UL (ref 0–0.2)
BASOPHILS NFR BLD: 0.5 % (ref 0–1.9)
BILIRUB SERPL-MCNC: 0.8 MG/DL (ref 0.1–1)
BILIRUB UR QL STRIP: NEGATIVE
BUN SERPL-MCNC: 15 MG/DL (ref 8–23)
CALCIUM SERPL-MCNC: 10.1 MG/DL (ref 8.7–10.5)
CAOX CRY UR QL COMP ASSIST: NORMAL
CHLORIDE SERPL-SCNC: 101 MMOL/L (ref 95–110)
CLARITY UR REFRACT.AUTO: ABNORMAL
CO2 SERPL-SCNC: 29 MMOL/L (ref 23–29)
COLOR UR AUTO: YELLOW
CREAT SERPL-MCNC: 0.9 MG/DL (ref 0.5–1.4)
DIFFERENTIAL METHOD: NORMAL
EOSINOPHIL # BLD AUTO: 0.4 K/UL (ref 0–0.5)
EOSINOPHIL NFR BLD: 4.5 % (ref 0–8)
ERYTHROCYTE [DISTWIDTH] IN BLOOD BY AUTOMATED COUNT: 13.7 % (ref 11.5–14.5)
EST. GFR  (AFRICAN AMERICAN): >60 ML/MIN/1.73 M^2
EST. GFR  (NON AFRICAN AMERICAN): >60 ML/MIN/1.73 M^2
ESTIMATED AVG GLUCOSE: 134 MG/DL (ref 68–131)
GLUCOSE SERPL-MCNC: 75 MG/DL (ref 70–110)
GLUCOSE UR QL STRIP: NEGATIVE
HBA1C MFR BLD HPLC: 6.3 % (ref 4–5.6)
HCT VFR BLD AUTO: 45.7 % (ref 37–48.5)
HGB BLD-MCNC: 15.5 G/DL (ref 12–16)
HGB UR QL STRIP: NEGATIVE
KETONES UR QL STRIP: NEGATIVE
LEUKOCYTE ESTERASE UR QL STRIP: ABNORMAL
LYMPHOCYTES # BLD AUTO: 3.1 K/UL (ref 1–4.8)
LYMPHOCYTES NFR BLD: 33.9 % (ref 18–48)
MCH RBC QN AUTO: 28.9 PG (ref 27–31)
MCHC RBC AUTO-ENTMCNC: 33.9 G/DL (ref 32–36)
MCV RBC AUTO: 85 FL (ref 82–98)
MICROSCOPIC COMMENT: NORMAL
MONOCYTES # BLD AUTO: 0.7 K/UL (ref 0.3–1)
MONOCYTES NFR BLD: 7.6 % (ref 4–15)
NEUTROPHILS # BLD AUTO: 4.9 K/UL (ref 1.8–7.7)
NEUTROPHILS NFR BLD: 53.5 % (ref 38–73)
NITRITE UR QL STRIP: NEGATIVE
PH UR STRIP: 5 [PH] (ref 5–8)
PLATELET # BLD AUTO: 201 K/UL (ref 150–350)
PMV BLD AUTO: 10.1 FL (ref 9.2–12.9)
POTASSIUM SERPL-SCNC: 4 MMOL/L (ref 3.5–5.1)
PROT SERPL-MCNC: 8 G/DL (ref 6–8.4)
PROT UR QL STRIP: NEGATIVE
RBC # BLD AUTO: 5.36 M/UL (ref 4–5.4)
RBC #/AREA URNS AUTO: 1 /HPF (ref 0–4)
SODIUM SERPL-SCNC: 140 MMOL/L (ref 136–145)
SP GR UR STRIP: 1.02 (ref 1–1.03)
SQUAMOUS #/AREA URNS AUTO: 3 /HPF
TSH SERPL DL<=0.005 MIU/L-ACNC: 0.7 UIU/ML (ref 0.4–4)
URN SPEC COLLECT METH UR: ABNORMAL
WBC # BLD AUTO: 9.24 K/UL (ref 3.9–12.7)
WBC #/AREA URNS AUTO: 3 /HPF (ref 0–5)

## 2019-11-14 PROCEDURE — 85025 COMPLETE CBC W/AUTO DIFF WBC: CPT

## 2019-11-14 PROCEDURE — 80053 COMPREHEN METABOLIC PANEL: CPT

## 2019-11-14 PROCEDURE — 99999 PR PBB SHADOW E&M-EST. PATIENT-LVL III: ICD-10-PCS | Mod: PBBFAC,GC,, | Performed by: STUDENT IN AN ORGANIZED HEALTH CARE EDUCATION/TRAINING PROGRAM

## 2019-11-14 PROCEDURE — 87088 URINE BACTERIA CULTURE: CPT

## 2019-11-14 PROCEDURE — 36415 COLL VENOUS BLD VENIPUNCTURE: CPT

## 2019-11-14 PROCEDURE — 87147 CULTURE TYPE IMMUNOLOGIC: CPT

## 2019-11-14 PROCEDURE — 81001 URINALYSIS AUTO W/SCOPE: CPT

## 2019-11-14 PROCEDURE — 99213 OFFICE O/P EST LOW 20 MIN: CPT | Mod: PBBFAC | Performed by: STUDENT IN AN ORGANIZED HEALTH CARE EDUCATION/TRAINING PROGRAM

## 2019-11-14 PROCEDURE — 84443 ASSAY THYROID STIM HORMONE: CPT

## 2019-11-14 PROCEDURE — 87086 URINE CULTURE/COLONY COUNT: CPT

## 2019-11-14 PROCEDURE — 99214 PR OFFICE/OUTPT VISIT, EST, LEVL IV, 30-39 MIN: ICD-10-PCS | Mod: S$PBB,GC,, | Performed by: STUDENT IN AN ORGANIZED HEALTH CARE EDUCATION/TRAINING PROGRAM

## 2019-11-14 PROCEDURE — 83036 HEMOGLOBIN GLYCOSYLATED A1C: CPT

## 2019-11-14 PROCEDURE — 99214 OFFICE O/P EST MOD 30 MIN: CPT | Mod: S$PBB,GC,, | Performed by: STUDENT IN AN ORGANIZED HEALTH CARE EDUCATION/TRAINING PROGRAM

## 2019-11-14 PROCEDURE — 99999 PR PBB SHADOW E&M-EST. PATIENT-LVL III: CPT | Mod: PBBFAC,GC,, | Performed by: STUDENT IN AN ORGANIZED HEALTH CARE EDUCATION/TRAINING PROGRAM

## 2019-11-14 RX ORDER — PANTOPRAZOLE SODIUM 40 MG/1
TABLET, DELAYED RELEASE ORAL
Qty: 90 TABLET | Refills: 1 | Status: SHIPPED | OUTPATIENT
Start: 2019-11-14 | End: 2020-08-06

## 2019-11-14 NOTE — PROGRESS NOTES
Clinic Note  11/14/2019      Subjective:       Patient ID: Lissy Palencia is a 74 y.o. female being seen for an established visit.    Chief Complaint: Diabetes; Cough; and Mass (on shoulder )      74 y.o. F w/ T2DM, hypothyroidism, hyperlipidemia, and GERD presenting to clinic with multiple complaints. She complains of upper back pain between her shoulder blades, lower back pain with sciatic nerve pain, stomach pain, heartburn, a recent fall with trauma to her R knee, a cough, a lipoma, and concerns about her diabetes. Additionally patient reports one episode of urgency and dysuria.      Review of Systems   Constitutional: Negative for chills and fever.   HENT: Positive for sore throat. Negative for trouble swallowing.    Respiratory: Positive for cough. Negative for shortness of breath and wheezing.    Cardiovascular: Negative for chest pain, palpitations and leg swelling.   Gastrointestinal: Positive for abdominal pain. Negative for constipation, diarrhea, nausea and vomiting.   Genitourinary: Negative for difficulty urinating, dysuria, hematuria and urgency.   Musculoskeletal: Positive for arthralgias, back pain and neck pain. Negative for myalgias.   Skin: Negative for rash and wound.   Neurological: Negative for dizziness and light-headedness.   Psychiatric/Behavioral: Negative for agitation, behavioral problems and confusion.       Patient's Medications   New Prescriptions    No medications on file   Previous Medications    ASPIRIN 81 MG CHEW    Take 1 tablet (81 mg total) by mouth once daily.    ATORVASTATIN (LIPITOR) 40 MG TABLET    Take 1 tablet (40 mg total) by mouth once daily. Ask patient to call the office [phone] for refills    AZITHROMYCIN (Z-MARIA L) 250 MG TABLET    Take 2 tablets by mouth on day 1; Take 1 tablet by mouth on days 2-5    CO-ENZYME Q-10 30 MG CAPSULE    Take 30 mg by mouth once daily.     LEVOTHYROXINE (SYNTHROID) 88 MCG TABLET    TAKE 1 TABLET EVERY MORNING BEFORE BREAKFAST     "METFORMIN (GLUCOPHAGE-XR) 500 MG 24 HR TABLET    TAKE 1 TABLET DAILY WITH BREAKFAST    METHYLPREDNISOLONE (MEDROL DOSEPACK) 4 MG TABLET    use as directed    PANTOPRAZOLE (PROTONIX) 40 MG TABLET    Take first thing in the morning on an empty stomach then eat 45 minutes later   Modified Medications    No medications on file   Discontinued Medications    No medications on file       Patient Active Problem List    Diagnosis Date Noted    Decreased strength 08/29/2019    Poor posture 08/29/2019    Aortic atherosclerosis 06/27/2019    Constipation 10/23/2018    GERD (gastroesophageal reflux disease) 10/23/2018    Lateral femoral cutaneous neuropathy, right 09/14/2018    Essential tremor, slight head vane 04/18/2018    Fatty liver 01/03/2018    HEMAL (obstructive sleep apnea)     Prediabetes 10/05/2016    Osteopenia 06/22/2016    Rectocele 01/19/2016    Primary hypothyroidism 11/05/2015    Hand arthritis 07/30/2014    Osteoarthritis 04/10/2013    CAD (coronary artery disease), native coronary artery, s/p PCI atherectomy to LAD 1997. 04/10/2013     Cath 2005 non-obstructive disease  2009 negative nuclear stress test      Chronic low back pain 09/26/2012    Hyperlipidemia, low HDL, high triglycerides.            Objective:      /78   Pulse 76   Ht 5' 2" (1.575 m)   Wt 78.1 kg (172 lb 2.9 oz)   BMI 31.49 kg/m²   Estimated body mass index is 31.49 kg/m² as calculated from the following:    Height as of this encounter: 5' 2" (1.575 m).    Weight as of this encounter: 78.1 kg (172 lb 2.9 oz).    Physical Exam   Constitutional: She is oriented to person, place, and time. She appears well-developed and well-nourished.   HENT:   Head: Normocephalic and atraumatic.   Mouth/Throat: No oropharyngeal exudate.   Eyes: Conjunctivae and EOM are normal. No scleral icterus.   Neck: Normal range of motion. Neck supple.   Cardiovascular: Normal rate, regular rhythm, normal heart sounds and intact distal pulses. "   Pulmonary/Chest: Effort normal and breath sounds normal.   Abdominal: Soft. Bowel sounds are normal. There is no tenderness.   Musculoskeletal: Normal range of motion. She exhibits tenderness (R lateral tibial epicondyle ).   Neurological: She is alert and oriented to person, place, and time.   Skin: Skin is warm and dry.   Psychiatric: She has a normal mood and affect. Her behavior is normal. Judgment and thought content normal.   Vitals reviewed.      Assessment and Plan:   Lissy was seen today for diabetes, cough and mass.    Patient with multiple complaints. Provided education on diabetes and role of glucose monitor. Discussed with patient the importance of maintaining a low carb diet and exercising. Will check a Hgb A1C and adjust her medication as necessary.  Counseled patient on what GERD is and the purpose of the PPI she was previously prescribed. The patient expressed understanding and is amenable with starting a PPI  Reviewed interpretations of patient CTL XR. Patient with osteoarthritis and scoliosis throughout her spine. Patient felt that PT was not rigorous enough and felt she could continue to rehab at home. However her pain is persistent and not improved with current regimen. Patient is amenable to trying more rigorous PT. Will refer patient to [facility].    Diagnoses and all orders for this visit:    Gastroesophageal reflux disease, esophagitis presence not specified  -     pantoprazole (PROTONIX) 40 MG tablet; Take first thing in the morning on an empty stomach then eat 45 minutes later    Prediabetes  -     Hemoglobin A1c; Future  -     CBC auto differential; Future  -     Comprehensive metabolic panel; Future    Acute cystitis without hematuria  -     URINALYSIS  -     Urine culture    Chronic low back pain with right-sided sciatica, unspecified back pain laterality  -     Ambulatory consult to [facility]    Primary hypothyroidism  -     TSH; Future    Other orders  -     Cancel:  Ambulatory consult to Diabetic Education; Future  -     Urinalysis Microscopic      Patient seen and plan of care discussed with Dr. Bassem Morton MD/MS  [facility]   Internal Medicine, PGY-2

## 2019-11-15 LAB — BACTERIA UR CULT: ABNORMAL

## 2019-11-17 ENCOUNTER — TELEPHONE (OUTPATIENT)
Dept: INTERNAL MEDICINE | Facility: CLINIC | Age: 74
End: 2019-11-17

## 2019-11-17 DIAGNOSIS — R73.03 PREDIABETES: Primary | ICD-10-CM

## 2019-11-17 DIAGNOSIS — R73.9 HYPERGLYCEMIA: ICD-10-CM

## 2019-11-18 NOTE — TELEPHONE ENCOUNTER
Dear Callie,  Please call patient.  She has prediabetes. Prediabetes has significant risks. Ten percent of people with prediabetes develop diabetic retinopathy and another ten to fifteen percent of prediabetics develop peripheral neuropathy over time. Please ask her if she would like to take metformin one tablet daily tosee if this helps and recheck A1c in 6 months. Let me know what she says

## 2019-11-19 ENCOUNTER — TELEPHONE (OUTPATIENT)
Dept: INTERNAL MEDICINE | Facility: CLINIC | Age: 74
End: 2019-11-19

## 2019-11-19 DIAGNOSIS — R73.03 PREDIABETES: Primary | ICD-10-CM

## 2019-11-20 NOTE — TELEPHONE ENCOUNTER
She has prediabetes. Prediabetes has significant risks. Ten percent of people with prediabetes develop diabetic retinopathy and another ten to fifteen percent of prediabetics develop peripheral neuropathy over time. Please ask her if she would like to take metformin one tablet daily tosee if this helps and recheck A1c in 6 months. Let me know what she says

## 2019-11-21 NOTE — TELEPHONE ENCOUNTER
Dear Carmencita,  Because she has prediabetes since 2014, her insurance will not pay for diabetic education nor will it pay for a visit to a nutritionist.  Therefore, I recommend you advise her to call Carmen Kiran, [phone] to schedule a nutrition appointment. Carmen is well qualified to answer all of her questions and provide detailed dietary information. Her services are provided by our department free of charge.  Sincerely, Dr. Deya Luevano

## 2019-11-21 NOTE — TELEPHONE ENCOUNTER
Pt stats that she has been taking metformin for over a year now. She states that she has never been given a diet to follow or knows how to manage her diabetes. She is willing to see Mrs Morteza GUERRERO.

## 2019-12-02 ENCOUNTER — PATIENT OUTREACH (OUTPATIENT)
Dept: ADMINISTRATIVE | Facility: OTHER | Age: 74
End: 2019-12-02

## 2019-12-03 ENCOUNTER — CLINICAL SUPPORT (OUTPATIENT)
Dept: REHABILITATION | Facility: OTHER | Age: 74
End: 2019-12-03
Attending: STUDENT IN AN ORGANIZED HEALTH CARE EDUCATION/TRAINING PROGRAM
Payer: MEDICARE

## 2019-12-03 DIAGNOSIS — M54.41 CHRONIC LOW BACK PAIN WITH RIGHT-SIDED SCIATICA, UNSPECIFIED BACK PAIN LATERALITY: ICD-10-CM

## 2019-12-03 DIAGNOSIS — G89.29 CHRONIC LOW BACK PAIN WITH RIGHT-SIDED SCIATICA, UNSPECIFIED BACK PAIN LATERALITY: ICD-10-CM

## 2019-12-03 DIAGNOSIS — M54.41 CHRONIC BILATERAL LOW BACK PAIN WITH RIGHT-SIDED SCIATICA: ICD-10-CM

## 2019-12-03 DIAGNOSIS — R29.818 POOR MOTOR CONTROL OF TRUNK: ICD-10-CM

## 2019-12-03 DIAGNOSIS — M25.60 JOINT STIFFNESS OF SPINE: ICD-10-CM

## 2019-12-03 DIAGNOSIS — G89.29 CHRONIC BILATERAL LOW BACK PAIN WITH RIGHT-SIDED SCIATICA: ICD-10-CM

## 2019-12-03 PROCEDURE — G8978 MOBILITY CURRENT STATUS: HCPCS | Mod: CJ

## 2019-12-03 PROCEDURE — 97110 THERAPEUTIC EXERCISES: CPT

## 2019-12-03 PROCEDURE — G8979 MOBILITY GOAL STATUS: HCPCS | Mod: CJ

## 2019-12-03 PROCEDURE — 97162 PT EVAL MOD COMPLEX 30 MIN: CPT

## 2019-12-03 NOTE — PLAN OF CARE
[facility] - PHYSICAL THERAPY EVALUATION     Name: Lissy Palencia  Clinic Number: [id number]    Therapy Diagnosis:   Encounter Diagnoses   Name Primary?    Chronic low back pain with right-sided sciatica, unspecified back pain laterality     Chronic bilateral low back pain with right-sided sciatica     Joint stiffness of spine     Poor motor control of trunk      Physician: Kristopher Morton, *    Physician Orders: PT Eval and Treat   Medical Diagnosis from Referral: M54.41,G89.29 (ICD-10-CM) - Chronic low back pain with right-sided sciatica, unspecified back pain laterality  Evaluation Date: 12/3/2019  Authorization Period Expiration: 12/31/2019  Plan of Care Expiration: 12/31/2019  Reassessment Due: 12/31/2019  Visit # / Visits authorized: 1/ 1    Time In: 0950  Time Out: 10:20  Total Billable Time: 90 minutes    Precautions: Diabetes, standard    Pattern of pain determined: 3      Subjective   Date of onset: Been going on for years  History of current condition - Lissy reports: Started with the R leg and hip, got worse over the years, describes it as burning, stinging, pinching, and exhausting. Got a shot in the hip and medication that helped some. As been to therapy for the leg about 6 times at [facility], she got an HEP and continues to do the stretches. The pain starts above the knee goes to the side of the leg and into the glute. She gets pain in the feet but it is possibly due to arthritis. Pain in the Lateral R calf, to the touch it is a pain but the sensation without touching is a balloon stretching feeling, that has been going on for maybe a year. The leg pain is always there from the anterior knee to the buttocks. Standing too long, walking too much, sitting too long, increased activity will aggravate the pain. sweeping increases pain in the low back. Pt back pain is primairly on the R side but will go to the L when it gets very aggrivated. Back has gone out several time, last time about 6  months ag0    Per MD: 74 y.o. F w/ T2DM, hypothyroidism, hyperlipidemia, and GERD presenting to clinic with multiple complaints. She complains of upper back pain between her shoulder blades, lower back pain with sciatic nerve pain, stomach pain, heartburn, a recent fall with trauma to her R knee, a cough, a lipoma, and concerns about her diabetes. Additionally patient reports one episode of urgency and dysuria.     Medical History:   Past Medical History:   Diagnosis Date    Allergy     Arthritis     Coronary artery disease     in the past    Cystocele 1/19/2016    GERD (gastroesophageal reflux disease)     Hemorrhoids     History of cholelithiasis     Hyperlipidemia     Hypothyroidism     Obesity     Positive cardiac stress test 7/27/2016    Right shoulder pain 10/9/2012    Trigger finger, right 3rd finger 7/30/2014    Vaginal atrophy 1/19/2016    Vaginal vault prolapse 1/19/2016       Surgical History:   Lissy Palencia  has a past surgical history that includes Tonsillectomy; Shoulder arthroscopy w/ rotator cuff repair; Total abdominal hysterectomy; Adenoidectomy; Tubal ligation; heart cath (2005); Cholecystectomy; Oophorectomy; Ablation colpoclesis; Coronary stent placement; Esophagogastroduodenoscopy (N/A, 11/28/2018); and Colonoscopy (N/A, 11/28/2018).    Medications:   Lissy has a current medication list which includes the following prescription(s): aspirin, atorvastatin, azithromycin, co-enzyme q-10, levothyroxine, metformin, methylprednisolone, and pantoprazole.    Allergies:   Review of patient's allergies indicates:   Allergen Reactions    Compazine  [prochlorperazine edisylate]      Other reaction(s): SPASMS    Parafon forte      Other reaction(s): Hives        Imaging, bone scan films:   FINDINGS:  No evidence of an acute fracture or dislocation of the spine.  Alignment is relatively well maintained, noting at there is trace anterolisthesis of C3 on C4 and trace retrolisthesis of C4 on C5  without significant change in flexion/extension view.  There is a mild dextroscoliosis of the lumbar spine.  AP alignment of the thoracic and lumbar spine is maintained.  Degenerative changes noted most notable in the mid cervical spine, most notable at C5-6 and C6-7.  Multilevel intervertebral disc height loss throughout the lumbar spine, most notable at L5-S1.    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 R lateral calf pain   Description:  burning, stinging, pinching, and exhausting  Aggravating Factors: prolonged walking, standing, sitting, sweeping, mopping, vacuuming   Easing Factors:  Hot water, cold  Disturbed Sleep: no        Pattern of pain questions:  1.  Where is your pain the worst? leg  2.  Is your pain constant or intermittent? constant  3.  Does bending forward make your typical pain worse? no  4.  Since the start of your back pain, has there been a change in your bowel or bladder? Yes about a month ago  5.  What can't you do now that you use to be able to do? Sweeping, mopping, and vacuuming       Pts goals: Be able to decrease discomfort in the leg and learn what to do for exercises      Red Flag Screening:   Cough  Sneeze  Strain: (--)  Bladder/ bowel: (+)  Falls: (--) has been getting dizzy a lot about 2 weeks ago  Night pain: (--)  Unexplained weight loss: (--)  General health: Fair    OBJECTIVE      Postural examination/scapula alignment: Rounded shoulder and Head forward R lateral lean  Joint integrity: Hard end feel increased tenderness at the R SIJ  Skin integrity: Intact  Edema: Mild  Sitting: Fair  Standing: Fair  Correction of posture: better with lumbar roll    MOVEMENT LOSS    ROM Loss   Flexion minimal loss   Extension moderate loss   Side bending Right moderate loss   Side bending Left moderate loss   Rotation Right minimal loss   Rotation Left minimal loss     Flexion= posterionr leg  Extionion = low back  RSB= low back  LSB = anterior R side pain    Lower Extremity Strength  Right LE  Left LE    Hip flexion: 3+/5 Hip flexion: 4/5   Hip extension:  4/5 Hip extension: 4/5   Hip abduction: 3+/5 Hip abduction: 3+/5   Hip adduction:  4/5 Hip adduction:  4/5   Hip Internal rotation   4/5 Hip Internal rotation 4/5   Knee Flexion 4/5 Knee Flexion 4/5   Knee Extension 4/5 Knee Extension 4/5   Ankle dorsiflexion: 4/5 Ankle dorsiflexion: 4/5   Ankle plantarflexion: 4/5 Ankle plantarflexion: 4/5       GAIT:  Assistive Device used: none  Level of Assistance: independent  Patient displays the following gait deviations:  no gait deviations observed.     Special Tests:   Test Name  Test Result   Prone Instability Test (--)   SI Joint Provocation Test (--)   Straight Leg Raise (--)   Neural Tension Test (--)   Crossed Straight Leg Raise (--)   Walking on toes (--)   Walking on heels  (--)     Difficulty with neuro testing, pt not relaxed    NEUROLOGICAL SCREENING     Sensory deficit: Increased sensation on the Lateral R thigh    Reflexes:    Left Right   Patella Tendon 2+ 2+   Achilles Tendon 2+ 2+   Clonus (--) (--)     REPEATED TEST MOVEMENTS:  Repeated Flexion in Standing worse, end range pain, went down when she stopped   Repeated Extension in Standing no effect   Repeated Flexion in lying no effect   Repeated Extension in lying  NT         Baseline Isometric Testing on Med X equipment: Testing administered  by PT  Date of testin2019  ROM 0-48 deg   Max Peak Torque 136    Min Peak Torque 29    Flex/Ext Ratio 4.68   % below normative data 6     Start at 50 ft lbs    CMS Impairment/Limitation/Restriction for FOTO  Survey    Therapist reviewed FOTO scores for Lissy Palencia on 12/3/2019.   FOTO documents entered into "[employer] - see Media section.    Limitation Score: 39%  Category: Mobility    Current : CJ = at least 20% but < 40% impaired, limited or restricted  Goal: CJ = at least 20% but < 40% impaired, limited or restricted  Discharge:              Treatment   Treatment Time In: 10:50  Treatment Time Out: 11:20  Total Treatment time separate from Evaluation: 30 minutes      Lissy received therapeutic exercises to develop/improve posture, lumbar/cervical ROM, strength and muscular endurance for 30 minutes including the following exercises:     Med x dynamic exercise and baseline IM test    [facility] 12/3/2019   Visit Number 1   VAS Pain Rating 3   Extension in Standing 10   Flexion in Lying 10   Lumbar Extension Seat Pad 1   Femur Restraint 6   Top Dead Center 24   Counterweight 204   Lumbar Flexion 48   Lumbar Extension 0   Lumbar Peak Torque 136   Min Torque 29   Test Percent Below Normative Data 6   Ice - Z Lie (in min.) 10     Posterior pelvic tilt 10x  Double knee to chest 10x  Piriformis Stretch 3x20 sec    Written Home Exercises Provided: yes.  Exercises were reviewed and Lissy was able to demonstrate them prior to the end of the session.  Lissy demonstrated good  understanding of the education provided.     See EMR under Patient Instructions for exercises provided 12/3/2019.      Education provided:   - Patient received education regarding proper posture and body mechanics.  Patient was given top 10 tips handout which discusses posture seated, standing, lifting correctly, components of exercise, importance of nutrition and hydration, and importance of sleep.  - Ruthann soriano tried, recommended, and  purchase information was provided.    - Patient received a handout regarding anticipated muscular soreness following the isometric test and strategies for management were reviewed with patient including stretching, using ice and scheduled rest.   - Patient received education on the [facility] program, purpose of the isometric test, progression of back strengthening as well as wellness approach and systemic strengthening.  Details of the program were discussed.  Reviewed that patient should feel support/pressure from med ex restraints but no pain or discomfort and patient expressed understanding.    Lissy received cold pack for 10 minutes to low back in Z lying.    Assessment   Lissy is a 74 y.o. female referred to [facility] with a medical diagnosis of Chronic low back pain with right-sided sciatica, unspecified back pain laterality. Pt presents with low back pain that is aggrivated with standing flexion and prone extension. She would benefit from stability exercises and strengthening for motor control and transfers. She spends her time bending and lifting children and has difficulty with household chores. She was able to test on the lumbar medx and demonstrated to be 6% below values primarily in the most extended positions    Pain Pattern: 3       Pt prognosis is Good.   Pt will benefit from skilled outpatient Physical Therapy to address the deficits stated above and in the chart below, provide pt/family education, and to maximize pt's level of independence. Based on the above history and physical examination an active physical therapy program is recommended.  Pt will continue to benefit from skilled outpatient physical therapy to address the deficits listed below in the chart, provide pt/family education and to maximize pt's level of independence in the home and community environment. .       Plan of care discussed with patient: Yes  Pt's spiritual, cultural and educational needs considered and patient  is agreeable to the plan of care and goals as stated below:     Anticipated Barriers for therapy: Travel distance, busy schedule, attitudes towards therapy    PT Evaluation Completed? Yes    Medical necessity is demonstrated by the following problem list.    Pt presents with the following impairments:     History  Co-morbidities and personal factors that may impact the plan of care Co-morbidities:   advanced age, diabetes and chronic low back pain    Personal Factors:   lifestyle  attitudes     moderate   Examination  Body Structures and Functions, activity limitations and participation restrictions that may impact the plan of care Body Regions:   back  lower extremities    Body Systems:    gross symmetry  ROM  strength  gross coordinated movement  transfers  transitions  motor control  motor learning    Participation Restrictions:   Travel distance, busy schedule, attitudes towards therapy    Activity limitations:   Learning and applying knowledge  no deficits    General Tasks and Commands  no deficits    Communication  no deficits    Mobility  lifting and carrying objects  walking    Self care  no deficits    Domestic Life  cooking  doing house work (cleaning house, washing dishes, laundry)  assisting others    Interactions/Relationships  no deficits    Life Areas  no deficits    Community and Social Life  no deficits         moderate   Clinical Presentation evolving clinical presentation with changing clinical characteristics moderate   Decision Making/ Complexity Score: moderate       GOALS: Pt is in agreement with the following goals.    Short term goals:  6 weeks or 10 visits   1.  Pt will demonstrate increased lumbar ROM by at least 3 degrees from the initial ROM value with improvements noted in functional ROM and ability to perform ADLs.  2.  Pt will demonstrate increased maximum isometric torque value by 10% when compared to the initial value resulting in improved ability to perform bending, lifting, and  "carrying activities safely, confidently.    3.  Patient report a reduction in worst pain score by 1-2 points for improved tolerance for Prolonged standing and walking.  4.  Pt able to perform HEP correctly with minimal cueing or supervision from therapist to encourage independent management of symptoms.       Long term goals: 10 weeks or 20 visits   1. Pt will demonstrate increased lumbar ROM by at least 6 degrees from initial ROM value, resulting in improved ability to perform functional fwd bending while standing and sitting.   2. Pt will demonstrate increased maximum isometric torque value by 20% when compared to the initial value resulting in improved ability to perform bending, lifting, and carrying activities safely, confidently.  3. Pt to demonstrate ability to independently control and reduce their pain through posture positioning and mechanical movements throughout a typical day.  4.  Pt will demonstrate reduced pain and improved functional outcomes as reported on the FOTO by reaching a score of CJ = at least 20% but < 40% impaired, limited or restricted or less in order to demonstrate subjective improvement in pt's condition.    5. Pt will demonstrate independence with the HEP at discharge  6.  Pt able to complete sweeping, mopping, and vacuuming without increased low back pain.      Plan   Outpatient physical therapy 2x week for 10 weeks or 20 visits to include the following:   - Patient education  - Therapeutic exercise  - Manual therapy  - Performance testing   - Neuromuscular Re-education  - Therapeutic activity   - Modalities    Pt may be seen by PTA as part of the rehabilitation team.     Therapist: Nima Banks, PT  12/3/2019    "I certify the need for these services furnished under this plan of treatment and while under my care."    ____________________________________  Physician/Referring Practitioner    _______________  Date of Signature          "

## 2019-12-03 NOTE — PATIENT INSTRUCTIONS
Extension in Standing    Place hands on back of hips and lean back, pushing hips gently forward. Repeat slowly and continuously.  Repeat 10 times per set. Do 3 sessions per day.          Pelvic Tilt    Flatten back by tightening stomach muscles and buttocks. Do not hold your breath.  Hold 5 seconds.  Repeat 10 times per set.  Do 3 sessions per day.    Knee-to-Chest Stretch: Bilateral        With hands behind knees, pull both knees in to chest until a comfortable stretch is felt in lower back and buttocks. Keep back relaxed. Hold 5 seconds.  Repeat 10 times per set.  Do 3 sessions per day.     [email or web address]     Copyright © [employer]. All rights reserved.      Stretching: Piriformis (Supine)        Pull right knee toward opposite shoulder. Hold 20 seconds. Relax.  Repeat 3 times per set.  Do 3 sessions per day.     [email or web address]     Copyright © [employer]. All rights reserved.

## 2019-12-16 ENCOUNTER — TELEPHONE (OUTPATIENT)
Dept: INTERNAL MEDICINE | Facility: CLINIC | Age: 74
End: 2019-12-16

## 2019-12-16 NOTE — TELEPHONE ENCOUNTER
Patient referred by Dr. Luevano for Health coaching (prediabetes, lifestyle changes).  Called patient and gave an explanation about Health Coaching program and invited participation.   Patient states she would like to participate.  Set appointment for 12.30.19.   Gave direct contact number to call if she has any questions [phone].   Carmen Elmore RN  Health

## 2019-12-29 DIAGNOSIS — E78.5 HYPERLIPIDEMIA, UNSPECIFIED HYPERLIPIDEMIA TYPE: Primary | ICD-10-CM

## 2019-12-30 ENCOUNTER — CLINICAL SUPPORT (OUTPATIENT)
Dept: INTERNAL MEDICINE | Facility: CLINIC | Age: 74
End: 2019-12-30
Payer: MEDICARE

## 2019-12-30 VITALS — BODY MASS INDEX: 31.69 KG/M2 | WEIGHT: 173.31 LBS

## 2019-12-30 PROCEDURE — 99999 PR PBB SHADOW E&M-EST. PATIENT-LVL I: ICD-10-PCS | Mod: PBBFAC,,,

## 2019-12-30 PROCEDURE — 99211 OFF/OP EST MAY X REQ PHY/QHP: CPT | Mod: PBBFAC

## 2019-12-30 PROCEDURE — 99999 PR PBB SHADOW E&M-EST. PATIENT-LVL I: CPT | Mod: PBBFAC,,,

## 2019-12-30 RX ORDER — ATORVASTATIN CALCIUM 40 MG/1
TABLET, FILM COATED ORAL
Qty: 90 TABLET | Refills: 0 | Status: SHIPPED | OUTPATIENT
Start: 2019-12-30 | End: 2020-03-31

## 2019-12-30 NOTE — PROGRESS NOTES
Date:  12.30.19                    Time: 1330  Initial Health  Contact - [x]Clinic visit  []  Phone Visit  ASSEMENT: Information obtained through combination of chart review prior to seeing patient, patient self-report.   Primary Referral diagnosis/reason for referral:    Pre diabetes       (See physician progress note for complete list of diagnoses.)  PCP:   Dr. Luevano     Referent :  [x] PCP       [] Transition Navigator    []  E.KHALIDA    []  APRN  []  Other:     Supports:    One son      Preferred Learning Style  (may be a combination)  [x]  Visual (pictures/ video)     [x] Auditory/ Listening   [x]  Reading    [x]  Hands-on/ Demonstration   []  1:1    [x]  Group        [x]  Alone       []  No preference   [x]  Combination  []  Other:         Presenting issues from patients point of view:  [x]  Improve nutrition/increase healthy choices.                    [x]  Improve /maintain current functioning.  [x]  Obtain and maintain healthy blood pressure.                  []  Stop smoking.  [x]  Improve weight management.                                       [x]  Lower cholesterol.  [x]  Improve blood glucose management.                            []  Improve breathing.  [x]  Increase energy level.                                                      []  Increase/maintain independence.   [x]  Improve sleep.                                                                [x]  Decrease stress.  [x]  Improve pain management.                                             []  Improve mood.  []  Other:                  Pt. Education Level:       12 th grade   Disease specific education services attended:   no      Patient Employed:  []yes    [x] No  Where _____________________  Days and Hours:                Current Self-help Behaviors  []  Checks glucose level        times a day.  [x]  Tries to modify meals so more healthy.  [x]  Exercises by    Walking 30 min 2 x week    []  Takes BP        times a       (insert  frequency)  [x]  Weighs self     weekly    (how often)  [x]  Takes all medications as prescribed.  [x]  Rarely misses health care appointments.  []  Sleeps 8 hours without waking more than twice.  [x]  Consistently wears/uses functioning aids as recommended  (ie:  Contacts [] , glasses [x] , hearing  Aid [] , prosthesis [] ,  Walker [] ,  Wheelchair []  etc.)  []  Regularly engages in stress management activities I.e.:  []  Quit smoking   [x]  Purposefully educates self about health issues.  []  Pt did bring glucose log with him/her.  []  Other  (explain)           []  Comments:       []  Reported Blood Sugar Readings:          Health screenings   Last PCP visit:   19   GYN/Pap: Hysterectomy in 50's                            MM.27.                                      DEXA: .                        Colon: .   Lipids: ..18    CMP: 19   HgA1C:   19 (6.3)          Eye Exam:   2 years ago [facility]      Strengths:  [x]  Confident                       []  Determined/persistent           [x]  Enthusiastic         [x]  Good problem solving           []  Good self-control                   [x]  Hard working        []  Hopeful/optimistic                  [x]  Intelligent                                [x]  Self aware  [x]  Creative                                 [x]  Flexible          [x]  Sense of humor                     [x]  Open/willing          [x]  Spiritual                                  [x] Values health    [x]  Successful overcoming difficult challenges in the past.     []  Pos support network  [x]   Health literate (The degree to which individuals have the capacity to obtain, process, and    understand basic health information and services needed to make appropriate health decisions.- Dept. of Health and Human services.)  []   Other Comments:             Change Markers  Scale 0-10 with 10 representing most Ready 0 least ready, 10 most  important 0 least important 10 most confident 0 least confident.     [] NA at this time.   Readiness:    10 ;  Importance:   10  ;  Confidence:  10      INTERVENTIONS:  [x] Given an explanation about health coaching program and invited participation.  [x] Listened reflectively; provided support, encouragement, and validation; respected  and maintained patient self-direction; explored pros/cons of change; personalized health risks of maintaining the status quo; and facilitated recognition of discrepancy between current behaviors and patients goals and values.  [x] Assessed stage of change and employed appropriate motivational interviewing strategies to facilitate movement toward the next stage of change and healthy behavior modification.  [x] Normalized occurrence of relapse, helped patient recognize outcome of new self-learning as a result of the relapse such as triggers, and helped focus on factors to reduce chances of returning to previous behaviors .  [x] Used readiness scale ratings to guide assessment of importance and confidence of successfully reaching goals.  [x] Assisted patient to develop goal, action plan, and address potential barriers to goal     achievement.  [] Patient was given a new (insert glucose meter or other supplies), taught to use, and      successfully demonstrated ability to carry out essential steps of process.    [] Rx for ( monitor/supplies, pen needles, etc.) was obtained.  [x] Provided educational review, written materials/handouts (Meal Planning Counting Carbs, Healthy Plate, 10 Rules for Eating Safely for Life, 10 Tips to Cutting Your Cravings, Probiotics).   [x] Topics discussed/provided:    GI of foods and how it affects your metabolism and helps you burn fat, carb counting    [x] Facilitated completion of needed exams and screenings by reviewing Diabetic/Health Maintenance Report Card with patient.  [x] Provided pt with my business card.  [x] Informed of/reviewed how to access  health  and after hours assistance.  [x] Discussed, planned, and scheduled future contacts.  [x]Challenges/Barriers identified discussed as identified by patient.  [x] Needs identified by this Health :    Education and support     [] Other:            For additional care management support patient was referred to:        []  Community resources for                        []  Medication assistance programs               []  Dietician              []  Diabetes  [employer]                                        []  Mental health services                           []                  []  Diabetes [place]                                              []  Home health services                                []  Complex case management              []  PCP/covering provider due to                 []  Emergency Dept.                                  []  GYN              []  Optometrist/ Ophthalmologist                          []  Podiatrist                                                      [x]  N/A        []  Other:           RESPONSE/IMPRESSION  Behavior is consistent with the following stage of change:  []  Pre-contemplation  []  Contemplation  [x]  Preparation  []  Action  []  Maintenance  []  Relapse    Level of participation:  []  Refused to participate  []  Guarded / resistant  []  Passive participant  [x]  Active participant/interactive  [x]  Interested/receptive  [x]  Self-motivated  []  Other          Goal developed by patient today: 10/10  Get all bad food in garbage   Get  out of house and buying food not good and healthy   Stop buying candy, mac& cheese, potatoes and cake    Eat less food at one time  Eat more veggies, fruit, fish and chicken  Stop eating sugar  Drink more water    Plan (needed actions to accomplish goal):          [x] Pt will work on action plan as stated above.        [x] Pt will follow up with Health  on  Will call for appt      [x] Health  will  remain available.  [x] Health  will maintain regular contacts with patient to educate, support, encourage; help problem-solve; assist with development of self-advocacy/increasing independent health management; and provide resources as needed.  [] Pt has declined Health  Program at this time. Provided pt with Health  Program information should they decide to participate at a later time.        [] Pt to facilitate contact with Health        [] Health  to facilitate contact with patient.        [] Other:          Notes:   Met with patient she is interested in losing weight and decreasing A1c 6.3 prediabetes.   Her best reasons are for her health, to feel good, prevent DM and get off some medications.  Goals set by patient and HC will continue to work with patient to assist to reach her goals.     Best Method of Contact:  [] email:   [x] Phone:   [] Mail  [] Office     Carmen Elmore RN HC

## 2019-12-30 NOTE — TELEPHONE ENCOUNTER
I have reviewed and agree with the assessment below with changes. Patient seen by Dr. uSmmers under the supervision of Dr. Munroe. Will not schedule annual. Will schedule lipid panel as last lipid was drawn 4/18.

## 2019-12-30 NOTE — TELEPHONE ENCOUNTER
Please schedule patient for labs     (LIPID)    Needs Appointment(ANNUAL)    Please also check with your provider if any further labs need to be added and scheduled together.     Thanks

## 2019-12-30 NOTE — PROGRESS NOTES
Refill Authorization Note     is requesting a refill authorization.    Brief assessment and rationale for refill: APPROVE: needs labs/APPT(ANNUAL)     Medication-related problems identified:   Requires labs  Requires appointment    Medication Therapy Plan: NTBO(lipid); Needs appt(ANNUAL); Approve 3 more months             Comments:   Requested Prescriptions   Pending Prescriptions Disp Refills    atorvastatin (LIPITOR) 40 MG tablet [Pharmacy Med Name: ATORVASTATIN TABS 40MG] 90 tablet 0     Sig: TAKE 1 TABLET ONCE DAILY (CALL THE OFFICE [phone] FOR REFILLS)       Cardiovascular:  Antilipid - Statins Failed - 12/29/2019  5:24 AM        Failed - Lipid Panel completed in last 360 days     Lab Results   Component Value Date    CHOL 166 04/18/2018    HDL 46 04/18/2018    LDLCALC 81.0 04/18/2018    TRIG 195 (H) 04/18/2018             Passed - Patient is at least 18 years old        Passed - Office visit in past 12 months or future 90 days     Recent Outpatient Visits            1 month ago Gastroesophageal reflux disease, esophagitis presence not specified    [facility] - Internal Medicine Kristopher Morton MD    4 months ago Community acquired pneumonia, unspecified laterality    [facility] - Internal Medicine Toney Diana MD    4 months ago Upper respiratory tract infection, unspecified type    [facility] - Internal Medicine Nelson Vargas MD    4 months ago Neck pain    [facility] [address] Danielle Hinojosa PA-C    5 months ago Neoplasm of uncertain behavior    [facility] - Dermatology Reyna Coronado PA-C          Future Appointments              Today [facility] - Internal Medicine, [facility]                Passed - ALT is 94 or below and within 360 days     ALT   Date Value Ref Range Status   11/14/2019 37 10 - 44 U/L Final   10/23/2018 29 10 - 44 U/L Final   04/18/2018 27 10 - 44 U/L Final              Passed - AST is 54 or below and within 360 days      AST   Date Value Ref Range Status   11/14/2019 26 10 - 40 U/L Final   10/23/2018 24 10 - 40 U/L Final   04/18/2018 23 10 - 40 U/L Final

## 2019-12-30 NOTE — TELEPHONE ENCOUNTER
Please schedule patient for labs     (Lipid)    Please also check with your provider if any further labs need to be added and scheduled together.     Thanks

## 2020-02-26 ENCOUNTER — PATIENT OUTREACH (OUTPATIENT)
Dept: ADMINISTRATIVE | Facility: OTHER | Age: 75
End: 2020-02-26

## 2020-02-27 ENCOUNTER — OFFICE VISIT (OUTPATIENT)
Dept: OPTOMETRY | Facility: CLINIC | Age: 75
End: 2020-02-27
Payer: MEDICARE

## 2020-02-27 ENCOUNTER — TELEPHONE (OUTPATIENT)
Dept: OPTOMETRY | Facility: CLINIC | Age: 75
End: 2020-02-27

## 2020-02-27 DIAGNOSIS — H10.13 ALLERGIC CONJUNCTIVITIS OF BOTH EYES: Primary | ICD-10-CM

## 2020-02-27 PROCEDURE — 99999 PR PBB SHADOW E&M-EST. PATIENT-LVL II: CPT | Mod: PBBFAC,,, | Performed by: OPTOMETRIST

## 2020-02-27 PROCEDURE — 92002 PR EYE EXAM, NEW PATIENT,INTERMED: ICD-10-PCS | Mod: S$PBB,,, | Performed by: OPTOMETRIST

## 2020-02-27 PROCEDURE — 92002 INTRM OPH EXAM NEW PATIENT: CPT | Mod: S$PBB,,, | Performed by: OPTOMETRIST

## 2020-02-27 PROCEDURE — 99212 OFFICE O/P EST SF 10 MIN: CPT | Mod: PBBFAC | Performed by: OPTOMETRIST

## 2020-02-27 PROCEDURE — 99999 PR PBB SHADOW E&M-EST. PATIENT-LVL II: ICD-10-PCS | Mod: PBBFAC,,, | Performed by: OPTOMETRIST

## 2020-02-27 RX ORDER — NEOMYCIN SULFATE, POLYMYXIN B SULFATE AND DEXAMETHASONE 3.5; 10000; 1 MG/ML; [USP'U]/ML; MG/ML
1 SUSPENSION/ DROPS OPHTHALMIC 4 TIMES DAILY
Qty: 5 ML | Refills: 0 | Status: SHIPPED | OUTPATIENT
Start: 2020-02-27 | End: 2020-03-08

## 2020-02-28 NOTE — PROGRESS NOTES
"HPI     Eye Problem      Additional comments: Itchy eyes x's 3 wks              Comments     Patient states her eyelids have been red and itchy, progressively   becoming worse, over the last 3 weeks. Her eyes have been tearing a lot as   well. She also has irritations near her lateral canthus of both of her   eyes. She also states she has been having "crusting" upon waking up in the   morning for the past week. Uses Systane BID OU. Denies pain or   photophobia.          Last edited by Dai Conner, OD on 2/28/2020  2:13 PM. (History)            Assessment /Plan     For exam results, see Encounter Report.    Allergic conjunctivitis of both eyes  -     neomycin-polymyxin-dexamethasone (MAXITROL) 3.5mg/mL-10,000 unit/mL-0.1 % DrpS; Place 1 drop into both eyes 4 (four) times daily. for 10 days  Dispense: 5 mL; Refill: 0      1. Educated pt on findings. Rx Maxitrol 1 gtt OU QID for 10 days. Recommended the use of AT's OU prn and cold compresses to help with comfort.  RTC in 2 week for annual exam/aseg f/u or prn.                 "

## 2020-03-06 ENCOUNTER — TELEPHONE (OUTPATIENT)
Dept: OPTOMETRY | Facility: CLINIC | Age: 75
End: 2020-03-06

## 2020-03-06 NOTE — TELEPHONE ENCOUNTER
----- Message from Jessenia Andre sent at 3/6/2020  3:41 PM CST -----  Contact: Lissy  Mrs. Palencia says that medication that was prescribed isn't working. She can be reached at [phone]

## 2020-03-08 ENCOUNTER — PATIENT OUTREACH (OUTPATIENT)
Dept: ADMINISTRATIVE | Facility: OTHER | Age: 75
End: 2020-03-08

## 2020-03-09 ENCOUNTER — OFFICE VISIT (OUTPATIENT)
Dept: OPTOMETRY | Facility: CLINIC | Age: 75
End: 2020-03-09
Payer: MEDICARE

## 2020-03-09 DIAGNOSIS — H10.13 ALLERGIC CONJUNCTIVITIS OF BOTH EYES: Primary | ICD-10-CM

## 2020-03-09 PROCEDURE — 99999 PR PBB SHADOW E&M-EST. PATIENT-LVL II: ICD-10-PCS | Mod: PBBFAC,,, | Performed by: OPTOMETRIST

## 2020-03-09 PROCEDURE — 92012 INTRM OPH EXAM EST PATIENT: CPT | Mod: S$PBB,,, | Performed by: OPTOMETRIST

## 2020-03-09 PROCEDURE — 92012 PR EYE EXAM, EST PATIENT,INTERMED: ICD-10-PCS | Mod: S$PBB,,, | Performed by: OPTOMETRIST

## 2020-03-09 PROCEDURE — 99212 OFFICE O/P EST SF 10 MIN: CPT | Mod: PBBFAC,PO | Performed by: OPTOMETRIST

## 2020-03-09 PROCEDURE — 99999 PR PBB SHADOW E&M-EST. PATIENT-LVL II: CPT | Mod: PBBFAC,,, | Performed by: OPTOMETRIST

## 2020-03-09 RX ORDER — TOBRAMYCIN AND DEXAMETHASONE 3; 1 MG/ML; MG/ML
1 SUSPENSION/ DROPS OPHTHALMIC 4 TIMES DAILY
Qty: 5 ML | Refills: 0 | Status: SHIPPED | OUTPATIENT
Start: 2020-03-09 | End: 2020-03-19

## 2020-03-09 NOTE — PROGRESS NOTES
KEENAN PARIKH 02/27/2020 for allergic conjunctivitis OU. Patient has been using   Maxitrol qid for 10 days but reports it is not helping. Patient reports   irritation with tearing, and slightly sticky eyes in the morning. Itching   is still slightly there but improved. Reports it feels like a film is over   the eyes.      Last edited by Dai Conner, OD on 3/9/2020  4:56 PM. (History)            Assessment /Plan     For exam results, see Encounter Report.    Allergic conjunctivitis of both eyes  -     tobramycin-dexamethasone 0.3-0.1% (TOBRADEX) 0.3-0.1 % DrpS; Place 1 drop into both eyes 4 (four) times daily. for 10 days  Dispense: 5 mL; Refill: 0      1. Educated pt on findings. Rx Tobradex 1 gtt OU QID for 10 days. D/c Maxitrol. Recommended the use of AT's OU prn and cold compresses to help with comfort.  RTC in 2 week for annual exam/aseg f/u or prn.

## 2020-03-31 NOTE — PROGRESS NOTES
Refill Authorization Note     is requesting a refill authorization.    Brief assessment and rationale for refill: REVIEW: unclear if patient follows with PCP          Medication Therapy Plan: LOC(10/18) with PCP; Last noted on refill(12/30/19) for patient to call for refill; Needs appt(Annual); unclear if pt still follows with PCP                              Comments:   Refill Center Care Gap Closure protocols temporarily suspended.   Requested Prescriptions   Pending Prescriptions Disp Refills    atorvastatin (LIPITOR) 40 MG tablet [Pharmacy Med Name: ATORVASTATIN TABS 40MG] 90 tablet 0     Sig: TAKE 1 TABLET ONCE DAILY (CALL THE OFFICE [phone] FOR REFILLS)       Cardiovascular:  Antilipid - Statins Failed - 3/31/2020  3:54 PM        Failed - Lipid Panel completed in last 360 days     Lab Results   Component Value Date    CHOL 166 04/18/2018    HDL 46 04/18/2018    LDLCALC 81.0 04/18/2018    TRIG 195 (H) 04/18/2018             Passed - Patient is at least 18 years old        Passed - Office visit in past 12 months or future 90 days.     Recent Outpatient Visits            3 weeks ago Allergic conjunctivitis of both eyes    [facility] - Optometry Dai Conner, OD    1 month ago Allergic conjunctivitis of both eyes    [facility] Dai Conner, OD    4 months ago Gastroesophageal reflux disease, esophagitis presence not specified    [facility] - Internal Medicine Kristopher Morton MD    7 months ago Community acquired pneumonia, unspecified laterality    [facility] - Internal Medicine Toney Diana MD    7 months ago Upper respiratory tract infection, unspecified type    [facility] Internal Medicine Nelson Vargas MD                    Passed - ALT is 94 or below and within 360 days     ALT   Date Value Ref Range Status   11/14/2019 37 10 - 44 U/L Final   10/23/2018 29 10 - 44 U/L Final   04/18/2018 27 10 - 44 U/L Final              Passed - AST is 54 or below and within 360 days      AST   Date Value Ref Range Status   11/14/2019 26 10 - 40 U/L Final   10/23/2018 24 10 - 40 U/L Final   04/18/2018 23 10 - 40 U/L Final               Appointments  past 12m or future 3m with PCP    Date Provider   Last Visit   10/23/2018 Deya Luevano MD   Next Visit   Visit date not found Deya Luevano MD   .  ED visits in past 90 days: 0       Note composed:4:00 PM 03/31/2020

## 2020-04-01 RX ORDER — ATORVASTATIN CALCIUM 40 MG/1
TABLET, FILM COATED ORAL
Qty: 90 TABLET | Refills: 3 | Status: SHIPPED | OUTPATIENT
Start: 2020-04-01 | End: 2021-06-30

## 2020-04-01 NOTE — TELEPHONE ENCOUNTER
Please see the following assessment. This refill request still requires some action on your part. Pt's LOV with you was in 10/18. Last seen by Dr. Trevino in 11/19. Pt instructed to make appt at last refill (12/19). Defer to you. Thank you.

## 2020-04-08 ENCOUNTER — DOCUMENTATION ONLY (OUTPATIENT)
Dept: REHABILITATION | Facility: OTHER | Age: 75
End: 2020-04-08

## 2020-04-08 DIAGNOSIS — M25.60 JOINT STIFFNESS OF SPINE: ICD-10-CM

## 2020-04-08 DIAGNOSIS — G89.29 CHRONIC BILATERAL LOW BACK PAIN WITH RIGHT-SIDED SCIATICA: Primary | ICD-10-CM

## 2020-04-08 DIAGNOSIS — M54.41 CHRONIC BILATERAL LOW BACK PAIN WITH RIGHT-SIDED SCIATICA: Primary | ICD-10-CM

## 2020-04-08 DIAGNOSIS — R29.818 POOR MOTOR CONTROL OF TRUNK: ICD-10-CM

## 2020-04-08 NOTE — PROGRESS NOTES
PHYSICAL THERAPY DISCHARGE SUMMARY     Name: Lissy Palencia  Clinic Number: [id number]    Diagnosis: M54.41,G89.29 (ICD-10-CM) - Chronic low back pain with right-sided sciatica, unspecified back pain laterality   Physician: Kristopher Morton, *   Treatment Orders: PT Eval and Treat  Past Medical History:   Diagnosis Date    Allergy     Arthritis     Coronary artery disease     in the past    Cystocele 1/19/2016    GERD (gastroesophageal reflux disease)     Hemorrhoids     History of cholelithiasis     Hyperlipidemia     Hypothyroidism     Obesity     Positive cardiac stress test 7/27/2016    Right shoulder pain 10/9/2012    Trigger finger, right 3rd finger 7/30/2014    Vaginal atrophy 1/19/2016    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     Discharge reason : Patient self discharge      PLAN   This patient is discharged from Physical Therapy Services.

## 2020-05-04 ENCOUNTER — TELEPHONE (OUTPATIENT)
Dept: OPHTHALMOLOGY | Facility: CLINIC | Age: 75
End: 2020-05-04

## 2020-05-04 NOTE — TELEPHONE ENCOUNTER
----- Message from Belem Camp sent at 5/4/2020  9:33 AM CDT -----  Contact: Kenia ()   Pt  stated he see Dr. Roque and wanted his wife to start seeing him also. Pt  stated wife have been having some issue with her eyes but it hasn't gotten any better. Pt  wanted Dr. Roque to see if something else was going on with pts eyes. Pt is having trouble with both of her eyes. Pt is having some itching, burning and really bad blurry vision. Pt  contact number is [phone].

## 2020-05-04 NOTE — TELEPHONE ENCOUNTER
Pt has been seen by optometry several times, with no relief.   Pt booked to see Dr. Mcintosh on 05/05/2020 ay 12:30.   Pt notified and verbalizes understanding.

## 2020-05-05 ENCOUNTER — OFFICE VISIT (OUTPATIENT)
Dept: OPHTHALMOLOGY | Facility: CLINIC | Age: 75
End: 2020-05-05
Attending: OPHTHALMOLOGY
Payer: MEDICARE

## 2020-05-05 DIAGNOSIS — H10.13 ALLERGIC CONJUNCTIVITIS OF BOTH EYES: Primary | ICD-10-CM

## 2020-05-05 PROCEDURE — 99999 PR PBB SHADOW E&M-EST. PATIENT-LVL II: CPT | Mod: PBBFAC,,, | Performed by: OPHTHALMOLOGY

## 2020-05-05 PROCEDURE — 99212 OFFICE O/P EST SF 10 MIN: CPT | Mod: PBBFAC | Performed by: OPHTHALMOLOGY

## 2020-05-05 PROCEDURE — 99999 PR PBB SHADOW E&M-EST. PATIENT-LVL II: ICD-10-PCS | Mod: PBBFAC,,, | Performed by: OPHTHALMOLOGY

## 2020-05-05 PROCEDURE — 92014 PR EYE EXAM, EST PATIENT,COMPREHESV: ICD-10-PCS | Mod: S$PBB,,, | Performed by: OPHTHALMOLOGY

## 2020-05-05 PROCEDURE — 92014 COMPRE OPH EXAM EST PT 1/>: CPT | Mod: S$PBB,,, | Performed by: OPHTHALMOLOGY

## 2020-05-05 RX ORDER — CETIRIZINE HYDROCHLORIDE 10 MG/1
10 TABLET ORAL DAILY
Qty: 30 TABLET | Refills: 2 | Status: SHIPPED | OUTPATIENT
Start: 2020-05-05 | End: 2021-09-15 | Stop reason: ALTCHOICE

## 2020-05-05 RX ORDER — OLOPATADINE HYDROCHLORIDE 1 MG/ML
1 SOLUTION/ DROPS OPHTHALMIC 2 TIMES DAILY
Qty: 5 ML | Refills: 3 | Status: SHIPPED | OUTPATIENT
Start: 2020-05-05 | End: 2021-09-15 | Stop reason: ALTCHOICE

## 2020-05-05 NOTE — PROGRESS NOTES
HPI     Eye Problem      Additional comments: Both Eyes.               Comments     76 y/o Lissette patient presents for evaluation for blurry, red, itching,   stinging eyes x 2 months.  Had been treated for allergic conjunctivitis by   Dr Mclaughlin with Tobradex x 10 days with no reilef, Maxitrol x 1 month with   no relief, Systane x weeks with worsening of symptoms.           Last edited by Davina Del Valle on 5/5/2020 12:14 PM. (History)            Assessment /Plan     For exam results, see Encounter Report.    Allergic conjunctivitis of both eyes      Use Zyrtec daily  And Patanol bid

## 2020-06-08 ENCOUNTER — PES CALL (OUTPATIENT)
Dept: ADMINISTRATIVE | Facility: CLINIC | Age: 75
End: 2020-06-08

## 2020-07-01 ENCOUNTER — OFFICE VISIT (OUTPATIENT)
Dept: OPHTHALMOLOGY | Facility: CLINIC | Age: 75
End: 2020-07-01
Payer: MEDICARE

## 2020-07-01 DIAGNOSIS — H25.13 NUCLEAR SCLEROSIS, BILATERAL: Primary | ICD-10-CM

## 2020-07-01 PROCEDURE — 99999 PR PBB SHADOW E&M-EST. PATIENT-LVL II: ICD-10-PCS | Mod: PBBFAC,,, | Performed by: OPHTHALMOLOGY

## 2020-07-01 PROCEDURE — 92012 PR EYE EXAM, EST PATIENT,INTERMED: ICD-10-PCS | Mod: S$PBB,,, | Performed by: OPHTHALMOLOGY

## 2020-07-01 PROCEDURE — 92012 INTRM OPH EXAM EST PATIENT: CPT | Mod: S$PBB,,, | Performed by: OPHTHALMOLOGY

## 2020-07-01 PROCEDURE — 99212 OFFICE O/P EST SF 10 MIN: CPT | Mod: PBBFAC | Performed by: OPHTHALMOLOGY

## 2020-07-01 PROCEDURE — 99999 PR PBB SHADOW E&M-EST. PATIENT-LVL II: CPT | Mod: PBBFAC,,, | Performed by: OPHTHALMOLOGY

## 2020-07-06 ENCOUNTER — TELEPHONE (OUTPATIENT)
Dept: INTERNAL MEDICINE | Facility: CLINIC | Age: 75
End: 2020-07-06

## 2020-07-06 NOTE — TELEPHONE ENCOUNTER
Left message. Scheduled pt with sooner appointment on 8/6/2020.    Pt also requesting labs please advise.

## 2020-07-06 NOTE — TELEPHONE ENCOUNTER
Spoke with Lissy Palencia. She would like to get blood work as she cannot be seen by Dr. Luevano until 10/6/2020. Patient reports she has pre-diabetes and high cholesterol and is concerned it's getting worse. She is also concerned that her metformin is causing cancer and has stopped taking the metformin. She is very convinced the metformin does not help her after discussion. Discussed that the patient needs to be seen by a physician before getting labs and imaging done and to either try to get an earlier appointment with Dr. Luevano or to see a different physician sooner. Patient agrees that if her symptoms worsen, she will see another physician.     ----- Message from Nohemy Almazan sent at 7/6/2020  8:52 AM CDT -----  Regarding: Pts  Mr. Palencia Mobile# [phone]  Doctor appointment and lab have been scheduled.  Please link lab orders to the lab appointment.  Date of doctor appointment:  10/06/2020  Date of lab appointment:  N/A  Physical or EP: Ep  Comments: Mr. Palencia would like to put in orders for blood work for his wife, he would like the blood work done for his wife due to the pains she's having in her stomach, ankles and, legs. Patient is coming in to see you on 10/06/2020.

## 2020-07-06 NOTE — TELEPHONE ENCOUNTER
----- Message from Katiana Oconnor MD sent at 7/6/2020 11:50 AM CDT -----  Patient would like labs an an appointment with Dr. Luevano sooner than October.

## 2020-07-15 DIAGNOSIS — Z71.89 COMPLEX CARE COORDINATION: ICD-10-CM

## 2020-07-22 ENCOUNTER — TELEPHONE (OUTPATIENT)
Dept: INTERNAL MEDICINE | Facility: CLINIC | Age: 75
End: 2020-07-22

## 2020-07-22 ENCOUNTER — OFFICE VISIT (OUTPATIENT)
Dept: INTERNAL MEDICINE | Facility: CLINIC | Age: 75
End: 2020-07-22
Payer: MEDICARE

## 2020-07-22 VITALS
DIASTOLIC BLOOD PRESSURE: 72 MMHG | OXYGEN SATURATION: 98 % | HEIGHT: 62 IN | SYSTOLIC BLOOD PRESSURE: 118 MMHG | BODY MASS INDEX: 31.97 KG/M2 | WEIGHT: 173.75 LBS | HEART RATE: 73 BPM

## 2020-07-22 DIAGNOSIS — K21.9 GASTROESOPHAGEAL REFLUX DISEASE, ESOPHAGITIS PRESENCE NOT SPECIFIED: ICD-10-CM

## 2020-07-22 DIAGNOSIS — R42 LIGHTHEADEDNESS: ICD-10-CM

## 2020-07-22 DIAGNOSIS — R73.03 PREDIABETES: Primary | ICD-10-CM

## 2020-07-22 DIAGNOSIS — R20.0 NUMBNESS AND TINGLING OF BOTH LEGS BELOW KNEES: ICD-10-CM

## 2020-07-22 DIAGNOSIS — M54.31 RIGHT SCIATIC NERVE PAIN: ICD-10-CM

## 2020-07-22 DIAGNOSIS — R73.9 HYPERGLYCEMIA: ICD-10-CM

## 2020-07-22 DIAGNOSIS — Z83.79 FAMILY HISTORY OF CELIAC DISEASE: ICD-10-CM

## 2020-07-22 DIAGNOSIS — I70.0 AORTIC ATHEROSCLEROSIS: ICD-10-CM

## 2020-07-22 DIAGNOSIS — R20.2 NUMBNESS AND TINGLING OF BOTH LEGS BELOW KNEES: ICD-10-CM

## 2020-07-22 DIAGNOSIS — E03.9 PRIMARY HYPOTHYROIDISM: ICD-10-CM

## 2020-07-22 PROCEDURE — 99213 OFFICE O/P EST LOW 20 MIN: CPT | Mod: PBBFAC | Performed by: INTERNAL MEDICINE

## 2020-07-22 PROCEDURE — 99999 PR PBB SHADOW E&M-EST. PATIENT-LVL III: CPT | Mod: PBBFAC,,, | Performed by: INTERNAL MEDICINE

## 2020-07-22 PROCEDURE — 99999 PR PBB SHADOW E&M-EST. PATIENT-LVL III: ICD-10-PCS | Mod: PBBFAC,,, | Performed by: INTERNAL MEDICINE

## 2020-07-22 PROCEDURE — 99214 PR OFFICE/OUTPT VISIT, EST, LEVL IV, 30-39 MIN: ICD-10-PCS | Mod: S$PBB,,, | Performed by: INTERNAL MEDICINE

## 2020-07-22 PROCEDURE — 99214 OFFICE O/P EST MOD 30 MIN: CPT | Mod: S$PBB,,, | Performed by: INTERNAL MEDICINE

## 2020-07-22 RX ORDER — LEVOTHYROXINE SODIUM 75 UG/1
75 TABLET ORAL DAILY
Qty: 90 TABLET | Refills: 1 | Status: SHIPPED | OUTPATIENT
Start: 2020-07-22 | End: 2020-12-31

## 2020-07-22 NOTE — TELEPHONE ENCOUNTER
Please schedule labs non fasting about 8 weeks then f/u visit  Informed the patient that I sent her prescription for levothyroxine 75 mcg a slightly lower dose than what she is currently [facility]

## 2020-07-22 NOTE — PROGRESS NOTES
Subjective:       Patient ID: Lissy Palencia is a 75 y.o. female.    Chief Complaint: Joint Swelling (pain) and Foot Swelling (pain)  This dictation was performed using using MModal.  She generally has a distrust of all medications.  She stop taking metformin 2 weeks ago because of a letter she received in the mail saying that extended release metformin could cause cancer.  She was taking metformin to prevent the development of type 2 diabetes because her A1c which has always been elevated since it was 1st checked had gone up the highest it has ever been at 6.3.    Since the COVID pandemic she has been inactive.  She is normally use to a great deal of activity.  She has been sitting most of the day reading her iPad and she is concerned because both of her legs are experiencing numbness and tingling below the knees and her ankles have looks swollen.  She was afraid she had a blood clot in her legs.    She has had a lot of anxiety and fatigue.  Never has her levothyroxine dose been adjusted.  Her TSH is typically less than 1.0 and we talked about changing reducing her dose to see if she would have less difficulty with fatigue lightheadedness    She has been eating a lot of processed foods and foods that tend to bother her.  For example last night she had pizza.  This morning when she woke up she had heaviness in her chest in between her shoulder blades, kind of a dry cough and hoarseness.  She has 4 bottles of pantoprazole but she has never taken it.  It comes to her directly in the mail.  She will consider trying this medication to see if that helps to relieve the symptoms.    Lastly, she has a daughter who is diagnosed with celiac disease and she wants to be tested to see if she has this problem because she has had many of the same symptoms that her daughters been experiencing over the years.  Intermittent anemia, abdominal bloating, diarrheal stools  HPI  Review of Systems   Constitutional: Positive for activity  change. Negative for appetite change, chills, fatigue, fever and unexpected weight change.   HENT: Negative for hearing loss and tinnitus.    Eyes: Negative for visual disturbance.   Respiratory: Negative for cough, shortness of breath and wheezing.    Cardiovascular: Negative.  Negative for chest pain, palpitations and leg swelling.   Gastrointestinal: Negative for abdominal pain, blood in stool, constipation, diarrhea and nausea.   Genitourinary: Negative for dysuria, frequency and urgency.   Musculoskeletal: Negative for back pain and neck stiffness.   Skin: Negative for rash.   Neurological: Positive for numbness. Negative for headaches.   Psychiatric/Behavioral: Negative for dysphoric mood and sleep disturbance. The patient is not nervous/anxious.        Objective:      Physical Exam  Vitals signs reviewed.   Constitutional:       Appearance: Normal appearance.   HENT:      Head: Normocephalic and atraumatic.   Eyes:      General: No scleral icterus.     Conjunctiva/sclera: Conjunctivae normal.   Neck:      Musculoskeletal: Neck supple.   Cardiovascular:      Rate and Rhythm: Normal rate.   Pulmonary:      Effort: Pulmonary effort is normal.   Musculoskeletal:      Comments: Ankle edema   Lymphadenopathy:      Cervical: No cervical adenopathy.   Skin:     General: Skin is warm and dry.      Coloration: Skin is not jaundiced or pale.      Findings: No bruising, erythema, lesion or rash.   Neurological:      General: No focal deficit present.      Mental Status: She is alert.   Psychiatric:         Behavior: Behavior normal.         Assessment:       1. Prediabetes    2. Hyperglycemia    3. Numbness and tingling of both legs below knees    4. Right sciatic nerve pain    5. Aortic atherosclerosis    6. Primary hypothyroidism    7. Lightheadedness    8. Gastroesophageal reflux disease, esophagitis presence not specified    9. Family history of celiac disease, daughter        Plan:   Lissy was seen today for joint  swelling and foot swelling.    Diagnoses and all orders for this visit:    Prediabetes    Hyperglycemia  -     Hemoglobin A1C; Future    Numbness and tingling of both legs below knees    Right sciatic nerve pain    Aortic atherosclerosis    Primary hypothyroidism, decrease levothyroxine to 75 mcg daily.  Currently taking 88 mcg daily and has taken the same dose for many years.  -     TSH; Future    Lightheadedness    Gastroesophageal reflux disease, esophagitis presence not specified    Family history of celiac disease, daughter  -     Tissue transglutaminase, IgA; Future  -     IgA; Future      F/u 3 months, after A1c, TSH and celiac panel.

## 2020-07-23 ENCOUNTER — TELEPHONE (OUTPATIENT)
Dept: INTERNAL MEDICINE | Facility: CLINIC | Age: 75
End: 2020-07-23

## 2020-07-23 NOTE — TELEPHONE ENCOUNTER
Spoke to pt and explained a response from PCP is not available. Pt did eat, will await response from PCP but says she is not taking the Synthroid 75 mcg until the lab results have been reviewed.

## 2020-07-23 NOTE — TELEPHONE ENCOUNTER
----- Message from Loly Soriano sent at 7/23/2020 12:52 PM CDT -----  Contact: Patient [phone]  Requesting to speak with Yanet regarding labs.    Please call and advise.    Thank You

## 2020-07-23 NOTE — TELEPHONE ENCOUNTER
Spoke with pt , stated she would like to have BNT, CMP , CBC , THYROID , A1c and VITAMIN test now. She do not want to wait 8weeks.

## 2020-07-23 NOTE — TELEPHONE ENCOUNTER
Pt is having a great deal of swelling to ble w/moderate pain controlled w/ ice, burning, stinging. She has not had breakfast yet and is asking to get this lab done today.  Cardiac and Ortho hx.

## 2020-07-24 ENCOUNTER — TELEPHONE (OUTPATIENT)
Dept: INTERNAL MEDICINE | Facility: CLINIC | Age: 75
End: 2020-07-24

## 2020-07-24 ENCOUNTER — LAB VISIT (OUTPATIENT)
Dept: LAB | Facility: HOSPITAL | Age: 75
End: 2020-07-24
Attending: INTERNAL MEDICINE
Payer: MEDICARE

## 2020-07-24 DIAGNOSIS — I25.10 CORONARY ARTERY DISEASE INVOLVING NATIVE CORONARY ARTERY WITHOUT ANGINA PECTORIS, UNSPECIFIED WHETHER NATIVE OR TRANSPLANTED HEART: ICD-10-CM

## 2020-07-24 DIAGNOSIS — R73.03 PREDIABETES: ICD-10-CM

## 2020-07-24 DIAGNOSIS — R29.818 SUSPECTED SLEEP APNEA: ICD-10-CM

## 2020-07-24 DIAGNOSIS — E03.9 PRIMARY HYPOTHYROIDISM: ICD-10-CM

## 2020-07-24 DIAGNOSIS — R07.9 CHEST PAIN OF UNKNOWN ETIOLOGY: ICD-10-CM

## 2020-07-24 DIAGNOSIS — R73.9 HYPERGLYCEMIA: ICD-10-CM

## 2020-07-24 DIAGNOSIS — E78.2 MIXED HYPERLIPIDEMIA: ICD-10-CM

## 2020-07-24 DIAGNOSIS — Z83.79 FAMILY HISTORY OF CELIAC DISEASE: ICD-10-CM

## 2020-07-24 DIAGNOSIS — E55.9 VITAMIN D DEFICIENCY: ICD-10-CM

## 2020-07-24 LAB
25(OH)D3+25(OH)D2 SERPL-MCNC: 49 NG/ML (ref 30–96)
ALBUMIN SERPL BCP-MCNC: 4.1 G/DL (ref 3.5–5.2)
ALP SERPL-CCNC: 95 U/L (ref 55–135)
ALT SERPL W/O P-5'-P-CCNC: 24 U/L (ref 10–44)
ANION GAP SERPL CALC-SCNC: 8 MMOL/L (ref 8–16)
AST SERPL-CCNC: 23 U/L (ref 10–40)
BASOPHILS # BLD AUTO: 0.05 K/UL (ref 0–0.2)
BASOPHILS NFR BLD: 0.7 % (ref 0–1.9)
BILIRUB SERPL-MCNC: 1.2 MG/DL (ref 0.1–1)
BUN SERPL-MCNC: 16 MG/DL (ref 8–23)
CALCIUM SERPL-MCNC: 9.5 MG/DL (ref 8.7–10.5)
CHLORIDE SERPL-SCNC: 106 MMOL/L (ref 95–110)
CHOLEST SERPL-MCNC: 166 MG/DL (ref 120–199)
CHOLEST/HDLC SERPL: 4.7 {RATIO} (ref 2–5)
CO2 SERPL-SCNC: 27 MMOL/L (ref 23–29)
CREAT SERPL-MCNC: 0.8 MG/DL (ref 0.5–1.4)
DIFFERENTIAL METHOD: ABNORMAL
EOSINOPHIL # BLD AUTO: 0.5 K/UL (ref 0–0.5)
EOSINOPHIL NFR BLD: 7.1 % (ref 0–8)
ERYTHROCYTE [DISTWIDTH] IN BLOOD BY AUTOMATED COUNT: 13.2 % (ref 11.5–14.5)
EST. GFR  (AFRICAN AMERICAN): >60 ML/MIN/1.73 M^2
EST. GFR  (NON AFRICAN AMERICAN): >60 ML/MIN/1.73 M^2
ESTIMATED AVG GLUCOSE: 140 MG/DL (ref 68–131)
GLUCOSE SERPL-MCNC: 131 MG/DL (ref 70–110)
HBA1C MFR BLD HPLC: 6.5 % (ref 4–5.6)
HCT VFR BLD AUTO: 46.8 % (ref 37–48.5)
HDLC SERPL-MCNC: 35 MG/DL (ref 40–75)
HDLC SERPL: 21.1 % (ref 20–50)
HGB BLD-MCNC: 14.7 G/DL (ref 12–16)
IGA SERPL-MCNC: 209 MG/DL (ref 40–350)
IMM GRANULOCYTES # BLD AUTO: 0.03 K/UL (ref 0–0.04)
IMM GRANULOCYTES NFR BLD AUTO: 0.4 % (ref 0–0.5)
LDLC SERPL CALC-MCNC: 87.2 MG/DL (ref 63–159)
LYMPHOCYTES # BLD AUTO: 2.3 K/UL (ref 1–4.8)
LYMPHOCYTES NFR BLD: 31.7 % (ref 18–48)
MCH RBC QN AUTO: 27.9 PG (ref 27–31)
MCHC RBC AUTO-ENTMCNC: 31.4 G/DL (ref 32–36)
MCV RBC AUTO: 89 FL (ref 82–98)
MONOCYTES # BLD AUTO: 0.6 K/UL (ref 0.3–1)
MONOCYTES NFR BLD: 8.9 % (ref 4–15)
NEUTROPHILS # BLD AUTO: 3.7 K/UL (ref 1.8–7.7)
NEUTROPHILS NFR BLD: 51.2 % (ref 38–73)
NONHDLC SERPL-MCNC: 131 MG/DL
NRBC BLD-RTO: 0 /100 WBC
PLATELET # BLD AUTO: 186 K/UL (ref 150–350)
PMV BLD AUTO: 10.3 FL (ref 9.2–12.9)
POTASSIUM SERPL-SCNC: 4.6 MMOL/L (ref 3.5–5.1)
PROT SERPL-MCNC: 7.4 G/DL (ref 6–8.4)
RBC # BLD AUTO: 5.27 M/UL (ref 4–5.4)
SODIUM SERPL-SCNC: 141 MMOL/L (ref 136–145)
T4 FREE SERPL-MCNC: 1.09 NG/DL (ref 0.71–1.51)
TRIGL SERPL-MCNC: 219 MG/DL (ref 30–150)
TSH SERPL DL<=0.005 MIU/L-ACNC: 0.39 UIU/ML (ref 0.4–4)
WBC # BLD AUTO: 7.2 K/UL (ref 3.9–12.7)

## 2020-07-24 PROCEDURE — 84439 ASSAY OF FREE THYROXINE: CPT

## 2020-07-24 PROCEDURE — 80061 LIPID PANEL: CPT

## 2020-07-24 PROCEDURE — 83036 HEMOGLOBIN GLYCOSYLATED A1C: CPT

## 2020-07-24 PROCEDURE — 36415 COLL VENOUS BLD VENIPUNCTURE: CPT

## 2020-07-24 PROCEDURE — 85025 COMPLETE CBC W/AUTO DIFF WBC: CPT

## 2020-07-24 PROCEDURE — 84443 ASSAY THYROID STIM HORMONE: CPT

## 2020-07-24 PROCEDURE — 82306 VITAMIN D 25 HYDROXY: CPT

## 2020-07-24 PROCEDURE — 83516 IMMUNOASSAY NONANTIBODY: CPT

## 2020-07-24 PROCEDURE — 80053 COMPREHEN METABOLIC PANEL: CPT

## 2020-07-24 PROCEDURE — 82784 ASSAY IGA/IGD/IGG/IGM EACH: CPT

## 2020-07-24 NOTE — TELEPHONE ENCOUNTER
She does not need a BNP, brain natruretic peptide blood test, used to monitor chronic congestive heart failure  comprehensive metabolic panel is scheduled

## 2020-07-24 NOTE — TELEPHONE ENCOUNTER
----- Message from Carmen Cardozo sent at 7/24/2020  8:36 AM CDT -----  Regarding: Lab work question  Contact: Patient [phone]  Patient would like a call back to discuss test that were done.

## 2020-07-24 NOTE — TELEPHONE ENCOUNTER
----- Message from Alissa Hdz sent at 7/24/2020  9:03 AM CDT -----  Contact: Self   Patient is returning a phone call.  Who left a message for the patient: Glory Saab MA  Does patient know what this is regarding:    Comments:

## 2020-07-25 ENCOUNTER — TELEPHONE (OUTPATIENT)
Dept: INTERNAL MEDICINE | Facility: CLINIC | Age: 75
End: 2020-07-25

## 2020-07-25 DIAGNOSIS — R73.9 HYPERGLYCEMIA: ICD-10-CM

## 2020-07-25 DIAGNOSIS — E03.9 PRIMARY HYPOTHYROIDISM: ICD-10-CM

## 2020-07-25 DIAGNOSIS — E78.5 HYPERLIPIDEMIA, UNSPECIFIED HYPERLIPIDEMIA TYPE: Primary | ICD-10-CM

## 2020-07-25 DIAGNOSIS — R73.03 PREDIABETES: ICD-10-CM

## 2020-07-25 NOTE — TELEPHONE ENCOUNTER
Please cancel A1c scheduled on August 6, because she has already had this test and does not need again now.  Please schedule a repeat fasting lipid profile and A1c in 6 months.  In about 6-8 weeks she needs to schedule a TSH provided she is taking 75 mcg of levothyroxine daily for 6-8 weeks at that time.  She does not need any other blood tests.

## 2020-07-27 LAB — TTG IGA SER-ACNC: 5 UNITS

## 2020-07-27 NOTE — TELEPHONE ENCOUNTER
Pt just spoke to Glory and message from PCP was confirmed. Pt is clear about taking the synthroid 75 mcgs daily for 6-8wks then repeat labs.

## 2020-08-03 ENCOUNTER — TELEPHONE (OUTPATIENT)
Dept: INTERNAL MEDICINE | Facility: CLINIC | Age: 75
End: 2020-08-03

## 2020-08-03 NOTE — TELEPHONE ENCOUNTER
----- Message from Leona New sent at 7/31/2020  8:36 AM CDT -----  Regarding: Pt   Reason: Mammo Order Needed    Communication: [phone]

## 2020-08-06 ENCOUNTER — OFFICE VISIT (OUTPATIENT)
Dept: INTERNAL MEDICINE | Facility: CLINIC | Age: 75
End: 2020-08-06
Payer: MEDICARE

## 2020-08-06 VITALS
WEIGHT: 171.5 LBS | DIASTOLIC BLOOD PRESSURE: 70 MMHG | SYSTOLIC BLOOD PRESSURE: 122 MMHG | OXYGEN SATURATION: 97 % | HEIGHT: 62 IN | HEART RATE: 66 BPM | BODY MASS INDEX: 31.56 KG/M2

## 2020-08-06 DIAGNOSIS — G89.29 CHRONIC RUQ PAIN: ICD-10-CM

## 2020-08-06 DIAGNOSIS — G47.33 OSA (OBSTRUCTIVE SLEEP APNEA): Primary | ICD-10-CM

## 2020-08-06 DIAGNOSIS — R10.11 CHRONIC RUQ PAIN: ICD-10-CM

## 2020-08-06 DIAGNOSIS — E03.9 PRIMARY HYPOTHYROIDISM: ICD-10-CM

## 2020-08-06 PROCEDURE — 99213 OFFICE O/P EST LOW 20 MIN: CPT | Mod: PBBFAC | Performed by: INTERNAL MEDICINE

## 2020-08-06 PROCEDURE — 99999 PR PBB SHADOW E&M-EST. PATIENT-LVL III: ICD-10-PCS | Mod: PBBFAC,,, | Performed by: INTERNAL MEDICINE

## 2020-08-06 PROCEDURE — 99999 PR PBB SHADOW E&M-EST. PATIENT-LVL III: CPT | Mod: PBBFAC,,, | Performed by: INTERNAL MEDICINE

## 2020-08-06 PROCEDURE — 99214 PR OFFICE/OUTPT VISIT, EST, LEVL IV, 30-39 MIN: ICD-10-PCS | Mod: S$PBB,,, | Performed by: INTERNAL MEDICINE

## 2020-08-06 PROCEDURE — 99214 OFFICE O/P EST MOD 30 MIN: CPT | Mod: S$PBB,,, | Performed by: INTERNAL MEDICINE

## 2020-08-07 NOTE — PROGRESS NOTES
Subjective:       Patient ID: Lissy Palencia is a 75 y.o. female.    Chief Complaint: Follow-up  This dictation was performed using using MModal.  She presents to the office today with her   She was seen in the office on July 22nd with numerous complaints everything from numbness in her toes to headache and everything in between.  Since that visit she has call the office 8 times.  Extensive conversations regarding prediabetes type 2 diabetes, necessity of reducing levothyroxine from 88-75 daily  On and on  And on and on  Results for orders placed or performed in visit on 07/24/20   TSH   Result Value Ref Range    TSH 0.393 (L) 0.400 - 4.000 uIU/mL   Hemoglobin A1C   Result Value Ref Range    Hemoglobin A1C 6.5 (H) 4.0 - 5.6 %    Estimated Avg Glucose 140 (H) 68 - 131 mg/dL   Tissue transglutaminase, IgA   Result Value Ref Range    TTG IgA 5 <20 UNITS   IgA   Result Value Ref Range    IgA 209 40 - 350 mg/dL   CBC auto differential   Result Value Ref Range    WBC 7.20 3.90 - 12.70 K/uL    RBC 5.27 4.00 - 5.40 M/uL    Hemoglobin 14.7 12.0 - 16.0 g/dL    Hematocrit 46.8 37.0 - 48.5 %    Mean Corpuscular Volume 89 82 - 98 fL    Mean Corpuscular Hemoglobin 27.9 27.0 - 31.0 pg    Mean Corpuscular Hemoglobin Conc 31.4 (L) 32.0 - 36.0 g/dL    RDW 13.2 11.5 - 14.5 %    Platelets 186 150 - 350 K/uL    MPV 10.3 9.2 - 12.9 fL    Immature Granulocytes 0.4 0.0 - 0.5 %    Gran # (ANC) 3.7 1.8 - 7.7 K/uL    Immature Grans (Abs) 0.03 0.00 - 0.04 K/uL    Lymph # 2.3 1.0 - 4.8 K/uL    Mono # 0.6 0.3 - 1.0 K/uL    Eos # 0.5 0.0 - 0.5 K/uL    Baso # 0.05 0.00 - 0.20 K/uL    nRBC 0 0 /100 WBC    Gran% 51.2 38.0 - 73.0 %    Lymph% 31.7 18.0 - 48.0 %    Mono% 8.9 4.0 - 15.0 %    Eosinophil% 7.1 0.0 - 8.0 %    Basophil% 0.7 0.0 - 1.9 %    Differential Method Automated    Comprehensive metabolic panel   Result Value Ref Range    Sodium 141 136 - 145 mmol/L    Potassium 4.6 3.5 - 5.1 mmol/L    Chloride 106 95 - 110 mmol/L    CO2 27 23 -  29 mmol/L    Glucose 131 (H) 70 - 110 mg/dL    BUN, Bld 16 8 - 23 mg/dL    Creatinine 0.8 0.5 - 1.4 mg/dL    Calcium 9.5 8.7 - 10.5 mg/dL    Total Protein 7.4 6.0 - 8.4 g/dL    Albumin 4.1 3.5 - 5.2 g/dL    Total Bilirubin 1.2 (H) 0.1 - 1.0 mg/dL    Alkaline Phosphatase 95 55 - 135 U/L    AST 23 10 - 40 U/L    ALT 24 10 - 44 U/L    Anion Gap 8 8 - 16 mmol/L    eGFR if African American >60.0 >60 mL/min/1.73 m^2    eGFR if non African American >60.0 >60 mL/min/1.73 m^2   Lipid panel   Result Value Ref Range    Cholesterol 166 120 - 199 mg/dL    Triglycerides 219 (H) 30 - 150 mg/dL    HDL 35 (L) 40 - 75 mg/dL    LDL Cholesterol 87.2 63.0 - 159.0 mg/dL    Hdl/Cholesterol Ratio 21.1 20.0 - 50.0 %    Total Cholesterol/HDL Ratio 4.7 2.0 - 5.0    Non-HDL Cholesterol 131 mg/dL   Vitamin D   Result Value Ref Range    Vit D, 25-Hydroxy 49 30 - 96 ng/mL   T4, free   Result Value Ref Range    Free T4 1.09 0.71 - 1.51 ng/dL    She is having a lot of trouble with anxiety but she does not wanted addressed that directly.  She could be in denial of her anxiety disorder or she could have a personality disorder or both.  Her  says she is constantly reading her iPad.  He goes to bed at 11 and she might go to bed at 2:00 a.m. and she is up at 7 in complaining of being tired all the time.  She says that she can not sleep and that she has a sleep problem but she could not tolerate CPAP because the company that was supplying her stuff when work with her.  On on.  She does not have any trust of doctors, medical interventions medications etc..    She says she wants to try CPAP again.  I found her old prescription for CPAP in-home supplies and ordered for her and the staff is going to fax it to [facility]    She says she will try 75 mcg of levothyroxine taken 1st thing in the morning on empty stomach and then waiting about 45 min to eat or drink    She is complaining of chronic right upper quadrant abdominal pain ever since she had a  cholecystectomy 6 years ago  She has central obesity and they have fatty liver which can sometimes be painful and this was explained more than once and again today.  For for for her for for she for for her  HPI  Review of Systems   Constitutional: Positive for activity change, appetite change, fatigue and unexpected weight change.   Eyes: Positive for visual disturbance.   Respiratory: Positive for cough.    Cardiovascular: Positive for chest pain.   Gastrointestinal: Positive for abdominal pain.   Genitourinary: Positive for flank pain.   Musculoskeletal: Positive for arthralgias and back pain.   Neurological: Positive for headaches.   Psychiatric/Behavioral: Positive for sleep disturbance.       Objective:      Physical Exam  Vitals signs and nursing note reviewed.   HENT:      Head: Atraumatic.   Eyes:      General: No scleral icterus.     Conjunctiva/sclera: Conjunctivae normal.   Neck:      Musculoskeletal: Neck supple.   Cardiovascular:      Rate and Rhythm: Normal rate and regular rhythm.   Pulmonary:      Effort: Pulmonary effort is normal.      Breath sounds: Normal breath sounds.   Abdominal:      General: Abdomen is flat. Bowel sounds are normal. There is no distension.      Palpations: Abdomen is soft. There is no mass.      Tenderness: There is no abdominal tenderness. There is no right CVA tenderness, left CVA tenderness, guarding or rebound.      Hernia: No hernia is present.   Lymphadenopathy:      Cervical: No cervical adenopathy.   Skin:     General: Skin is warm and dry.   Neurological:      Mental Status: She is alert.   Psychiatric:         Behavior: Behavior normal.         Assessment:       1. HEMAL (obstructive sleep apnea)    2. Primary hypothyroidism    3. Chronic RUQ pain        Plan:   Lissy was seen today for follow-up.    Diagnoses and all orders for this visit:    HEMAL (obstructive sleep apnea)  -     CPAP FOR HOME USE    Primary hypothyroidism, hopefully understands needs a lower dose of  levothyroxine her not tried and tried to explain it your at any rate adjust hope she tries a 75 a day gets a TSH rechecked about 6 weeks.  In 6 months  ask her to schedule labs to monitor for the development of type 2 diabetes which it looks like where she has had a because which she is doing is gaining way, sitting around and eating bad food    Chronic RUQ pain, above lab test reviewed, maybe the recent weight gain his cause some fatty liver I do not know.  I do not think that is necessary to do any additional testing at the moment, because she says the pain has not changed in all the time that she has had ever since her cholecystectomy

## 2020-08-10 ENCOUNTER — OFFICE VISIT (OUTPATIENT)
Dept: HOME HEALTH SERVICES | Facility: CLINIC | Age: 75
End: 2020-08-10
Payer: MEDICARE

## 2020-08-10 VITALS
SYSTOLIC BLOOD PRESSURE: 123 MMHG | OXYGEN SATURATION: 98 % | TEMPERATURE: 98 F | DIASTOLIC BLOOD PRESSURE: 70 MMHG | BODY MASS INDEX: 31.09 KG/M2 | WEIGHT: 170 LBS | HEART RATE: 77 BPM

## 2020-08-10 DIAGNOSIS — I25.10 CORONARY ARTERY DISEASE INVOLVING NATIVE CORONARY ARTERY OF NATIVE HEART WITHOUT ANGINA PECTORIS: ICD-10-CM

## 2020-08-10 DIAGNOSIS — M54.41 CHRONIC BILATERAL LOW BACK PAIN WITH RIGHT-SIDED SCIATICA: ICD-10-CM

## 2020-08-10 DIAGNOSIS — G89.29 CHRONIC BILATERAL LOW BACK PAIN WITH RIGHT-SIDED SCIATICA: ICD-10-CM

## 2020-08-10 DIAGNOSIS — E03.9 PRIMARY HYPOTHYROIDISM: ICD-10-CM

## 2020-08-10 DIAGNOSIS — E78.5 HYPERLIPIDEMIA, UNSPECIFIED HYPERLIPIDEMIA TYPE: ICD-10-CM

## 2020-08-10 DIAGNOSIS — I70.0 AORTIC ATHEROSCLEROSIS: ICD-10-CM

## 2020-08-10 DIAGNOSIS — Z12.31 ENCOUNTER FOR SCREENING MAMMOGRAM FOR MALIGNANT NEOPLASM OF BREAST: ICD-10-CM

## 2020-08-10 DIAGNOSIS — R73.03 PREDIABETES: ICD-10-CM

## 2020-08-10 DIAGNOSIS — E66.9 CLASS 1 OBESITY WITH SERIOUS COMORBIDITY AND BODY MASS INDEX (BMI) OF 31.0 TO 31.9 IN ADULT, UNSPECIFIED OBESITY TYPE: ICD-10-CM

## 2020-08-10 DIAGNOSIS — K21.9 GASTROESOPHAGEAL REFLUX DISEASE, ESOPHAGITIS PRESENCE NOT SPECIFIED: ICD-10-CM

## 2020-08-10 DIAGNOSIS — Z00.00 ENCOUNTER FOR PREVENTIVE HEALTH EXAMINATION: Primary | ICD-10-CM

## 2020-08-10 PROCEDURE — G0439 PR MEDICARE ANNUAL WELLNESS SUBSEQUENT VISIT: ICD-10-PCS | Mod: S$GLB,,, | Performed by: NURSE PRACTITIONER

## 2020-08-10 PROCEDURE — G0439 PPPS, SUBSEQ VISIT: HCPCS | Mod: S$GLB,,, | Performed by: NURSE PRACTITIONER

## 2020-08-10 NOTE — PATIENT INSTRUCTIONS
Counseling and Referral of Other Preventative  (Italic type indicates deductible and co-insurance are waived)    Patient Name: Lissy Palencia  Today's Date: 8/10/2020    Health Maintenance       Date Due Completion Date    Shingles Vaccine (2 of 3) 02/20/2015 12/26/2014    Mammogram 07/30/2020 7/30/2019    Influenza Vaccine (1) 09/01/2020 10/23/2018    DEXA SCAN 02/25/2021 2/25/2016    Override on 11/25/2009: Done    Lipid Panel 07/24/2021 7/24/2020    High Dose Statin 08/10/2021 8/10/2020    Aspirin/Antiplatelet Therapy 08/10/2021 8/10/2020    TETANUS VACCINE 01/03/2028 1/3/2018    Colorectal Cancer Screening 11/28/2028 11/28/2018        No orders of the defined types were placed in this encounter.    The following information is provided to all patients.  This information is to help you find resources for any of the problems found today that may be affecting your health:                Living healthy guide: www.[facility].[place].gov      Understanding Diabetes: www.diabetes.org      Eating healthy: www.cdc.gov/healthyweight      CDC home safety checklist: www.cdc.gov/steadi/patient.html      Agency on Aging: www.goea.[place].gov      Alcoholics anonymous (AA): www.aa.org      Physical Activity: www.[place].nih.gov/ja2khmf      Tobacco use: www.quitwithusla.org

## 2020-08-10 NOTE — PROGRESS NOTES
Lissy Palencia presented for a  Medicare AWV and comprehensive Health Risk Assessment today. The following components were reviewed and updated:    · Medical history  · Family History  · Social history  · Allergies and Current Medications  · Health Risk Assessment  · Health Maintenance  · Care Team     ** See Completed Assessments for Annual Wellness Visit within the encounter summary.**         The following assessments were completed:  · Living Situation  · CAGE  · Depression Screening  · Timed Get Up and Go  · Whisper Test  · Cognitive Function Screening  · Nutrition Screening  · ADL Screening  · PAQ Screening        Vitals:    08/10/20 0947   BP: 123/70   Pulse: 77   Temp: 98.3 °F (36.8 °C)   SpO2: 98%   Weight: 77.1 kg (170 lb)     Body mass index is 31.09 kg/m².  Physical Exam  Constitutional:       Appearance: Normal appearance. She is obese.   HENT:      Head: Normocephalic and atraumatic.   Eyes:      General: No scleral icterus.  Neck:      Musculoskeletal: Neck supple. No neck rigidity.   Cardiovascular:      Rate and Rhythm: Normal rate and regular rhythm.      Heart sounds: Normal heart sounds.   Pulmonary:      Effort: No respiratory distress.      Breath sounds: Normal breath sounds.   Abdominal:      General: Bowel sounds are normal.      Palpations: Abdomen is soft.   Musculoskeletal:      Right lower leg: No edema.      Left lower leg: No edema.   Neurological:      Mental Status: She is alert and oriented to person, place, and time.   Psychiatric:         Mood and Affect: Mood normal.         Behavior: Behavior normal.               Diagnoses and health risks identified today and associated recommendations/orders:    1. Encounter for preventive health examination  - Screenings performed, as noted above. Personal preventative testing needs reviewed.     2. Encounter for screening mammogram for malignant neoplasm of breast   - Mammo Digital Screening Bilat w/ [facility]; Future    3. Coronary artery  disease involving native coronary artery of native heart without angina pectoris  - Stable, followed by PCP     4. Aortic atherosclerosis  - Stable, on statin    5. Hyperlipidemia, unspecified hyperlipidemia type  - Stable, on statin    6. Primary hypothyroidism  - Controlled, followed by PCP    7. Prediabetes  - Steadily increasing. Most recent A1c is 6.5   She has begun dieting and exercising to try to control her blood sugar.     8. Gastroesophageal reflux disease, esophagitis presence not specified  - Stable    9. Chronic bilateral low back pain with right-sided sciatica  - Stable, persistent pain in right hip     10. Class 1 obesity with serious comorbidity and body mass index (BMI) of 31.0 to 31.9 in adult, unspecified obesity type  - Encouraged to continue with exercise and diet.       Provided Lissy with a 5-10 year written screening schedule and personal prevention plan. Recommendations were developed using the USPSTF age appropriate recommendations. Education, counseling, and referrals were provided as needed. After Visit Summary printed and given to patient which includes a list of additional screenings\tests needed.    Follow up in about 1 year (around 8/10/2021) for your next annual wellness visit.    Rowena Echavarria NP  I offered to discuss end of life issues, including information on how to make advance directives that the patient could use to name someone who would make medical decisions on their behalf if they became too ill to make themselves.    ___Patient declined  _X_Patient is interested, I provided paper work and offered to discuss.

## 2020-08-11 ENCOUNTER — HOSPITAL ENCOUNTER (OUTPATIENT)
Dept: RADIOLOGY | Facility: HOSPITAL | Age: 75
Discharge: HOME | End: 2020-08-11
Attending: NURSE PRACTITIONER
Payer: MEDICARE

## 2020-08-11 DIAGNOSIS — Z12.31 ENCOUNTER FOR SCREENING MAMMOGRAM FOR MALIGNANT NEOPLASM OF BREAST: ICD-10-CM

## 2020-08-11 PROCEDURE — 77067 SCR MAMMO BI INCL CAD: CPT | Mod: 26,,, | Performed by: RADIOLOGY

## 2020-08-11 PROCEDURE — 77063 MAMMO DIGITAL SCREENING BILAT WITH TOMOSYNTHESIS_CAD: ICD-10-PCS | Mod: 26,,, | Performed by: RADIOLOGY

## 2020-08-11 PROCEDURE — 77067 MAMMO DIGITAL SCREENING BILAT WITH TOMOSYNTHESIS_CAD: ICD-10-PCS | Mod: 26,,, | Performed by: RADIOLOGY

## 2020-08-11 PROCEDURE — 77067 SCR MAMMO BI INCL CAD: CPT | Mod: TC

## 2020-08-11 PROCEDURE — 77063 BREAST TOMOSYNTHESIS BI: CPT | Mod: 26,,, | Performed by: RADIOLOGY

## 2020-08-12 ENCOUNTER — TELEPHONE (OUTPATIENT)
Dept: INTERNAL MEDICINE | Facility: CLINIC | Age: 75
End: 2020-08-12

## 2020-08-17 ENCOUNTER — OFFICE VISIT (OUTPATIENT)
Dept: OPHTHALMOLOGY | Facility: CLINIC | Age: 75
End: 2020-08-17
Payer: MEDICARE

## 2020-08-17 DIAGNOSIS — H00.029 MEIBOMIAN BLEPHARITIS, UNSPECIFIED LATERALITY: ICD-10-CM

## 2020-08-17 DIAGNOSIS — H10.13 ALLERGIC CONJUNCTIVITIS OF BOTH EYES: Primary | ICD-10-CM

## 2020-08-17 DIAGNOSIS — H25.13 NUCLEAR SCLEROSIS, BILATERAL: ICD-10-CM

## 2020-08-17 PROCEDURE — 99213 OFFICE O/P EST LOW 20 MIN: CPT | Mod: PBBFAC | Performed by: OPHTHALMOLOGY

## 2020-08-17 PROCEDURE — 99999 PR PBB SHADOW E&M-EST. PATIENT-LVL III: CPT | Mod: PBBFAC,,, | Performed by: OPHTHALMOLOGY

## 2020-08-17 PROCEDURE — 99999 PR PBB SHADOW E&M-EST. PATIENT-LVL III: ICD-10-PCS | Mod: PBBFAC,,, | Performed by: OPHTHALMOLOGY

## 2020-08-17 PROCEDURE — 92014 PR EYE EXAM, EST PATIENT,COMPREHESV: ICD-10-PCS | Mod: S$PBB,,, | Performed by: OPHTHALMOLOGY

## 2020-08-17 PROCEDURE — 92014 COMPRE OPH EXAM EST PT 1/>: CPT | Mod: S$PBB,,, | Performed by: OPHTHALMOLOGY

## 2020-08-17 RX ORDER — NEOMYCIN SULFATE, POLYMYXIN B SULFATE AND DEXAMETHASONE 3.5; 10000; 1 MG/ML; [USP'U]/ML; MG/ML
1 SUSPENSION/ DROPS OPHTHALMIC 3 TIMES DAILY
Qty: 5 ML | Refills: 3 | Status: SHIPPED | OUTPATIENT
Start: 2020-08-17 | End: 2020-09-07

## 2020-08-17 NOTE — PATIENT INSTRUCTIONS
MAXITROL DROPS - 1 DROP BOTH EYES 3 TIMES A DAY FOR 1 WEEK THEN TWICE A DAY FOR 1 WEEK THEN DAILY FOR 1 WEEK THEN STOP

## 2020-08-17 NOTE — PROGRESS NOTES
HPI     Dr Mcintosh, Dr Conner pt / [facility]    No eye surgery   (+) glaucoma suspect OS   +dry eye   +cataracts     Systane PRN OU   Patanol (ran out yesterday)  Pt here for check of ocular health.  Pt is not sure if the dry eyes or   cataracts are causing blurred VA OU . Pt states blurry VA is intermittent   and gets better after blinking.  Pt states when reading she notices the   blurry VA more.  Pt states she has a sticking and glue feeling OU in the   morning.  Pt states she has photophobia in the morning and it takes a   while to get adjusted to the light. Pt states constant tearing OU.  Pt   states occasional itching on the eyelids and the corner of the eyes and   burning OU. Pt states it sometimes feels like an ant is crawling on her   eyes.  Pt denies eye pain OU. Pt didn't get new eyeglasses due to Covid.     Last edited by Ciara Mclaughlin MD on 8/17/2020 10:19 AM. (History)            Assessment /Plan     For exam results, see Encounter Report.    Allergic conjunctivitis of both eyes    Meibomian blepharitis, unspecified laterality    Nuclear sclerosis, bilateral    Other orders  -     neomycin-polymyxin-dexamethasone (MAXITROL) 3.5mg/mL-10,000 unit/mL-0.1 % DrpS; Place 1 drop into both eyes 3 (three) times daily. for 21 days  Dispense: 5 mL; Refill: 3      Jesus / belpharitis  - trial of maxitrol gtts per instructions    Allergic conj  - NI with patanol rx'ed by giselle  - cont cool compresses - pt says this helps    NSC OU  - becoming VS, pt wishes to address the above first before talking about cat sx    RE  - pt desires updated mrx    F/up optom 4 wks.

## 2020-09-18 ENCOUNTER — TELEPHONE (OUTPATIENT)
Dept: INTERNAL MEDICINE | Facility: CLINIC | Age: 75
End: 2020-09-18

## 2020-09-18 DIAGNOSIS — G47.33 OBSTRUCTIVE SLEEP APNEA: Primary | ICD-10-CM

## 2020-09-18 NOTE — TELEPHONE ENCOUNTER
Ms Lissy states she is not going to try to wear this machine again she is miserable with it. This is her 3rd attempt and she would like a letter per [facility] before they will take it back. She really had nothing good to say about them not helpful etc. Would you write this letter so they will take it back? Thanks

## 2020-09-18 NOTE — TELEPHONE ENCOUNTER
----- Message from Carmen Cardozo sent at 9/18/2020  2:04 PM CDT -----  Regarding: C-pap  Contact: patient [phone]   Patient would like to return the cpap machine.  Unable to adjust. Need to return cpap

## 2020-09-21 ENCOUNTER — TELEPHONE (OUTPATIENT)
Dept: OPHTHALMOLOGY | Facility: CLINIC | Age: 75
End: 2020-09-21

## 2020-09-21 NOTE — TELEPHONE ENCOUNTER
Left message with  since pt is in dentist office getting teeth filling   Gave  phone number to call back to set up appt with dr matta for re-eval           ----- Message from Patty Savage sent at 9/21/2020  9:12 AM CDT -----  Regarding: Pt Advice  Contact: Lissy  Pt calling to inform that the drop regimen she was on from Dr. Matta did not help. She does not want to see Dr. Jack for glasses until this matter has been cleared up.  She would like to schedule with Lissette again and to go back over what was discussed about her cataracts. She can be reached at [phone]

## 2020-09-22 ENCOUNTER — LAB VISIT (OUTPATIENT)
Dept: LAB | Facility: HOSPITAL | Age: 75
End: 2020-09-22
Attending: INTERNAL MEDICINE
Payer: MEDICARE

## 2020-09-22 DIAGNOSIS — E03.9 PRIMARY HYPOTHYROIDISM: ICD-10-CM

## 2020-09-22 LAB — TSH SERPL DL<=0.005 MIU/L-ACNC: 2.9 UIU/ML (ref 0.4–4)

## 2020-09-22 PROCEDURE — 36415 COLL VENOUS BLD VENIPUNCTURE: CPT

## 2020-09-22 PROCEDURE — 84443 ASSAY THYROID STIM HORMONE: CPT

## 2020-09-23 ENCOUNTER — TELEPHONE (OUTPATIENT)
Dept: OPHTHALMOLOGY | Facility: CLINIC | Age: 75
End: 2020-09-23

## 2020-09-23 NOTE — TELEPHONE ENCOUNTER
Noticed pt has appt with dr matta on 09/25 for re-eval   Pt wishes to discuss further about what to do with her vision wise-either get cataract sx or discuss other options first   Has other questions as well dr quinones

## 2020-09-24 ENCOUNTER — PATIENT OUTREACH (OUTPATIENT)
Dept: ADMINISTRATIVE | Facility: OTHER | Age: 75
End: 2020-09-24

## 2020-09-25 ENCOUNTER — OFFICE VISIT (OUTPATIENT)
Dept: OPHTHALMOLOGY | Facility: CLINIC | Age: 75
End: 2020-09-25
Payer: MEDICARE

## 2020-09-25 DIAGNOSIS — H16.223 KERATOCONJUNCTIVITIS SICCA NOT SPECIFIED AS SJOGREN'S, BILATERAL: ICD-10-CM

## 2020-09-25 DIAGNOSIS — H04.123 DRY EYE SYNDROME, BILATERAL: ICD-10-CM

## 2020-09-25 DIAGNOSIS — H10.13 ALLERGIC CONJUNCTIVITIS OF BOTH EYES: Primary | ICD-10-CM

## 2020-09-25 PROCEDURE — 99999 PR PBB SHADOW E&M-EST. PATIENT-LVL II: ICD-10-PCS | Mod: PBBFAC,,, | Performed by: OPHTHALMOLOGY

## 2020-09-25 PROCEDURE — 92014 COMPRE OPH EXAM EST PT 1/>: CPT | Mod: S$PBB,,, | Performed by: OPHTHALMOLOGY

## 2020-09-25 PROCEDURE — 99212 OFFICE O/P EST SF 10 MIN: CPT | Mod: PBBFAC | Performed by: OPHTHALMOLOGY

## 2020-09-25 PROCEDURE — 92014 PR EYE EXAM, EST PATIENT,COMPREHESV: ICD-10-PCS | Mod: S$PBB,,, | Performed by: OPHTHALMOLOGY

## 2020-09-25 PROCEDURE — 99999 PR PBB SHADOW E&M-EST. PATIENT-LVL II: CPT | Mod: PBBFAC,,, | Performed by: OPHTHALMOLOGY

## 2020-09-25 RX ORDER — LIFITEGRAST 50 MG/ML
1 SOLUTION/ DROPS OPHTHALMIC 2 TIMES DAILY
Qty: 60 EACH | Refills: 12 | Status: SHIPPED | OUTPATIENT
Start: 2020-09-25 | End: 2021-01-19 | Stop reason: SDUPTHER

## 2020-09-25 NOTE — PROGRESS NOTES
HPI     Dr Mcintosh/ Dr Conner pt / Dr. Jcak     No eye surgery    Glaucoma suspect OS   ZACK ou  Cataracts     Systane PRN OU      Patient here today with c/o blurry VA ou, fb sensation and tearing ou.    Last edited by Brittni Amador on 9/25/2020 11:33 AM. (History)            Assessment /Plan     For exam results, see Encounter Report.    Allergic conjunctivitis of both eyes    Keratoconjunctivitis sicca not specified as Sjogren's, bilateral  -     lifitegrast (XIIDRA) 5 % Dpet; Place 1 drop into both eyes 2 (two) times daily.  Dispense: 60 each; Refill: 12    Dry eye syndrome, bilateral  -     lifitegrast (XIIDRA) 5 % Dpet; Place 1 drop into both eyes 2 (two) times daily.  Dispense: 60 each; Refill: 12      ZACK  - trial of xiidra BID OU    Blepharitis / MGD  - warm compresses, LH    RE  - f/up optom    CAT OU  - pt wishes to have above addressed first

## 2020-10-01 ENCOUNTER — HOSPITAL ENCOUNTER (EMERGENCY)
Facility: HOSPITAL | Age: 75
Discharge: HOME | End: 2020-10-01
Attending: EMERGENCY MEDICINE
Payer: MEDICARE

## 2020-10-01 VITALS
SYSTOLIC BLOOD PRESSURE: 186 MMHG | HEART RATE: 71 BPM | RESPIRATION RATE: 18 BRPM | DIASTOLIC BLOOD PRESSURE: 88 MMHG | WEIGHT: 170 LBS | OXYGEN SATURATION: 95 % | HEIGHT: 62 IN | BODY MASS INDEX: 31.28 KG/M2 | TEMPERATURE: 99 F

## 2020-10-01 DIAGNOSIS — Z20.822 COVID-19 VIRUS NOT DETECTED: ICD-10-CM

## 2020-10-01 DIAGNOSIS — J06.9 UPPER RESPIRATORY TRACT INFECTION, UNSPECIFIED TYPE: ICD-10-CM

## 2020-10-01 DIAGNOSIS — H93.8X1 EAR PRESSURE, RIGHT: Primary | ICD-10-CM

## 2020-10-01 LAB
CTP QC/QA: YES
SARS-COV-2 RDRP RESP QL NAA+PROBE: NEGATIVE

## 2020-10-01 PROCEDURE — 99283 EMERGENCY DEPT VISIT LOW MDM: CPT | Mod: 25

## 2020-10-01 PROCEDURE — 99284 PR EMERGENCY DEPT VISIT,LEVEL IV: ICD-10-PCS | Mod: CS,,, | Performed by: PHYSICIAN ASSISTANT

## 2020-10-01 PROCEDURE — 99284 EMERGENCY DEPT VISIT MOD MDM: CPT | Mod: CS,,, | Performed by: PHYSICIAN ASSISTANT

## 2020-10-01 PROCEDURE — U0002 COVID-19 LAB TEST NON-CDC: HCPCS | Performed by: PHYSICIAN ASSISTANT

## 2020-10-01 RX ORDER — FLUTICASONE PROPIONATE 50 MCG
1 SPRAY, SUSPENSION (ML) NASAL 2 TIMES DAILY PRN
Qty: 15 G | Refills: 0 | Status: SHIPPED | OUTPATIENT
Start: 2020-10-01 | End: 2021-09-15 | Stop reason: SDUPTHER

## 2020-10-02 NOTE — ED PROVIDER NOTES
"Encounter Date: 10/1/2020       History     Chief Complaint   Patient presents with    Otalgia     c/o right ear pain. Pt states it feels like water is in it. Pain started last week     The patient presents to the ER with a chief complaint of right ear discomfort. She describes the discomfort as "pressure" in the ear, and the sensation of "echoes" in the ear. She states that she thinks she may have gotten water in her ear while showering. She denies pain, injury, hearing loss, vertigo, ringing, or drainage. She reports associated symptoms or post nasal drip and having to occasionally "clear phlegm out of my throat". Her symptoms began this week. She states that the degree is mild. She states that the course is constant and seems to be getting worse. She denies any additional symptoms. She denies any pre-arrival treatment attempted. She denies any fever or chills. She denies any known exposure to Covid.         Review of patient's allergies indicates:   Allergen Reactions    Compazine  [prochlorperazine edisylate]      Other reaction(s): SPASMS    Parafon forte      Other reaction(s): Hives     Past Medical History:   Diagnosis Date    Allergy     Arthritis     Coronary artery disease     in the past    Cystocele 1/19/2016    GERD (gastroesophageal reflux disease)     Hemorrhoids     History of cholelithiasis     Hyperlipidemia     Hypothyroidism     Obesity     Positive cardiac stress test 7/27/2016    Right shoulder pain 10/9/2012    Trigger finger, right 3rd finger 7/30/2014    Vaginal atrophy 1/19/2016    Vaginal vault prolapse 1/19/2016     Past Surgical History:   Procedure Laterality Date    ABLATION COLPOCLESIS      ADENOIDECTOMY      CHOLECYSTECTOMY      COLONOSCOPY N/A 11/28/2018    Procedure: COLONOSCOPY;  Surgeon: Richi Mcintosh MD;  Location: [address]);  Service: Endoscopy;  Laterality: N/A;    CORONARY STENT PLACEMENT      ESOPHAGOGASTRODUODENOSCOPY N/A 11/28/2018    " "Procedure: ESOPHAGOGASTRODUODENOSCOPY (EGD);  Surgeon: Richi Mcintosh MD;  Location: [facility] ([address] [place] [address]);  Service: Endoscopy;  Laterality: N/A;    heart cath  2005    non-obstructive disease    OOPHORECTOMY      SHOULDER ARTHROSCOPY W/ ROTATOR CUFF REPAIR      Right    TONSILLECTOMY      TOTAL ABDOMINAL HYSTERECTOMY      with BSO    TUBAL LIGATION       Family History   Problem Relation Age of Onset    Breast cancer Neg Hx     Ovarian cancer Neg Hx     Glaucoma Neg Hx     Cataracts Neg Hx     Diabetes Neg Hx     Macular degeneration Neg Hx     Retinal detachment Neg Hx     Melanoma Neg Hx     Cervical cancer Neg Hx     Endometrial cancer Neg Hx     Vaginal cancer Neg Hx      Social History     Tobacco Use    Smoking status: Never Smoker    Smokeless tobacco: Never Used   Substance Use Topics    Alcohol use: Yes     Alcohol/week: 0.0 standard drinks     Comment: "maybe a glass of wine every 6 months"    Drug use: No     Review of Systems   Constitutional: Negative for activity change, appetite change, chills, fatigue and fever.   HENT: Positive for congestion, ear pain, postnasal drip, rhinorrhea and sore throat. Negative for ear discharge, hearing loss, sinus pain, sneezing and trouble swallowing.    Eyes: Negative for redness.   Respiratory: Negative for cough, shortness of breath, wheezing and stridor.    Cardiovascular: Negative for chest pain.   Gastrointestinal: Negative for abdominal pain, diarrhea, nausea and vomiting.   Genitourinary: Negative for decreased urine volume, difficulty urinating, dysuria and flank pain.   Musculoskeletal: Negative for arthralgias, gait problem, myalgias, neck pain and neck stiffness.   Skin: Negative for color change and rash.   Allergic/Immunologic: Negative for immunocompromised state.   Neurological: Negative for dizziness, tremors, seizures, syncope, facial asymmetry, speech difficulty, weakness, light-headedness, numbness and headaches. " "  Hematological: Negative for adenopathy.   Psychiatric/Behavioral: Negative for confusion.       Physical Exam     Initial Vitals [10/01/20 1856]   BP Pulse Resp Temp SpO2   (!) 186/88 71 18 98.6 °F (37 °C) 95 %      MAP       --         Physical Exam    Nursing note and vitals reviewed.  Constitutional: She appears well-developed and well-nourished. She is not diaphoretic. No distress.   HENT:   Head: Normocephalic.   Mouth/Throat: Oropharynx is clear and moist.   Edematous nasal mucosa. Nasal congestion with scant clear rhinorrhea. Benign oropharynx. Mild TM bulging bilaterally w/o erythema, air-fluid levels, perforation, tenderness, or otorrhea.    Eyes: Conjunctivae are normal.   Neck: Normal range of motion. Neck supple.   Cardiovascular: Normal rate and intact distal pulses.   Pulmonary/Chest: Breath sounds normal. No respiratory distress. She has no wheezes.   No cough. No conversational dyspnea. No tachypnea or hypoxia.    Abdominal: Soft. She exhibits no distension. There is no abdominal tenderness.   Musculoskeletal: Normal range of motion.   Lymphadenopathy:     She has no cervical adenopathy.   Neurological: She is alert and oriented to person, place, and time. She has normal strength. No sensory deficit.   Skin: Skin is warm and dry.   Psychiatric: She has a normal mood and affect.         ED Course   Procedures  Labs Reviewed   SARS-COV-2 RDRP GENE     Results for orders placed or performed during the hospital encounter of 10/01/20   POCT COVID-19 Rapid Screening   Result Value Ref Range    POC Rapid COVID Negative Negative     Acceptable Yes             Imaging Results    None          Medical Decision Making:   History:   Old Medical Records: I decided to obtain old medical records.  Initial Assessment:   C/o ear pressure R > L, nasal congestion with post nasal drip, and having to "clear phlegm from throat" x 3-5 days   Differential Diagnosis:   Covid, URI, Sinusitis, Otitis media, " "Tinnitus, serrous otitis, otitis externa, pharyngitis, bronchitis, Rhinitis, Allergies, etc   Clinical Tests:   Lab Tests: Ordered and Reviewed  ED Management:  Covid negative   Exam and history consistent with Viral URI vs seasonal allergies - antibiotic not indicated at this time   Patient given Rx for Zyrtec and Flonase - later patient realized that she is already prescribed both medications but has not been using them - agrees to take as directed   Patient advised to follow up with her primary care physician in the next 2-3 days for re-evaluation and further management   Patient advised to return to the ER promptly if worse in any way     Patient later called desk and I spoke with her - she wanted to verify that "Cetirizine is the same drug as Zyrtec?" - I confirmed this for her - and to take 10 mg PO daily. She verbalized understanding and agreement                                Clinical Impression:     ICD-10-CM ICD-9-CM   1. Ear pressure, right  H93.8X1 388.8   2. Upper respiratory tract infection, unspecified type  J06.9 465.9   3. COVID-19 virus not detected  Z03.818 V71.83                      Disposition:   Disposition: Discharged  Condition: Stable     ED Disposition Condition    Discharge Stable        ED Prescriptions     Medication Sig Dispense Start Date End Date Auth. Provider    fluticasone propionate (FLONASE) 50 mcg/actuation nasal spray 1 spray (50 mcg total) by Each Nostril route 2 (two) times daily as needed. 15 g 10/1/2020  Willam Urbano PA-C        Follow-up Information     Follow up With Specialties Details Why Contact Info    Deya Luevano MD Internal Medicine Schedule an appointment as soon as possible for a visit in 2 days  [address]  [place] [address]  [phone]      [facility] Emergency Medicine  If symptoms worsen [address] [facility] [address] [phone]                                       Willam Urbano, " ISMA  10/03/20 0029

## 2020-10-02 NOTE — ED NOTES
"Patient reporting x1 day R ear "fullness and echoing" as well as post nasal drip, watery eyes, and sinus congestion.   "

## 2020-10-05 ENCOUNTER — OUTPATIENT CASE MANAGEMENT (OUTPATIENT)
Dept: ADMINISTRATIVE | Facility: OTHER | Age: 75
End: 2020-10-05

## 2021-01-14 ENCOUNTER — PATIENT OUTREACH (OUTPATIENT)
Dept: ADMINISTRATIVE | Facility: OTHER | Age: 76
End: 2021-01-14

## 2021-01-19 ENCOUNTER — OFFICE VISIT (OUTPATIENT)
Dept: OPHTHALMOLOGY | Facility: CLINIC | Age: 76
End: 2021-01-19
Payer: MEDICARE

## 2021-01-19 DIAGNOSIS — H25.13 NUCLEAR SCLEROSIS, BILATERAL: ICD-10-CM

## 2021-01-19 DIAGNOSIS — H16.223 KERATOCONJUNCTIVITIS SICCA NOT SPECIFIED AS SJOGREN'S, BILATERAL: Primary | ICD-10-CM

## 2021-01-19 DIAGNOSIS — H04.123 DRY EYE SYNDROME, BILATERAL: ICD-10-CM

## 2021-01-19 PROCEDURE — 92014 COMPRE OPH EXAM EST PT 1/>: CPT | Mod: S$PBB,,, | Performed by: OPHTHALMOLOGY

## 2021-01-19 PROCEDURE — 99999 PR PBB SHADOW E&M-EST. PATIENT-LVL I: CPT | Mod: PBBFAC,,, | Performed by: OPHTHALMOLOGY

## 2021-01-19 PROCEDURE — 99211 OFF/OP EST MAY X REQ PHY/QHP: CPT | Mod: PBBFAC | Performed by: OPHTHALMOLOGY

## 2021-01-19 PROCEDURE — 99999 PR PBB SHADOW E&M-EST. PATIENT-LVL I: ICD-10-PCS | Mod: PBBFAC,,, | Performed by: OPHTHALMOLOGY

## 2021-01-19 PROCEDURE — 92014 PR EYE EXAM, EST PATIENT,COMPREHESV: ICD-10-PCS | Mod: S$PBB,,, | Performed by: OPHTHALMOLOGY

## 2021-01-19 RX ORDER — LIFITEGRAST 50 MG/ML
1 SOLUTION/ DROPS OPHTHALMIC 2 TIMES DAILY
Qty: 60 EACH | Refills: 12 | Status: SHIPPED | OUTPATIENT
Start: 2021-01-19 | End: 2021-03-29 | Stop reason: ALTCHOICE

## 2021-01-20 ENCOUNTER — TELEPHONE (OUTPATIENT)
Dept: OPHTHALMOLOGY | Facility: CLINIC | Age: 76
End: 2021-01-20

## 2021-01-25 ENCOUNTER — LAB VISIT (OUTPATIENT)
Dept: LAB | Facility: HOSPITAL | Age: 76
End: 2021-01-25
Attending: INTERNAL MEDICINE
Payer: MEDICARE

## 2021-01-25 DIAGNOSIS — R73.9 HYPERGLYCEMIA: ICD-10-CM

## 2021-01-25 DIAGNOSIS — E78.5 HYPERLIPIDEMIA, UNSPECIFIED HYPERLIPIDEMIA TYPE: ICD-10-CM

## 2021-01-25 LAB
CHOLEST SERPL-MCNC: 164 MG/DL (ref 120–199)
CHOLEST/HDLC SERPL: 4.2 {RATIO} (ref 2–5)
ESTIMATED AVG GLUCOSE: 192 MG/DL (ref 68–131)
HBA1C MFR BLD HPLC: 8.3 % (ref 4–5.6)
HDLC SERPL-MCNC: 39 MG/DL (ref 40–75)
HDLC SERPL: 23.8 % (ref 20–50)
LDLC SERPL CALC-MCNC: 83.6 MG/DL (ref 63–159)
NONHDLC SERPL-MCNC: 125 MG/DL
TRIGL SERPL-MCNC: 207 MG/DL (ref 30–150)

## 2021-01-25 PROCEDURE — 83036 HEMOGLOBIN GLYCOSYLATED A1C: CPT

## 2021-01-25 PROCEDURE — 80061 LIPID PANEL: CPT

## 2021-01-25 PROCEDURE — 36415 COLL VENOUS BLD VENIPUNCTURE: CPT

## 2021-01-26 ENCOUNTER — OFFICE VISIT (OUTPATIENT)
Dept: OTOLARYNGOLOGY | Facility: CLINIC | Age: 76
End: 2021-01-26
Payer: MEDICARE

## 2021-01-26 VITALS — DIASTOLIC BLOOD PRESSURE: 72 MMHG | HEART RATE: 68 BPM | SYSTOLIC BLOOD PRESSURE: 134 MMHG

## 2021-01-26 DIAGNOSIS — H61.23 IMPACTED CERUMEN OF BOTH EARS: ICD-10-CM

## 2021-01-26 DIAGNOSIS — J31.0 CHRONIC RHINITIS: Primary | ICD-10-CM

## 2021-01-26 PROCEDURE — 69210 REMOVE IMPACTED EAR WAX UNI: CPT | Mod: 50,PBBFAC | Performed by: OTOLARYNGOLOGY

## 2021-01-26 PROCEDURE — 99213 OFFICE O/P EST LOW 20 MIN: CPT | Mod: PBBFAC | Performed by: OTOLARYNGOLOGY

## 2021-01-26 PROCEDURE — 99204 OFFICE O/P NEW MOD 45 MIN: CPT | Mod: 25,S$PBB,, | Performed by: OTOLARYNGOLOGY

## 2021-01-26 PROCEDURE — 99999 PR PBB SHADOW E&M-EST. PATIENT-LVL III: CPT | Mod: PBBFAC,,, | Performed by: OTOLARYNGOLOGY

## 2021-01-26 PROCEDURE — 99999 PR PBB SHADOW E&M-EST. PATIENT-LVL III: ICD-10-PCS | Mod: PBBFAC,,, | Performed by: OTOLARYNGOLOGY

## 2021-01-26 PROCEDURE — 99204 PR OFFICE/OUTPT VISIT, NEW, LEVL IV, 45-59 MIN: ICD-10-PCS | Mod: 25,S$PBB,, | Performed by: OTOLARYNGOLOGY

## 2021-01-26 PROCEDURE — 69210 EAR CERUMEN REMOVAL: ICD-10-PCS | Mod: S$PBB,,, | Performed by: OTOLARYNGOLOGY

## 2021-01-26 RX ORDER — AZELASTINE 1 MG/ML
1 SPRAY, METERED NASAL 2 TIMES DAILY
Qty: 30 ML | Refills: 2 | Status: SHIPPED | OUTPATIENT
Start: 2021-01-26 | End: 2021-06-08

## 2021-01-27 ENCOUNTER — TELEPHONE (OUTPATIENT)
Dept: INTERNAL MEDICINE | Facility: CLINIC | Age: 76
End: 2021-01-27

## 2021-01-27 DIAGNOSIS — E11.9 TYPE 2 DIABETES MELLITUS WITHOUT COMPLICATION, WITHOUT LONG-TERM CURRENT USE OF INSULIN: Primary | ICD-10-CM

## 2021-01-27 RX ORDER — METFORMIN HYDROCHLORIDE 500 MG/1
500 TABLET ORAL
Qty: 90 TABLET | Refills: 3 | Status: SHIPPED | OUTPATIENT
Start: 2021-01-27 | End: 2021-09-15 | Stop reason: SDUPTHER

## 2021-01-28 PROBLEM — E11.9 TYPE 2 DIABETES MELLITUS WITHOUT COMPLICATION, WITHOUT LONG-TERM CURRENT USE OF INSULIN: Status: ACTIVE | Noted: 2021-01-28

## 2021-02-03 DIAGNOSIS — E11.9 TYPE 2 DIABETES MELLITUS WITHOUT COMPLICATION: ICD-10-CM

## 2021-02-12 ENCOUNTER — CLINICAL SUPPORT (OUTPATIENT)
Dept: DIABETES | Facility: CLINIC | Age: 76
End: 2021-02-12
Payer: MEDICARE

## 2021-02-12 ENCOUNTER — TELEPHONE (OUTPATIENT)
Dept: INTERNAL MEDICINE | Facility: CLINIC | Age: 76
End: 2021-02-12

## 2021-02-12 DIAGNOSIS — E11.9 TYPE 2 DIABETES MELLITUS WITHOUT COMPLICATION, WITHOUT LONG-TERM CURRENT USE OF INSULIN: ICD-10-CM

## 2021-02-12 PROCEDURE — G0108 DIAB MANAGE TRN  PER INDIV: HCPCS | Mod: PBBFAC

## 2021-02-12 PROCEDURE — 99213 OFFICE O/P EST LOW 20 MIN: CPT | Mod: PBBFAC

## 2021-02-12 PROCEDURE — 99999 PR PBB SHADOW E&M-EST. PATIENT-LVL III: CPT | Mod: PBBFAC,,,

## 2021-02-12 PROCEDURE — 99999 PR PBB SHADOW E&M-EST. PATIENT-LVL III: ICD-10-PCS | Mod: PBBFAC,,,

## 2021-02-12 RX ORDER — LANCETS
EACH MISCELLANEOUS
Qty: 100 EACH | Refills: 3 | Status: SHIPPED | OUTPATIENT
Start: 2021-02-12

## 2021-02-12 RX ORDER — INSULIN PUMP SYRINGE, 3 ML
EACH MISCELLANEOUS
Qty: 1 EACH | Refills: 1 | Status: SHIPPED | OUTPATIENT
Start: 2021-02-12 | End: 2023-12-11

## 2021-03-02 ENCOUNTER — LAB VISIT (OUTPATIENT)
Dept: LAB | Facility: HOSPITAL | Age: 76
End: 2021-03-02
Attending: INTERNAL MEDICINE
Payer: MEDICARE

## 2021-03-02 DIAGNOSIS — E11.9 TYPE 2 DIABETES MELLITUS WITHOUT COMPLICATION, WITHOUT LONG-TERM CURRENT USE OF INSULIN: ICD-10-CM

## 2021-03-02 LAB
ESTIMATED AVG GLUCOSE: 157 MG/DL (ref 68–131)
HBA1C MFR BLD: 7.1 % (ref 4–5.6)

## 2021-03-02 PROCEDURE — 83036 HEMOGLOBIN GLYCOSYLATED A1C: CPT

## 2021-03-02 PROCEDURE — 36415 COLL VENOUS BLD VENIPUNCTURE: CPT

## 2021-03-04 ENCOUNTER — OFFICE VISIT (OUTPATIENT)
Dept: INTERNAL MEDICINE | Facility: CLINIC | Age: 76
End: 2021-03-04
Payer: MEDICARE

## 2021-03-04 VITALS
BODY MASS INDEX: 31.5 KG/M2 | SYSTOLIC BLOOD PRESSURE: 110 MMHG | HEIGHT: 62 IN | HEART RATE: 70 BPM | WEIGHT: 171.19 LBS | DIASTOLIC BLOOD PRESSURE: 70 MMHG

## 2021-03-04 DIAGNOSIS — E03.9 PRIMARY HYPOTHYROIDISM: ICD-10-CM

## 2021-03-04 DIAGNOSIS — E11.9 TYPE 2 DIABETES MELLITUS WITHOUT COMPLICATION, WITHOUT LONG-TERM CURRENT USE OF INSULIN: Primary | ICD-10-CM

## 2021-03-04 DIAGNOSIS — E78.5 HYPERLIPIDEMIA, UNSPECIFIED HYPERLIPIDEMIA TYPE: ICD-10-CM

## 2021-03-04 DIAGNOSIS — I70.0 AORTIC ATHEROSCLEROSIS: ICD-10-CM

## 2021-03-04 PROCEDURE — 99999 PR PBB SHADOW E&M-EST. PATIENT-LVL IV: CPT | Mod: PBBFAC,,, | Performed by: PHYSICIAN ASSISTANT

## 2021-03-04 PROCEDURE — 99999 PR PBB SHADOW E&M-EST. PATIENT-LVL IV: ICD-10-PCS | Mod: PBBFAC,,, | Performed by: PHYSICIAN ASSISTANT

## 2021-03-04 PROCEDURE — 99214 OFFICE O/P EST MOD 30 MIN: CPT | Mod: S$PBB,,, | Performed by: PHYSICIAN ASSISTANT

## 2021-03-04 PROCEDURE — 99214 PR OFFICE/OUTPT VISIT, EST, LEVL IV, 30-39 MIN: ICD-10-PCS | Mod: S$PBB,,, | Performed by: PHYSICIAN ASSISTANT

## 2021-03-04 PROCEDURE — 99214 OFFICE O/P EST MOD 30 MIN: CPT | Mod: PBBFAC | Performed by: PHYSICIAN ASSISTANT

## 2021-03-09 ENCOUNTER — TELEPHONE (OUTPATIENT)
Dept: INTERNAL MEDICINE | Facility: CLINIC | Age: 76
End: 2021-03-09

## 2021-03-09 ENCOUNTER — OFFICE VISIT (OUTPATIENT)
Dept: PODIATRY | Facility: CLINIC | Age: 76
End: 2021-03-09
Payer: MEDICARE

## 2021-03-09 VITALS
HEIGHT: 62 IN | WEIGHT: 173.75 LBS | BODY MASS INDEX: 31.97 KG/M2 | DIASTOLIC BLOOD PRESSURE: 78 MMHG | SYSTOLIC BLOOD PRESSURE: 123 MMHG | HEART RATE: 71 BPM

## 2021-03-09 DIAGNOSIS — E03.9 PRIMARY HYPOTHYROIDISM: ICD-10-CM

## 2021-03-09 DIAGNOSIS — E11.9 TYPE 2 DIABETES MELLITUS WITHOUT COMPLICATION, WITHOUT LONG-TERM CURRENT USE OF INSULIN: ICD-10-CM

## 2021-03-09 DIAGNOSIS — E11.9 TYPE 2 DIABETES MELLITUS WITHOUT COMPLICATION, WITHOUT LONG-TERM CURRENT USE OF INSULIN: Primary | ICD-10-CM

## 2021-03-09 PROCEDURE — 99213 OFFICE O/P EST LOW 20 MIN: CPT | Mod: S$PBB,,, | Performed by: PODIATRIST

## 2021-03-09 PROCEDURE — 99213 PR OFFICE/OUTPT VISIT, EST, LEVL III, 20-29 MIN: ICD-10-PCS | Mod: S$PBB,,, | Performed by: PODIATRIST

## 2021-03-09 PROCEDURE — 99999 PR PBB SHADOW E&M-EST. PATIENT-LVL III: CPT | Mod: PBBFAC,,, | Performed by: PODIATRIST

## 2021-03-09 PROCEDURE — 99213 OFFICE O/P EST LOW 20 MIN: CPT | Mod: PBBFAC | Performed by: PODIATRIST

## 2021-03-09 PROCEDURE — 99999 PR PBB SHADOW E&M-EST. PATIENT-LVL III: ICD-10-PCS | Mod: PBBFAC,,, | Performed by: PODIATRIST

## 2021-03-24 ENCOUNTER — PATIENT OUTREACH (OUTPATIENT)
Dept: ADMINISTRATIVE | Facility: OTHER | Age: 76
End: 2021-03-24

## 2021-03-26 ENCOUNTER — OFFICE VISIT (OUTPATIENT)
Dept: OPHTHALMOLOGY | Facility: CLINIC | Age: 76
End: 2021-03-26
Payer: MEDICARE

## 2021-03-26 DIAGNOSIS — H04.123 DRY EYE SYNDROME, BILATERAL: ICD-10-CM

## 2021-03-26 DIAGNOSIS — H25.13 NUCLEAR SCLEROSIS, BILATERAL: ICD-10-CM

## 2021-03-26 DIAGNOSIS — H16.223 KERATOCONJUNCTIVITIS SICCA NOT SPECIFIED AS SJOGREN'S, BILATERAL: Primary | ICD-10-CM

## 2021-03-26 PROCEDURE — 92014 COMPRE OPH EXAM EST PT 1/>: CPT | Mod: S$PBB,,, | Performed by: OPHTHALMOLOGY

## 2021-03-26 PROCEDURE — 99999 PR PBB SHADOW E&M-EST. PATIENT-LVL II: CPT | Mod: PBBFAC,,, | Performed by: OPHTHALMOLOGY

## 2021-03-26 PROCEDURE — 99212 OFFICE O/P EST SF 10 MIN: CPT | Mod: PBBFAC | Performed by: OPHTHALMOLOGY

## 2021-03-26 PROCEDURE — 92014 PR EYE EXAM, EST PATIENT,COMPREHESV: ICD-10-PCS | Mod: S$PBB,,, | Performed by: OPHTHALMOLOGY

## 2021-03-26 PROCEDURE — 99999 PR PBB SHADOW E&M-EST. PATIENT-LVL II: ICD-10-PCS | Mod: PBBFAC,,, | Performed by: OPHTHALMOLOGY

## 2021-03-29 DIAGNOSIS — H16.223 KERATOCONJUNCTIVITIS SICCA NOT SPECIFIED AS SJOGREN'S, BILATERAL: Primary | ICD-10-CM

## 2021-03-29 RX ORDER — CYCLOSPORINE 0.5 MG/ML
1 EMULSION OPHTHALMIC 2 TIMES DAILY
Qty: 60 EACH | Refills: 3 | Status: SHIPPED | OUTPATIENT
Start: 2021-03-29 | End: 2022-09-26

## 2021-04-05 RX ORDER — LEVOTHYROXINE SODIUM 75 UG/1
TABLET ORAL
Qty: 90 TABLET | Refills: 1 | Status: SHIPPED | OUTPATIENT
Start: 2021-04-05 | End: 2021-09-15 | Stop reason: SDUPTHER

## 2021-05-04 ENCOUNTER — TELEPHONE (OUTPATIENT)
Dept: INTERNAL MEDICINE | Facility: CLINIC | Age: 76
End: 2021-05-04

## 2021-05-04 ENCOUNTER — CLINICAL SUPPORT (OUTPATIENT)
Dept: DIABETES | Facility: CLINIC | Age: 76
End: 2021-05-04
Payer: MEDICARE

## 2021-05-04 DIAGNOSIS — E11.9 TYPE 2 DIABETES MELLITUS WITHOUT COMPLICATION, WITHOUT LONG-TERM CURRENT USE OF INSULIN: ICD-10-CM

## 2021-05-04 PROCEDURE — G0108 DIAB MANAGE TRN  PER INDIV: HCPCS | Mod: PBBFAC

## 2021-05-04 PROCEDURE — 99999 PR PBB SHADOW E&M-EST. PATIENT-LVL I: ICD-10-PCS | Mod: PBBFAC,,,

## 2021-05-04 PROCEDURE — 99211 OFF/OP EST MAY X REQ PHY/QHP: CPT | Mod: PBBFAC

## 2021-05-04 PROCEDURE — 99999 PR PBB SHADOW E&M-EST. PATIENT-LVL I: CPT | Mod: PBBFAC,,,

## 2021-05-14 ENCOUNTER — TELEPHONE (OUTPATIENT)
Dept: INTERNAL MEDICINE | Facility: CLINIC | Age: 76
End: 2021-05-14

## 2021-05-14 ENCOUNTER — PATIENT OUTREACH (OUTPATIENT)
Dept: DIABETES | Facility: CLINIC | Age: 76
End: 2021-05-14

## 2021-05-20 ENCOUNTER — TELEPHONE (OUTPATIENT)
Dept: INTERNAL MEDICINE | Facility: CLINIC | Age: 76
End: 2021-05-20

## 2021-06-01 ENCOUNTER — LAB VISIT (OUTPATIENT)
Dept: LAB | Facility: HOSPITAL | Age: 76
End: 2021-06-01
Attending: INTERNAL MEDICINE
Payer: MEDICARE

## 2021-06-01 DIAGNOSIS — E11.9 TYPE 2 DIABETES MELLITUS WITHOUT COMPLICATION, WITHOUT LONG-TERM CURRENT USE OF INSULIN: ICD-10-CM

## 2021-06-01 DIAGNOSIS — E03.9 PRIMARY HYPOTHYROIDISM: ICD-10-CM

## 2021-06-01 LAB
ESTIMATED AVG GLUCOSE: 123 MG/DL (ref 68–131)
HBA1C MFR BLD: 5.9 % (ref 4–5.6)
TSH SERPL DL<=0.005 MIU/L-ACNC: 0.77 UIU/ML (ref 0.4–4)

## 2021-06-01 PROCEDURE — 36415 COLL VENOUS BLD VENIPUNCTURE: CPT | Performed by: INTERNAL MEDICINE

## 2021-06-01 PROCEDURE — 83036 HEMOGLOBIN GLYCOSYLATED A1C: CPT | Performed by: INTERNAL MEDICINE

## 2021-06-01 PROCEDURE — 84443 ASSAY THYROID STIM HORMONE: CPT | Performed by: INTERNAL MEDICINE

## 2021-06-02 DIAGNOSIS — E11.9 TYPE 2 DIABETES MELLITUS WITHOUT COMPLICATION, WITHOUT LONG-TERM CURRENT USE OF INSULIN: ICD-10-CM

## 2021-06-30 RX ORDER — ATORVASTATIN CALCIUM 40 MG/1
TABLET, FILM COATED ORAL
Qty: 90 TABLET | Refills: 0 | Status: SHIPPED | OUTPATIENT
Start: 2021-06-30 | End: 2021-09-15 | Stop reason: SDUPTHER

## 2021-07-16 ENCOUNTER — PES CALL (OUTPATIENT)
Dept: ADMINISTRATIVE | Facility: CLINIC | Age: 76
End: 2021-07-16

## 2021-07-30 ENCOUNTER — PATIENT OUTREACH (OUTPATIENT)
Dept: ADMINISTRATIVE | Facility: OTHER | Age: 76
End: 2021-07-30

## 2021-07-30 DIAGNOSIS — Z12.31 ENCOUNTER FOR SCREENING MAMMOGRAM FOR MALIGNANT NEOPLASM OF BREAST: Primary | ICD-10-CM

## 2021-08-03 ENCOUNTER — OFFICE VISIT (OUTPATIENT)
Dept: OPHTHALMOLOGY | Facility: CLINIC | Age: 76
End: 2021-08-03
Payer: MEDICARE

## 2021-08-03 DIAGNOSIS — H10.13 ALLERGIC CONJUNCTIVITIS OF BOTH EYES: ICD-10-CM

## 2021-08-03 DIAGNOSIS — H25.13 NUCLEAR SCLEROSIS, BILATERAL: ICD-10-CM

## 2021-08-03 DIAGNOSIS — H04.123 DRY EYE SYNDROME, BILATERAL: ICD-10-CM

## 2021-08-03 DIAGNOSIS — H52.7 REFRACTIVE ERRORS: ICD-10-CM

## 2021-08-03 DIAGNOSIS — H16.223 KERATOCONJUNCTIVITIS SICCA NOT SPECIFIED AS SJOGREN'S, BILATERAL: Primary | ICD-10-CM

## 2021-08-03 PROCEDURE — 92015 PR REFRACTION: ICD-10-PCS | Mod: ,,, | Performed by: OPHTHALMOLOGY

## 2021-08-03 PROCEDURE — 92015 DETERMINE REFRACTIVE STATE: CPT | Mod: ,,, | Performed by: OPHTHALMOLOGY

## 2021-08-03 PROCEDURE — 99999 PR PBB SHADOW E&M-EST. PATIENT-LVL II: CPT | Mod: PBBFAC,,, | Performed by: OPHTHALMOLOGY

## 2021-08-03 PROCEDURE — 99214 PR OFFICE/OUTPT VISIT, EST, LEVL IV, 30-39 MIN: ICD-10-PCS | Mod: S$PBB,,, | Performed by: OPHTHALMOLOGY

## 2021-08-03 PROCEDURE — 99999 PR PBB SHADOW E&M-EST. PATIENT-LVL II: ICD-10-PCS | Mod: PBBFAC,,, | Performed by: OPHTHALMOLOGY

## 2021-08-03 PROCEDURE — 99212 OFFICE O/P EST SF 10 MIN: CPT | Mod: PBBFAC | Performed by: OPHTHALMOLOGY

## 2021-08-03 PROCEDURE — 99214 OFFICE O/P EST MOD 30 MIN: CPT | Mod: S$PBB,,, | Performed by: OPHTHALMOLOGY

## 2021-08-11 ENCOUNTER — PES CALL (OUTPATIENT)
Dept: ADMINISTRATIVE | Facility: CLINIC | Age: 76
End: 2021-08-11

## 2021-09-02 ENCOUNTER — NURSE TRIAGE (OUTPATIENT)
Dept: ADMINISTRATIVE | Facility: CLINIC | Age: 76
End: 2021-09-02

## 2021-09-14 ENCOUNTER — HOSPITAL ENCOUNTER (OUTPATIENT)
Dept: RADIOLOGY | Facility: HOSPITAL | Age: 76
Discharge: HOME | End: 2021-09-14
Attending: PODIATRIST
Payer: MEDICARE

## 2021-09-14 ENCOUNTER — OFFICE VISIT (OUTPATIENT)
Dept: PODIATRY | Facility: CLINIC | Age: 76
End: 2021-09-14
Payer: MEDICARE

## 2021-09-14 VITALS
WEIGHT: 167.13 LBS | DIASTOLIC BLOOD PRESSURE: 69 MMHG | BODY MASS INDEX: 30.76 KG/M2 | SYSTOLIC BLOOD PRESSURE: 136 MMHG | HEART RATE: 59 BPM | HEIGHT: 62 IN

## 2021-09-14 DIAGNOSIS — M79.675 TOE PAIN, LEFT: Primary | ICD-10-CM

## 2021-09-14 DIAGNOSIS — M79.675 TOE PAIN, LEFT: ICD-10-CM
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
## 2021-09-14 PROCEDURE — 73630 X-RAY EXAM OF FOOT: CPT | Mod: TC,LT

## 2021-09-14 PROCEDURE — 99213 OFFICE O/P EST LOW 20 MIN: CPT | Mod: PBBFAC | Performed by: PODIATRIST

## 2021-09-14 PROCEDURE — 99999 PR PBB SHADOW E&M-EST. PATIENT-LVL III: CPT | Mod: PBBFAC,,, | Performed by: PODIATRIST

## 2021-09-14 PROCEDURE — 73630 X-RAY EXAM OF FOOT: CPT | Mod: 26,LT,, | Performed by: RADIOLOGY

## 2021-09-14 PROCEDURE — 99999 PR PBB SHADOW E&M-EST. PATIENT-LVL III: ICD-10-PCS | Mod: PBBFAC,,, | Performed by: PODIATRIST

## 2021-09-14 PROCEDURE — 73630 XR FOOT COMPLETE 3 VIEW LEFT: ICD-10-PCS | Mod: 26,LT,, | Performed by: RADIOLOGY

## 2021-09-14 PROCEDURE — 99214 PR OFFICE/OUTPT VISIT, EST, LEVL IV, 30-39 MIN: ICD-10-PCS | Mod: S$PBB,,, | Performed by: PODIATRIST

## 2021-09-14 PROCEDURE — 99214 OFFICE O/P EST MOD 30 MIN: CPT | Mod: S$PBB,,, | Performed by: PODIATRIST

## 2021-09-15 ENCOUNTER — OFFICE VISIT (OUTPATIENT)
Dept: INTERNAL MEDICINE | Facility: CLINIC | Age: 76
End: 2021-09-15
Payer: MEDICARE

## 2021-09-15 VITALS
SYSTOLIC BLOOD PRESSURE: 128 MMHG | WEIGHT: 163.81 LBS | HEART RATE: 64 BPM | BODY MASS INDEX: 30.14 KG/M2 | HEIGHT: 62 IN | DIASTOLIC BLOOD PRESSURE: 76 MMHG | OXYGEN SATURATION: 97 %

## 2021-09-15 DIAGNOSIS — Z12.31 BREAST CANCER SCREENING BY MAMMOGRAM: ICD-10-CM

## 2021-09-15 DIAGNOSIS — Z23 NEEDS FLU SHOT: ICD-10-CM

## 2021-09-15 DIAGNOSIS — I25.10 CORONARY ARTERY DISEASE INVOLVING NATIVE CORONARY ARTERY OF NATIVE HEART WITHOUT ANGINA PECTORIS: ICD-10-CM

## 2021-09-15 DIAGNOSIS — Z23 NEED FOR SHINGLES VACCINE: ICD-10-CM

## 2021-09-15 DIAGNOSIS — E78.5 HYPERLIPIDEMIA, UNSPECIFIED HYPERLIPIDEMIA TYPE: ICD-10-CM

## 2021-09-15 DIAGNOSIS — K21.9 GASTROESOPHAGEAL REFLUX DISEASE, UNSPECIFIED WHETHER ESOPHAGITIS PRESENT: ICD-10-CM

## 2021-09-15 DIAGNOSIS — J31.0 CHRONIC RHINITIS: ICD-10-CM

## 2021-09-15 DIAGNOSIS — Z78.0 POSTMENOPAUSAL STATUS: ICD-10-CM

## 2021-09-15 DIAGNOSIS — E03.9 PRIMARY HYPOTHYROIDISM: ICD-10-CM

## 2021-09-15 DIAGNOSIS — Z00.00 ANNUAL PHYSICAL EXAM: Primary | ICD-10-CM

## 2021-09-15 DIAGNOSIS — I70.0 AORTIC ATHEROSCLEROSIS: ICD-10-CM

## 2021-09-15 DIAGNOSIS — G25.0 ESSENTIAL TREMOR: ICD-10-CM

## 2021-09-15 DIAGNOSIS — K76.0 FATTY LIVER: ICD-10-CM

## 2021-09-15 DIAGNOSIS — E11.9 TYPE 2 DIABETES MELLITUS WITHOUT COMPLICATION, WITHOUT LONG-TERM CURRENT USE OF INSULIN: ICD-10-CM

## 2021-09-15 PROCEDURE — 99999 PR PBB SHADOW E&M-EST. PATIENT-LVL IV: CPT | Mod: PBBFAC,,, | Performed by: INTERNAL MEDICINE

## 2021-09-15 PROCEDURE — 99214 OFFICE O/P EST MOD 30 MIN: CPT | Mod: PBBFAC | Performed by: INTERNAL MEDICINE

## 2021-09-15 PROCEDURE — 99214 OFFICE O/P EST MOD 30 MIN: CPT | Mod: S$PBB,,, | Performed by: INTERNAL MEDICINE

## 2021-09-15 PROCEDURE — 99999 PR PBB SHADOW E&M-EST. PATIENT-LVL IV: ICD-10-PCS | Mod: PBBFAC,,, | Performed by: INTERNAL MEDICINE

## 2021-09-15 PROCEDURE — 99214 PR OFFICE/OUTPT VISIT, EST, LEVL IV, 30-39 MIN: ICD-10-PCS | Mod: S$PBB,,, | Performed by: INTERNAL MEDICINE

## 2021-09-15 RX ORDER — AZELASTINE 1 MG/ML
1 SPRAY, METERED NASAL 2 TIMES DAILY
Qty: 30 ML | Refills: 11 | Status: SHIPPED | OUTPATIENT
Start: 2021-09-15 | End: 2022-08-01

## 2021-09-15 RX ORDER — FLUTICASONE PROPIONATE 50 MCG
1 SPRAY, SUSPENSION (ML) NASAL 2 TIMES DAILY PRN
Qty: 16 G | Refills: 11 | Status: SHIPPED | OUTPATIENT
Start: 2021-09-15 | End: 2022-11-18

## 2021-09-15 RX ORDER — ATORVASTATIN CALCIUM 40 MG/1
TABLET, FILM COATED ORAL
Qty: 90 TABLET | Refills: 3 | Status: SHIPPED | OUTPATIENT
Start: 2021-09-15 | End: 2022-09-26

## 2021-09-15 RX ORDER — METFORMIN HYDROCHLORIDE 500 MG/1
500 TABLET ORAL
Qty: 90 TABLET | Refills: 3 | Status: SHIPPED | OUTPATIENT
Start: 2021-09-15 | End: 2022-07-30

## 2021-09-15 RX ORDER — LEVOTHYROXINE SODIUM 75 UG/1
75 TABLET ORAL DAILY
Qty: 90 TABLET | Refills: 3 | Status: SHIPPED | OUTPATIENT
Start: 2021-09-15 | End: 2022-01-07 | Stop reason: SDUPTHER

## 2021-09-16 ENCOUNTER — LAB VISIT (OUTPATIENT)
Dept: LAB | Facility: HOSPITAL | Age: 76
End: 2021-09-16
Attending: INTERNAL MEDICINE
Payer: MEDICARE

## 2021-09-16 DIAGNOSIS — E03.9 PRIMARY HYPOTHYROIDISM: ICD-10-CM

## 2021-09-16 DIAGNOSIS — E11.9 TYPE 2 DIABETES MELLITUS WITHOUT COMPLICATION, WITHOUT LONG-TERM CURRENT USE OF INSULIN: ICD-10-CM

## 2021-09-16 LAB
ALBUMIN SERPL BCP-MCNC: 3.9 G/DL (ref 3.5–5.2)
ALP SERPL-CCNC: 96 U/L (ref 55–135)
ALT SERPL W/O P-5'-P-CCNC: 22 U/L (ref 10–44)
ANION GAP SERPL CALC-SCNC: 9 MMOL/L (ref 8–16)
AST SERPL-CCNC: 22 U/L (ref 10–40)
BILIRUB SERPL-MCNC: 1.3 MG/DL (ref 0.1–1)
BUN SERPL-MCNC: 19 MG/DL (ref 8–23)
CALCIUM SERPL-MCNC: 9.1 MG/DL (ref 8.7–10.5)
CHLORIDE SERPL-SCNC: 105 MMOL/L (ref 95–110)
CHOLEST SERPL-MCNC: 153 MG/DL (ref 120–199)
CHOLEST/HDLC SERPL: 4.3 {RATIO} (ref 2–5)
CO2 SERPL-SCNC: 27 MMOL/L (ref 23–29)
CREAT SERPL-MCNC: 0.9 MG/DL (ref 0.5–1.4)
EST. GFR  (AFRICAN AMERICAN): >60 ML/MIN/1.73 M^2
EST. GFR  (NON AFRICAN AMERICAN): >60 ML/MIN/1.73 M^2
ESTIMATED AVG GLUCOSE: 128 MG/DL (ref 68–131)
GLUCOSE SERPL-MCNC: 108 MG/DL (ref 70–110)
HBA1C MFR BLD: 6.1 % (ref 4–5.6)
HDLC SERPL-MCNC: 36 MG/DL (ref 40–75)
HDLC SERPL: 23.5 % (ref 20–50)
LDLC SERPL CALC-MCNC: 76.2 MG/DL (ref 63–159)
NONHDLC SERPL-MCNC: 117 MG/DL
POTASSIUM SERPL-SCNC: 4.3 MMOL/L (ref 3.5–5.1)
PROT SERPL-MCNC: 7.1 G/DL (ref 6–8.4)
SODIUM SERPL-SCNC: 141 MMOL/L (ref 136–145)
T4 FREE SERPL-MCNC: 0.86 NG/DL (ref 0.71–1.51)
TRIGL SERPL-MCNC: 204 MG/DL (ref 30–150)
TSH SERPL DL<=0.005 MIU/L-ACNC: 5.87 UIU/ML (ref 0.4–4)
TSH SERPL DL<=0.005 MIU/L-ACNC: 5.87 UIU/ML (ref 0.4–4)

## 2021-09-16 PROCEDURE — 84443 ASSAY THYROID STIM HORMONE: CPT | Performed by: INTERNAL MEDICINE

## 2021-09-16 PROCEDURE — 83036 HEMOGLOBIN GLYCOSYLATED A1C: CPT | Performed by: INTERNAL MEDICINE

## 2021-09-16 PROCEDURE — 36415 COLL VENOUS BLD VENIPUNCTURE: CPT | Performed by: INTERNAL MEDICINE

## 2021-09-16 PROCEDURE — 80053 COMPREHEN METABOLIC PANEL: CPT | Performed by: INTERNAL MEDICINE

## 2021-09-16 PROCEDURE — 80061 LIPID PANEL: CPT | Performed by: INTERNAL MEDICINE

## 2021-09-16 PROCEDURE — 84439 ASSAY OF FREE THYROXINE: CPT | Performed by: INTERNAL MEDICINE

## 2021-09-22 ENCOUNTER — HOSPITAL ENCOUNTER (OUTPATIENT)
Dept: RADIOLOGY | Facility: HOSPITAL | Age: 76
Discharge: HOME | End: 2021-09-22
Attending: INTERNAL MEDICINE
Payer: MEDICARE

## 2021-09-22 ENCOUNTER — HOSPITAL ENCOUNTER (OUTPATIENT)
Dept: RADIOLOGY | Facility: CLINIC | Age: 76
Discharge: HOME | End: 2021-09-22
Attending: INTERNAL MEDICINE
Payer: MEDICARE

## 2021-09-22 DIAGNOSIS — Z78.0 POSTMENOPAUSAL STATUS: ICD-10-CM

## 2021-09-22 DIAGNOSIS — Z12.31 BREAST CANCER SCREENING BY MAMMOGRAM: ICD-10-CM

## 2021-09-22 PROCEDURE — 77080 DEXA BONE DENSITY SPINE HIP: ICD-10-PCS | Mod: 26,,, | Performed by: INTERNAL MEDICINE

## 2021-09-22 PROCEDURE — 77063 BREAST TOMOSYNTHESIS BI: CPT | Mod: 26,,, | Performed by: RADIOLOGY

## 2021-09-22 PROCEDURE — 77067 MAMMO DIGITAL SCREENING BILAT WITH TOMO: ICD-10-PCS | Mod: 26,,, | Performed by: RADIOLOGY

## 2021-09-22 PROCEDURE — 77067 SCR MAMMO BI INCL CAD: CPT | Mod: 26,,, | Performed by: RADIOLOGY

## 2021-09-22 PROCEDURE — 77080 DXA BONE DENSITY AXIAL: CPT | Mod: TC

## 2021-09-22 PROCEDURE — 77067 SCR MAMMO BI INCL CAD: CPT | Mod: TC

## 2021-09-22 PROCEDURE — 77063 MAMMO DIGITAL SCREENING BILAT WITH TOMO: ICD-10-PCS | Mod: 26,,, | Performed by: RADIOLOGY

## 2021-09-22 PROCEDURE — 77080 DXA BONE DENSITY AXIAL: CPT | Mod: 26,,, | Performed by: INTERNAL MEDICINE

## 2021-10-12 ENCOUNTER — HOSPITAL ENCOUNTER (OUTPATIENT)
Dept: RADIOLOGY | Facility: HOSPITAL | Age: 76
Discharge: HOME | End: 2021-10-12
Attending: PODIATRIST
Payer: MEDICARE

## 2021-10-12 ENCOUNTER — OFFICE VISIT (OUTPATIENT)
Dept: PODIATRY | Facility: CLINIC | Age: 76
End: 2021-10-12
Payer: MEDICARE

## 2021-10-12 VITALS
DIASTOLIC BLOOD PRESSURE: 74 MMHG | HEIGHT: 62 IN | RESPIRATION RATE: 18 BRPM | HEART RATE: 66 BPM | WEIGHT: 163 LBS | BODY MASS INDEX: 30 KG/M2 | SYSTOLIC BLOOD PRESSURE: 123 MMHG

## 2021-10-12 DIAGNOSIS — M79.675 TOE PAIN, LEFT: Primary | ICD-10-CM

## 2021-10-12 DIAGNOSIS — M79.675 TOE PAIN, LEFT: ICD-10-CM

## 2021-10-12 DIAGNOSIS — M79.671 RIGHT FOOT PAIN: ICD-10-CM

## 2021-10-12 PROCEDURE — 99214 PR OFFICE/OUTPT VISIT, EST, LEVL IV, 30-39 MIN: ICD-10-PCS | Mod: S$PBB,,, | Performed by: PODIATRIST

## 2021-10-12 PROCEDURE — 73630 X-RAY EXAM OF FOOT: CPT | Mod: 26,LT,, | Performed by: RADIOLOGY

## 2021-10-12 PROCEDURE — 99214 OFFICE O/P EST MOD 30 MIN: CPT | Mod: S$PBB,,, | Performed by: PODIATRIST

## 2021-10-12 PROCEDURE — 99214 OFFICE O/P EST MOD 30 MIN: CPT | Mod: PBBFAC | Performed by: PODIATRIST

## 2021-10-12 PROCEDURE — 73630 XR FOOT COMPLETE 3 VIEW LEFT: ICD-10-PCS | Mod: 26,LT,, | Performed by: RADIOLOGY

## 2021-10-12 PROCEDURE — 73630 X-RAY EXAM OF FOOT: CPT | Mod: TC,RT

## 2021-10-12 PROCEDURE — 99999 PR PBB SHADOW E&M-EST. PATIENT-LVL IV: CPT | Mod: PBBFAC,,, | Performed by: PODIATRIST

## 2021-10-12 PROCEDURE — 73630 X-RAY EXAM OF FOOT: CPT | Mod: 26,RT,, | Performed by: RADIOLOGY

## 2021-10-12 PROCEDURE — 73630 XR FOOT COMPLETE 3 VIEW RIGHT: ICD-10-PCS | Mod: 26,RT,, | Performed by: RADIOLOGY

## 2021-10-12 PROCEDURE — 99999 PR PBB SHADOW E&M-EST. PATIENT-LVL IV: ICD-10-PCS | Mod: PBBFAC,,, | Performed by: PODIATRIST

## 2021-10-12 PROCEDURE — 73630 X-RAY EXAM OF FOOT: CPT | Mod: TC,LT

## 2021-10-20 ENCOUNTER — HOSPITAL ENCOUNTER (OUTPATIENT)
Dept: RADIOLOGY | Facility: HOSPITAL | Age: 76
Discharge: HOME | End: 2021-10-20
Attending: ORTHOPAEDIC SURGERY
Payer: MEDICARE

## 2021-10-20 ENCOUNTER — NURSE TRIAGE (OUTPATIENT)
Dept: ADMINISTRATIVE | Facility: CLINIC | Age: 76
End: 2021-10-20

## 2021-10-20 ENCOUNTER — OFFICE VISIT (OUTPATIENT)
Dept: ORTHOPEDICS | Facility: CLINIC | Age: 76
End: 2021-10-20
Payer: MEDICARE

## 2021-10-20 VITALS — WEIGHT: 164.25 LBS | HEIGHT: 62 IN | BODY MASS INDEX: 30.22 KG/M2

## 2021-10-20 DIAGNOSIS — M54.2 NECK PAIN ON LEFT SIDE: Primary | ICD-10-CM

## 2021-10-20 DIAGNOSIS — M50.30 DDD (DEGENERATIVE DISC DISEASE), CERVICAL: ICD-10-CM

## 2021-10-20 PROCEDURE — 99999 PR PBB SHADOW E&M-EST. PATIENT-LVL III: ICD-10-PCS | Mod: PBBFAC,,, | Performed by: ORTHOPAEDIC SURGERY

## 2021-10-20 PROCEDURE — 99204 OFFICE O/P NEW MOD 45 MIN: CPT | Mod: S$PBB,,, | Performed by: ORTHOPAEDIC SURGERY

## 2021-10-20 PROCEDURE — 99204 PR OFFICE/OUTPT VISIT, NEW, LEVL IV, 45-59 MIN: ICD-10-PCS | Mod: S$PBB,,, | Performed by: ORTHOPAEDIC SURGERY

## 2021-10-20 PROCEDURE — 72050 X-RAY EXAM NECK SPINE 4/5VWS: CPT | Mod: TC

## 2021-10-20 PROCEDURE — 99213 OFFICE O/P EST LOW 20 MIN: CPT | Mod: PBBFAC | Performed by: ORTHOPAEDIC SURGERY

## 2021-10-20 PROCEDURE — 99999 PR PBB SHADOW E&M-EST. PATIENT-LVL III: CPT | Mod: PBBFAC,,, | Performed by: ORTHOPAEDIC SURGERY

## 2021-10-20 PROCEDURE — 72050 X-RAY EXAM NECK SPINE 4/5VWS: CPT | Mod: 26,,, | Performed by: RADIOLOGY

## 2021-10-20 PROCEDURE — 72050 XR CERVICAL SPINE AP LAT WITH FLEX EXTEN: ICD-10-PCS | Mod: 26,,, | Performed by: RADIOLOGY

## 2021-10-20 RX ORDER — MELOXICAM 15 MG/1
15 TABLET ORAL DAILY
Qty: 60 TABLET | Refills: 1 | Status: SHIPPED | OUTPATIENT
Start: 2021-10-20 | End: 2022-01-07 | Stop reason: ALTCHOICE

## 2021-10-22 ENCOUNTER — HOSPITAL ENCOUNTER (EMERGENCY)
Facility: HOSPITAL | Age: 76
Discharge: HOME | End: 2021-10-22
Attending: EMERGENCY MEDICINE
Payer: MEDICARE

## 2021-10-22 ENCOUNTER — NURSE TRIAGE (OUTPATIENT)
Dept: ADMINISTRATIVE | Facility: CLINIC | Age: 76
End: 2021-10-22

## 2021-10-22 VITALS
OXYGEN SATURATION: 96 % | SYSTOLIC BLOOD PRESSURE: 124 MMHG | HEART RATE: 57 BPM | RESPIRATION RATE: 18 BRPM | DIASTOLIC BLOOD PRESSURE: 71 MMHG | TEMPERATURE: 98 F

## 2021-10-22 DIAGNOSIS — J34.89 NASAL PAIN: ICD-10-CM
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
## 2021-10-22 DIAGNOSIS — S09.93XA FACIAL INJURY, INITIAL ENCOUNTER: Primary | ICD-10-CM

## 2021-10-22 PROCEDURE — 25000003 PHARM REV CODE 250: Performed by: PHYSICIAN ASSISTANT

## 2021-10-22 PROCEDURE — 99284 EMERGENCY DEPT VISIT MOD MDM: CPT | Mod: 25

## 2021-10-22 PROCEDURE — 99282 EMERGENCY DEPT VISIT SF MDM: CPT | Mod: ,,, | Performed by: PHYSICIAN ASSISTANT

## 2021-10-22 PROCEDURE — 99282 PR EMERGENCY DEPT VISIT,LEVEL II: ICD-10-PCS | Mod: ,,, | Performed by: PHYSICIAN ASSISTANT

## 2021-10-22 RX ORDER — ACETAMINOPHEN 500 MG
1000 TABLET ORAL
Status: COMPLETED | OUTPATIENT
Start: 2021-10-22 | End: 2021-10-22

## 2021-10-22 RX ADMIN — ACETAMINOPHEN 1000 MG: 500 TABLET ORAL at 03:10

## 2021-11-11 ENCOUNTER — PES CALL (OUTPATIENT)
Dept: ADMINISTRATIVE | Facility: CLINIC | Age: 76
End: 2021-11-11
Payer: MEDICARE

## 2021-11-15 ENCOUNTER — PES CALL (OUTPATIENT)
Dept: ADMINISTRATIVE | Facility: CLINIC | Age: 76
End: 2021-11-15
Payer: MEDICARE

## 2021-11-30 ENCOUNTER — IMMUNIZATION (OUTPATIENT)
Dept: INTERNAL MEDICINE | Facility: CLINIC | Age: 76
End: 2021-11-30
Payer: MEDICARE

## 2021-11-30 DIAGNOSIS — Z23 NEED FOR VACCINATION: Primary | ICD-10-CM

## 2021-11-30 PROCEDURE — 0004A COVID-19, MRNA, LNP-S, PF, 30 MCG/0.3 ML DOSE VACCINE: CPT | Mod: PBBFAC,CV19

## 2021-12-14 ENCOUNTER — PES CALL (OUTPATIENT)
Dept: ADMINISTRATIVE | Facility: CLINIC | Age: 76
End: 2021-12-14
Payer: MEDICARE

## 2021-12-29 ENCOUNTER — TELEPHONE (OUTPATIENT)
Dept: INTERNAL MEDICINE | Facility: CLINIC | Age: 76
End: 2021-12-29
Payer: MEDICARE

## 2022-01-03 ENCOUNTER — OFFICE VISIT (OUTPATIENT)
Dept: INTERNAL MEDICINE | Facility: CLINIC | Age: 77
End: 2022-01-03
Payer: MEDICARE

## 2022-01-03 ENCOUNTER — LAB VISIT (OUTPATIENT)
Dept: PRIMARY CARE CLINIC | Facility: OTHER | Age: 77
End: 2022-01-03
Attending: INTERNAL MEDICINE
Payer: MEDICARE

## 2022-01-03 VITALS
BODY MASS INDEX: 31.08 KG/M2 | HEIGHT: 62 IN | WEIGHT: 168.88 LBS | HEART RATE: 65 BPM | OXYGEN SATURATION: 98 % | DIASTOLIC BLOOD PRESSURE: 78 MMHG | SYSTOLIC BLOOD PRESSURE: 114 MMHG

## 2022-01-03 DIAGNOSIS — I70.0 AORTIC ATHEROSCLEROSIS: ICD-10-CM

## 2022-01-03 DIAGNOSIS — G25.0 ESSENTIAL TREMOR: ICD-10-CM

## 2022-01-03 DIAGNOSIS — M54.41 CHRONIC LOW BACK PAIN WITH RIGHT-SIDED SCIATICA, UNSPECIFIED BACK PAIN LATERALITY: ICD-10-CM

## 2022-01-03 DIAGNOSIS — E03.9 PRIMARY HYPOTHYROIDISM: ICD-10-CM

## 2022-01-03 DIAGNOSIS — E11.69 TYPE 2 DIABETES MELLITUS WITH OBESITY: ICD-10-CM

## 2022-01-03 DIAGNOSIS — E78.5 HYPERLIPIDEMIA ASSOCIATED WITH TYPE 2 DIABETES MELLITUS: ICD-10-CM

## 2022-01-03 DIAGNOSIS — G47.33 OSA (OBSTRUCTIVE SLEEP APNEA): ICD-10-CM

## 2022-01-03 DIAGNOSIS — E11.9 TYPE 2 DIABETES MELLITUS WITHOUT COMPLICATION, WITHOUT LONG-TERM CURRENT USE OF INSULIN: ICD-10-CM

## 2022-01-03 DIAGNOSIS — E11.69 HYPERLIPIDEMIA ASSOCIATED WITH TYPE 2 DIABETES MELLITUS: ICD-10-CM

## 2022-01-03 DIAGNOSIS — E66.9 TYPE 2 DIABETES MELLITUS WITH OBESITY: ICD-10-CM

## 2022-01-03 DIAGNOSIS — M19.90 OSTEOARTHRITIS, UNSPECIFIED OSTEOARTHRITIS TYPE, UNSPECIFIED SITE: ICD-10-CM

## 2022-01-03 DIAGNOSIS — Z20.822 ENCOUNTER FOR LABORATORY TESTING FOR COVID-19 VIRUS: ICD-10-CM

## 2022-01-03 DIAGNOSIS — M85.80 OSTEOPENIA, UNSPECIFIED LOCATION: ICD-10-CM

## 2022-01-03 DIAGNOSIS — K21.9 GASTROESOPHAGEAL REFLUX DISEASE, UNSPECIFIED WHETHER ESOPHAGITIS PRESENT: ICD-10-CM

## 2022-01-03 DIAGNOSIS — G89.29 CHRONIC LOW BACK PAIN WITH RIGHT-SIDED SCIATICA, UNSPECIFIED BACK PAIN LATERALITY: ICD-10-CM

## 2022-01-03 DIAGNOSIS — R29.6 FALLS FREQUENTLY: ICD-10-CM

## 2022-01-03 DIAGNOSIS — Z00.00 ENCOUNTER FOR PREVENTIVE HEALTH EXAMINATION: Primary | ICD-10-CM

## 2022-01-03 PROCEDURE — 99999 PR PBB SHADOW E&M-EST. PATIENT-LVL V: CPT | Mod: PBBFAC,,, | Performed by: NURSE PRACTITIONER

## 2022-01-03 PROCEDURE — G0439 PPPS, SUBSEQ VISIT: HCPCS | Mod: ,,, | Performed by: NURSE PRACTITIONER

## 2022-01-03 PROCEDURE — U0003 INFECTIOUS AGENT DETECTION BY NUCLEIC ACID (DNA OR RNA); SEVERE ACUTE RESPIRATORY SYNDROME CORONAVIRUS 2 (SARS-COV-2) (CORONAVIRUS DISEASE [COVID-19]), AMPLIFIED PROBE TECHNIQUE, MAKING USE OF HIGH THROUGHPUT TECHNOLOGIES AS DESCRIBED BY CMS-2020-01-R: HCPCS | Performed by: INTERNAL MEDICINE

## 2022-01-03 PROCEDURE — 99999 PR PBB SHADOW E&M-EST. PATIENT-LVL V: ICD-10-PCS | Mod: PBBFAC,,, | Performed by: NURSE PRACTITIONER

## 2022-01-03 PROCEDURE — G0439 PR MEDICARE ANNUAL WELLNESS SUBSEQUENT VISIT: ICD-10-PCS | Mod: ,,, | Performed by: NURSE PRACTITIONER

## 2022-01-03 PROCEDURE — 99215 OFFICE O/P EST HI 40 MIN: CPT | Mod: PBBFAC | Performed by: NURSE PRACTITIONER

## 2022-01-03 RX ORDER — CHOLECALCIFEROL (VITAMIN D3) 25 MCG
1000 TABLET ORAL DAILY
COMMUNITY
End: 2022-01-07 | Stop reason: SDUPTHER

## 2022-01-03 NOTE — PROGRESS NOTES
"Lissy Palencia presented for a  Medicare AWV and comprehensive Health Risk Assessment today. The following components were reviewed and updated:    · Medical history  · Family History  · Social history  · Allergies and Current Medications  · Health Risk Assessment  · Health Maintenance  · Care Team         ** See Completed Assessments for Annual Wellness Visit within the encounter summary.**         The following assessments were completed:  · Living Situation  · CAGE  · Depression Screening  · Timed Get Up and Go  · Whisper Test  · Cognitive Function Screening    · Nutrition Screening  · ADL Screening  · PAQ Screening        Vitals:    01/03/22 0806 01/03/22 0822   BP:  114/78   BP Location:  Left arm   Patient Position:  Sitting   Pulse:  65   SpO2:  98%   Weight: 76.6 kg (168 lb 14 oz)    Height: 5' 2" (1.575 m)      Body mass index is 30.89 kg/m².     Physical Exam  Vitals reviewed.   Constitutional:       Appearance: Normal appearance.   HENT:      Head: Normocephalic.   Cardiovascular:      Rate and Rhythm: Normal rate and regular rhythm.      Pulses:           Dorsalis pedis pulses are 2+ on the right side and 2+ on the left side.        Posterior tibial pulses are 2+ on the right side and 2+ on the left side.   Pulmonary:      Effort: Pulmonary effort is normal.      Breath sounds: Normal breath sounds.   Abdominal:      General: Bowel sounds are normal.   Musculoskeletal:         General: Normal range of motion.      Cervical back: Normal range of motion.      Right lower leg: No edema.      Left lower leg: No edema.   Feet:      Right foot:      Protective Sensation: 7 sites tested. 7 sites sensed.      Skin integrity: Skin integrity normal.      Toenail Condition: Right toenails are normal.      Left foot:      Protective Sensation: 7 sites tested. 7 sites sensed.      Skin integrity: Skin integrity normal.      Toenail Condition: Left toenails are normal.   Skin:     General: Skin is warm and dry.      " Capillary Refill: Capillary refill takes less than 2 seconds.   Neurological:      Mental Status: She is alert and oriented to person, place, and time.   Psychiatric:         Behavior: Behavior normal.         Thought Content: Thought content normal.         Judgment: Judgment normal.               Diagnoses and health risks identified today and associated recommendations/orders:    1. Encounter for preventive health examination  Assessments completed.   recommendations reviewed. Urine today. Foot exam done. Referred to PT/OT for gait training d/t multiple falls.  F/u with PCP as instructed.    2. Aortic atherosclerosis  Chronic, stable on current regimen. Followed by PCP. On statin.    3. Type 2 diabetes mellitus without complication, without long-term current use of insulin  Chronic, stable on current regimen. Followed by PCP.  Lab Results   Component Value Date    HGBA1C 6.1 (H) 09/16/2021      4. Hyperlipidemia associated with type 2 diabetes mellitus  Chronic, stable on current regimen. Followed by PCP. On statin.    5. Type 2 diabetes mellitus with obesity  Chronic, stable on current regimen. Followed by PCP.    6. Primary hypothyroidism  Chronic, stable on current regimen. Followed by PCP.    7. Gastroesophageal reflux disease, unspecified whether esophagitis present  Chronic, stable on current regimen. Followed by PCP.    8. HEMAL (obstructive sleep apnea)  Chronic, stable on current regimen. Followed by PCP.    9. Osteoarthritis, unspecified osteoarthritis type, unspecified site  Chronic, stable on current regimen. Followed by ortho.    10. Chronic low back pain with right-sided sciatica, unspecified back pain laterality  Chronic, stable on current regimen. Followed by PCP.    11. Osteopenia, unspecified location  Chronic, stable on current regimen. Followed by PCP. DXA UTD.    12. Essential tremor, slight head vane  Chronic, stable on current regimen. Followed by PCP.    13. Falls frequently  Chronic,  stable. Reports falls were from boot when toe was fractured. Referred to PT/OT for gait training. Followed by PCP.  - Ambulatory referral/consult to Physical/Occupational Therapy; Future      Provided Lissy with a 5-10 year written screening schedule and personal prevention plan. Recommendations were developed using the USPSTF age appropriate recommendations. Education, counseling, and referrals were provided as needed. After Visit Summary printed and given to patient which includes a list of additional screenings\tests needed.    Follow up in about 1 year (around 1/3/2023) for Medicare AWV and with PCP as instructed.       Bernadette Reddy NP     I offered to discuss advanced care planning, including how to pick a person who would make decisions for you if you were unable to make them for yourself, called a health care power of , and what kind of decisions you might make such as use of life sustaining treatments such as ventilators and tube feeding when faced with a life limiting illness recorded on a living will that they will need to know. (How you want to be cared for as you near the end of your natural life)     X Patient is interested in learning more about how to make advanced directives.  I provided them paperwork and offered to discuss this with them.

## 2022-01-03 NOTE — PATIENT INSTRUCTIONS
1. Follow up with PCP as instructed.    2. Urine sample today.    3. Foot exam done.    4. Follow up with Dr. Deya Munroe MD about bone density medications. I will also send a message. They recommend Daily calcium intake 5114-2436 mg, dietary sources preferred; Vitamin D 0758-9381 IU daily.    Counseling and Referral of Other Preventative  (Italic type indicates deductible and co-insurance are waived)    Patient Name: Lissy Palencia  Today's Date: 1/3/2022    Health Maintenance       Date Due Completion Date    Diabetes Urine Screening Never done ---    Foot Exam Never done ---    Hemoglobin A1c 03/16/2022 9/16/2021    Eye Exam 03/26/2022 3/26/2021    Lipid Panel 09/16/2022 9/16/2021    Mammogram 09/22/2022 9/22/2021    Aspirin/Antiplatelet Therapy 10/20/2022 10/20/2021    DEXA SCAN 09/22/2023 9/22/2021    Override on 11/25/2009: Done    TETANUS VACCINE 01/03/2028 1/3/2018        Orders Placed This Encounter   Procedures    Ambulatory referral/consult to Physical/Occupational Therapy     The following information is provided to all patients.  This information is to help you find resources for any of the problems found today that may be affecting your health:                Living healthy guide: www.[facility].[place].gov      Understanding Diabetes: www.diabetes.org      Eating healthy: www.cdc.gov/healthyweight      [facility] home safety checklist: www.cdc.gov/steadi/patient.html      Agency on Aging: www.goea.[place].[facility]      Alcoholics anonymous (AA): www.aa.org      Physical Activity: www.[place].nih.gov/vo9epfe      Tobacco use: www.quitwithusla.org   Patient Education       Calcium and Vitamin D for Bone Health   The Basics   Written by the doctors and editors at UpToDate   Why are calcium and vitamin D important for bone health? -- Calcium and vitamin D are important for bone health because they can:  · Help keep bones strong  · Prevent bones from breaking easily, especially bones in the spine  · Help keep teeth  "healthy and strong  What can happen if people do not have healthy bones? -- People who do not have healthy bones can have a condition called "osteoporosis." Osteoporosis can cause bones to:  · Become thin and weak  · Break more easily - Bone breaks often occur in the spine, hip, and arm bones near the wrist.  Doctors can use different treatments for osteoporosis, including medicines. But it's also important to eat and drink foods that have calcium and vitamin D. Eating enough calcium and vitamin D is an important first step in preventing and treating osteoporosis.  What foods and drinks have calcium and vitamin D? -- Different foods and drinks have calcium and vitamin D (figure 1). Some foods and drinks have more calcium and vitamin D than others.  Foods and drinks that have a lot of calcium include:  · Milk, yogurt, cheese, cottage cheese, ice cream, and other dairy foods  · Green vegetables, such as kale, adrianna greens, and broccoli  · Certain nuts and breads  · Foods that have calcium added to them, such as juices, cereals, and soy products  Foods and drinks that have a lot of vitamin D include:  · Milk, orange juice, or yogurt with vitamin D added  · [place] or mackerel  · Canned tuna fish  · Cereals with vitamin D added  · Cod liver oil  Your body can also get vitamin D from the sun. The body uses sunlight that shines on the skin to make vitamin D. But doctors don't suggest spending a lot of time in the sun to get vitamin D. That's because getting too much sun can lead to serious problems, including skin cancer.  What are supplements? -- Supplements are pills, capsules, liquids, or tablets that have nutrients in them. Supplements are another way people can get calcium and vitamin D. Some supplements contain either calcium or vitamin D. Others have both.  Do I need to take calcium or vitamin D supplements? -- People who do not get enough calcium or vitamin D from their food and drink might need to take " supplements.  If your doctor recommends that you take calcium or vitamin D supplements, ask him or her which type, how much, and when to take the supplements. For example, some calcium supplements can be taken with food, but others should be taken on an empty stomach.  The type and dose of supplement that is right for you will also depend on your medical problems and the other medicines you take.  What are some common side effects of calcium supplements? -- Common side effects of calcium supplements can include:  · Constipation - People who are constipated can have trouble having bowel movements.  · Upset stomach  Many people find that they can reduce these side effects by splitting up their calcium dose. For example, they might take a few small doses of calcium each day instead of one large dose.  Taking calcium supplements can also increase a person's chance of getting kidney stones. Kidney stones are small, hard stone-like objects that can form in a person's kidneys.  How much calcium and vitamin D do I need each day? -- It depends, because each person is different. Ask your doctor or nurse how much calcium and vitamin D you need each day. Women who have gone through menopause and no longer get monthly periods usually need more calcium each day than women who still get monthly periods.  It is important not to take too much calcium or vitamin D. Taking too much calcium or vitamin D can cause problems.  All topics are updated as new evidence becomes available and our peer review process is complete.  This topic retrieved from [employer] on: Sep 21, 2021.  Topic 87543 Version 8.0  Release: 29.4.2 - C29.263  © 2021 UpToDate, Inc. and/or its affiliates. All rights reserved.  figure 1: Foods and drinks with calcium and vitamin D     Foods rich in calcium include ice cream, soy milk, breads, kale, broccoli, milk, cheese, cottage cheese, almonds, yogurt, ready-to-eat cereals, beans, and tofu. Foods rich in vitamin D  "include milk, fortified plant-based "milks" (soy, almond), canned tuna fish, cod liver oil, yogurt, ready-to-eat-cereals, cooked salmon, canned sardines, mackerel, and eggs. Some of these foods are rich in both.  Graphic 01941 Version 4.0    Consumer Information Use and Disclaimer   This information is not specific medical advice and does not replace information you receive from your health care provider. This is only a brief summary of general information. It does NOT include all information about conditions, illnesses, injuries, tests, procedures, treatments, therapies, discharge instructions or life-style choices that may apply to you. You must talk with your health care provider for complete information about your health and treatment options. This information should not be used to decide whether or not to accept your health care provider's advice, instructions or recommendations. Only your health care provider has the knowledge and training to provide advice that is right for you. The use of this information is governed by the [employer] End User License Agreement, available at https://www.[employer]/en/solutions/Painting [employer] Twist/about/mar.The use of [employer] content is governed by the [employer] Terms of Use. ©2021 [employer] All rights reserved.  Copyright   © 2021 UpToDate, Inc. and/or its affiliates. All rights reserved.    "

## 2022-01-03 NOTE — Clinical Note
Medicare AWV completed. Urine today. Foot exam done. Referred to PT/OT for gait training d/t multiple falls.  --Bernadette

## 2022-01-04 ENCOUNTER — TELEPHONE (OUTPATIENT)
Dept: INTERNAL MEDICINE | Facility: CLINIC | Age: 77
End: 2022-01-04
Payer: MEDICARE

## 2022-01-04 NOTE — TELEPHONE ENCOUNTER
----- Message from Bernadette Reddy NP sent at 1/3/2022 10:47 AM CST -----  Regarding: Osteporosis treatment  Would like Dr. Luevano to discuss osteoporosis medications with her. (Recommended on her last DXA scan, 9/2021). I told her she may need a visit to discuss. Can someone please contact her?    Thanks,  Bernadette Reddy NP  Internal Medicine

## 2022-01-07 ENCOUNTER — OFFICE VISIT (OUTPATIENT)
Dept: INTERNAL MEDICINE | Facility: CLINIC | Age: 77
End: 2022-01-07
Payer: MEDICARE

## 2022-01-07 DIAGNOSIS — E11.9 TYPE 2 DIABETES MELLITUS WITHOUT COMPLICATION, WITHOUT LONG-TERM CURRENT USE OF INSULIN: ICD-10-CM

## 2022-01-07 DIAGNOSIS — K21.9 GASTROESOPHAGEAL REFLUX DISEASE, UNSPECIFIED WHETHER ESOPHAGITIS PRESENT: ICD-10-CM

## 2022-01-07 DIAGNOSIS — M19.90 OSTEOARTHRITIS, UNSPECIFIED OSTEOARTHRITIS TYPE, UNSPECIFIED SITE: ICD-10-CM

## 2022-01-07 DIAGNOSIS — M81.0 OSTEOPOROSIS, UNSPECIFIED OSTEOPOROSIS TYPE, UNSPECIFIED PATHOLOGICAL FRACTURE PRESENCE: Primary | ICD-10-CM

## 2022-01-07 LAB
SARS-COV-2 RNA RESP QL NAA+PROBE: NORMAL
TEST PERFORMANCE INFO SPEC: NORMAL

## 2022-01-07 PROCEDURE — 99215 PR OFFICE/OUTPT VISIT, EST, LEVL V, 40-54 MIN: ICD-10-PCS | Mod: S$PBB,,, | Performed by: INTERNAL MEDICINE

## 2022-01-07 PROCEDURE — 99211 OFF/OP EST MAY X REQ PHY/QHP: CPT | Mod: PBBFAC | Performed by: INTERNAL MEDICINE

## 2022-01-07 PROCEDURE — 99999 PR PBB SHADOW E&M-EST. PATIENT-LVL I: ICD-10-PCS | Mod: PBBFAC,,, | Performed by: INTERNAL MEDICINE

## 2022-01-07 PROCEDURE — 99999 PR PBB SHADOW E&M-EST. PATIENT-LVL I: CPT | Mod: PBBFAC,,, | Performed by: INTERNAL MEDICINE

## 2022-01-07 PROCEDURE — 99215 OFFICE O/P EST HI 40 MIN: CPT | Mod: S$PBB,,, | Performed by: INTERNAL MEDICINE

## 2022-01-07 RX ORDER — LEVOTHYROXINE SODIUM 75 UG/1
TABLET ORAL
Qty: 90 TABLET | Refills: 3 | Status: SHIPPED | OUTPATIENT
Start: 2022-01-07 | End: 2022-04-05

## 2022-01-07 RX ORDER — ALENDRONATE SODIUM 70 MG/1
70 TABLET ORAL
Qty: 12 TABLET | Refills: 1 | Status: SHIPPED | OUTPATIENT
Start: 2022-01-07 | End: 2022-06-13 | Stop reason: SDUPTHER

## 2022-01-07 RX ORDER — CHOLECALCIFEROL (VITAMIN D3) 25 MCG
1000 TABLET ORAL DAILY
Qty: 90 TABLET | Refills: 3 | Status: SHIPPED | OUTPATIENT
Start: 2022-01-07

## 2022-01-07 RX ORDER — PANTOPRAZOLE SODIUM 40 MG/1
TABLET, DELAYED RELEASE ORAL
Qty: 90 TABLET | Refills: 3 | Status: SHIPPED | OUTPATIENT
Start: 2022-01-07 | End: 2022-07-30

## 2022-01-07 NOTE — PATIENT INSTRUCTIONS
"Patient Education       Osteoporosis   The Basics   Written by the doctors and editors at [facility]   What is osteoporosis? -- Osteoporosis is a disease that makes your bones weak. People with the disease can break their bones too easily. For instance, people with osteoporosis sometimes break a bone after falling down at home.  Breaking a bone can be serious, especially if the bone is in the hip. People who break a hip sometimes lose the ability to walk on their own. Many of them end up in a nursing home. That's why it is so important to avoid breaking a bone in the first place.  How do I know if I have osteoporosis? -- Osteoporosis does not cause symptoms until you break a bone. But your doctor or nurse can have you tested for it. The best test is a bone density test called the "DXA test." It is a special kind of X-ray.  Experts recommend bone density testing for women older than 65. That is because women in this age group have the highest risk of osteoporosis. Still, other people should sometimes be tested, too. Ask your doctor or nurse if you should be tested.  Some people learn that they have osteoporosis because they break a bone during a fall or a mild impact. This is called a "fragility fracture," because people with healthy bones should not break a bone that easily. People who have fragility fractures are at high risk of having other bones break.  What can I do to keep my bones as healthy as possible? -- You can:  · Eat foods with a lot of calcium, such as milk, yogurt, and green leafy vegetables (table 1 and figure 1)  · Eat foods with a lot of vitamin D, such as milk that has vitamin D added, and fish from the ocean  · Take calcium and vitamin D pills (if you do not get enough from the food that you eat)  · Be active for at least 30 minutes, most days of the week  · Avoid smoking   · Limit the amount of alcohol you drink to 1 to 2 drinks a day at most  Do your best to keep from falling, too -- It sounds " simple, but you can prevent a lot of fractures by reducing the chances of a fall. To do that:  · Make sure all your rugs have a no-slip backing to keep them in place  · Tuck away any electrical cords, so they are not in your way  · Light all walkways well  · Watch out for slippery floors  · Wear sturdy, comfortable shoes with rubber soles  · Have your eyes checked  · Ask your doctor or nurse to check whether any of your medicines might make you dizzy or increase your risk of falling  Can osteoporosis be treated? -- Yes, there are a few medicines to treat osteoporosis. These medicines can reduce the chances that you will break a bone.  Doctors and nurses usually suggest trying medicines called bisphosphonates first. If those medicines do not do enough or if they cause side effects that you cannot stand, there are other medicines to try.  How will I know the treatment is working? -- Doctors and nurses often order bone density tests to check if osteoporosis medicines are working. These are the same tests they use to find osteoporosis in the first place. Sometimes a blood or urine test is also needed.  All topics are updated as new evidence becomes available and our peer review process is complete.  This topic retrieved from [employer] on: Sep 21, 2021.  Topic 04633 Version 9.0  Release: 29.4.2 - C29.263  © 2021 UpToDate, Inc. and/or its affiliates. All rights reserved.  table 1: Foods and drinks with calcium  Food  Calcium in milligrams    Milk (skim, 2%, or whole; 8 oz [240 mL]) 300   Yogurt (6 oz [168 g]) 250   Orange juice (with calcium; 8 oz [240 mL]) 300   Tofu with calcium (0.5 cup [113 g]) 435   Cheese (1 oz [28 g]) 195 to 335 (hard cheese = higher calcium)   Cottage cheese (0.5 cup [113 g]) 130   Ice cream or frozen yogurt (0.5 cup [113 g]) 100   Soy milk (8 oz [240 mL]) 300   Beans (0.5 cup cooked [113 g]) 60 to 80   Dark, leafy green vegetables (0.5 cup cooked [113 g]) 50 to 135   Almonds (24 whole) 70   Bryant  "(1 medium) 60   Graphic 34041 Version 6.0  figure 1: Foods and drinks with calcium and vitamin D     Foods rich in calcium include ice cream, soy milk, breads, kale, broccoli, milk, cheese, cottage cheese, almonds, yogurt, ready-to-eat cereals, beans, and tofu. Foods rich in vitamin D include milk, fortified plant-based "milks" (soy, almond), canned tuna fish, cod liver oil, yogurt, ready-to-eat-cereals, cooked salmon, canned sardines, mackerel, and eggs. Some of these foods are rich in both.  Graphic 52555 Version 4.0    Consumer Information Use and Disclaimer   This information is not specific medical advice and does not replace information you receive from your health care provider. This is only a brief summary of general information. It does NOT include all information about conditions, illnesses, injuries, tests, procedures, treatments, therapies, discharge instructions or life-style choices that may apply to you. You must talk with your health care provider for complete information about your health and treatment options. This information should not be used to decide whether or not to accept your health care provider's advice, instructions or recommendations. Only your health care provider has the knowledge and training to provide advice that is right for you. The use of this information is governed by the [employer]([facility]) End User License Agreement, available at https://www.HelpMeRent.com.Converser/en/solutions/sellpoints/about/mar.The use of [employer]utor content is governed by the [employer]utor Terms of Use. ©2021 UpToDate, Inc. All rights reserved.  Copyright   © 2021 UpToDate, Inc. and/or its affiliates. All rights reserved.  Patient Education       Medicines for Osteoporosis   The Basics   Written by the doctors and editors at [facility]   What do osteoporosis medicines do? -- If you have osteoporosis or a high risk of breaking a bone, the medicines your doctor prescribes can:  · Reduce bone loss  · Increase bone density or keep it " "about the same  · Reduce the chances that you will break a bone  For the medicines to work, you must also take calcium and vitamin D supplements. Your doctor or nurse will tell you how much medicine to take and how often to take it.  Which medicines might I need? -- There are many different osteoporosis medicines. Your doctor will work with you to choose the best one for you.  The list below gives basic information on osteoporosis medicines. The table lists the names of some common osteoporosis medicines (table 1).   For more detailed information about your medicines, ask your doctor or nurse for information from [employer] available through [employer]. It explains how to use each medicine, describes its possible side effects, and lists other medicines or foods that can affect how it works.  Bisphosphonates -- Most people being treated for osteoporosis take these medicines first. If they do not work well enough or cause side effects that are too hard to handle, there are other options.  Bisphosphonates come in a pill or a shot. Most people take one pill every week. If your doctor prescribes a bisphosphonate pill, you must take the medicine exactly as directed. If you don't, the medicine can irritate your throat or stomach. For most bisphosphonate pills, you must:  · Take the pill first thing in the morning, before you have anything to eat or drink.  · Drink an 8-ounce glass of water with the pill, but not eat or drink anything else for 30 minutes or 1 hour (depending on which pill you take).  · Avoid lying down for 30 minutes after taking the pill. You must sit or stand during that time.  There is one bisphosphonate pill, delayed release risedronate (brand name: Atelvia), that is taken in a different way from the others. It is taken after breakfast with 4 ounces of water.  "Estrogen-like" medicines -- Medicines called selective estrogen receptor modifiers (or "SERMs") act like the hormone "estrogen." Estrogen helps " "prevent bone loss. After menopause, a woman's body has less estrogen. (Menopause is the time when a woman stops having periods.) SERMs can act like estrogen to stop bone loss. Some of them also reduce the risk of breast cancer in women at high risk. SERMs are only for women who have gone through menopause.  Hormone medicines -- These medicines are sometimes called "hormone replacement therapy" or "menopausal hormone therapy" (MHT). After menopause, a woman's body has lower levels of certain hormones. Some women take MHT to replace these hormones. MHT can also protect against osteoporosis.  Hormones are not used often to treat osteoporosis in women who have gone through menopause. This is because other medicines usually work much better. But MHT can help women who have bothersome symptoms related to menopause (such as hot flashes) and who have osteoporosis but cannot take other osteoporosis medicines.  Women who have not gone through menopause might take hormones in birth control pills or a patch to prevent osteoporosis.  Some men get osteoporosis because their bodies do not make enough of a hormone called "testosterone." If this happens, doctors can give testosterone to treat the osteoporosis.  PTH or PTHrP analog -- Both of these are artificial forms of hormones the body makes naturally. PTH stands for "parathyroid hormone," and PTHrP stands for "parathyroid hormone-related protein." Both tell the body to make new bone. They are usually only for people with severe osteoporosis.  Romosozumab -- Romosozumab is a medicine that blocks a protein in the body. This protein usually stops new bone from being formed. Blocking the protein allows the body to make new bone. Romosozumab is usually only for people with severe osteoporosis.  Denosumab -- Denosumab blocks a different protein in the body. This protein usually causes bone to break down. By blocking the protein, denosumab reduces bone loss and the chance of breaking a " bone. If other osteoporosis medicines cause bad side effects or do not help, your doctor might give you denosumab. It might also be a good choice for people with kidney problems. When you stop taking denosumab, your bone density goes down again very quickly. Some people might be at higher risk for breaking a bone when this happens. If you stop denosumab, your doctor will prescribe a different osteoporosis medicine to prevent rapid bone loss.  Calcitonin -- Calcitonin is a hormone the body makes naturally. Doctors can give a man-made form to treat osteoporosis. It is not used as often as other medicines because it does not work as well. But it can help relieve pain from broken bones in the spine. When used for broken bones in the spine, calcitonin should only be used until the pain is better (and not longer than 6 months). If you are put on calcitonin, after 6 months you should switch to a medicine that is better at preventing fractures.  How long do I need to take osteoporosis medicines? -- If you are at high risk for breaking a bone, you can safely take osteoporosis medicines for many years. If you are not at high risk for breaking a bone, you might be able to stop your medicine for a year or more. Your doctor will check your bone density to make sure you are not losing too much bone. If you do stop the medicine, you will probably need to start it again later.  What else should I know about medicines for osteoporosis? -- Some people have heard that taking bisphosphonates for a long time can increase the risk of breaking certain bones. This is true, but it happens very rarely. Your chances of breaking a bone from osteoporosis are much higher than your chances of breaking one because you take bisphosphonates.  If you take osteoporosis medicines, your doctor will do regular exams and tests to see how well the medicines are working. If they are not working well, you might need a different medicine.  All topics are  "updated as new evidence becomes available and our peer review process is complete.  This topic retrieved from [employer] on: Sep 21, 2021.  Topic 33011 Version 7.0  Release: 29.4.2 - C29.263  © 2021 UpToDate, Inc. and/or its affiliates. All rights reserved.  table 1: Medicines to treat or prevent osteoporosis     Generic name  Sample US brand names  How it's taken    Bisphosphonates  Alendronate Fosamax, Binosto 1 pill a day or 1 pill a week  You can also take this as a liquid or a pill that can dissolve in water    Risedronate Actonel, Atelvia 1 pill a day, 1 pill a week, or 1 pill a month    Ibandronate Boniva 1 pill a month, or 1 injection given in a vein every 3 months    Zoledronic acid Reclast 1 injection given in a vein every year   "Estrogen-like" medicines (SERMs)  Raloxifene Evista 1 pill a day    Bazedoxifene and conjugated equine estrogens Duavee  1 pill a day   Hormone medicines  Estrogen-progestin therapy Prempro, Premphase, Climara Pro, others 1 pill a day, or a patch you wear on your skin that is changed 1 time a week    Estrogen therapy Premarin, Cenestin, Climara, Menostar, Vivelle-Dot,  others 1 pill a day, or a patch you wear on your skin that is changed either 1 or 2 times a week    Birth control pills (for women before menopause) Many different brand names 1 pill a day    Testosterone (for men with testosterone deficiency) Many different forms and brand names Depends on form   Parathyroid hormone (PTH)  Teriparatide Forteo 1 injection under the skin every day   Parathyroid hormone-related protein (PTHrP) analog  Abaloparatide Tymlos 1 injection under the skin every day    Romosozumab Evenity 2 injections under the skin (one after the other) once a month   Denosumab  Denosumab Prolia 1 injection under the skin 2 times a year   Graphic 74803 Version 5.0  Consumer Information Use and Disclaimer   This information is not specific medical advice and does not replace information you receive from your " health care provider. This is only a brief summary of general information. It does NOT include all information about conditions, illnesses, injuries, tests, procedures, treatments, therapies, discharge instructions or life-style choices that may apply to you. You must talk with your health care provider for complete information about your health and treatment options. This information should not be used to decide whether or not to accept your health care provider's advice, instructions or recommendations. Only your health care provider has the knowledge and training to provide advice that is right for you. The use of this information is governed by the [employer] End User License Agreement, available at https://www.We Heart It/en/solutions/[employer]/about/mar.The use of [employer] content is governed by the [employer] Terms of Use. ©2021 UpToDate, Inc. All rights reserved.  Copyright   © 2021 UpToDate, Inc. and/or its affiliates. All rights reserved.  Patient Education       Alendronate (a SHAR hoff)   Brand Names:  Binosto; Fosamax   Brand Names: Lynnette ACH-Alendronate; ACT Alendronate [DSC]; AG-Alendronate; Alendronate-70; APO-Alendronate; Auro-Alendronate; DOM-Alendronate; DOM-Alendronate-FC; Fosamax; GEN-Alendronate; JAMP-Alendronate; MINT-Alendronate; MYLAN-Alendronate [DSC]; PMS-Alendronate; PMS-Alendronate-FC; RAN-Alendronate; ADAMS-Alendronate; SANDOZ Alendronate; TEVA-Alendronate; VAN-Alendronate [DSC]   What is this drug used for?   · It is used to prevent or treat soft, brittle bones (osteoporosis).  · It is used to treat Paget's disease.  · It may be given to you for other reasons. Talk with the doctor.    What do I need to tell my doctor BEFORE I take this drug?   · If you are allergic to this drug; any part of this drug; or any other drugs, foods, or substances. Tell your doctor about the allergy and what signs you had.  · If you have any of these health problems: Esophagus problems, trouble  swallowing, low calcium levels, or kidney disease.  · If you are not able to stand or sit up for 30 minutes.  This is not a list of all drugs or health problems that interact with this drug.  Tell your doctor and pharmacist about all of your drugs (prescription or OTC, natural products, vitamins) and health problems. You must check to make sure that it is safe for you to take this drug with all of your drugs and health problems. Do not start, stop, or change the dose of any drug without checking with your doctor.  What are some things I need to know or do while I take this drug?   For all patients taking this drug:   · Tell all of your health care providers that you take this drug. This includes your doctors, nurses, pharmacists, and dentists.  · Severe esophagus problems like irritation, swelling, ulcers, and bleeding have happened with this drug. Talk with the doctor.  · Worsening of asthma has happened in people taking drugs like this one. Talk with the doctor.  · This drug may raise the chance of a broken leg. Talk with the doctor.  · Have a bone density test as you have been told by your doctor. Talk with your doctor.  · This drug works best when used with calcium/vitamin D and weight-bearing workouts like walking or PT (physical therapy).  · Follow the diet and workout plan that your doctor told you about.  · Have a dental exam before starting this drug.  · Take good care of your teeth. See a dentist often.  · Talk with your doctor before you drink alcohol.  · If you smoke, talk with your doctor.  · If you are on a low-sodium or sodium-free diet, talk with your doctor. Some of these products have sodium.  · If you are pregnant or you get pregnant while taking this drug, call your doctor right away.  · Tell your doctor if you are breast-feeding. You will need to talk about any risks to your baby.  Children:   · This drug is not approved for use in children. However, the doctor may decide the benefits of taking  this drug outweigh the risks. If your child has been given this drug, ask the doctor for information about the benefits and risks. Talk with the doctor if you have questions about giving this drug to your child.  What are some side effects that I need to call my doctor about right away?   WARNING/CAUTION: Even though it may be rare, some people may have very bad and sometimes deadly side effects when taking a drug. Tell your doctor or get medical help right away if you have any of the following signs or symptoms that may be related to a very bad side effect:  · Signs of an allergic reaction, like rash; hives; itching; red, swollen, blistered, or peeling skin with or without fever; wheezing; tightness in the chest or throat; trouble breathing, swallowing, or talking; unusual hoarseness; or swelling of the mouth, face, lips, tongue, or throat.  · Signs of low calcium levels like muscle cramps or spasms, numbness and tingling, or seizures.  · Black, tarry, or bloody stools.  · Chest pain.  · Coughing up blood.  · Heartburn.  · Trouble swallowing.  · Very bad pain when swallowing.  · Sore throat.  · Throwing up blood or throw up that looks like coffee grounds.  · Very bad bone, joint, or muscle pain.  · Any new or strange groin, hip, or thigh pain.  · Mouth sores.  · This drug may cause jawbone problems. The risk may be higher with longer use, cancer, dental problems, ill-fitting dentures, anemia, blood clotting problems, or infection. It may also be higher if you have dental work, chemo, radiation, or take other drugs that may cause jawbone problems. Many drugs can do this. Talk with your doctor if any of these apply to you, or if you have questions. Call your doctor right away if you have jaw swelling or pain.  What are some other side effects of this drug?   All drugs may cause side effects. However, many people have no side effects or only have minor side effects. Call your doctor or get medical help if any of these  side effects or any other side effects bother you or do not go away:  · Constipation, diarrhea, stomach pain, upset stomach, or throwing up.  · Headache.  · Muscle or joint pain.  These are not all of the side effects that may occur. If you have questions about side effects, call your doctor. Call your doctor for medical advice about side effects.  You may report side effects to your national health agency.  You may report side effects to the FDA at 1-122.499.5408. You may also report side effects at https://www.fda.gov/medwatch.  How is this drug best taken?   Use this drug as ordered by your doctor. Read all information given to you. Follow all instructions closely.  All products:   · Take on an empty stomach before breakfast.  · Take at least 30 minutes before the first food, drink, or drugs of the day.  · Do not lie down for at least 30 minutes after taking this drug and until you eat your first food of the day.  · Keep taking this drug as you have been told by your doctor or other health care provider, even if you feel well.  Tablets:   · Take with a full glass of water.  · Take with plain water only. Avoid taking with mineral water, milk, or other drinks.  · Swallow whole. Do not chew, break, or crush.  · Do not suck on this product.  Effervescent tablets:   · Dissolve the effervescent tablet in 1/2 glass (4 ounces/120 mL) of room temperature water only. Do not dissolve in cold or hot water. Do not swallow the tablet whole. Do not chew the tablet or let the tablet dissolve in your mouth.  · Do not suck on this product.  · When the water stops bubbling, wait at least 5 more minutes then stir for about 10 seconds and drink.  · Take with plain water only. Avoid taking with mineral water, milk, or other drinks.  Liquid (solution):   · Drink 1/4 cup of water after taking your full dose.  · Take with plain water only. Avoid taking with mineral water, milk, or other drinks.  · Measure liquid doses carefully. Use the  measuring device that comes with this drug. If there is none, ask the pharmacist for a device to measure this drug.  What do I do if I miss a dose?   · Take the missed dose on the next morning after you think about it and then go back to your normal time.  · Do not take 2 doses on the same day.    How do I store and/or throw out this drug?   All products:   · Store at room temperature in a dry place. Do not store in a bathroom.  · Keep all drugs in a safe place. Keep all drugs out of the reach of children and pets.  · Throw away unused or  drugs. Do not flush down a toilet or pour down a drain unless you are told to do so. Check with your pharmacist if you have questions about the best way to throw out drugs. There may be drug take-back programs in your area.  Effervescent tablets:   · Store in original container.  Liquid (solution):   · Do not freeze.  General drug facts   · If your symptoms or health problems do not get better or if they become worse, call your doctor.  · Do not share your drugs with others and do not take anyone else's drugs.  · Some drugs may have another patient information leaflet. If you have any questions about this drug, please talk with your doctor, nurse, pharmacist, or other health care provider.  · This drug comes with an extra patient fact sheet called a Medication Guide. Read it with care. Read it again each time this drug is refilled. If you have any questions about this drug, please talk with the doctor, pharmacist, or other health care provider.  · If you think there has been an overdose, call your poison control center or get medical care right away. Be ready to tell or show what was taken, how much, and when it happened.    Consumer Information Use and Disclaimer   This generalized information is a limited summary of diagnosis, treatment, and/or medication information. It is not meant to be comprehensive and should be used as a tool to help the user understand and/or  assess potential diagnostic and treatment options. It does NOT include all information about conditions, treatments, medications, side effects, or risks that may apply to a specific patient. It is not intended to be medical advice or a substitute for the medical advice, diagnosis, or treatment of a health care provider based on the health care provider's examination and assessment of a patient's specific and unique circumstances. Patients must speak with a health care provider for complete information about their health, medical questions, and treatment options, including any risks or benefits regarding use of medications. This information does not endorse any treatments or medications as safe, effective, or approved for treating a specific patient. [employer] and its affiliates disclaim any warranty or liability relating to this information or the use thereof. The use of this information is governed by the Terms of Use, available at https://www.[employer].statusboom/en/solutions/lexicomp/about/mar.  Last Reviewed Date   2020-07-31  Copyright   © 2021 [employer] and its affiliates and/or licensors. All rights reserved.    Once weekly, Monday when you first wake up take the Alendronate tablet with 8 oz glass of water. Stay up, walk around the house, morning exrecise,  around the house, read the paper. Then after an hour take your thyroid tablet, 30 minutes later your pantoprazole.

## 2022-01-15 NOTE — PROGRESS NOTES
Subjective:       Patient ID: Lissy Palencia is a 76 y.o. female.    Chief Complaint: No chief complaint on file.    This dictation was performed using using MModal.   She presents the office today with her   She has many concerns  She is having a lot of arthritis pain  She is not sleeping well  She is irritable  She is having trouble with acid reflux  This visit took nearly an hour  I kept trying to get her back to the topic of osteoporosis and fracture prevention.  Her mother lived to be nearly 100.  Diabetes Management Status    Statin: Taking  ACE/ARB: Not taking    Screening or Prevention Patient's value Goal Complete/Controlled?   HgA1C Testing and Control   Lab Results   Component Value Date    HGBA1C 6.1 (H) 09/16/2021      Annually/Less than 8% Yes   Lipid profile : 09/16/2021 Annually Yes   LDL control Lab Results   Component Value Date    LDLCALC 76.2 09/16/2021    Annually/Less than 100 mg/dl  Yes   Nephropathy screening Lab Results   Component Value Date    LABMICR <5.0 01/03/2022     Lab Results   Component Value Date    PROTEINUA Negative 11/14/2019     No results found for: UTPCR   Annually Yes   Blood pressure BP Readings from Last 1 Encounters:   01/03/22 114/78    Less than 140/90 Yes   Dilated retinal exam : 03/26/2021 Annually Yes   Foot exam   : 01/03/2022 Annually Yes        HPI  Review of Systems  Review of systems is negative unless noted.  Objective:      Physical Exam  Vitals reviewed.   Constitutional:       Appearance: She is well-nourished.   HENT:      Head: Atraumatic.   Eyes:      General: No scleral icterus.     Conjunctiva/sclera: Conjunctivae normal.   Cardiovascular:      Rate and Rhythm: Normal rate and regular rhythm.   Pulmonary:      Effort: Pulmonary effort is normal.      Breath sounds: Normal breath sounds.   Abdominal:      Palpations: Abdomen is soft.      Tenderness: There is no abdominal tenderness.   Musculoskeletal:         General: No edema.      Cervical  back: Neck supple.   Lymphadenopathy:      Cervical: No cervical adenopathy.   Skin:     General: Skin is warm and dry.   Neurological:      Mental Status: She is alert.   Psychiatric:         Mood and Affect: Mood and affect normal.         Behavior: Behavior normal.         Assessment:       1. Osteoporosis    2. Gastroesophageal reflux disease, unspecified whether esophagitis present    3. Osteoarthritis, unspecified osteoarthritis type, unspecified site    4. Type 2 diabetes mellitus without complication, without long-term current use of insulin        Plan:   Diagnoses and all orders for this visit:    Osteoporosis, I hope that I have convinced her to try an oral biphosphonate for the prevention of future fractures.  It was a very hard sell.  Explained that oral biphosphonate prevent fractures by 50% which is why seatbelts are required to be worn in all 50 states because they reduce traffic fatalities by 50%.    Gastroesophageal reflux disease, unspecified whether esophagitis present, we talked about nonpharmacologic ways to reduce acid reflux.  Eating slowly very important.  -     pantoprazole (PROTONIX) 40 MG tablet; take one tablet by mouth about 30 minutes after your levothyroxine    Osteoarthritis, unspecified osteoarthritis type, unspecified site, stop taking meloxicam with your age and comorbidities it is not a safe drug to be taking on a chronic basis.    Type 2 diabetes mellitus without complication, without long-term current use of insulin, she said she is taking metformin    Other orders  -     alendronate (FOSAMAX) 70 MG tablet; Take 1 tablet (70 mg total) by mouth every 7 days. Mondays.Then after an hour take your thyroid tablet, 30 minutes later your pantoprazole.  -     levothyroxine (SYNTHROID) 75 MCG tablet; Take first thing in the morning on an empty stomach  -     vitamin D (VITAMIN D3) 1000 units Tab; Take 1 tablet (1,000 Units total) by mouth once daily.      Medication List with  Changes/Refills   New Medications    ALENDRONATE (FOSAMAX) 70 MG TABLET    Take 1 tablet (70 mg total) by mouth every 7 days. Mondays.Then after an hour take your thyroid tablet, 30 minutes later your pantoprazole.    PANTOPRAZOLE (PROTONIX) 40 MG TABLET    take one tablet by mouth about 30 minutes after your levothyroxine   Current Medications    ASPIRIN 81 MG CHEW    Take 1 tablet (81 mg total) by mouth once daily.    ATORVASTATIN (LIPITOR) 40 MG TABLET    TAKE 1 TABLET ONCE DAILY    AZELASTINE (ASTELIN) 137 MCG (0.1 %) NASAL SPRAY    1 spray (137 mcg total) by Nasal route 2 (two) times daily.    BLOOD SUGAR DIAGNOSTIC STRP    To check BG ONCE daily, to use with insurance preferred meter    BLOOD-GLUCOSE METER KIT    To check BG ONCE THE daily, to use with insurance preferred meter    CYCLOSPORINE (RESTASIS) 0.05 % OPHTHALMIC EMULSION    Place 1 drop into both eyes 2 (two) times daily.    FLUTICASONE PROPIONATE (FLONASE) 50 MCG/ACTUATION NASAL SPRAY    1 spray (50 mcg total) by Each Nostril route 2 (two) times daily as needed.    LANCETS MISC    To check BG ONCE daily, to use with insurance preferred meter    METFORMIN (GLUCOPHAGE) 500 MG TABLET    Take 1 tablet (500 mg total) by mouth daily with breakfast.   Changed and/or Refilled Medications    Modified Medication Previous Medication    LEVOTHYROXINE (SYNTHROID) 75 MCG TABLET levothyroxine (SYNTHROID) 75 MCG tablet       Take first thing in the morning on an empty stomach    Take 1 tablet (75 mcg total) by mouth once daily.    VITAMIN D (VITAMIN D3) 1000 UNITS TAB vitamin D (VITAMIN D3) 1000 units Tab       Take 1 tablet (1,000 Units total) by mouth once daily.    Take 1,000 Units by mouth once daily.   Discontinued Medications    MELOXICAM (MOBIC) 15 MG TABLET    Take 1 tablet (15 mg total) by mouth once daily.

## 2022-02-28 ENCOUNTER — HOSPITAL ENCOUNTER (EMERGENCY)
Facility: HOSPITAL | Age: 77
Discharge: HOME | End: 2022-02-28
Attending: EMERGENCY MEDICINE
Payer: MEDICARE

## 2022-02-28 VITALS
RESPIRATION RATE: 16 BRPM | DIASTOLIC BLOOD PRESSURE: 62 MMHG | SYSTOLIC BLOOD PRESSURE: 125 MMHG | WEIGHT: 170 LBS | HEART RATE: 61 BPM | OXYGEN SATURATION: 98 % | TEMPERATURE: 99 F | BODY MASS INDEX: 31.09 KG/M2

## 2022-02-28 DIAGNOSIS — S39.92XA INJURY OF COCCYX, INITIAL ENCOUNTER: ICD-10-CM
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
## 2022-02-28 DIAGNOSIS — R10.2 PELVIC PAIN: ICD-10-CM

## 2022-02-28 PROCEDURE — 99284 EMERGENCY DEPT VISIT MOD MDM: CPT | Mod: 25

## 2022-02-28 PROCEDURE — 25000003 PHARM REV CODE 250: Performed by: PHYSICIAN ASSISTANT

## 2022-02-28 PROCEDURE — 99284 PR EMERGENCY DEPT VISIT,LEVEL IV: ICD-10-PCS | Mod: ,,, | Performed by: PHYSICIAN ASSISTANT

## 2022-02-28 PROCEDURE — 99284 EMERGENCY DEPT VISIT MOD MDM: CPT | Mod: ,,, | Performed by: PHYSICIAN ASSISTANT

## 2022-02-28 RX ORDER — LIDOCAINE 50 MG/G
1 PATCH TOPICAL DAILY
Qty: 15 PATCH | Refills: 0 | Status: SHIPPED | OUTPATIENT
Start: 2022-02-28 | End: 2022-11-18

## 2022-02-28 RX ORDER — LIDOCAINE 50 MG/G
1 PATCH TOPICAL
Status: DISCONTINUED | OUTPATIENT
Start: 2022-02-28 | End: 2022-03-01 | Stop reason: HOSPADM

## 2022-02-28 RX ORDER — METHOCARBAMOL 500 MG/1
500 TABLET, FILM COATED ORAL
Status: COMPLETED | OUTPATIENT
Start: 2022-02-28 | End: 2022-02-28

## 2022-02-28 RX ORDER — CYCLOBENZAPRINE HCL 5 MG
5 TABLET ORAL 2 TIMES DAILY
Qty: 10 TABLET | Refills: 0 | Status: SHIPPED | OUTPATIENT
Start: 2022-02-28 | End: 2022-03-05

## 2022-02-28 RX ORDER — ACETAMINOPHEN 500 MG
1000 TABLET ORAL
Status: COMPLETED | OUTPATIENT
Start: 2022-02-28 | End: 2022-02-28

## 2022-02-28 RX ORDER — DEXTROMETHORPHAN HYDROBROMIDE, GUAIFENESIN 5; 100 MG/5ML; MG/5ML
650 LIQUID ORAL EVERY 8 HOURS
Qty: 21 TABLET | Refills: 0 | Status: SHIPPED | OUTPATIENT
Start: 2022-02-28 | End: 2022-03-07

## 2022-02-28 RX ADMIN — METHOCARBAMOL 500 MG: 500 TABLET ORAL at 07:02

## 2022-02-28 RX ADMIN — ACETAMINOPHEN 1000 MG: 500 TABLET ORAL at 07:02

## 2022-02-28 RX ADMIN — LIDOCAINE 1 PATCH: 50 PATCH CUTANEOUS at 07:02

## 2022-03-01 NOTE — DISCHARGE INSTRUCTIONS
Follow-up with your primary care provider for further management. You may have a coccyx fracture which is a stable fracture and does not require any surgical intervention normally.     Take the prescribed Tylenol, Flexeril, and Lidoderm patches for further management of your pain.     Return to the emergency room for new, worsening, or concerning symptoms.

## 2022-03-01 NOTE — ED PROVIDER NOTES
Encounter Date: 2/28/2022       History     Chief Complaint   Patient presents with    Fall     Was walking and didn't see hole in floor they were fixing; floor went through all the way through to the ground and was straddling the floorboard;c/o left hip pain and in tailbone     The history is provided by the patient and medical records. No  was used.      Lissy Palencia is a 76 y.o. female with medical history of CAD with stent, Osteoporosis, HLD, Hypothyroidism, GERD, T2DM presenting to the ED with the chief complaint of fall.     Patient is remodeling her house and accidentally stepped on a board that gave out causing her to fall through the floor. Reports her left foot contacted the ground underneath her and was straddling a floorboard. Denies head trauma or LOC. Reprots having tailbone pain. Believes her neck jerked from the fall and is experiencing posterior neck pain. She is on daily baby ASA. She was feeling at her baseline health prior to the fall. She was able to ambulate after the incident. No numbness, paresthesias, extremity weakness, chest pain, abdominal pain.     Review of patient's allergies indicates:   Allergen Reactions    Compazine  [prochlorperazine edisylate]      Other reaction(s): SPASMS    Parafon forte      Other reaction(s): Hives     Past Medical History:   Diagnosis Date    Allergy     Arthritis     Coronary artery disease     in the past    Cystocele 1/19/2016    GERD (gastroesophageal reflux disease)     Hemorrhoids     History of cholelithiasis     Hyperlipidemia     Hypothyroidism     Obesity     Positive cardiac stress test 7/27/2016    Right shoulder pain 10/9/2012    Trigger finger, right 3rd finger 7/30/2014    Vaginal atrophy 1/19/2016    Vaginal vault prolapse 1/19/2016     Past Surgical History:   Procedure Laterality Date    ABLATION COLPOCLESIS      ADENOIDECTOMY      CHOLECYSTECTOMY      COLONOSCOPY N/A 11/28/2018    Procedure:  "COLONOSCOPY;  Surgeon: Richi Mcintosh MD;  [facility]  Service: Endoscopy;  Laterality: N/A;    CORONARY STENT PLACEMENT      ESOPHAGOGASTRODUODENOSCOPY N/A 11/28/2018    Procedure: ESOPHAGOGASTRODUODENOSCOPY (EGD);  Surgeon: Richi Mcintosh MD;  Location: [facility] ([facility]  Service: Endoscopy;  Laterality: N/A;    heart cath  2005    non-obstructive disease    OOPHORECTOMY      SHOULDER ARTHROSCOPY W/ ROTATOR CUFF REPAIR      Right    TONSILLECTOMY      TOTAL ABDOMINAL HYSTERECTOMY      with BSO    TUBAL LIGATION       Family History   Problem Relation Age of Onset    Breast cancer Neg Hx     Ovarian cancer Neg Hx     Glaucoma Neg Hx     Cataracts Neg Hx     Diabetes Neg Hx     Macular degeneration Neg Hx     Retinal detachment Neg Hx     Melanoma Neg Hx     Cervical cancer Neg Hx     Endometrial cancer Neg Hx     Vaginal cancer Neg Hx      Social History     Tobacco Use    Smoking status: Never Smoker    Smokeless tobacco: Never Used   Substance Use Topics    Alcohol use: Yes     Alcohol/week: 1.0 standard drink     Types: 1 Glasses of wine per week     Comment: "maybe a glass of wine every 6 months"    Drug use: No     Review of Systems   Constitutional: Negative for fever.   HENT: Negative for sore throat.    Eyes: Negative for pain.   Respiratory: Negative for shortness of breath.    Cardiovascular: Negative for chest pain.   Gastrointestinal: Negative for nausea.   Genitourinary: Negative for dysuria.   Musculoskeletal: Positive for arthralgias, back pain and neck pain.   Skin: Negative for rash.   Neurological: Negative for weakness.   Hematological: Does not bruise/bleed easily.     Physical Exam     Initial Vitals [02/28/22 1842]   BP Pulse Resp Temp SpO2   (!) 161/78 74 20 98.5 °F (36.9 °C) 97 %      MAP       --         Physical Exam    Constitutional: She appears well-developed and well-nourished. She is not diaphoretic. No distress.   Patient undressed " for exam   HENT:   Head: Normocephalic and atraumatic.   Mouth/Throat: Oropharynx is clear and moist. No oropharyngeal exudate.   Eyes: Conjunctivae and EOM are normal. Pupils are equal, round, and reactive to light. No scleral icterus.   Neck: Neck supple.   Normal range of motion.  Cardiovascular: Normal rate and regular rhythm.   Pulmonary/Chest: Breath sounds normal. No respiratory distress. She has no wheezes.   Abdominal: Abdomen is soft. She exhibits no distension. There is no abdominal tenderness. There is no rebound.   Musculoskeletal:         General: No tenderness or edema. Normal range of motion.      Cervical back: Normal range of motion and neck supple.      Comments: TTP inferior medial aspect of left gluteal region. No pelvic or perineal skin breaks or ecchymosis. No midline spinal tenderness.  Full A/P ROM of extremities. Able to stand and ambulate with mild pain.      Neurological: She is alert and oriented to person, place, and time. She has normal strength. No sensory deficit.   Skin: Skin is warm and dry. No rash noted. No erythema.   Psychiatric: She has a normal mood and affect.       ED Course   Procedures  Labs Reviewed - No data to display       Imaging Results          X-Ray Sacrum And Coccyx (Final result)  Result time 02/28/22 22:15:05    Final result by Gordon Coughlin MD (02/28/22 22:15:05)                 Impression:      No acute sacral fracture identified.    On the lateral view, the coccyx appears irregular with posterior displacement/angulation of the coccygeal tip. No prior studies available for comparison at this particular region.  The coccyx can have a variable and offered irregular appearance.  Potential recent or remote coccygeal fracture is not excluded.  Correlate clinically.      Electronically signed by: Gordon Coughlin MD  Date:    02/28/2022  Time:    22:15             Narrative:    EXAMINATION:  XR SACRUM AND COCCYX    CLINICAL HISTORY:  Unspecified fall, initial  encounter    TECHNIQUE:  Sacrum and coccyx three views:    COMPARISON:  None    FINDINGS:  No displaced fracture of the sacrum identified.  On the lateral view, the coccyx appears irregular with posterior displacement/angulation of the coccygeal tip.  No prior studies available for comparison at this particular region.  Osteitis condensans Bonnie I adjacent to the lower right SI joint which appears similar compared back to 08/27/2019.                               X-Ray Lumbar Spine Ap And Lateral (Final result)  Result time 02/28/22 21:06:52    Final result by Alex Fernandez MD (02/28/22 21:06:52)                 Impression:      No acute radiographic abnormality.  Mild degenerative changes.      Electronically signed by: Alex Fernandez  Date:    02/28/2022  Time:    21:06             Narrative:    EXAMINATION:  XR LUMBAR SPINE AP AND LATERAL    CLINICAL HISTORY:  Pelvic pain s/p fall;    TECHNIQUE:  AP, lateral and spot images were performed of the lumbar spine.    COMPARISON:  08/27/2019    FINDINGS:  There are 5 non rib-bearing lumbar segments.    Alignment is satisfactory.  Mild disc space narrowing throughout the lumbar spine.    Mild facet arthropathy at L4-5 and L5-S1.    No acute fracture or traumatic subluxation.  No osseous destruction.                               X-Ray Pelvis Routine AP (Final result)  Result time 02/28/22 20:58:57    Final result by Gordon Coughlin MD (02/28/22 20:58:57)                 Impression:      No acute displaced pelvic fracture.      Electronically signed by: Gordon Coughlin MD  Date:    02/28/2022  Time:    20:58             Narrative:    EXAMINATION:  XR PELVIS ROUTINE AP    CLINICAL HISTORY:  Pelvic pain s/p fall;    TECHNIQUE:  AP view of the pelvis was performed.    COMPARISON:  None.    FINDINGS:  No displaced pelvic fracture identified.  No bone destruction.  Hip joints and SI joints appear intact.                                 Medications   LIDOcaine 5 % patch 1  patch (1 patch Transdermal Patch Applied 2/28/22 1944)   acetaminophen tablet 1,000 mg (1,000 mg Oral Given 2/28/22 1944)   methocarbamoL tablet 500 mg (500 mg Oral Given 2/28/22 1951)     Medical Decision Making:   History:   Old Medical Records: I decided to obtain old medical records.  Old Records Summarized: records from clinic visits.  Clinical Tests:   Radiological Study: Ordered and Reviewed       APC / Resident Notes:   76 y.o. female with medical history of CAD with stent, Osteoporosis, HLD, Hypothyroidism, GERD, T2DM presenting to the ED c/o neck pain, tailbone pain after accidental fall through the floor of her house causing her to straddle a floorboard. Incident occurred 2 hours PTA. DDx includes but not limited to contusion, fracture, dislocation, ligament strain, hematoma.      X-rays of sacrum, pelvis, lumbar spine obtained and reviewed. Questionable coccyx fracture. No other acute fractures. Pain improved on repeat assessment. Able to stand and ambulate without assistance. Discussed imaging findings with the patient. Advised outpatient follow-up with PCP for further management. RX for Lidoderm patches, Flexeril, and Tylenol provided. Patient expresses understanding and agreeable to the plan. Return to ED precautions given for new, worsening, or concerning symptoms.         ED Course as of 03/01/22 0017   Mon Feb 28, 2022 1941 Tylenol, Lidoderm patch, Ice pack, Robaxin given for multimodal pain control  [BA]      ED Course User Index  [BA] Cesar Justice PA-C             Clinical Impression:   Final diagnoses:  [W19.XXXA] Fall, initial encounter (Primary)  [R10.2] Pelvic pain  [W19.XXXA] Fall  [S39.92XA] Injury of coccyx, initial encounter          ED Disposition Condition    Discharge Stable        ED Prescriptions     Medication Sig Dispense Start Date End Date Auth. Provider    LIDOcaine (LIDODERM) 5 % Place 1 patch onto the skin once daily. Remove & Discard patch within 12 hours or as directed  by MD 15 patch 2/28/2022  Cesar Justice PA-C    acetaminophen (TYLENOL) 650 MG TbSR Take 1 tablet (650 mg total) by mouth every 8 (eight) hours. for 7 days 21 tablet 2/28/2022 3/7/2022 Cesar Justice PA-C    cyclobenzaprine (FLEXERIL) 5 MG tablet Take 1 tablet (5 mg total) by mouth 2 (two) times a day. for 5 days 10 tablet 2/28/2022 3/5/2022 Cesar Justice PA-C        Follow-up Information     Follow up With Specialties Details Why Contact Info    Deya Luevano MD Internal Medicine   [address] [place] [address] [id number] [phone]             Cesar Justice PA-C  03/01/22 0017     Mother  Still living? Yes, Estimated age: Age Unknown  Family history of essential hypertension, Age at diagnosis: Age Unknown     Father  Still living? Unknown  Acute myocardial infarction, Age at diagnosis: Age Unknown

## 2022-03-01 NOTE — ED NOTES
Lissy Mariscalrone, an 76 y.o. female presents to the ED after fall through floorboards. Reports pain to L hip and tailbone. Patient able to ambulate. Pain worsens when moving from sitting to standing. Denies chest pain, SOB.      Review of patient's allergies indicates:   Allergen Reactions    Compazine  [prochlorperazine edisylate]      Other reaction(s): SPASMS    Parafon forte      Other reaction(s): Hives     Chief Complaint   Patient presents with    Fall     Was walking and didn't see hole in floor they were fixing; floor went through all the way through to the ground and was straddling the floorboard;c/o left hip pain and in tailbone     Past Medical History:   Diagnosis Date    Allergy     Arthritis     Coronary artery disease     in the past    Cystocele 1/19/2016    GERD (gastroesophageal reflux disease)     Hemorrhoids     History of cholelithiasis     Hyperlipidemia     Hypothyroidism     Obesity     Positive cardiac stress test 7/27/2016    Right shoulder pain 10/9/2012    Trigger finger, right 3rd finger 7/30/2014    Vaginal atrophy 1/19/2016    Vaginal vault prolapse 1/19/2016

## 2022-04-05 ENCOUNTER — TELEPHONE (OUTPATIENT)
Dept: INTERNAL MEDICINE | Facility: CLINIC | Age: 77
End: 2022-04-05
Payer: MEDICARE

## 2022-04-05 ENCOUNTER — OFFICE VISIT (OUTPATIENT)
Dept: INTERNAL MEDICINE | Facility: CLINIC | Age: 77
End: 2022-04-05
Payer: MEDICARE

## 2022-04-05 VITALS
DIASTOLIC BLOOD PRESSURE: 70 MMHG | HEART RATE: 64 BPM | OXYGEN SATURATION: 98 % | HEIGHT: 62 IN | WEIGHT: 167.56 LBS | BODY MASS INDEX: 30.83 KG/M2 | SYSTOLIC BLOOD PRESSURE: 130 MMHG

## 2022-04-05 DIAGNOSIS — S32.10XA CLOSED FRACTURE OF SACRUM AND COCCYX, INITIAL ENCOUNTER: ICD-10-CM

## 2022-04-05 DIAGNOSIS — M81.0 OSTEOPOROSIS, UNSPECIFIED OSTEOPOROSIS TYPE, UNSPECIFIED PATHOLOGICAL FRACTURE PRESENCE: ICD-10-CM

## 2022-04-05 DIAGNOSIS — K76.0 FATTY LIVER: ICD-10-CM

## 2022-04-05 DIAGNOSIS — M54.41 CHRONIC LOW BACK PAIN WITH RIGHT-SIDED SCIATICA, UNSPECIFIED BACK PAIN LATERALITY: ICD-10-CM

## 2022-04-05 DIAGNOSIS — I25.10 CORONARY ARTERY DISEASE INVOLVING NATIVE CORONARY ARTERY OF NATIVE HEART WITHOUT ANGINA PECTORIS: ICD-10-CM

## 2022-04-05 DIAGNOSIS — E11.9 TYPE 2 DIABETES MELLITUS WITHOUT COMPLICATION, WITHOUT LONG-TERM CURRENT USE OF INSULIN: ICD-10-CM

## 2022-04-05 DIAGNOSIS — E78.5 HYPERLIPIDEMIA ASSOCIATED WITH TYPE 2 DIABETES MELLITUS: ICD-10-CM

## 2022-04-05 DIAGNOSIS — G47.33 OSA (OBSTRUCTIVE SLEEP APNEA): ICD-10-CM

## 2022-04-05 DIAGNOSIS — K21.9 GASTROESOPHAGEAL REFLUX DISEASE, UNSPECIFIED WHETHER ESOPHAGITIS PRESENT: ICD-10-CM

## 2022-04-05 DIAGNOSIS — Z00.00 ENCOUNTER FOR MEDICAL EXAMINATION TO ESTABLISH CARE: Primary | ICD-10-CM

## 2022-04-05 DIAGNOSIS — G89.29 CHRONIC LOW BACK PAIN WITH RIGHT-SIDED SCIATICA, UNSPECIFIED BACK PAIN LATERALITY: ICD-10-CM

## 2022-04-05 DIAGNOSIS — E03.9 PRIMARY HYPOTHYROIDISM: ICD-10-CM

## 2022-04-05 DIAGNOSIS — S32.2XXA CLOSED FRACTURE OF SACRUM AND COCCYX, INITIAL ENCOUNTER: ICD-10-CM

## 2022-04-05 DIAGNOSIS — E11.69 HYPERLIPIDEMIA ASSOCIATED WITH TYPE 2 DIABETES MELLITUS: ICD-10-CM

## 2022-04-05 PROBLEM — R53.1 DECREASED STRENGTH: Status: RESOLVED | Noted: 2019-08-29 | Resolved: 2022-04-05

## 2022-04-05 PROBLEM — R29.3 POOR POSTURE: Status: RESOLVED | Noted: 2019-08-29 | Resolved: 2022-04-05

## 2022-04-05 PROCEDURE — 99999 PR PBB SHADOW E&M-EST. PATIENT-LVL III: ICD-10-PCS | Mod: PBBFAC,,, | Performed by: STUDENT IN AN ORGANIZED HEALTH CARE EDUCATION/TRAINING PROGRAM

## 2022-04-05 PROCEDURE — 99213 OFFICE O/P EST LOW 20 MIN: CPT | Mod: PBBFAC | Performed by: STUDENT IN AN ORGANIZED HEALTH CARE EDUCATION/TRAINING PROGRAM

## 2022-04-05 PROCEDURE — 99214 PR OFFICE/OUTPT VISIT, EST, LEVL IV, 30-39 MIN: ICD-10-PCS | Mod: S$PBB,,, | Performed by: STUDENT IN AN ORGANIZED HEALTH CARE EDUCATION/TRAINING PROGRAM

## 2022-04-05 PROCEDURE — 99999 PR PBB SHADOW E&M-EST. PATIENT-LVL III: CPT | Mod: PBBFAC,,, | Performed by: STUDENT IN AN ORGANIZED HEALTH CARE EDUCATION/TRAINING PROGRAM

## 2022-04-05 PROCEDURE — 99214 OFFICE O/P EST MOD 30 MIN: CPT | Mod: S$PBB,,, | Performed by: STUDENT IN AN ORGANIZED HEALTH CARE EDUCATION/TRAINING PROGRAM

## 2022-04-05 RX ORDER — LEVOTHYROXINE SODIUM 88 UG/1
TABLET ORAL
Qty: 90 TABLET | Refills: 1 | Status: SHIPPED | OUTPATIENT
Start: 2022-04-05 | End: 2023-01-03

## 2022-04-05 NOTE — TELEPHONE ENCOUNTER
----- Message from Mariah Quinones sent at 4/5/2022  9:23 AM CDT -----  Regarding:   Her  wants to get in with provider as a new pt.

## 2022-04-05 NOTE — PROGRESS NOTES
INTERNAL MEDICINE INITIAL VISIT NOTE      CHIEF COMPLAINT     Chief Complaint   Patient presents with    Establish Care       ASSESSMENT/PLAN     Lissy Palencia is a 76 y.o. female who presents with HLD, OA, CAD, hypothyroidism, HEMAL, fatty liver, GERD, osteoporosis, here today to establish care.    1. Encounter for medical examination to establish care  All previous labs, imaging, and notes reviewed up to the time point of this note.   2. Closed fracture of sacrum and coccyx, initial encounter  Discussed offloading and seating recommendations for the patient.   3. Hyperlipidemia associated with type 2 diabetes mellitus  4. Chronic low back pain with right-sided sciatica, unspecified back pain laterality  5. Coronary artery disease involving native coronary artery of native heart without angina pectoris  6. Primary hypothyroidism  - levothyroxine (SYNTHROID) 88 MCG tablet; Take first thing in the morning on an empty stomach  Dispense: 90 tablet; Refill: 1  - TSH; Future  - T4, Free; Future  7. HEMAL (obstructive sleep apnea)  8. Fatty liver  9. Gastroesophageal reflux disease, unspecified whether esophagitis present  10. Osteoporosis, unspecified osteoporosis type, unspecified pathological fracture presence  11. Type 2 diabetes mellitus without complication, without long-term current use of insulin  - Hemoglobin A1C; Future      All previous labs, imaging, and notes reviewed up to the time point of this note.   Patient here for establishing care. Feeling more tired and TSH on higher side, will increase synthroid and have patient recheck labs in 2 months. Due for some blood work. Other issues all within a normal range.      reviewed and discussed in detail with the patient.     RTC in 6 months, sooner if needed and depending on labs.    HPI     Lissy Palencia is a 76 y.o. female who presents with HLD, OA, CAD, hypothyroidism, HEMAL, fatty liver, GERD, osteoporosis, here today to establish care.    Patient was  previously cared for by Dr. Deya Luevano. LOV 1/7/22.     She injured her tailbone at the end of Feb. Per records reviewed she had posterior displacement of the coccygeal tip. Discussed offloading pressure, avoid sitting for prolonged periods of time.     TIIDM - last A1c 6.1, has been well controlled through at least 2018. Metformin 500 mg     Hypothyroidism - last TSH 5.8, T4 0.86. reports she is always tired, reports dry skin. Takes her medications all at the same time, and could be affecting the absorption. Will increase thyroid supplement and check labs again in 2 months.     Osteoporosis - started on alendronate in the fall 2021. Last DEXA 9/15/21 - high FRAX score. Repeat in 2023.     CAD - s/p PCI atherectomy to LAD in 1997. on aspirin, statin.     GERD - PPI as needed. Does get symptoms in the morning if eating heavy meal the day before.     Frequent leg cramps - at bedtime. Discussed supplements, stretching, and electrolyte repleshment    She is discussing a lot of mucous production in her chest that she has to cough up a large amount       Past Medical History:  Past Medical History:   Diagnosis Date    Allergy     Arthritis     Coronary artery disease     in the past    Cystocele 1/19/2016    GERD (gastroesophageal reflux disease)     Hemorrhoids     History of cholelithiasis     Hyperlipidemia     Hypothyroidism     Obesity     Positive cardiac stress test 7/27/2016    Right shoulder pain 10/9/2012    Trigger finger, right 3rd finger 7/30/2014    Vaginal atrophy 1/19/2016    Vaginal vault prolapse 1/19/2016       Past Surgical History:  Past Surgical History:   Procedure Laterality Date    ABLATION COLPOCLESIS      ADENOIDECTOMY      CHOLECYSTECTOMY      COLONOSCOPY N/A 11/28/2018    Procedure: COLONOSCOPY;  Surgeon: Richi Mcintosh MD;  Location: [address]);  Service: Endoscopy;  Laterality: N/A;    CORONARY STENT PLACEMENT      ESOPHAGOGASTRODUODENOSCOPY N/A 11/28/2018     Procedure: ESOPHAGOGASTRODUODENOSCOPY (EGD);  Surgeon: Richi Mcintosh MD;  Location: [facility] ([address] [place] [address]);  Service: Endoscopy;  Laterality: N/A;    heart cath  2005    non-obstructive disease    OOPHORECTOMY      SHOULDER ARTHROSCOPY W/ ROTATOR CUFF REPAIR      Right    TONSILLECTOMY      TOTAL ABDOMINAL HYSTERECTOMY      with BSO    TUBAL LIGATION         Home Medications:  Prior to Admission medications    Medication Sig Start Date End Date Taking? Authorizing Provider   alendronate (FOSAMAX) 70 MG tablet Take 1 tablet (70 mg total) by mouth every 7 days. Mondays.Then after an hour take your thyroid tablet, 30 minutes later your pantoprazole. 1/7/22 1/7/23 Yes Deya Luevano MD   aspirin 81 MG Chew Take 1 tablet (81 mg total) by mouth once daily. 8/1/17  Yes Deya Luevano MD   atorvastatin (LIPITOR) 40 MG tablet TAKE 1 TABLET ONCE DAILY 9/15/21  Yes Deya Luevano MD   azelastine (ASTELIN) 137 mcg (0.1 %) nasal spray 1 spray (137 mcg total) by Nasal route 2 (two) times daily. 9/15/21  Yes Deya Luevano MD   blood sugar diagnostic Strp To check BG ONCE daily, to use with insurance preferred meter 6/2/21  Yes Deya Luevano MD   fluticasone propionate (FLONASE) 50 mcg/actuation nasal spray 1 spray (50 mcg total) by Each Nostril route 2 (two) times daily as needed. 9/15/21  Yes Deya Luevano MD   lancets Misc To check BG ONCE daily, to use with insurance preferred meter 2/12/21  Yes Deya Luevano MD   levothyroxine (SYNTHROID) 75 MCG tablet Take first thing in the morning on an empty stomach 1/7/22  Yes Deya Luevano MD   LIDOcaine (LIDODERM) 5 % Place 1 patch onto the skin once daily. Remove & Discard patch within 12 hours or as directed by MD 2/28/22  Yes Cesar Justice PA-C   metFORMIN (GLUCOPHAGE) 500 MG tablet Take 1 tablet (500 mg total) by mouth daily with breakfast. 9/15/21 9/15/22 Yes Deya Luevano MD   pantoprazole (PROTONIX) 40 MG tablet take one tablet  "by mouth about 30 minutes after your levothyroxine 1/7/22  Yes Deya Luevano MD   vitamin D (VITAMIN D3) 1000 units Tab Take 1 tablet (1,000 Units total) by mouth once daily. 1/7/22  Yes Deya Luevano MD   blood-glucose meter kit To check BG ONCE THE daily, to use with insurance preferred meter 2/12/21 2/12/22  Deya Luevano MD   cycloSPORINE (RESTASIS) 0.05 % ophthalmic emulsion Place 1 drop into both eyes 2 (two) times daily. 3/29/21 3/29/22  Ciara Mclaughlin MD       Allergies:  Review of patient's allergies indicates:   Allergen Reactions    Compazine  [prochlorperazine edisylate]      Other reaction(s): SPASMS    Parafon forte      Other reaction(s): Hives       Family History:  Family History   Problem Relation Age of Onset    Breast cancer Neg Hx     Ovarian cancer Neg Hx     Glaucoma Neg Hx     Cataracts Neg Hx     Diabetes Neg Hx     Macular degeneration Neg Hx     Retinal detachment Neg Hx     Melanoma Neg Hx     Cervical cancer Neg Hx     Endometrial cancer Neg Hx     Vaginal cancer Neg Hx        Social History:  Social History     Tobacco Use    Smoking status: Never Smoker    Smokeless tobacco: Never Used   Substance Use Topics    Alcohol use: Yes     Alcohol/week: 1.0 standard drink     Types: 1 Glasses of wine per week     Comment: "maybe a glass of wine every 6 months"    Drug use: No       Review of Systems:  Review of Systems   Constitutional: Positive for fatigue. Negative for fever and unexpected weight change.   HENT: Negative for sinus pressure and sore throat.    Eyes: Negative for visual disturbance.   Respiratory: Negative for cough and shortness of breath.    Cardiovascular: Negative for chest pain and palpitations.   Gastrointestinal: Negative for constipation, diarrhea, nausea and vomiting.   Endocrine: Negative for polyuria.   Genitourinary: Negative for dysuria and urgency.   Musculoskeletal: Negative for arthralgias and myalgias.   Skin: Negative for " "rash.   Allergic/Immunologic: Negative for environmental allergies.   Neurological: Negative for dizziness, light-headedness and headaches.   Hematological: Does not bruise/bleed easily.   Psychiatric/Behavioral: Negative for agitation and decreased concentration. The patient is not nervous/anxious.        Health Maintenance:   Reviewed with patient       PHYSICAL EXAM     /70   Pulse 64   Ht 5' 2" (1.575 m)   Wt 76 kg (167 lb 8.8 oz)   SpO2 98%   BMI 30.65 kg/m²     GEN - A+OX4, NAD elderly, well appearing woman   HEENT - PERRL, EOMI, OP clear  Neck - No thyromegaly or thyroid masses felt.  No cervical lymphadenopathy appreciated.  CV - RRR, no m/r/g   Chest - CTAB, no wheezing, crackles, or rhonchi   Abd - S/NT/ND/+BS.   Ext - 2+BDP. No C/C/E.  LN - No LAD appreciated.  Skin - Normal color and texture, no rash, no skin lesions.      LABS     Lab Results   Component Value Date    WBC 7.20 07/24/2020    HGB 14.7 07/24/2020    HCT 46.8 07/24/2020    MCV 89 07/24/2020     07/24/2020     Sodium   Date Value Ref Range Status   09/16/2021 141 136 - 145 mmol/L Final     Potassium   Date Value Ref Range Status   09/16/2021 4.3 3.5 - 5.1 mmol/L Final     Chloride   Date Value Ref Range Status   09/16/2021 105 95 - 110 mmol/L Final     CO2   Date Value Ref Range Status   09/16/2021 27 23 - 29 mmol/L Final     Glucose   Date Value Ref Range Status   09/16/2021 108 70 - 110 mg/dL Final     BUN   Date Value Ref Range Status   09/16/2021 19 8 - 23 mg/dL Final     Creatinine   Date Value Ref Range Status   09/16/2021 0.9 0.5 - 1.4 mg/dL Final     Calcium   Date Value Ref Range Status   09/16/2021 9.1 8.7 - 10.5 mg/dL Final     Total Protein   Date Value Ref Range Status   09/16/2021 7.1 6.0 - 8.4 g/dL Final     Albumin   Date Value Ref Range Status   09/16/2021 3.9 3.5 - 5.2 g/dL Final     Total Bilirubin   Date Value Ref Range Status   09/16/2021 1.3 (H) 0.1 - 1.0 mg/dL Final     Comment:     For infants and " newborns, interpretation of results should be based  on gestational age, weight and in agreement with clinical  observations.    Premature Infant recommended reference ranges:  Up to 24 hours.............<8.0 mg/dL  Up to 48 hours............<12.0 mg/dL  3-5 days..................<15.0 mg/dL  6-29 days.................<15.0 mg/dL       Alkaline Phosphatase   Date Value Ref Range Status   09/16/2021 96 55 - 135 U/L Final     AST   Date Value Ref Range Status   09/16/2021 22 10 - 40 U/L Final     ALT   Date Value Ref Range Status   09/16/2021 22 10 - 44 U/L Final     Anion Gap   Date Value Ref Range Status   09/16/2021 9 8 - 16 mmol/L Final     eGFR if    Date Value Ref Range Status   09/16/2021 >60.0 >60 mL/min/1.73 m^2 Final     eGFR if non    Date Value Ref Range Status   09/16/2021 >60.0 >60 mL/min/1.73 m^2 Final     Comment:     Calculation used to obtain the estimated glomerular filtration  rate (eGFR) is the CKD-EPI equation.        Lab Results   Component Value Date    HGBA1C 6.1 (H) 09/16/2021     Lab Results   Component Value Date    LDLCALC 76.2 09/16/2021     Lab Results   Component Value Date    TSH 5.870 (H) 09/16/2021    TSH 5.870 (H) 09/16/2021           Patti Campbell MD   [facility]   Department of Internal Medicine   04/05/2022

## 2022-06-07 ENCOUNTER — LAB VISIT (OUTPATIENT)
Dept: LAB | Facility: HOSPITAL | Age: 77
End: 2022-06-07
Attending: STUDENT IN AN ORGANIZED HEALTH CARE EDUCATION/TRAINING PROGRAM
Payer: MEDICARE

## 2022-06-07 DIAGNOSIS — E11.9 TYPE 2 DIABETES MELLITUS WITHOUT COMPLICATION, WITHOUT LONG-TERM CURRENT USE OF INSULIN: ICD-10-CM

## 2022-06-07 DIAGNOSIS — E03.9 PRIMARY HYPOTHYROIDISM: ICD-10-CM

## 2022-06-07 LAB
ESTIMATED AVG GLUCOSE: 137 MG/DL (ref 68–131)
HBA1C MFR BLD: 6.4 % (ref 4–5.6)
T4 FREE SERPL-MCNC: 1.03 NG/DL (ref 0.71–1.51)
TSH SERPL DL<=0.005 MIU/L-ACNC: 2.23 UIU/ML (ref 0.4–4)

## 2022-06-07 PROCEDURE — 83036 HEMOGLOBIN GLYCOSYLATED A1C: CPT | Performed by: STUDENT IN AN ORGANIZED HEALTH CARE EDUCATION/TRAINING PROGRAM

## 2022-06-07 PROCEDURE — 36415 COLL VENOUS BLD VENIPUNCTURE: CPT | Performed by: STUDENT IN AN ORGANIZED HEALTH CARE EDUCATION/TRAINING PROGRAM

## 2022-06-07 PROCEDURE — 84439 ASSAY OF FREE THYROXINE: CPT | Performed by: STUDENT IN AN ORGANIZED HEALTH CARE EDUCATION/TRAINING PROGRAM

## 2022-06-07 PROCEDURE — 84443 ASSAY THYROID STIM HORMONE: CPT | Performed by: STUDENT IN AN ORGANIZED HEALTH CARE EDUCATION/TRAINING PROGRAM

## 2022-06-13 RX ORDER — ALENDRONATE SODIUM 70 MG/1
70 TABLET ORAL
Qty: 12 TABLET | Refills: 1 | Status: SHIPPED | OUTPATIENT
Start: 2022-06-13 | End: 2022-12-22 | Stop reason: SDUPTHER

## 2022-06-13 NOTE — TELEPHONE ENCOUNTER
----- Message from Vika Rogers sent at 6/13/2022  9:44 AM CDT -----  Contact: self [phone]  Type: Rx    Name of medication(s): alendronate (FOSAMAX) 70 MG tablet    Is this a refill? New rx?    Who prescribed medication? Shad mckenzie    Pharmacy Name, Phone, & Location: [facility]     Comments: patient is requesting above refill

## 2022-07-30 ENCOUNTER — HOSPITAL ENCOUNTER (EMERGENCY)
Facility: HOSPITAL | Age: 77
Discharge: HOME | End: 2022-07-30
Attending: EMERGENCY MEDICINE
Payer: MEDICARE

## 2022-07-30 VITALS
RESPIRATION RATE: 16 BRPM | HEART RATE: 68 BPM | TEMPERATURE: 100 F | SYSTOLIC BLOOD PRESSURE: 114 MMHG | DIASTOLIC BLOOD PRESSURE: 64 MMHG | BODY MASS INDEX: 30.36 KG/M2 | OXYGEN SATURATION: 95 % | WEIGHT: 165 LBS | HEIGHT: 62 IN

## 2022-07-30 DIAGNOSIS — U07.1 COVID-19: Primary | ICD-10-CM

## 2022-07-30 DIAGNOSIS — R07.9 CHEST PAIN: ICD-10-CM

## 2022-07-30 DIAGNOSIS — J18.9 PNEUMONIA OF LEFT LOWER LOBE DUE TO INFECTIOUS ORGANISM: ICD-10-CM

## 2022-07-30 DIAGNOSIS — R50.9 FEVER: ICD-10-CM

## 2022-07-30 DIAGNOSIS — U07.1 COVID-19 VIRUS DETECTED: ICD-10-CM

## 2022-07-30 LAB
INFLUENZA A, MOLECULAR: NEGATIVE
INFLUENZA B, MOLECULAR: NEGATIVE
SARS-COV-2 RDRP RESP QL NAA+PROBE: POSITIVE
SPECIMEN SOURCE: NORMAL

## 2022-07-30 PROCEDURE — 99285 PR EMERGENCY DEPT VISIT,LEVEL V: ICD-10-PCS | Mod: CR,CS,, | Performed by: EMERGENCY MEDICINE

## 2022-07-30 PROCEDURE — 93010 EKG 12-LEAD: ICD-10-PCS | Mod: ,,, | Performed by: INTERNAL MEDICINE

## 2022-07-30 PROCEDURE — 25000003 PHARM REV CODE 250: Performed by: EMERGENCY MEDICINE

## 2022-07-30 PROCEDURE — U0002 COVID-19 LAB TEST NON-CDC: HCPCS | Performed by: EMERGENCY MEDICINE

## 2022-07-30 PROCEDURE — 99285 EMERGENCY DEPT VISIT HI MDM: CPT | Mod: CR,CS,, | Performed by: EMERGENCY MEDICINE

## 2022-07-30 PROCEDURE — 93005 ELECTROCARDIOGRAM TRACING: CPT

## 2022-07-30 PROCEDURE — 87502 INFLUENZA DNA AMP PROBE: CPT | Performed by: EMERGENCY MEDICINE

## 2022-07-30 PROCEDURE — 93010 ELECTROCARDIOGRAM REPORT: CPT | Mod: ,,, | Performed by: INTERNAL MEDICINE

## 2022-07-30 PROCEDURE — 99285 EMERGENCY DEPT VISIT HI MDM: CPT | Mod: 25

## 2022-07-30 PROCEDURE — 87502 INFLUENZA DNA AMP PROBE: CPT

## 2022-07-30 RX ORDER — DOXYCYCLINE 100 MG/1
100 CAPSULE ORAL 2 TIMES DAILY
Qty: 10 CAPSULE | Refills: 0 | Status: SHIPPED | OUTPATIENT
Start: 2022-07-30 | End: 2022-08-04

## 2022-07-30 RX ORDER — ACETAMINOPHEN 500 MG
1000 TABLET ORAL
Status: COMPLETED | OUTPATIENT
Start: 2022-07-30 | End: 2022-07-30

## 2022-07-30 RX ORDER — DOXYCYCLINE 100 MG/1
100 CAPSULE ORAL 2 TIMES DAILY
Qty: 20 CAPSULE | Refills: 0 | Status: SHIPPED | OUTPATIENT
Start: 2022-07-30 | End: 2022-07-30 | Stop reason: SDUPTHER

## 2022-07-30 RX ORDER — IBUPROFEN 600 MG/1
600 TABLET ORAL
Status: COMPLETED | OUTPATIENT
Start: 2022-07-30 | End: 2022-07-30

## 2022-07-30 RX ADMIN — IBUPROFEN 600 MG: 600 TABLET, FILM COATED ORAL at 06:07

## 2022-07-30 RX ADMIN — ACETAMINOPHEN 1000 MG: 500 TABLET ORAL at 06:07

## 2022-07-30 NOTE — ED NOTES
"Pt presents w/ 8/10 HA, weakness, dizziness, and generalized pain that started yesterday morning. "Feeling flushed and weak." Reports sore throat and productive cough. Denies N/V, SOB, and abdominal pain. Pt presents with chills and fever.  "

## 2022-07-30 NOTE — ED PROVIDER NOTES
"Encounter Date: 7/30/2022    SCRIBE #1 NOTE: I, Lilliana Faith, am scribing for, and in the presence of,  Geno Lance MD. I have scribed the entire note.       History     Chief Complaint   Patient presents with    Headache     Headache chills, feels pain all over, weak and dizzy, started yesterday. "The headache is the worst, it's behind my eyes"  constant "hacking cough"     Time patient was seen by the provider: 6:26 PM      The patient is a 77 y.o. female with past medical history of arthritis, HLD, cholelithiasis, hypothyroidism, CAD, GERD, obesity, who presents to the ED with a complaint of fever, chills and headaches onset yesterday. The patient reports she has been experiencing additional symptoms of cough, sore throat, rhinorrhea and body aches. Notes cough with yellowish sputum. She has not taken any medications for the symptoms today. Denies shortness of breath, chest pain, congestion, leg swelling, dysuria, hematuria, abdominal pain. Denies any sick contacts at home. The patient also reports of left shoulder pain sustained from a fall 2 weeks ago. She was not seen after the fall. States she continues to experience pain that worsens with movement of the shoulder and arm.     The history is provided by the patient and medical records. No  was used.     Review of patient's allergies indicates:   Allergen Reactions    Compazine  [prochlorperazine edisylate]      Other reaction(s): SPASMS    Parafon forte      Other reaction(s): Hives     Past Medical History:   Diagnosis Date    Allergy     Arthritis     Coronary artery disease     in the past    Cystocele 1/19/2016    GERD (gastroesophageal reflux disease)     Hemorrhoids     History of cholelithiasis     Hyperlipidemia     Hypothyroidism     Obesity     Positive cardiac stress test 7/27/2016    Right shoulder pain 10/9/2012    Trigger finger, right 3rd finger 7/30/2014    Vaginal atrophy 1/19/2016    Vaginal vault " "prolapse 1/19/2016     Past Surgical History:   Procedure Laterality Date    ABLATION COLPOCLESIS      ADENOIDECTOMY      CHOLECYSTECTOMY      COLONOSCOPY N/A 11/28/2018    Procedure: COLONOSCOPY;  Surgeon: Richi Mcintosh MD;  [facility]  Service: Endoscopy;  Laterality: N/A;    CORONARY STENT PLACEMENT      ESOPHAGOGASTRODUODENOSCOPY N/A 11/28/2018    Procedure: ESOPHAGOGASTRODUODENOSCOPY (EGD);  Surgeon: Richi Mcintosh MD;  [facility]  Service: Endoscopy;  Laterality: N/A;    heart cath  2005    non-obstructive disease    OOPHORECTOMY      SHOULDER ARTHROSCOPY W/ ROTATOR CUFF REPAIR      Right    TONSILLECTOMY      TOTAL ABDOMINAL HYSTERECTOMY      with BSO    TUBAL LIGATION       Family History   Problem Relation Age of Onset    Breast cancer Neg Hx     Ovarian cancer Neg Hx     Glaucoma Neg Hx     Cataracts Neg Hx     Diabetes Neg Hx     Macular degeneration Neg Hx     Retinal detachment Neg Hx     Melanoma Neg Hx     Cervical cancer Neg Hx     Endometrial cancer Neg Hx     Vaginal cancer Neg Hx      Social History     Tobacco Use    Smoking status: Never Smoker    Smokeless tobacco: Never Used   Substance Use Topics    Alcohol use: Yes     Alcohol/week: 1.0 standard drink     Types: 1 Glasses of wine per week     Comment: "maybe a glass of wine every 6 months"    Drug use: No     Review of Systems  Review of Systems   Constitutional: Positive for fever, positive for chills, positive for body aches.   HENT: Negative for ear pain, negative for congestion, positive for sore throat, positive for rhinorrhea.  Respiratory: Negative for shortness of breath. Positive for cough   Cardiovascular: Negative for chest pain.   Gastrointestinal: Negative for nausea, negative for vomiting, negative for abdominal pain.   Genitourinary: Negative for dysuria, negative for hematuria, negative for urinary frequency/urgency. .   Musculoskeletal: Negative for " extremity weakness, negative for extremity edema. Positive for left shoulder pain.  Skin: Negative for rash.   Neurological: Negative for numbness/tingling/weakness, positive for headache, negative for syncope.   Hematological: Does not bruise/bleed easily.   Psych: Negative for suicidal ideation/homicidal ideation   10 point review of systems reviewed with patient otherwise negative.       Physical Exam     Initial Vitals [07/30/22 1757]   BP Pulse Resp Temp SpO2   138/70 81 18 (!) 100.5 °F (38.1 °C) 96 %      MAP       --         Physical Exam    Nursing note and vitals reviewed.  Constitutional: Patient appears well-developed and well-nourished. No distress.   HENT:   Head: Normocephalic and atraumatic.   Mouth:  No tonsillar hypertrophy, no erythema, no purulence, uvula midline, no stridor, no drooling  Eyes: Conjunctivae and EOM are normal. Pupils are equal, round, and reactive to light.   Neck: Neck supple.   Normal range of motion.  Cardiovascular: Normal rate, regular rhythm, normal heart sounds and intact distal pulses.   Pulmonary/Chest: Breath sounds normal. Crackles in the left base.   Abdominal: Abdomen is soft. Patient exhibits no distension. There is no abdominal tenderness.   Musculoskeletal:      Cervical back: Normal range of motion and neck supple, no meningeal signs. Tenderness along the left acromion.  Neurological: Patient is alert and oriented to person, place, and time. No cranial nerve deficit. Gait normal. GCS score is 15.   Skin: Skin is warm and dry.  Psych: Normal mood/affect      ED Course   Procedures  Labs Reviewed   SARS-COV-2 RNA AMPLIFICATION, QUAL - Abnormal; Notable for the following components:       Result Value    SARS-CoV-2 RNA, Amplification, Qual Positive (*)     All other components within normal limits   INFLUENZA A & B BY MOLECULAR     EKG Readings: (Independently Interpreted)   Initial Reading: No STEMI.   EKG: No evidence of ischemia, nor dysrhythmia       Imaging  Results          X-Ray Shoulder 2 or More Views Left (Final result)  Result time 07/30/22 19:44:27    Final result by Cesar Peralta MD (07/30/22 19:44:27)                 Impression:      No acute displaced fracture-dislocation identified.      Electronically signed by: Cesar Peralta MD  Date:    07/30/2022  Time:    19:44             Narrative:    EXAMINATION:  XR SHOULDER COMPLETE 2 OR MORE VIEWS LEFT    CLINICAL HISTORY:  L shoulder pain;    TECHNIQUE:  Two or three views of the left shoulder were performed.    COMPARISON:  Chest radiograph 02/18/2015    FINDINGS:  Overall alignment is within normal limits.  No displaced fracture, dislocation or destructive osseous process.  Minimal degenerative change about the shoulder.  No subcutaneous emphysema or radiodense retained foreign body.  Left lung apex is clear.  Left basilar scattered linear opacities suggesting subsegmental scarring versus atelectasis.  Calcification and tortuosity of the aorta.                                X-Ray Chest PA And Lateral (Final result)  Result time 07/30/22 20:11:06    Final result by Cesar Peralta MD (07/30/22 20:11:06)                 Impression:      Left basilar patchy subtle opacities concerning for subsegmental airspace disease including aspiration or pneumonia in this patient history of fever.  Short-term follow-up chest radiography after therapy is recommended to ensure resolution.    This report was flagged in Epic as abnormal.      Electronically signed by: Cesar Peralta MD  Date:    07/30/2022  Time:    20:11             Narrative:    EXAMINATION:  XR CHEST PA AND LATERAL    CLINICAL HISTORY:  Fever, unspecified    TECHNIQUE:  PA and lateral views of the chest were performed.    COMPARISON:  Chest radiograph 02/18/2015    FINDINGS:  Trachea is relatively midline and patent.  Mediastinal structures are midline.  There is calcification and tortuosity of the aorta.  Heart size is normal.  Pulmonary vasculature and hilar  contours are within normal limits.    The lungs are symmetrically well expanded.  Few scattered linear opacities at the lung bases consistent with minimal platelike scarring versus atelectasis.  Additional subtle patchy opacities at the left lung base best seen on the frontal view.  No pleural effusion or pneumothorax.  Osseous structures appear grossly stable without acute process seen.                              X-Rays:   Independently Interpreted Readings:   Chest X-Ray: Opacity in the left lower lobe.     Medications   acetaminophen tablet 1,000 mg (1,000 mg Oral Given 7/30/22 1849)   ibuprofen tablet 600 mg (600 mg Oral Given 7/30/22 1849)     Medical Decision Making:   History:   Old Medical Records: I decided to obtain old medical records.  Initial Assessment:   Patient is COVID-19 positive. Considered influenza, negative. No meningeal signs to suggest meningitis. Given focal findings of opacity of the left lower lobe there is concerns for super infection with bacterial pneumonia. Plan to discharge home with doxycycline for CAP coverage. Patient is a candidate for Paxlovid. Patient advised to hold her Lipitor for the first 12 hours before first dose of Paxlovid and during the first 5 days of taking Paxlovid to stop taking Lipitor, and restart 5 days after last Paxlovid dose. Patient was discharged home with PMD f/u as needed with strict RTED precautions.      Independently Interpreted Test(s):   I have ordered and independently interpreted X-rays - see prior notes.  I have ordered and independently interpreted EKG Reading(s) - see prior notes  Clinical Tests:   Lab Tests: Ordered and Reviewed       <> Summary of Lab: COVID-19: positive  Radiological Study: Ordered and Reviewed  Medical Tests: Ordered and Reviewed          Scribe Attestation:   Scribe #1: I performed the above scribed service and the documentation accurately describes the services I performed. I attest to the accuracy of the note.                  Clinical Impression:   Final diagnoses:  [R07.9] Chest pain  [R50.9] Fever  [U07.1] COVID-19 (Primary)  [J18.9] Pneumonia of left lower lobe due to infectious organism          ED Disposition Condition    Discharge Stable        ED Prescriptions     Medication Sig Dispense Start Date End Date Auth. Provider    doxycycline (VIBRAMYCIN) 100 MG Cap  (Status: Discontinued) Take 1 capsule (100 mg total) by mouth 2 (two) times daily. for 10 days 20 capsule 7/30/2022 7/30/2022 Geno Lance MD    doxycycline (VIBRAMYCIN) 100 MG Cap Take 1 capsule (100 mg total) by mouth 2 (two) times daily. for 5 days 10 capsule 7/30/2022 8/4/2022 Geno Lance MD    nirmatrelvir-ritonavir 300 mg (150 mg x 2)-100 mg copackaged tablets (EUA) Take 3 tablets by mouth 2 (two) times daily for 5 days. Each dose contains 2 nirmatrelvir (pink tablets) and 1 ritonavir (white tablet). Take all 3 tablets together 30 tablet 7/30/2022 8/4/2022 Geno Lance MD        Follow-up Information     Follow up With Specialties Details Why Contact Info    Patti Campbell MD Internal Medicine Schedule an appointment as soon as possible for a visit  As needed [address] [facility] [address]  [phone]      [facility] - Emergency Dept Emergency Medicine  As needed, If symptoms worsen [address] [facility] [id number] [phone]           Geno Lance MD  07/31/22 [id number]

## 2022-07-31 NOTE — DISCHARGE INSTRUCTIONS
Please complete the entire course of antibiotics as prescribed.     The pharmacy in the atrium of the main [facility] is open from 10a-4pm on Sunday to  your Paxlovid prescription. This medication can interact with your Lipitor, also known as atorvastatin.  It is recommended that you hold your Lipitor 12 hours before your 1st dose of Paxlovid. During the 5 days you are taking Paxlovid, DO NOT take your Lipitor. You can restart your Lipitor 5 days AFTER your last dose of Paxil.    We recommend that you quarantine at home, and if possible, do not be in contact with others for 5 days after symptoms resolve.  If you must leave your home, we recommend that you wear a mask at all times.    Return to the ED immediately for any new chest pain, shortness of breath, severe abdominal pain, abdominal pain that is constant, nausea/and vomiting that does not stop on its own, severe headache, new numbness, tingling, or weakness anywhere, fever greater than 101F or any additional concerns.

## 2022-08-09 ENCOUNTER — OFFICE VISIT (OUTPATIENT)
Dept: INTERNAL MEDICINE | Facility: CLINIC | Age: 77
End: 2022-08-09
Payer: MEDICARE

## 2022-08-09 DIAGNOSIS — R13.10 DYSPHAGIA, UNSPECIFIED TYPE: ICD-10-CM

## 2022-08-09 DIAGNOSIS — K21.9 GASTROESOPHAGEAL REFLUX DISEASE, UNSPECIFIED WHETHER ESOPHAGITIS PRESENT: Primary | ICD-10-CM

## 2022-08-09 DIAGNOSIS — R94.31 ABNORMAL EKG: ICD-10-CM

## 2022-08-09 PROCEDURE — 99214 OFFICE O/P EST MOD 30 MIN: CPT | Mod: S$PBB,,, | Performed by: NURSE PRACTITIONER

## 2022-08-09 PROCEDURE — 93010 EKG 12-LEAD: ICD-10-PCS | Mod: S$PBB,,, | Performed by: INTERNAL MEDICINE

## 2022-08-09 PROCEDURE — 93010 ELECTROCARDIOGRAM REPORT: CPT | Mod: S$PBB,,, | Performed by: INTERNAL MEDICINE

## 2022-08-09 PROCEDURE — 99999 PR PBB SHADOW E&M-EST. PATIENT-LVL V: CPT | Mod: PBBFAC,,, | Performed by: NURSE PRACTITIONER

## 2022-08-09 PROCEDURE — 99215 OFFICE O/P EST HI 40 MIN: CPT | Mod: PBBFAC | Performed by: NURSE PRACTITIONER

## 2022-08-09 PROCEDURE — 93005 ELECTROCARDIOGRAM TRACING: CPT | Mod: PBBFAC | Performed by: INTERNAL MEDICINE

## 2022-08-09 PROCEDURE — 99999 PR PBB SHADOW E&M-EST. PATIENT-LVL V: ICD-10-PCS | Mod: PBBFAC,,, | Performed by: NURSE PRACTITIONER

## 2022-08-09 PROCEDURE — 99214 PR OFFICE/OUTPT VISIT, EST, LEVL IV, 30-39 MIN: ICD-10-PCS | Mod: S$PBB,,, | Performed by: NURSE PRACTITIONER

## 2022-08-09 RX ORDER — PANTOPRAZOLE SODIUM 40 MG/1
40 TABLET, DELAYED RELEASE ORAL DAILY
Qty: 30 TABLET | Refills: 2 | Status: CANCELLED | OUTPATIENT
Start: 2022-08-09 | End: 2023-08-09

## 2022-08-09 RX ORDER — PANTOPRAZOLE SODIUM 40 MG/1
40 TABLET, DELAYED RELEASE ORAL DAILY
Qty: 90 TABLET | Refills: 1 | Status: SHIPPED | OUTPATIENT
Start: 2022-08-09 | End: 2023-03-06

## 2022-08-09 NOTE — PROGRESS NOTES
"[facility] Note    Chief Complaint      Chief Complaint   Patient presents with    Dysphagia     C/o swallowing issues, pain in back of throat/top of pallet. Feels like she swallows a bubble every time she swallows, causing abd/chest issues.      History of Present Illness      Lissy Palencia is a 77 y.o. female patient of Dr. Campbell's with hyperlipidemia 2/2 DM2, aortic atherosclerosis, fracture of sacrum who is new to me and presents today for c/o throat pain, feels tight, "feels like my throat is locked up, like it is closing",  burning down chest-almost like a heartburn feeling, feels like she is going to choke. Feels that she needs to belch and it will subside symptoms but it makes it worse. She is afraid to eat anything and swallowing it.  She is coughing up clear/white foam, she feels nauseated at times. She has taken rolaids and pantoprazole which neither helped. No fever, chills, sob, cardiac chest pain, vomiting or diarrhea.    she has tried creamed potatoes, cherrios and lemon water. Did tolerate but is afraid to eat now. Symptoms have been intermittent and getting worse over the past 3 days.     Pt was diagnosed with COVID on 7/30/22, she reports got better. She is 10 days since her diagnosis of pna LLL was treated with doxy x 5 days, and given paxlovid    EKG- NSR  Will refer to ENT for further eval of esophagus      Health Maintenance   Topic Date Due    Eye Exam  03/26/2022    Mammogram  09/22/2022    Lipid Panel  09/16/2022    Hemoglobin A1c  12/07/2022    Foot Exam  01/03/2023    Aspirin/Antiplatelet Therapy  08/09/2023    DEXA Scan  09/22/2023    TETANUS VACCINE  01/03/2028    Hepatitis C Screening  Completed       Past Medical History:   Diagnosis Date    Allergy     Arthritis     Coronary artery disease     in the past    Cystocele 1/19/2016    GERD (gastroesophageal reflux disease)     Hemorrhoids     History of cholelithiasis     Hyperlipidemia     " "Hypothyroidism     Obesity     Positive cardiac stress test 7/27/2016    Right shoulder pain 10/9/2012    Trigger finger, right 3rd finger 7/30/2014    Vaginal atrophy 1/19/2016    Vaginal vault prolapse 1/19/2016       Past Surgical History:   Procedure Laterality Date    ABLATION COLPOCLESIS      ADENOIDECTOMY      CHOLECYSTECTOMY      COLONOSCOPY N/A 11/28/2018    Procedure: COLONOSCOPY;  Surgeon: Richi Mcintosh MD;  Location: [facility] ([address] [place] [address]);  Service: Endoscopy;  Laterality: N/A;    CORONARY STENT PLACEMENT      ESOPHAGOGASTRODUODENOSCOPY N/A 11/28/2018    Procedure: ESOPHAGOGASTRODUODENOSCOPY (EGD);  Surgeon: Richi Mcintosh MD;  Location: [facility] ([address] [place] [address]);  Service: Endoscopy;  Laterality: N/A;    heart cath  2005    non-obstructive disease    OOPHORECTOMY      SHOULDER ARTHROSCOPY W/ ROTATOR CUFF REPAIR      Right    TONSILLECTOMY      TOTAL ABDOMINAL HYSTERECTOMY      with BSO    TUBAL LIGATION         family history is not on file.    Social History     Tobacco Use    Smoking status: Never Smoker    Smokeless tobacco: Never Used   Substance Use Topics    Alcohol use: Yes     Alcohol/week: 1.0 standard drink     Types: 1 Glasses of wine per week     Comment: "maybe a glass of wine every 6 months"    Drug use: No       Review of Systems   Constitutional: Negative for chills, fever and malaise/fatigue.   HENT: Positive for sore throat. Negative for congestion and sinus pain.    Eyes: Negative for blurred vision.   Respiratory: Positive for cough and sputum production. Negative for hemoptysis and shortness of breath.    Cardiovascular: Positive for chest pain. Negative for palpitations.   Gastrointestinal: Positive for abdominal pain, heartburn and nausea. Negative for diarrhea and vomiting.   Genitourinary: Negative for dysuria.   Musculoskeletal: Negative for neck pain.   Neurological: Positive for headaches. Negative for dizziness, tingling and weakness.        Outpatient " Encounter Medications as of 8/9/2022   Medication Sig Dispense Refill    alendronate (FOSAMAX) 70 MG tablet Take 1 tablet (70 mg total) by mouth every 7 days. Mondays.Then after an hour take your thyroid tablet, 30 minutes later your pantoprazole. 12 tablet 1    aspirin 81 MG Chew Take 1 tablet (81 mg total) by mouth once daily. 100 tablet 4    atorvastatin (LIPITOR) 40 MG tablet TAKE 1 TABLET ONCE DAILY 90 tablet 3    azelastine (ASTELIN) 137 mcg (0.1 %) nasal spray USE 1 SPRAY NASALLY TWICE DAILY 30 mL 11    blood sugar diagnostic Strp To check BG ONCE daily, to use with insurance preferred meter 100 strip 3    blood-glucose meter kit To check BG ONCE THE daily, to use with insurance preferred meter 1 each 1    cycloSPORINE (RESTASIS) 0.05 % ophthalmic emulsion Place 1 drop into both eyes 2 (two) times daily. 60 each 3    fluticasone propionate (FLONASE) 50 mcg/actuation nasal spray 1 spray (50 mcg total) by Each Nostril route 2 (two) times daily as needed. 16 g 11    lancets Misc To check BG ONCE daily, to use with insurance preferred meter 100 each 3    levothyroxine (SYNTHROID) 88 MCG tablet Take first thing in the morning on an empty stomach 90 tablet 1    LIDOcaine (LIDODERM) 5 % Place 1 patch onto the skin once daily. Remove & Discard patch within 12 hours or as directed by MD 15 patch 0    vitamin D (VITAMIN D3) 1000 units Tab Take 1 tablet (1,000 Units total) by mouth once daily. 90 tablet 3    azelastine (ASTELIN) 137 mcg (0.1 %) nasal spray USE 1 SPRAY NASALLY TWICE DAILY (Patient not taking: Reported on 8/9/2022) 30 mL 11    pantoprazole (PROTONIX) 40 MG tablet Take 1 tablet (40 mg total) by mouth once daily. 90 tablet 1     No facility-administered encounter medications on file as of 8/9/2022.        Review of patient's allergies indicates:   Allergen Reactions    Compazine  [prochlorperazine edisylate]      Other reaction(s): SPASMS    Parafon forte      Other reaction(s): Hives  "      Physical Exam      Vital Signs  Temp: (P) 98.7 °F (37.1 °C)  Pulse: (P) 64  SpO2: (P) 97 %  BP: (P) 118/74  Height and Weight  Height: (P) 5' 2" (157.5 cm)  Weight: (P) 73.1 kg (161 lb 2.5 oz)  BSA (Calculated - sq m): (P) 1.79 sq meters  BMI (Calculated): (P) 29.5  Weight in (lb) to have BMI = 25: (P) 136.4    Physical Exam  Vitals and nursing note reviewed.   Constitutional:       General: She is not in acute distress.     Appearance: Normal appearance. She is ill-appearing.   HENT:      Head: Normocephalic and atraumatic.      Right Ear: Tympanic membrane normal.      Left Ear: Tympanic membrane normal.      Nose: Nose normal.      Mouth/Throat:      Mouth: Mucous membranes are moist.      Pharynx: Posterior oropharyngeal erythema present.   Eyes:      Pupils: Pupils are equal, round, and reactive to light.   Cardiovascular:      Rate and Rhythm: Normal rate and regular rhythm.      Heart sounds: Normal heart sounds.   Pulmonary:      Effort: Pulmonary effort is normal. No respiratory distress.      Breath sounds: Normal breath sounds.   Musculoskeletal:      Cervical back: Normal range of motion and neck supple. No tenderness.   Lymphadenopathy:      Cervical: Cervical adenopathy present.   Skin:     General: Skin is warm and dry.   Neurological:      General: No focal deficit present.      Mental Status: She is alert and oriented to person, place, and time.   Psychiatric:         Mood and Affect: Mood normal.         Behavior: Behavior normal.         Thought Content: Thought content normal.         Judgment: Judgment normal.          Laboratory:  CBC:  Lab Results   Component Value Date    WBC 7.20 07/24/2020    RBC 5.27 07/24/2020    HGB 14.7 07/24/2020    HCT 46.8 07/24/2020     07/24/2020    MCV 89 07/24/2020    MCH 27.9 07/24/2020    MCHC 31.4 (L) 07/24/2020    MCHC 33.9 11/14/2019    MCHC 33.0 10/23/2018     CMP:  Lab Results   Component Value Date     09/16/2021    CALCIUM 9.1 " 09/16/2021    ALBUMIN 3.9 09/16/2021    PROT 7.1 09/16/2021     09/16/2021    K 4.3 09/16/2021    CO2 27 09/16/2021     09/16/2021    BUN 19 09/16/2021    ALKPHOS 96 09/16/2021    ALT 22 09/16/2021    AST 22 09/16/2021    BILITOT 1.3 (H) 09/16/2021    BILITOT 1.2 (H) 07/24/2020    BILITOT 0.8 11/14/2019     URINALYSIS:  Lab Results   Component Value Date    COLORU Yellow 11/14/2019    SPECGRAV 1.020 11/14/2019    PHUR 5.0 11/14/2019    PROTEINUA Negative 11/14/2019    BACTERIA Occasional 11/14/2019    NITRITE Negative 11/14/2019    LEUKOCYTESUR Trace (A) 11/14/2019    UROBILINOGEN normal 09/18/2018      LIPIDS:  Lab Results   Component Value Date    TSH 2.235 06/07/2022    TSH 5.870 (H) 09/16/2021    TSH 5.870 (H) 09/16/2021    HDL 36 (L) 09/16/2021    HDL 39 (L) 01/25/2021    HDL 35 (L) 07/24/2020    CHOL 153 09/16/2021    CHOL 164 01/25/2021    CHOL 166 07/24/2020    TRIG 204 (H) 09/16/2021    TRIG 207 (H) 01/25/2021    TRIG 219 (H) 07/24/2020    LDLCALC 76.2 09/16/2021    LDLCALC 83.6 01/25/2021    LDLCALC 87.2 07/24/2020    CHOLHDL 23.5 09/16/2021    CHOLHDL 23.8 01/25/2021    CHOLHDL 21.1 07/24/2020    NONHDLCHOL 117 09/16/2021    NONHDLCHOL 125 01/25/2021    NONHDLCHOL 131 07/24/2020    TOTALCHOLEST 4.3 09/16/2021    TOTALCHOLEST 4.2 01/25/2021    TOTALCHOLEST 4.7 07/24/2020     TSH:  Lab Results   Component Value Date    TSH 2.235 06/07/2022    TSH 5.870 (H) 09/16/2021    TSH 5.870 (H) 09/16/2021     A1C:  Lab Results   Component Value Date    HGBA1C 6.4 (H) 06/07/2022    HGBA1C 6.1 (H) 09/16/2021    HGBA1C 5.9 (H) 06/01/2021    HGBA1C 7.1 (H) 03/02/2021    HGBA1C 8.3 (H) 01/25/2021    HGBA1C 6.5 (H) 07/24/2020    HGBA1C 6.3 (H) 11/14/2019    HGBA1C 6.0 (H) 04/18/2018    HGBA1C 5.7 (H) 01/03/2018    HGBA1C 5.8 (H) 08/01/2017    HGBA1C 6.2 09/28/2016    HGBA1C 5.9 07/16/2014         Assessment/Plan     Lissy Palencia is a 77 y.o.female with:    Gastroesophageal reflux disease, unspecified whether  esophagitis present  -     pantoprazole (PROTONIX) 40 MG tablet; Take 1 tablet (40 mg total) by mouth once daily.  Dispense: 90 tablet; Refill: 1  -     Ambulatory referral/consult to ENT; Future; Expected date: 08/09/2022  -     IN OFFICE EKG 12-LEAD (to Muse)    Dysphagia, unspecified type  -     Ambulatory referral/consult to ENT; Future; Expected date: 08/09/2022  -     IN OFFICE EKG 12-LEAD (to Muse)    Abnormal EKG  -     IN OFFICE EKG 12-LEAD (to Muse)      Stay hydrated  Monitor symptoms    I spent 35 minutes on the day of this encounter for preparing for, evaluating, treating, and managing this patient.      -Continue current medications and maintain follow up with specialists.  Return to clinic as needed for any concerns   No follow-ups on file.       JOSAFAT Bocanegra  [facility]

## 2022-08-10 ENCOUNTER — HOSPITAL ENCOUNTER (OUTPATIENT)
Dept: RADIOLOGY | Facility: HOSPITAL | Age: 77
Discharge: HOME | End: 2022-08-10
Attending: ORTHOPAEDIC SURGERY
Payer: MEDICARE

## 2022-08-10 DIAGNOSIS — M53.3 COCCYX PAIN: ICD-10-CM

## 2022-08-10 PROCEDURE — 72220 X-RAY EXAM SACRUM TAILBONE: CPT | Mod: 26,,, | Performed by: RADIOLOGY

## 2022-08-10 PROCEDURE — 72220 X-RAY EXAM SACRUM TAILBONE: CPT | Mod: TC

## 2022-08-10 PROCEDURE — 72220 XR SACRUM AND COCCYX: ICD-10-PCS | Mod: 26,,, | Performed by: RADIOLOGY

## 2022-08-26 ENCOUNTER — OFFICE VISIT (OUTPATIENT)
Dept: ORTHOPEDICS | Facility: CLINIC | Age: 77
End: 2022-08-26
Payer: MEDICARE

## 2022-08-26 VITALS — HEIGHT: 62 IN | WEIGHT: 161.19 LBS | BODY MASS INDEX: 29.66 KG/M2

## 2022-08-26 DIAGNOSIS — M53.3 COCCYDYNIA: Primary | ICD-10-CM

## 2022-08-26 PROCEDURE — 99213 PR OFFICE/OUTPT VISIT, EST, LEVL III, 20-29 MIN: ICD-10-PCS | Mod: S$PBB,,, | Performed by: ORTHOPAEDIC SURGERY

## 2022-08-26 PROCEDURE — 99212 OFFICE O/P EST SF 10 MIN: CPT | Mod: PBBFAC | Performed by: ORTHOPAEDIC SURGERY

## 2022-08-26 PROCEDURE — 99213 OFFICE O/P EST LOW 20 MIN: CPT | Mod: S$PBB,,, | Performed by: ORTHOPAEDIC SURGERY

## 2022-08-26 PROCEDURE — 99999 PR PBB SHADOW E&M-EST. PATIENT-LVL II: CPT | Mod: PBBFAC,,, | Performed by: ORTHOPAEDIC SURGERY

## 2022-08-26 PROCEDURE — 99999 PR PBB SHADOW E&M-EST. PATIENT-LVL II: ICD-10-PCS | Mod: PBBFAC,,, | Performed by: ORTHOPAEDIC SURGERY

## 2022-08-29 ENCOUNTER — OFFICE VISIT (OUTPATIENT)
Dept: OTOLARYNGOLOGY | Facility: CLINIC | Age: 77
End: 2022-08-29
Payer: MEDICARE

## 2022-08-29 VITALS
HEART RATE: 78 BPM | BODY MASS INDEX: 29.45 KG/M2 | WEIGHT: 161 LBS | DIASTOLIC BLOOD PRESSURE: 53 MMHG | SYSTOLIC BLOOD PRESSURE: 117 MMHG

## 2022-08-29 DIAGNOSIS — K21.9 GASTROESOPHAGEAL REFLUX DISEASE, UNSPECIFIED WHETHER ESOPHAGITIS PRESENT: ICD-10-CM

## 2022-08-29 DIAGNOSIS — R13.10 DYSPHAGIA, UNSPECIFIED TYPE: ICD-10-CM

## 2022-08-29 PROCEDURE — 31575 PR LARYNGOSCOPY, FLEXIBLE; DIAGNOSTIC: ICD-10-PCS | Mod: S$PBB,,, | Performed by: OTOLARYNGOLOGY

## 2022-08-29 PROCEDURE — 99999 PR PBB SHADOW E&M-EST. PATIENT-LVL III: ICD-10-PCS | Mod: PBBFAC,,, | Performed by: OTOLARYNGOLOGY

## 2022-08-29 PROCEDURE — 99999 PR PBB SHADOW E&M-EST. PATIENT-LVL III: CPT | Mod: PBBFAC,,, | Performed by: OTOLARYNGOLOGY

## 2022-08-29 PROCEDURE — 99213 OFFICE O/P EST LOW 20 MIN: CPT | Mod: S$PBB,25,, | Performed by: OTOLARYNGOLOGY

## 2022-08-29 PROCEDURE — 31575 DIAGNOSTIC LARYNGOSCOPY: CPT | Mod: PBBFAC | Performed by: OTOLARYNGOLOGY

## 2022-08-29 PROCEDURE — 99213 OFFICE O/P EST LOW 20 MIN: CPT | Mod: PBBFAC,25 | Performed by: OTOLARYNGOLOGY

## 2022-08-29 PROCEDURE — 31575 DIAGNOSTIC LARYNGOSCOPY: CPT | Mod: S$PBB,,, | Performed by: OTOLARYNGOLOGY

## 2022-08-29 PROCEDURE — 99213 PR OFFICE/OUTPT VISIT, EST, LEVL III, 20-29 MIN: ICD-10-PCS | Mod: S$PBB,25,, | Performed by: OTOLARYNGOLOGY

## 2022-08-29 NOTE — PROGRESS NOTES
The patient returns for follow up.    She has a history of coccydynia.    Overall she is much better, now about 75% better from the worst of her symptoms.    She does not some pain with prolonged sitting.    New pelvic xrays demonstrate evidence of an old coccygeal fracture.    Today we discussed options, plan will be for donut pillow, RTC PRN.

## 2022-08-29 NOTE — PROGRESS NOTES
Chief Complaint   Patient presents with    Gastroesophageal reflux disease       HPI   77 y.o. female presents in follow up for throat burning after completing Paxlovid for COVID several weeks ago.  She reports gastroesophageal reflux reports that her symptoms have begun to improve on PPI.  No throat pain.  She reported some dysphagia when this began but states this problem is also improving.  No other complaints.    Review of Systems   Constitutional: Negative for fatigue and unexpected weight change.   HENT: Per HPI.  Eyes: Negative for visual disturbance.   Respiratory: Negative for shortness of breath, hemoptysis   Cardiovascular: Negative for chest pain and palpitations.   Musculoskeletal: Negative for decreased ROM, back pain.   Skin: Negative for rash, sunburn, itching.   Neurological: Negative for dizziness and seizures.   Hematological: Negative for adenopathy. Does not bruise/bleed easily.   Endocrine: Negative for rapid weight loss/weight gain, heat/cold intolerance.     Past Medical History   Patient Active Problem List   Diagnosis    Hyperlipidemia associated with type 2 diabetes mellitus    Chronic low back pain    Osteoarthritis    CAD (coronary artery disease), native coronary artery, s/p PCI atherectomy to LAD 1997.    Hand arthritis    Primary hypothyroidism    Rectocele    HEMAL (obstructive sleep apnea)    Fatty liver    Essential tremor, slight head vane    Lateral femoral cutaneous neuropathy, right    Constipation    GERD (gastroesophageal reflux disease)    Aortic atherosclerosis    Chronic RUQ pain    Type 2 diabetes mellitus without complication, without long-term current use of insulin    Osteoporosis    Fx sacrum/coccyx-closed           Past Surgical History   Past Surgical History:   Procedure Laterality Date    ABLATION COLPOCLESIS      ADENOIDECTOMY      CHOLECYSTECTOMY      COLONOSCOPY N/A 11/28/2018    Procedure: COLONOSCOPY;  Surgeon: Richi Mcintosh MD;  Location: [address]  "[facility]R);  Service: Endoscopy;  Laterality: N/A;    CORONARY STENT PLACEMENT      ESOPHAGOGASTRODUODENOSCOPY N/A 11/28/2018    Procedure: ESOPHAGOGASTRODUODENOSCOPY (EGD);  Surgeon: Richi Mcintosh MD;  Location: [facility] (4TH FLR);  Service: Endoscopy;  Laterality: N/A;    heart cath  2005    non-obstructive disease    OOPHORECTOMY      SHOULDER ARTHROSCOPY W/ ROTATOR CUFF REPAIR      Right    TONSILLECTOMY      TOTAL ABDOMINAL HYSTERECTOMY      with BSO    TUBAL LIGATION           Family History   Family History   Problem Relation Age of Onset    Breast cancer Neg Hx     Ovarian cancer Neg Hx     Glaucoma Neg Hx     Cataracts Neg Hx     Diabetes Neg Hx     Macular degeneration Neg Hx     Retinal detachment Neg Hx     Melanoma Neg Hx     Cervical cancer Neg Hx     Endometrial cancer Neg Hx     Vaginal cancer Neg Hx            Social History   .  Social History     Socioeconomic History    Marital status:    Occupational History     Employer: [employer]   Tobacco Use    Smoking status: Never    Smokeless tobacco: Never   Substance and Sexual Activity    Alcohol use: Yes     Alcohol/week: 1.0 standard drink     Types: 1 Glasses of wine per week     Comment: "maybe a glass of wine every 6 months"    Drug use: No    Sexual activity: Not Currently     Partners: Male     Birth control/protection: None     Social Determinants of Health     Financial Resource Strain: Low Risk     Difficulty of Paying Living Expenses: Not hard at all   Food Insecurity: No Food Insecurity    Worried About Running Out of Food in the Last Year: Never true    Ran Out of Food in the Last Year: Never true   Transportation Needs: No Transportation Needs    Lack of Transportation (Medical): No    Lack of Transportation (Non-Medical): No   Stress: No Stress Concern Present    Feeling of Stress : Not at all   Social Connections: Moderately Isolated    Frequency of Communication with Friends and Family: More than three times a week    " Frequency of Social Gatherings with Friends and Family: Once a week    Attends Mu-ism Services: Never    Active Member of Clubs or Organizations: No    Attends Club or Organization Meetings: Never    Marital Status:    Housing Stability: Low Risk     Unable to Pay for Housing in the Last Year: No    Number of Places Lived in the Last Year: 1    Unstable Housing in the Last Year: No         Allergies   Review of patient's allergies indicates:   Allergen Reactions    Compazine  [prochlorperazine edisylate]      Other reaction(s): SPASMS    Parafon forte      Other reaction(s): Hives           Physical Exam     Vitals:    08/29/22 1059   BP: (!) 117/53   Pulse: 78         Body mass index is 29.45 kg/m².      General: AOx3, NAD   Respiratory:  Symmetric chest rise, normal effort  Nose: No gross nasal septal deviation. Inferior Turbinates WNL bilaterally. No septal perforation. No masses/lesions.   Oral Cavity:  Oral Tongue mobile, no lesions noted. Hard Palate WNL. No buccal or FOM lesions.  Oropharynx:  No masses/lesions of the posterior pharyngeal wall. Tonsillar fossa without lesions. Soft palate without masses. Midline uvula.   Neck: No scars.  No cervical lymphadenopathy, thyromegaly or thyroid nodules.  Normal range of motion.    Face: House Brackmann I bilaterally.     Flex Naso Kelsey Hypo Procedures #2    Procedure:  Diagnostic flexible nasopharyngoscopy, laryngoscopy and hypopharyngoscopy:    Routine preparation with local atomizer with 1% neosynephrine/pontocaine with customary flexible endoscope.    Nasopharynx:  No lesions.   Mucosa:  No lesions.   Adenoids:  Present.  Posterior Choanae:  Patent.  Eustachian Tubes:  Patent.  Posterior pharynx:  No lesions.  Larynx/hypopharynx:   Epiglottis:  No lesions, without edema.   AE Folds:  No lesions.   Vocal cords:  No polyps, nodules, ulcers or lesions.   Mobility:  Equal and normal bilateral.   Hypopharynx:  No lesions.   Piriform sinus:  No pooling, no  lesions.   Post Cricoid:  No erythema, no edema.      Assessment/Plan  Problem List Items Addressed This Visit          GI    GERD (gastroesophageal reflux disease)     Improving on PPI.  Head and neck exam unremarkable.  She will contact me if she does not improve to her satisfaction and I will arrange a GI consult.             Other Visit Diagnoses       Dysphagia, unspecified type

## 2022-08-29 NOTE — ASSESSMENT & PLAN NOTE
Improving on PPI.  Head and neck exam unremarkable.  She will contact me if she does not improve to her satisfaction and I will arrange a GI consult.

## 2022-09-06 ENCOUNTER — OFFICE VISIT (OUTPATIENT)
Dept: OPHTHALMOLOGY | Facility: CLINIC | Age: 77
End: 2022-09-06
Payer: MEDICARE

## 2022-09-06 DIAGNOSIS — H16.223 KERATOCONJUNCTIVITIS SICCA NOT SPECIFIED AS SJOGREN'S, BILATERAL: ICD-10-CM

## 2022-09-06 DIAGNOSIS — H04.123 DRY EYE SYNDROME, BILATERAL: ICD-10-CM

## 2022-09-06 DIAGNOSIS — H25.11 NUCLEAR SCLEROTIC CATARACT OF RIGHT EYE: Primary | ICD-10-CM

## 2022-09-06 DIAGNOSIS — H25.12 NUCLEAR SCLEROTIC CATARACT OF LEFT EYE: ICD-10-CM

## 2022-09-06 PROCEDURE — 99213 OFFICE O/P EST LOW 20 MIN: CPT | Mod: PBBFAC | Performed by: OPHTHALMOLOGY

## 2022-09-06 PROCEDURE — 99214 PR OFFICE/OUTPT VISIT, EST, LEVL IV, 30-39 MIN: ICD-10-PCS | Mod: S$PBB,,, | Performed by: OPHTHALMOLOGY

## 2022-09-06 PROCEDURE — 99214 OFFICE O/P EST MOD 30 MIN: CPT | Mod: S$PBB,,, | Performed by: OPHTHALMOLOGY

## 2022-09-06 PROCEDURE — 99999 PR PBB SHADOW E&M-EST. PATIENT-LVL III: ICD-10-PCS | Mod: PBBFAC,,, | Performed by: OPHTHALMOLOGY

## 2022-09-06 PROCEDURE — 99999 PR PBB SHADOW E&M-EST. PATIENT-LVL III: CPT | Mod: PBBFAC,,, | Performed by: OPHTHALMOLOGY

## 2022-09-06 NOTE — PROGRESS NOTES
HPI    Dr Mcintosh/ Dr Conner / Dr. Jack     Glaucoma suspect OS   ZACK OU   Cataracts      Restasis BID OU    Pt here today for cataract eval. Pt states she seems like VA got worse   since her last visit. Pt c/o around OU would itch.  Last edited by Gisel Acosta on 9/6/2022  8:41 AM.            Assessment /Plan     For exam results, see Encounter Report.    Nuclear sclerotic cataract of right eye  -     IOL Master - OD - Right Eye    Nuclear sclerotic cataract of left eye    Dry eye syndrome, bilateral    Keratoconjunctivitis sicca not specified as Sjogren's, bilateral      Visually significant nuclear sclerotic cataract   - Interfering with activities of daily living.  Pt desires cataract surgery for Va rehabilitation.   - R/B/A discussed and pt agrees to proceed with surgery.   - IOL options discussed according to patient's goals and concomitant ocular pathology; and pt content with mf lens.    - Target: plano.    Symfony optiblue [id number] 21.5 range OD    (ZXROO 21.5 range OS)    Discussed risk of glare / halo after sx, pt understands and wishes to proceed.  Agrees to fee for service cost of $2250 per eye.

## 2022-09-12 ENCOUNTER — OFFICE VISIT (OUTPATIENT)
Dept: INTERNAL MEDICINE | Facility: CLINIC | Age: 77
End: 2022-09-12
Payer: MEDICARE

## 2022-09-12 VITALS
HEART RATE: 72 BPM | WEIGHT: 166.75 LBS | TEMPERATURE: 98 F | DIASTOLIC BLOOD PRESSURE: 84 MMHG | RESPIRATION RATE: 18 BRPM | SYSTOLIC BLOOD PRESSURE: 132 MMHG | BODY MASS INDEX: 30.69 KG/M2 | OXYGEN SATURATION: 98 % | HEIGHT: 62 IN

## 2022-09-12 DIAGNOSIS — E11.9 TYPE 2 DIABETES MELLITUS WITHOUT COMPLICATION, WITHOUT LONG-TERM CURRENT USE OF INSULIN: Primary | ICD-10-CM

## 2022-09-12 DIAGNOSIS — E78.5 HYPERLIPIDEMIA ASSOCIATED WITH TYPE 2 DIABETES MELLITUS: ICD-10-CM

## 2022-09-12 DIAGNOSIS — I25.10 CORONARY ARTERY DISEASE INVOLVING NATIVE CORONARY ARTERY OF NATIVE HEART WITHOUT ANGINA PECTORIS: ICD-10-CM

## 2022-09-12 DIAGNOSIS — G47.33 OSA (OBSTRUCTIVE SLEEP APNEA): ICD-10-CM

## 2022-09-12 DIAGNOSIS — Z12.31 SCREENING MAMMOGRAM FOR BREAST CANCER: ICD-10-CM

## 2022-09-12 DIAGNOSIS — E11.69 HYPERLIPIDEMIA ASSOCIATED WITH TYPE 2 DIABETES MELLITUS: ICD-10-CM

## 2022-09-12 PROCEDURE — 99215 OFFICE O/P EST HI 40 MIN: CPT | Mod: PBBFAC | Performed by: INTERNAL MEDICINE

## 2022-09-12 PROCEDURE — 99999 PR PBB SHADOW E&M-EST. PATIENT-LVL V: CPT | Mod: PBBFAC,,, | Performed by: INTERNAL MEDICINE

## 2022-09-12 PROCEDURE — 99214 PR OFFICE/OUTPT VISIT, EST, LEVL IV, 30-39 MIN: ICD-10-PCS | Mod: S$PBB,,, | Performed by: INTERNAL MEDICINE

## 2022-09-12 PROCEDURE — 99999 PR PBB SHADOW E&M-EST. PATIENT-LVL V: ICD-10-PCS | Mod: PBBFAC,,, | Performed by: INTERNAL MEDICINE

## 2022-09-12 PROCEDURE — 99214 OFFICE O/P EST MOD 30 MIN: CPT | Mod: S$PBB,,, | Performed by: INTERNAL MEDICINE

## 2022-09-12 NOTE — PROGRESS NOTES
[facility] Note    Chief Complaint      Chief Complaint   Patient presents with    Establish Care       History of Present Illness      Lissy Palencia is a 77 y.o. female who presents today for   Chief Complaint   Patient presents with    Establish Care   .  Patient comes to appointment here for establish visit with me . She has multiple chronic issues as discussed din detail below . Last a1c good in June at 6.4 . She is complaint with all meds and diet . She is uptodate with all labs needs mammogram . Needs a1c as she has stopped her metformin in June .    Problem List Items Addressed This Visit          Cardiac/Vascular    Hyperlipidemia associated with type 2 diabetes mellitus    Overview     Cont lipitor          CAD (coronary artery disease), native coronary artery, s/p PCI atherectomy to LAD 1997.    Overview     Cath 2005 non-obstructive disease  2009 negative nuclear stress test  Has not hd cardiology visit in years she needs referral back            Endocrine    Type 2 diabetes mellitus without complication, without long-term current use of insulin - Primary    Overview     Last a1c good in June but has stopped her metformin  In July will repeat a1c             Other    HEMAL (obstructive sleep apnea)    Overview     Cannot tolerate cpap is on no treatment cu rrently and declines           Other Visit Diagnoses       Screening mammogram for breast cancer                  Past Medical History:  Past Medical History:   Diagnosis Date    Allergy     Arthritis     Coronary artery disease     in the past    Cystocele 1/19/2016    GERD (gastroesophageal reflux disease)     Hemorrhoids     History of cholelithiasis     Hyperlipidemia     Hypothyroidism     Obesity     Positive cardiac stress test 7/27/2016    Right shoulder pain 10/9/2012    Trigger finger, right 3rd finger 7/30/2014    Vaginal atrophy 1/19/2016    Vaginal vault prolapse 1/19/2016       Past Surgical History:  Past  "Surgical History:   Procedure Laterality Date    ABLATION COLPOCLESIS      ADENOIDECTOMY      CHOLECYSTECTOMY      COLONOSCOPY N/A 11/28/2018    Procedure: COLONOSCOPY;  Surgeon: Richi Mcintosh MD;  Location: [address]);  Service: Endoscopy;  Laterality: N/A;    CORONARY STENT PLACEMENT      ESOPHAGOGASTRODUODENOSCOPY N/A 11/28/2018    Procedure: ESOPHAGOGASTRODUODENOSCOPY (EGD);  Surgeon: Richi Mcintosh MD;  Location: [address]);  Service: Endoscopy;  Laterality: N/A;    heart cath  2005    non-obstructive disease    OOPHORECTOMY      SHOULDER ARTHROSCOPY W/ ROTATOR CUFF REPAIR      Right    TONSILLECTOMY      TOTAL ABDOMINAL HYSTERECTOMY      with BSO    TUBAL LIGATION         Family History:  family history is not on file.    Social History:  Social History     Socioeconomic History    Marital status:    Occupational History     Employer: [employer]   Tobacco Use    Smoking status: Never    Smokeless tobacco: Never   Substance and Sexual Activity    Alcohol use: Yes     Alcohol/week: 1.0 standard drink     Types: 1 Glasses of wine per week     Comment: "maybe a glass of wine every 6 months"    Drug use: No    Sexual activity: Not Currently     Partners: Male     Birth control/protection: None     Social Determinants of Health     Financial Resource Strain: Low Risk     Difficulty of Paying Living Expenses: Not hard at all   Food Insecurity: No Food Insecurity    Worried About Running Out of Food in the Last Year: Never true    Ran Out of Food in the Last Year: Never true   Transportation Needs: No Transportation Needs    Lack of Transportation (Medical): No    Lack of Transportation (Non-Medical): No   Stress: No Stress Concern Present    Feeling of Stress : Not at all   Social Connections: Moderately Isolated    Frequency of Communication with Friends and Family: More than three times a week    Frequency of Social Gatherings with Friends and Family: Once a week    Attends " Baptism Services: Never    Active Member of Clubs or Organizations: No    Attends Club or Organization Meetings: Never    Marital Status:    Housing Stability: Low Risk     Unable to Pay for Housing in the Last Year: No    Number of Places Lived in the Last Year: 1    Unstable Housing in the Last Year: No       Review of Systems:   Review of Systems   Constitutional:  Negative for fever and weight loss.   HENT:  Negative for congestion, hearing loss and sore throat.    Eyes:  Negative for blurred vision.   Respiratory:  Negative for cough and shortness of breath.    Cardiovascular:  Negative for chest pain, palpitations, claudication and leg swelling.   Gastrointestinal:  Negative for abdominal pain, constipation, diarrhea and heartburn.   Genitourinary:  Negative for dysuria.   Musculoskeletal:  Positive for joint pain and myalgias. Negative for back pain.   Skin:  Negative for rash.   Neurological:  Negative for tingling, focal weakness and headaches.   Psychiatric/Behavioral:  Negative for depression and suicidal ideas. The patient is not nervous/anxious.        Medications:  Outpatient Encounter Medications as of 9/12/2022   Medication Sig Dispense Refill    alendronate (FOSAMAX) 70 MG tablet Take 1 tablet (70 mg total) by mouth every 7 days. Mondays.Then after an hour take your thyroid tablet, 30 minutes later your pantoprazole. 12 tablet 1    atorvastatin (LIPITOR) 40 MG tablet TAKE 1 TABLET ONCE DAILY 90 tablet 3    azelastine (ASTELIN) 137 mcg (0.1 %) nasal spray USE 1 SPRAY NASALLY TWICE DAILY 30 mL 11    blood sugar diagnostic Strp To check BG ONCE daily, to use with insurance preferred meter 100 strip 3    fluticasone propionate (FLONASE) 50 mcg/actuation nasal spray 1 spray (50 mcg total) by Each Nostril route 2 (two) times daily as needed. 16 g 11    lancets Misc To check BG ONCE daily, to use with insurance preferred meter 100 each 3    levothyroxine (SYNTHROID) 88 MCG tablet Take first thing  in the morning on an empty stomach 90 tablet 1    LIDOcaine (LIDODERM) 5 % Place 1 patch onto the skin once daily. Remove & Discard patch within 12 hours or as directed by MD 15 patch 0    pantoprazole (PROTONIX) 40 MG tablet Take 1 tablet (40 mg total) by mouth once daily. 90 tablet 1    vitamin D (VITAMIN D3) 1000 units Tab Take 1 tablet (1,000 Units total) by mouth once daily. 90 tablet 3    blood-glucose meter kit To check BG ONCE THE daily, to use with insurance preferred meter 1 each 1    cycloSPORINE (RESTASIS) 0.05 % ophthalmic emulsion Place 1 drop into both eyes 2 (two) times daily. 60 each 3    [DISCONTINUED] aspirin 81 MG Chew Take 1 tablet (81 mg total) by mouth once daily. (Patient not taking: Reported on 9/12/2022) 100 tablet 4    [DISCONTINUED] azelastine (ASTELIN) 137 mcg (0.1 %) nasal spray USE 1 SPRAY NASALLY TWICE DAILY (Patient not taking: Reported on 9/12/2022) 30 mL 11     No facility-administered encounter medications on file as of 9/12/2022.        Allergies:  Review of patient's allergies indicates:   Allergen Reactions    Compazine  [prochlorperazine edisylate]      Other reaction(s): SPASMS    Parafon forte      Other reaction(s): Hives         Physical Exam         Vitals:    09/12/22 1105   BP: 132/84   Pulse:    Resp:    Temp:          Physical Exam  Constitutional:       Appearance: She is well-developed.   Eyes:      Pupils: Pupils are equal, round, and reactive to light.   Neck:      Thyroid: No thyromegaly.   Cardiovascular:      Rate and Rhythm: Normal rate.      Heart sounds: Normal heart sounds. No murmur heard.    No friction rub. No gallop.   Pulmonary:      Breath sounds: Normal breath sounds.   Abdominal:      General: Bowel sounds are normal.      Palpations: Abdomen is soft.   Musculoskeletal:         General: Normal range of motion.      Cervical back: Normal range of motion.   Lymphadenopathy:      Cervical: No cervical adenopathy.   Skin:     General: Skin is warm.       Findings: No rash.   Neurological:      Mental Status: She is alert and oriented to person, place, and time.      Cranial Nerves: No cranial nerve deficit.   Psychiatric:         Behavior: Behavior normal.        Laboratory:  CBC:  No results for input(s): WBC, RBC, HGB, HCT, PLT, MCV, MCH, MCHC in the last 2160 hours.  CMP:  No results for input(s): GLU, CALCIUM, ALBUMIN, PROT, NA, K, CO2, CL, BUN, ALKPHOS, ALT, AST, BILITOT in the last 2160 hours.    Invalid input(s): CREATININ  URINALYSIS:  No results for input(s): COLORU, CLARITYU, SPECGRAV, PHUR, PROTEINUA, GLUCOSEU, BILIRUBINCON, BLOODU, WBCU, RBCU, BACTERIA, MUCUS, NITRITE, LEUKOCYTESUR, UROBILINOGEN, HYALINECASTS in the last 2160 hours.   LIPIDS:  No results for input(s): TSH, HDL, CHOL, TRIG, LDLCALC, CHOLHDL, NONHDLCHOL, TOTALCHOLEST in the last 2160 hours.  TSH:  No results for input(s): TSH in the last 2160 hours.  A1C:  No results for input(s): HGBA1C in the last 2160 hours.    Radiology:        Assessment:     Lissy Palencia is a 77 y.o.female with:    Type 2 diabetes mellitus without complication, without long-term current use of insulin  -     Hemoglobin A1C; Future; Expected date: 09/12/2022    Screening mammogram for breast cancer  -     Mammo Digital Screening Bilat; Future; Expected date: 09/12/2022    Coronary artery disease involving native coronary artery of native heart without angina pectoris  -     Ambulatory referral/consult to Cardiology; Future; Expected date: 09/19/2022    Hyperlipidemia associated with type 2 diabetes mellitus    HEMAL (obstructive sleep apnea)        Plan:     Problem List Items Addressed This Visit          Cardiac/Vascular    Hyperlipidemia associated with type 2 diabetes mellitus    Overview     Cont lipitor          CAD (coronary artery disease), native coronary artery, s/p PCI atherectomy to LAD 1997.    Overview     Cath 2005 non-obstructive disease  2009 negative nuclear stress test  Has not hd cardiology visit  in years she needs referral back            Endocrine    Type 2 diabetes mellitus without complication, without long-term current use of insulin - Primary    Overview     Last a1c good in June but has stopped her metformin  In July will repeat a1c             Other    HEMAL (obstructive sleep apnea)    Overview     Cannot tolerate cpap is on no treatment cu rrently and declines           Other Visit Diagnoses       Screening mammogram for breast cancer                As above, continue current medications and maintain follow up with specialists.  Return to clinic in 6 months.      Frederick W Dantagnan [facility] [place]

## 2022-09-19 ENCOUNTER — LAB VISIT (OUTPATIENT)
Dept: LAB | Facility: HOSPITAL | Age: 77
End: 2022-09-19
Attending: INTERNAL MEDICINE
Payer: MEDICARE

## 2022-09-19 DIAGNOSIS — E11.9 TYPE 2 DIABETES MELLITUS WITHOUT COMPLICATION, WITHOUT LONG-TERM CURRENT USE OF INSULIN: ICD-10-CM

## 2022-09-19 LAB
ESTIMATED AVG GLUCOSE: 146 MG/DL (ref 68–131)
HBA1C MFR BLD: 6.7 % (ref 4–5.6)

## 2022-09-19 PROCEDURE — 36415 COLL VENOUS BLD VENIPUNCTURE: CPT | Performed by: INTERNAL MEDICINE

## 2022-09-19 PROCEDURE — 83036 HEMOGLOBIN GLYCOSYLATED A1C: CPT | Performed by: INTERNAL MEDICINE

## 2022-09-21 ENCOUNTER — HOSPITAL ENCOUNTER (EMERGENCY)
Facility: HOSPITAL | Age: 77
Discharge: HOME | End: 2022-09-21
Attending: EMERGENCY MEDICINE
Payer: MEDICARE

## 2022-09-21 ENCOUNTER — NURSE TRIAGE (OUTPATIENT)
Dept: ADMINISTRATIVE | Facility: CLINIC | Age: 77
End: 2022-09-21
Payer: MEDICARE

## 2022-09-21 VITALS
OXYGEN SATURATION: 99 % | HEART RATE: 54 BPM | HEIGHT: 62 IN | SYSTOLIC BLOOD PRESSURE: 152 MMHG | RESPIRATION RATE: 14 BRPM | TEMPERATURE: 98 F | DIASTOLIC BLOOD PRESSURE: 76 MMHG | WEIGHT: 165 LBS | BODY MASS INDEX: 30.36 KG/M2

## 2022-09-21 DIAGNOSIS — M25.512 LEFT SHOULDER PAIN: ICD-10-CM

## 2022-09-21 DIAGNOSIS — M25.532 LEFT WRIST PAIN: ICD-10-CM

## 2022-09-21 DIAGNOSIS — S63.502A LEFT WRIST SPRAIN, INITIAL ENCOUNTER: ICD-10-CM
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
## 2022-09-21 DIAGNOSIS — H05.232 PERIORBITAL HEMATOMA OF LEFT EYE: ICD-10-CM

## 2022-09-21 PROCEDURE — 25000003 PHARM REV CODE 250: Performed by: EMERGENCY MEDICINE

## 2022-09-21 PROCEDURE — 99284 EMERGENCY DEPT VISIT MOD MDM: CPT | Mod: ,,, | Performed by: EMERGENCY MEDICINE

## 2022-09-21 PROCEDURE — 99284 PR EMERGENCY DEPT VISIT,LEVEL IV: ICD-10-PCS | Mod: ,,, | Performed by: EMERGENCY MEDICINE

## 2022-09-21 PROCEDURE — 99284 EMERGENCY DEPT VISIT MOD MDM: CPT | Mod: 25

## 2022-09-21 RX ORDER — HYDROCODONE BITARTRATE AND ACETAMINOPHEN 5; 325 MG/1; MG/1
1 TABLET ORAL
Status: COMPLETED | OUTPATIENT
Start: 2022-09-21 | End: 2022-09-21

## 2022-09-21 RX ADMIN — HYDROCODONE BITARTRATE AND ACETAMINOPHEN 1 TABLET: 5; 325 TABLET ORAL at 10:09

## 2022-09-21 NOTE — TELEPHONE ENCOUNTER
"Pt calling stating that she tripped on a step at a business about 30 minutes prior to call and hit the brick wall and the brick ground with her knee, wrist and head. Reports her eye is swollen. Denies any major bleeding at this time. Pt does endorse "9" out of 10 HA. Per protocol advised to ED NOW. verbalized understanding. Denies any further questions or concerns at this time, advised to call back if they have any that come up. Advised pt to call back with any other concerns or worsening symptoms. Verbalized understanding and will route message to provider.       Reason for Disposition   Severe headache    Additional Information   Negative: ACUTE NEUROLOGIC SYMPTOM and symptom present now   Negative: Knocked out (unconscious) > 1 minute   Negative: Seizure (convulsion) occurred (Exception: prior history of seizures and now alert and without Acute Neurologic Symptoms)   Negative: Neck pain after dangerous injury (e.g., MVA, diving, trampoline, contact sports, fall > 10 feet or 3 meters) (Exception: neck pain began > 1 hour after injury)   Negative: Major bleeding (actively dripping or spurting) that can't be stopped   Negative: Penetrating head injury (e.g., knife, gunshot wound, metal object)   Negative: Sounds like a life-threatening emergency to the triager   Negative: Can't remember what happened (amnesia)   Negative: Vomiting once or more   Negative: Watery or blood-tinged fluid dripping from the nose or ears   Negative: ACUTE NEUROLOGIC SYMPTOM and now fine   Negative: Knocked out (unconscious) < 1 minute and now fine    Protocols used: Head Injury-A-[place]    "

## 2022-09-21 NOTE — DISCHARGE INSTRUCTIONS
Your imaging was negative for fracture, intracranial bleed.  Rest, ice, elevate your wrist, head.  Expect your left eye to be swollen, black and blue.  Take Tylenol/ibuprofen as needed for pain.  Wear wrist splint as needed for pain.

## 2022-09-21 NOTE — ED PROVIDER NOTES
Encounter Date: 9/21/2022       History     Chief Complaint   Patient presents with    Fall     Tripped and fell, struck face on ground, swelling around left eye, denies use of blood thinners     Lissy Palencia is a 77 F hx of CAD, GERD, hyperlipidemia presenting today after a fall.  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  She did hit her head but denies loss of consciousness.  She currently complains of a 9/10 throbbing headache located in the left side of her head.  She has significant swelling to the left eye- currently using an ice pack.  No vision changes, nausea or vomiting.  She also reports left shoulder and left wrist pain, worse with movement and better with rest.  No chest pain or shortness of breath.  No anticoagulation    Review of patient's allergies indicates:   Allergen Reactions    Compazine  [prochlorperazine edisylate]      Other reaction(s): SPASMS    Parafon forte      Other reaction(s): Hives     Past Medical History:   Diagnosis Date    Allergy     Arthritis     Coronary artery disease     in the past    Cystocele 1/19/2016    GERD (gastroesophageal reflux disease)     Hemorrhoids     History of cholelithiasis     Hyperlipidemia     Hypothyroidism     Obesity     Positive cardiac stress test 7/27/2016    Right shoulder pain 10/9/2012    Trigger finger, right 3rd finger 7/30/2014    Vaginal atrophy 1/19/2016    Vaginal vault prolapse 1/19/2016     Past Surgical History:   Procedure Laterality Date    ABLATION COLPOCLESIS      ADENOIDECTOMY      CHOLECYSTECTOMY      COLONOSCOPY N/A 11/28/2018    Procedure: COLONOSCOPY;  Surgeon: Rcihi Mcintosh MD;  Location: [address]);  Service: Endoscopy;  Laterality: N/A;    CORONARY STENT PLACEMENT      ESOPHAGOGASTRODUODENOSCOPY N/A 11/28/2018    Procedure: ESOPHAGOGASTRODUODENOSCOPY (EGD);  Surgeon: Richi Mcintosh MD;  Location: [facility] ([address] [place] [address]);  Service: Endoscopy;   "Laterality: N/A;    heart cath  2005    non-obstructive disease    OOPHORECTOMY      SHOULDER ARTHROSCOPY W/ ROTATOR CUFF REPAIR      Right    TONSILLECTOMY      TOTAL ABDOMINAL HYSTERECTOMY      with BSO    TUBAL LIGATION       Family History   Problem Relation Age of Onset    Breast cancer Neg Hx     Ovarian cancer Neg Hx     Glaucoma Neg Hx     Cataracts Neg Hx     Diabetes Neg Hx     Macular degeneration Neg Hx     Retinal detachment Neg Hx     Melanoma Neg Hx     Cervical cancer Neg Hx     Endometrial cancer Neg Hx     Vaginal cancer Neg Hx      Social History     Tobacco Use    Smoking status: Never    Smokeless tobacco: Never   Substance Use Topics    Alcohol use: Yes     Alcohol/week: 1.0 standard drink     Types: 1 Glasses of wine per week     Comment: "maybe a glass of wine every 6 months"    Drug use: No     Review of Systems   Constitutional:  Negative for fever.   HENT:  Positive for facial swelling (Left periorbital area). Negative for nosebleeds and sore throat.    Eyes:  Negative for visual disturbance.   Respiratory:  Negative for shortness of breath.    Cardiovascular:  Negative for chest pain.   Gastrointestinal:  Negative for abdominal pain and nausea.   Genitourinary:  Negative for dysuria.   Musculoskeletal:  Positive for arthralgias (Left shoulder, left wrist). Negative for back pain.   Skin:  Negative for rash.   Allergic/Immunologic: Negative for food allergies.   Neurological:  Positive for headaches. Negative for weakness and numbness.   Hematological:  Does not bruise/bleed easily.     Physical Exam     Initial Vitals [09/21/22 0911]   BP Pulse Resp Temp SpO2   (!) 190/88 77 16 98.4 °F (36.9 °C) 99 %      MAP       --         Physical Exam    Nursing note and vitals reviewed.  Constitutional: She appears well-developed and well-nourished. No distress.   HENT:   Mouth/Throat: Oropharynx is clear and moist.   Eyes: Conjunctivae and EOM are normal. Pupils are equal, round, and reactive to " light. No scleral icterus.   Significant ecchymosis and hematoma of the left periorbital area   Neck: No JVD present.   No midline cervical tenderness, full range of motion the neck.   Cardiovascular:  Normal rate, regular rhythm and intact distal pulses.           Pulmonary/Chest: Breath sounds normal. No respiratory distress. She has no wheezes. She has no rales. She exhibits tenderness (Left lateral chest wall tenderness).   No clavicle tenderness   Abdominal: Abdomen is soft. Bowel sounds are normal. She exhibits no distension. There is no abdominal tenderness. There is no rebound.   Musculoskeletal:         General: No edema.      Left shoulder: Tenderness (posterior shoulder) and bony tenderness present. Normal range of motion.      Right wrist: Normal.      Left wrist: Swelling and bony tenderness (medial tenderness) present. No deformity, snuff box tenderness or crepitus. Normal pulse.      Right knee: Normal.      Left knee: Bony tenderness present. No swelling, deformity or effusion.      Comments: No T, L spine tenderness     Lymphadenopathy:     She has no cervical adenopathy.   Neurological: She is alert and oriented to person, place, and time. She has normal strength. No cranial nerve deficit or sensory deficit. GCS score is 15. GCS eye subscore is 4. GCS verbal subscore is 5. GCS motor subscore is 6.   Skin: Skin is warm. No rash noted.       ED Course   Procedures  Labs Reviewed - No data to display       Imaging Results              CT Maxillofacial Without Contrast (In process)                      CT Head Without Contrast (In process)  Result time 09/21/22 09:42:13                     Medications   HYDROcodone-acetaminophen 5-325 mg per tablet 1 tablet (has no administration in time range)     Medical Decision Making:   History:   I obtained history from: someone other than patient.       <> Summary of History:  at bedside  Old Medical Records: I decided to obtain old medical  records.  Initial Assessment:   Emergent evaluation a 77-year-old female presenting today for mechanical fall with head injury.  Hypertensive, likely secondary to pain.  Vital Signs otherwise stable  Differential Diagnosis:   Intracranial hemorrhage, scalp hematoma, periorbital hematoma, orbital fracture, wrist sprain, strain.  Left shoulder sprain, left shoulder fracture, left wrist fracture  Independently Interpreted Test(s):   I have ordered and independently interpreted X-rays - see prior notes.  Clinical Tests:   Radiological Study: Reviewed and Ordered  ED Management:  - CT head, x-ray shoulder, x-ray wrist  - Norco for pain           ED Course as of 09/21/22 1033   Wed Sep 21, 2022   1029 X-ray the shoulder, wrist showed no acute fracture dislocation.  There is osteoarthritis noted.    CT head, max face consistent with left periorbital swelling, no acute fracture, intracranial hemorrhage.    Discussed results with patient.  Recommend rest, ice, elevation.  Will discharge with a splint for her wrist.    Return precautions given. [GM]      ED Course User Index  [GM] Ricarda Gonzalez MD                 Clinical Impression:   Final diagnoses:  [M25.512] Left shoulder pain  [M25.532] Left wrist pain  [W19.XXXA] Fall, initial encounter (Primary)  [H05.232] Periorbital hematoma of left eye  [S63.502A] Left wrist sprain, initial encounter               Ricarda Gonzalez MD  09/21/22 1033

## 2022-09-21 NOTE — ED NOTES
Patient identifiers verified and correct     C/C: Pt fell and tripped, struck face. L eye swelling.  Denies blood thinners     APPEARANCE: awake and alert in NAD.  SKIN: warm, dry and intact. No breakdown or bruising. L eye swelling  MUSCULOSKELETAL: Patient moving all extremities spontaneously, no obvious swelling or deformities noted. Ambulates independently.  RESPIRATORY: Denies shortness of breath.Respirations unlabored.   CARDIAC: Denies CP, 2+ distal pulses; no peripheral edema  ABDOMEN: S/ND/NT, Denies nausea  : voids spontaneously, denies difficulty  Neurologic: AAO x 4; follows commands equal strength in all extremities; denies numbness/tingling. Denies dizziness

## 2022-09-26 DIAGNOSIS — H16.223 KERATOCONJUNCTIVITIS SICCA NOT SPECIFIED AS SJOGREN'S, BILATERAL: ICD-10-CM

## 2022-09-26 RX ORDER — CYCLOSPORINE 0.5 MG/ML
EMULSION OPHTHALMIC
Qty: 60 EACH | Refills: 11 | Status: SHIPPED | OUTPATIENT
Start: 2022-09-26

## 2022-09-26 RX ORDER — ATORVASTATIN CALCIUM 40 MG/1
TABLET, FILM COATED ORAL
Qty: 90 TABLET | Refills: 3 | Status: SHIPPED | OUTPATIENT
Start: 2022-09-26 | End: 2022-10-20

## 2022-09-27 ENCOUNTER — TELEPHONE (OUTPATIENT)
Dept: OPHTHALMOLOGY | Facility: CLINIC | Age: 77
End: 2022-09-27
Payer: MEDICARE

## 2022-09-27 NOTE — TELEPHONE ENCOUNTER
Spoke to pt and she does want to proceed with surgery.  Scheduled for surgery at end of November and December.

## 2022-09-27 NOTE — TELEPHONE ENCOUNTER
----- Message from Rona Silva sent at 9/27/2022  8:38 AM CDT -----  Patient's  is calling stating that the pt took a hard fall and the left eye is still swollen from a week ago. He stated that she hasn't been to the eye doctor since discharge. Patient also has a cat sx coming up and  wanted to inform Dr. Mclaughlin of what is going on as well.     Message sent to Triage and Dr. Mclaughlin staff.      Please contact patient's  at [phone] or [phone].  Please no messages patient isn't sure how to check voicemail.

## 2022-09-27 NOTE — TELEPHONE ENCOUNTER
----- Message from Ciara Mclaughlin MD sent at 9/27/2022 11:15 AM CDT -----  Ok does pt want to proceed with surgery? I do not know when it is.  We were gonna do OD first.  ----- Message -----  From: Yoko Gustafson MA  Sent: 9/27/2022  10:04 AM CDT  To: Ciara Mclaughlin MD      ----- Message -----  From: Rona Silva  Sent: 9/27/2022   8:43 AM CDT  To: Lissette VAZQUEZ Staff, #    Patient's  is calling stating that the pt took a hard fall and the left eye is still swollen from a week ago. He stated that she hasn't been to the eye doctor since discharge. Patient also has a cat sx coming up and  wanted to inform Dr. Mclaughlin of what is going on as well.     Message sent to Triage and Dr. Mclaughlin staff.      Please contact patient's  at [phone] or [phone].  Please no messages patient isn't sure how to check voicemail.

## 2022-10-03 ENCOUNTER — TELEPHONE (OUTPATIENT)
Dept: OPHTHALMOLOGY | Facility: CLINIC | Age: 77
End: 2022-10-03
Payer: MEDICARE

## 2022-10-03 DIAGNOSIS — H25.11 NUCLEAR SCLEROTIC CATARACT OF RIGHT EYE: Primary | ICD-10-CM

## 2022-10-19 NOTE — PROGRESS NOTES
Cardiology Clinic Note  Reason for Visit: CAD    HPI:     Lissy Palencia is a 77 y.o. F, who presents for CAD.    Her prior ischemic symptoms include shortness of breath, dizziness/weakness, and chest pains/palpitations.  Did stress test in 1997, which was abnormal. Then, she had an angiogram and PCI.  She had LHC in 8/4/2005, which showed no significant stenoses.    She has been less active lately.   She feels twitching chest discomfort with more than typical exertion.   This resolves with rest after ~5 minutes.    Was taking baby aspirin. She stopped ~6 months ago.  No GI/ bleeding. No nosebleeds.    Medical: CAD s/p PCI to LAD (1997), hypothyroidism, GERD, aortic calcification (CXR), DM   Surgical: Reviewed, as below.  Family: Reviewed, as below. No premature CAD, HF, SCD.  Social: Reviewed, as below. Never smoked.    ROS:    Pertinent ROS included in HPI and below.  PMH:     Past Medical History:   Diagnosis Date    Allergy     Arthritis     Cystocele 1/19/2016    GERD (gastroesophageal reflux disease)     Hemorrhoids     History of cholelithiasis     Hypothyroidism     Obesity     Right shoulder pain 10/9/2012    Trigger finger, right 3rd finger 7/30/2014    Vaginal atrophy 1/19/2016    Vaginal vault prolapse 1/19/2016     Past Surgical History:   Procedure Laterality Date    ABLATION COLPOCLESIS      ADENOIDECTOMY      CHOLECYSTECTOMY      COLONOSCOPY N/A 11/28/2018    Procedure: COLONOSCOPY;  Surgeon: Richi Mcintosh MD;  Location: [address]);  Service: Endoscopy;  Laterality: N/A;    CORONARY STENT PLACEMENT      ESOPHAGOGASTRODUODENOSCOPY N/A 11/28/2018    Procedure: ESOPHAGOGASTRODUODENOSCOPY (EGD);  Surgeon: Richi Mcintosh MD;  Location: [address]);  Service: Endoscopy;  Laterality: N/A;    heart cath  2005    non-obstructive disease    OOPHORECTOMY      SHOULDER ARTHROSCOPY W/ ROTATOR CUFF REPAIR      Right    TONSILLECTOMY      TOTAL ABDOMINAL HYSTERECTOMY      with BSO     TUBAL LIGATION       Allergies:     Review of patient's allergies indicates:   Allergen Reactions    Compazine  [prochlorperazine edisylate]      Other reaction(s): SPASMS    Parafon forte      Other reaction(s): Hives     Medications:     Current Outpatient Medications on File Prior to Visit   Medication Sig Dispense Refill    alendronate (FOSAMAX) 70 MG tablet Take 1 tablet (70 mg total) by mouth every 7 days. Mondays.Then after an hour take your thyroid tablet, 30 minutes later your pantoprazole. 12 tablet 1    atorvastatin (LIPITOR) 40 MG tablet TAKE 1 TABLET DAILY 90 tablet 3    azelastine (ASTELIN) 137 mcg (0.1 %) nasal spray USE 1 SPRAY NASALLY TWICE DAILY 30 mL 11    blood sugar diagnostic Strp To check BG ONCE daily, to use with insurance preferred meter 100 strip 3    blood-glucose meter kit To check BG ONCE THE daily, to use with insurance preferred meter 1 each 1    fluticasone propionate (FLONASE) 50 mcg/actuation nasal spray 1 spray (50 mcg total) by Each Nostril route 2 (two) times daily as needed. 16 g 11    lancets Misc To check BG ONCE daily, to use with insurance preferred meter 100 each 3    levothyroxine (SYNTHROID) 88 MCG tablet Take first thing in the morning on an empty stomach 90 tablet 1    LIDOcaine (LIDODERM) 5 % Place 1 patch onto the skin once daily. Remove & Discard patch within 12 hours or as directed by MD 15 patch 0    pantoprazole (PROTONIX) 40 MG tablet Take 1 tablet (40 mg total) by mouth once daily. 90 tablet 1    RESTASIS 0.05 % ophthalmic emulsion INSTILL 1 DROP IN BOTH EYES TWICE A DAY 60 each 11    vitamin D (VITAMIN D3) 1000 units Tab Take 1 tablet (1,000 Units total) by mouth once daily. 90 tablet 3     No current facility-administered medications on file prior to visit.     Social History:     Social History     Tobacco Use    Smoking status: Never    Smokeless tobacco: Never   Substance Use Topics    Alcohol use: Yes     Alcohol/week: 1.0 standard drink     Types: 1  "Glasses of wine per week     Comment: "maybe a glass of wine every 6 months"     Family History:     Family History   Problem Relation Age of Onset    Breast cancer Neg Hx     Ovarian cancer Neg Hx     Glaucoma Neg Hx     Cataracts Neg Hx     Diabetes Neg Hx     Macular degeneration Neg Hx     Retinal detachment Neg Hx     Melanoma Neg Hx     Cervical cancer Neg Hx     Endometrial cancer Neg Hx     Vaginal cancer Neg Hx      Physical Exam:   /62 (BP Location: Left arm, Patient Position: Sitting, BP Method: Medium (Automatic))   Pulse 63   Ht 5' 2" (1.575 m)   Wt 74.9 kg (165 lb 2 oz)   SpO2 98%   BMI 30.20 kg/m²      Constitutional: No apparent distress, conversant  Neck: No jugular venous distension, no carotid bruits  CV: Regular rate and rhythm, no murmurs, normal S1/S2  Pulm: Clear to auscultation bilaterally  Extremities: No lower extremity edema, warm with palpable pulses    Labs:     Blood Tests:  Lab Results   Component Value Date     09/16/2021    K 4.3 09/16/2021     09/16/2021    CO2 27 09/16/2021    BUN 19 09/16/2021    CREATININE 0.9 09/16/2021     09/16/2021    HGBA1C 6.7 (H) 09/19/2022    MG 2.0 05/24/2008    AST 22 09/16/2021    ALT 22 09/16/2021    ALBUMIN 3.9 09/16/2021    PROT 7.1 09/16/2021    BILITOT 1.3 (H) 09/16/2021    WBC 7.20 07/24/2020    HGB 14.7 07/24/2020    HCT 46.8 07/24/2020    MCV 89 07/24/2020     07/24/2020    INR 1.0 07/27/2005    TSH 2.235 06/07/2022       Lab Results   Component Value Date    CHOL 153 09/16/2021    HDL 36 (L) 09/16/2021    TRIG 204 (H) 09/16/2021       Lab Results   Component Value Date    LDLCALC 76.2 09/16/2021       Urine Tests:  Lab Results   Component Value Date    COLORU Yellow 11/14/2019    APPEARANCEUA Hazy (A) 11/14/2019    PHUR 5.0 11/14/2019    SPECGRAV 1.020 11/14/2019    PROTEINUA Negative 11/14/2019    GLUCUA Negative 11/14/2019    KETONESU Negative 11/14/2019    BILIRUBINUA Negative 11/14/2019    OCCULTUA " Negative 2019    NITRITE Negative 2019    UROBILINOGEN normal 2018    UROBILINOGEN Negative 2015    LEUKOCYTESUR Trace (A) 2019    CREATRANDUR 85.0 2022       Imaging:     Echocardiogram  None    Stress testing  PET 16  CONCLUSIONS: NORMAL MYOCARDIAL PERFUSION PET STRESS TEST   1. The perfusion scan is free of evidence for myocardial ischemia or injury.   2. Resting wall motion is physiologic. Stress wall motion is physiologic.   3. Visually estimated LV function is normal at rest and normal at stress.   4. The ventricular volumes are normal at rest and stress.   5. The extracardiac distribution of radioactivity is normal.   6. There was no previous study available to compare.     MABEL 16  CONCLUSIONS     1 - Normal left ventricular diastolic function.     2 - Normal left ventricular diastolic function.     3 - Normal right ventricular systolic function .     4 - Trivial pericardial effusion.     Positive stress echocardiographic study demonstrating an ischemic response involving the mid anteroseptum, apical septum, apical inferior wall, mid inferior wall, basal inferior wall. Non-specific finding demonstrating failure to augment involving the   anterolateral wall which may be associated with ischemia; clinical correlation required.     These results suggest ischemia in a multi-vessel distribution.     MABEL 13  CONCLUSIONS     1 - Normal left ventricular function (EF 60%).     2 - Normal diastolic function.     3 - Normal right ventricular function .     4 - Trivial to mild mitral regurgitation.     5 - Trivial tricuspid regurgitation.     No evidence of stress induced myocardial ischemia.     Cath Lab  None    Other  CXR 22  There is calcification and tortuosity of the aorta.      US abd 1/10/18  The abdominal aorta is normal in caliber     EK/9/22 - NSR (personally reviewed)    Assessment:     1. Hyperlipidemia associated with type 2 diabetes mellitus     2. Coronary artery disease involving native coronary artery of native heart without angina pectoris    3. Aortic atherosclerosis    4. Type 2 diabetes mellitus without complication, without long-term current use of insulin    5. HEMAL (obstructive sleep apnea)        Plan:     Coronary artery disease involving native coronary artery of native heart   Symptoms of possible angina. Obtain exercise stress echo  Restart ASA 81mg qd  Increase statin, as below    Aortic atherosclerosis  Hyperlipidemia associated with type 2 diabetes mellitus  LDL >70  Increase atorvastatin to 80mg qd given h/o ASCVD  Check lipids/CMP in 3 months    Type 2 diabetes mellitus without complication, without long-term current use of insulin  A1c at goal, 6.7%    HEMAL (obstructive sleep apnea)  CPAP    Signed:  Pierre Brown MD  Cardiology     10/20/2022 11:40 AM    Follow-up:     Future Appointments   Date Time Provider Department Center   10/20/2022 11:00 AM Perry Brown III, MD [facility] CARDIO [address]   10/26/2022  9:15 AM [facility] [id number]   3/13/2023  8:00 AM Drew Nicole MD [facility]

## 2022-10-20 ENCOUNTER — OFFICE VISIT (OUTPATIENT)
Dept: CARDIOLOGY | Facility: CLINIC | Age: 77
End: 2022-10-20
Payer: MEDICARE

## 2022-10-20 VITALS
HEIGHT: 62 IN | SYSTOLIC BLOOD PRESSURE: 138 MMHG | HEART RATE: 63 BPM | BODY MASS INDEX: 30.39 KG/M2 | WEIGHT: 165.13 LBS | OXYGEN SATURATION: 98 % | DIASTOLIC BLOOD PRESSURE: 62 MMHG

## 2022-10-20 DIAGNOSIS — E78.5 HYPERLIPIDEMIA ASSOCIATED WITH TYPE 2 DIABETES MELLITUS: Primary | ICD-10-CM

## 2022-10-20 DIAGNOSIS — I25.10 CORONARY ARTERY DISEASE INVOLVING NATIVE CORONARY ARTERY OF NATIVE HEART WITHOUT ANGINA PECTORIS: ICD-10-CM

## 2022-10-20 DIAGNOSIS — G47.33 OSA (OBSTRUCTIVE SLEEP APNEA): ICD-10-CM

## 2022-10-20 DIAGNOSIS — E11.9 TYPE 2 DIABETES MELLITUS WITHOUT COMPLICATION, WITHOUT LONG-TERM CURRENT USE OF INSULIN: ICD-10-CM

## 2022-10-20 DIAGNOSIS — E11.69 HYPERLIPIDEMIA ASSOCIATED WITH TYPE 2 DIABETES MELLITUS: Primary | ICD-10-CM

## 2022-10-20 DIAGNOSIS — I70.0 AORTIC ATHEROSCLEROSIS: ICD-10-CM

## 2022-10-20 DIAGNOSIS — R07.89 CHEST DISCOMFORT: ICD-10-CM

## 2022-10-20 PROCEDURE — 99999 PR PBB SHADOW E&M-EST. PATIENT-LVL IV: CPT | Mod: PBBFAC,,, | Performed by: INTERNAL MEDICINE

## 2022-10-20 PROCEDURE — 99999 PR PBB SHADOW E&M-EST. PATIENT-LVL IV: ICD-10-PCS | Mod: PBBFAC,,, | Performed by: INTERNAL MEDICINE

## 2022-10-20 PROCEDURE — 99214 OFFICE O/P EST MOD 30 MIN: CPT | Mod: PBBFAC | Performed by: INTERNAL MEDICINE

## 2022-10-20 PROCEDURE — 99204 OFFICE O/P NEW MOD 45 MIN: CPT | Mod: S$PBB,,, | Performed by: INTERNAL MEDICINE

## 2022-10-20 PROCEDURE — 99204 PR OFFICE/OUTPT VISIT, NEW, LEVL IV, 45-59 MIN: ICD-10-PCS | Mod: S$PBB,,, | Performed by: INTERNAL MEDICINE

## 2022-10-20 RX ORDER — ATORVASTATIN CALCIUM 80 MG/1
80 TABLET, FILM COATED ORAL DAILY
Qty: 90 TABLET | Refills: 3 | Status: SHIPPED | OUTPATIENT
Start: 2022-10-20 | End: 2024-01-11

## 2022-10-26 ENCOUNTER — HOSPITAL ENCOUNTER (OUTPATIENT)
Dept: RADIOLOGY | Facility: HOSPITAL | Age: 77
Discharge: HOME | End: 2022-10-26
Attending: INTERNAL MEDICINE
Payer: MEDICARE

## 2022-10-26 DIAGNOSIS — Z12.31 SCREENING MAMMOGRAM FOR BREAST CANCER: ICD-10-CM

## 2022-10-26 PROCEDURE — 77063 BREAST TOMOSYNTHESIS BI: CPT | Mod: TC

## 2022-10-26 PROCEDURE — 77063 BREAST TOMOSYNTHESIS BI: CPT | Mod: 26,,, | Performed by: RADIOLOGY

## 2022-10-26 PROCEDURE — 77067 SCR MAMMO BI INCL CAD: CPT | Mod: 26,,, | Performed by: RADIOLOGY

## 2022-10-26 PROCEDURE — 77063 MAMMO DIGITAL SCREENING BILAT WITH TOMO: ICD-10-PCS | Mod: 26,,, | Performed by: RADIOLOGY

## 2022-10-26 PROCEDURE — 77067 MAMMO DIGITAL SCREENING BILAT WITH TOMO: ICD-10-PCS | Mod: 26,,, | Performed by: RADIOLOGY

## 2022-10-28 ENCOUNTER — HOSPITAL ENCOUNTER (OUTPATIENT)
Dept: CARDIOLOGY | Facility: HOSPITAL | Age: 77
Discharge: HOME | End: 2022-10-28
Attending: INTERNAL MEDICINE
Payer: MEDICARE

## 2022-10-28 VITALS — BODY MASS INDEX: 30.55 KG/M2 | WEIGHT: 166 LBS | HEIGHT: 62 IN

## 2022-10-28 DIAGNOSIS — R07.89 CHEST DISCOMFORT: ICD-10-CM

## 2022-10-28 LAB
ASCENDING AORTA: 2.88 CM
BSA FOR ECHO PROCEDURE: 1.81 M2
CV ECHO LV RWT: 0.32 CM
CV STRESS BASE HR: 56 BPM
DIASTOLIC BLOOD PRESSURE: 59 MMHG
DOP CALC LVOT AREA: 2.7 CM2
DOP CALC LVOT DIAMETER: 1.87 CM
DOP CALC LVOT PEAK VEL: 1 M/S
DOP CALC LVOT STROKE VOLUME: 62.29 CM3
DOP CALCLVOT PEAK VEL VTI: 22.69 CM
E WAVE DECELERATION TIME: 451.3 MSEC
E/A RATIO: 0.64
E/E' RATIO: 8.13 M/S
ECHO LV POSTERIOR WALL: 0.78 CM (ref 0.6–1.1)
EJECTION FRACTION: 58 %
FRACTIONAL SHORTENING: 32 % (ref 28–44)
INTERVENTRICULAR SEPTUM: 0.84 CM (ref 0.6–1.1)
IVRT: 102.76 MSEC
LA MAJOR: 4.71 CM
LA MINOR: 5.04 CM
LA WIDTH: 3.07 CM
LEFT ATRIUM SIZE: 3.55 CM
LEFT ATRIUM VOLUME INDEX: 25.5 ML/M2
LEFT ATRIUM VOLUME: 45.11 CM3
LEFT INTERNAL DIMENSION IN SYSTOLE: 3.3 CM (ref 2.1–4)
LEFT VENTRICLE DIASTOLIC VOLUME INDEX: 61.8 ML/M2
LEFT VENTRICLE DIASTOLIC VOLUME: 109.39 ML
LEFT VENTRICLE MASS INDEX: 74 G/M2
LEFT VENTRICLE SYSTOLIC VOLUME INDEX: 24.9 ML/M2
LEFT VENTRICLE SYSTOLIC VOLUME: 44.09 ML
LEFT VENTRICULAR INTERNAL DIMENSION IN DIASTOLE: 4.84 CM (ref 3.5–6)
LEFT VENTRICULAR MASS: 130.56 G
LV LATERAL E/E' RATIO: 6.78 M/S
LV SEPTAL E/E' RATIO: 10.17 M/S
MV A" WAVE DURATION": 7.42 MSEC
MV PEAK A VEL: 0.95 M/S
MV PEAK E VEL: 0.61 M/S
MV STENOSIS PRESSURE HALF TIME: 130.88 MS
MV VALVE AREA P 1/2 METHOD: 1.68 CM2
OHS CV CPX 1 MINUTE RECOVERY HEART RATE: 93 BPM
OHS CV CPX 85 PERCENT MAX PREDICTED HEART RATE MALE: 118
OHS CV CPX ESTIMATED METS: 12
OHS CV CPX MAX PREDICTED HEART RATE: 138
OHS CV CPX PATIENT IS FEMALE: 1
OHS CV CPX PATIENT IS MALE: 0
OHS CV CPX PEAK DIASTOLIC BLOOD PRESSURE: 72 MMHG
OHS CV CPX PEAK HEAR RATE: 137 BPM
OHS CV CPX PEAK RATE PRESSURE PRODUCT: NORMAL
OHS CV CPX PEAK SYSTOLIC BLOOD PRESSURE: 196 MMHG
OHS CV CPX PERCENT MAX PREDICTED HEART RATE ACHIEVED: 99
OHS CV CPX RATE PRESSURE PRODUCT PRESENTING: 6496
PISA TR MAX VEL: 2.52 M/S
PULM VEIN S/D RATIO: 1.56
PV PEAK D VEL: 0.36 M/S
PV PEAK S VEL: 0.56 M/S
RA MAJOR: 4.2 CM
RA PRESSURE: 3 MMHG
RA WIDTH: 3.29 CM
RIGHT VENTRICULAR END-DIASTOLIC DIMENSION: 2.91 CM
RV TISSUE DOPPLER FREE WALL SYSTOLIC VELOCITY 1 (APICAL 4 CHAMBER VIEW): 5.49 CM/S
SINUS: 2.81 CM
STJ: 2.23 CM
STRESS ECHO POST EXERCISE DUR MIN: 7 MINUTES
STRESS ECHO POST EXERCISE DUR SEC: 8 SECONDS
SYSTOLIC BLOOD PRESSURE: 116 MMHG
TDI LATERAL: 0.09 M/S
TDI SEPTAL: 0.06 M/S
TDI: 0.08 M/S
TR MAX PG: 25 MMHG
TRICUSPID ANNULAR PLANE SYSTOLIC EXCURSION: 1.89 CM
TV REST PULMONARY ARTERY PRESSURE: 28 MMHG

## 2022-10-28 PROCEDURE — 93351 STRESS TTE COMPLETE: CPT | Mod: 26,,, | Performed by: INTERNAL MEDICINE

## 2022-10-28 PROCEDURE — 93351 STRESS TTE COMPLETE: CPT

## 2022-10-28 PROCEDURE — 93351 STRESS ECHO (CUPID ONLY): ICD-10-PCS | Mod: 26,,, | Performed by: INTERNAL MEDICINE

## 2022-10-28 NOTE — PROGRESS NOTES
As Dr. Brown requested me to do was to inform Mrs. Yuan about her test results. I called Mrs. Yuan and informed her that her  results were fine and do not require any change in treatment.     Please let her know that the stress test did not show changes to suggest that her symptoms are related to blockages in the heart arteries. I still want her to continue on the medications that we adjusted at her last clinic visit. Please ask her to let me know if this worsens

## 2022-11-01 ENCOUNTER — TELEPHONE (OUTPATIENT)
Dept: OPHTHALMOLOGY | Facility: CLINIC | Age: 77
End: 2022-11-01
Payer: MEDICARE

## 2022-11-01 NOTE — TELEPHONE ENCOUNTER
----- Message from Ni Chau sent at 10/31/2022  4:23 PM CDT -----  Regarding: Appt Inquiry  Pt called about having a bump on bottom eye lid and asked if doctor could look at it.     Pts call back :[phone]

## 2022-11-04 ENCOUNTER — OFFICE VISIT (OUTPATIENT)
Dept: OPTOMETRY | Facility: CLINIC | Age: 77
End: 2022-11-04
Payer: MEDICARE

## 2022-11-04 DIAGNOSIS — H01.006: Primary | ICD-10-CM

## 2022-11-04 DIAGNOSIS — B95.8: Primary | ICD-10-CM

## 2022-11-04 PROCEDURE — 99212 OFFICE O/P EST SF 10 MIN: CPT | Mod: PBBFAC | Performed by: OPTOMETRIST

## 2022-11-04 PROCEDURE — 92012 INTRM OPH EXAM EST PATIENT: CPT | Mod: S$PBB,,, | Performed by: OPTOMETRIST

## 2022-11-04 PROCEDURE — 92012 PR EYE EXAM, EST PATIENT,INTERMED: ICD-10-PCS | Mod: S$PBB,,, | Performed by: OPTOMETRIST

## 2022-11-04 PROCEDURE — 99999 PR PBB SHADOW E&M-EST. PATIENT-LVL II: CPT | Mod: PBBFAC,,, | Performed by: OPTOMETRIST

## 2022-11-04 PROCEDURE — 99999 PR PBB SHADOW E&M-EST. PATIENT-LVL II: ICD-10-PCS | Mod: PBBFAC,,, | Performed by: OPTOMETRIST

## 2022-11-04 NOTE — PROGRESS NOTES
HPI    CC: bump on the left sx eye, lower lid, bothering her when she wipes her   eye it feels irritated and sensitive to touch   JL: 09/06/2022  CAT EVAL   FIRST EYE CAT Sx scheduled 11/23/22  (+) Changes in vision blurry vision   (+) Pain   (+) Irritation   (+) Itching   (-) Flashes  (-) Floaters  (+) Glasses wearer  (-) CL wearer  (+) Uses eye gtts  Restasis BID OU   (+) Eye injury OS was swollen shut when pt fell and hit her eye/brow bone   08/21/2022  (-) Eye surgery   (-)POHx  (-)FOHx  (+)DM  Last edited by Luna Cruz, OD on 11/4/2022  2:45 PM.            Assessment /Plan     For exam results, see Encounter Report.    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 Pt to continue Tobradex application to affected site bid x 1 week, then d/c.    Continue scheduled cataract surgical care with Dr. Mclaughlin or return sooner if symptoms arise

## 2022-11-07 DIAGNOSIS — H25.11 NUCLEAR SCLEROTIC CATARACT OF RIGHT EYE: Primary | ICD-10-CM

## 2022-11-08 RX ORDER — PREDNISOLONE ACETATE-GATIFLOXACIN-BROMFENAC .75; 5; 1 MG/ML; MG/ML; MG/ML
1 SUSPENSION/ DROPS OPHTHALMIC 3 TIMES DAILY
Qty: 5 ML | Refills: 3 | Status: SHIPPED | OUTPATIENT
Start: 2022-11-08

## 2022-11-22 ENCOUNTER — TELEPHONE (OUTPATIENT)
Dept: OPHTHALMOLOGY | Facility: CLINIC | Age: 77
End: 2022-11-22
Payer: MEDICARE

## 2022-11-22 NOTE — H&P
History    Chief complaint:  Painless progressive vision loss    Present Ilness/Diagnosis: Nuclear sclerotic Cataract    Past Medical History:  has a past medical history of Allergy, Arthritis, Coronary artery disease, Cystocele (01/19/2016), GERD (gastroesophageal reflux disease), Hemorrhoids, History of cholelithiasis, Hypothyroidism, Obesity, Right shoulder pain (10/09/2012), Trigger finger, right 3rd finger (07/30/2014), Vaginal atrophy (01/19/2016), and Vaginal vault prolapse (01/19/2016).    Family History/Social History: refer to chart    Allergies:   Review of patient's allergies indicates:   Allergen Reactions    Compazine  [prochlorperazine edisylate]      Other reaction(s): SPASMS    Parafon forte      Other reaction(s): Hives       Current Medications: No current facility-administered medications for this encounter.    Current Outpatient Medications:     alendronate (FOSAMAX) 70 MG tablet, Take 1 tablet (70 mg total) by mouth every 7 days. Mondays.Then after an hour take your thyroid tablet, 30 minutes later your pantoprazole., Disp: 12 tablet, Rfl: 1    atorvastatin (LIPITOR) 80 MG tablet, Take 1 tablet (80 mg total) by mouth once daily. TAKE 1 TABLET DAILY, Disp: 90 tablet, Rfl: 3    azelastine (ASTELIN) 137 mcg (0.1 %) nasal spray, USE 1 SPRAY NASALLY TWICE DAILY, Disp: 30 mL, Rfl: 11    blood sugar diagnostic Strp, To check BG ONCE daily, to use with insurance preferred meter, Disp: 100 strip, Rfl: 3    blood-glucose meter kit, To check BG ONCE THE daily, to use with insurance preferred meter, Disp: 1 each, Rfl: 1    lancets Misc, To check BG ONCE daily, to use with insurance preferred meter, Disp: 100 each, Rfl: 3    levothyroxine (SYNTHROID) 88 MCG tablet, Take first thing in the morning on an empty stomach, Disp: 90 tablet, Rfl: 1    metFORMIN (GLUCOPHAGE) 500 MG tablet, TAKE 1 TABLET DAILY WITH BREAKFAST, Disp: 90 tablet, Rfl: 3    pantoprazole (PROTONIX) 40 MG tablet, Take 1 tablet (40 mg total)  by mouth once daily., Disp: 90 tablet, Rfl: 1    prednisolon/gatiflox/bromfenac (PREDNISOL ACE-GATIFLOX-BROMFEN) 1-0.5-0.075 % DrpS, Apply 1 drop to eye 3 (three) times daily. One drop 3 times a day in surgical eye, Disp: 5 mL, Rfl: 3    RESTASIS 0.05 % ophthalmic emulsion, INSTILL 1 DROP IN BOTH EYES TWICE A DAY, Disp: 60 each, Rfl: 11    vitamin D (VITAMIN D3) 1000 units Tab, Take 1 tablet (1,000 Units total) by mouth once daily., Disp: 90 tablet, Rfl: 3    Physical Exam    BP: Vital signs stable  General: No apparent distress  HEENT: nuclear sclerotic cataract  Lungs: adequate respirations  Heart: + pulses  Abdomen: soft  Rectal/pelvic: deferred    Impression: Visually significant Cataract.    See previous clinic notes for surgical indications.    Plan: Phacoemulsification with implantation of Intraocular lens

## 2022-11-23 ENCOUNTER — ANESTHESIA EVENT (OUTPATIENT)
Dept: SURGERY | Facility: OTHER | Age: 77
End: 2022-11-23
Payer: MEDICARE

## 2022-11-23 ENCOUNTER — ANESTHESIA (OUTPATIENT)
Dept: SURGERY | Facility: OTHER | Age: 77
End: 2022-11-23
Payer: MEDICARE

## 2022-11-23 ENCOUNTER — HOSPITAL ENCOUNTER (OUTPATIENT)
Facility: OTHER | Age: 77
Discharge: HOME | End: 2022-11-23
Attending: OPHTHALMOLOGY | Admitting: OPHTHALMOLOGY
Payer: MEDICARE

## 2022-11-23 VITALS
TEMPERATURE: 98 F | RESPIRATION RATE: 18 BRPM | SYSTOLIC BLOOD PRESSURE: 143 MMHG | OXYGEN SATURATION: 97 % | DIASTOLIC BLOOD PRESSURE: 67 MMHG | BODY MASS INDEX: 31.28 KG/M2 | WEIGHT: 170 LBS | HEIGHT: 62 IN | HEART RATE: 65 BPM

## 2022-11-23 DIAGNOSIS — H25.10 AGE-RELATED NUCLEAR CATARACT: ICD-10-CM

## 2022-11-23 DIAGNOSIS — H25.11 NUCLEAR SCLEROTIC CATARACT OF RIGHT EYE: Primary | ICD-10-CM

## 2022-11-23 PROCEDURE — 36000706: Performed by: OPHTHALMOLOGY

## 2022-11-23 PROCEDURE — 25000003 PHARM REV CODE 250

## 2022-11-23 PROCEDURE — 37000008 HC ANESTHESIA 1ST 15 MINUTES: Performed by: OPHTHALMOLOGY

## 2022-11-23 PROCEDURE — 66984 PR REMOVAL, CATARACT, W/INSRT INTRAOC LENS, W/O ENDO CYCLO: ICD-10-PCS | Mod: RT,,, | Performed by: OPHTHALMOLOGY

## 2022-11-23 PROCEDURE — 37000009 HC ANESTHESIA EA ADD 15 MINS: Performed by: OPHTHALMOLOGY

## 2022-11-23 PROCEDURE — 71000015 HC POSTOP RECOV 1ST HR: Performed by: OPHTHALMOLOGY

## 2022-11-23 PROCEDURE — 66999 PR FEMTO MFIOL: ICD-10-PCS | Mod: CSM,RT,, | Performed by: OPHTHALMOLOGY

## 2022-11-23 PROCEDURE — 66984 XCAPSL CTRC RMVL W/O ECP: CPT | Mod: RT,,, | Performed by: OPHTHALMOLOGY

## 2022-11-23 PROCEDURE — 66999 UNLISTED PX ANT SEGMENT EYE: CPT | Mod: CSM,RT,, | Performed by: OPHTHALMOLOGY

## 2022-11-23 PROCEDURE — 63600175 PHARM REV CODE 636 W HCPCS

## 2022-11-23 PROCEDURE — 25000003 PHARM REV CODE 250: Performed by: OPHTHALMOLOGY

## 2022-11-23 PROCEDURE — 36000707: Performed by: OPHTHALMOLOGY

## 2022-11-23 PROCEDURE — V2788 PRESBYOPIA-CORRECT FUNCTION: HCPCS | Performed by: OPHTHALMOLOGY

## 2022-11-23 RX ORDER — TROPICAMIDE 10 MG/ML
1 SOLUTION/ DROPS OPHTHALMIC
Status: COMPLETED | OUTPATIENT
Start: 2022-11-23 | End: 2022-11-23

## 2022-11-23 RX ORDER — TETRACAINE HYDROCHLORIDE 5 MG/ML
SOLUTION OPHTHALMIC
Status: DISCONTINUED | OUTPATIENT
Start: 2022-11-23 | End: 2022-11-23 | Stop reason: HOSPADM

## 2022-11-23 RX ORDER — MOXIFLOXACIN 5 MG/ML
SOLUTION/ DROPS OPHTHALMIC
Status: DISCONTINUED | OUTPATIENT
Start: 2022-11-23 | End: 2022-11-23 | Stop reason: HOSPADM

## 2022-11-23 RX ORDER — TETRACAINE HYDROCHLORIDE 5 MG/ML
1 SOLUTION OPHTHALMIC
Status: COMPLETED | OUTPATIENT
Start: 2022-11-23 | End: 2022-11-23

## 2022-11-23 RX ORDER — PREDNISOLONE ACETATE 10 MG/ML
SUSPENSION/ DROPS OPHTHALMIC
Status: DISCONTINUED | OUTPATIENT
Start: 2022-11-23 | End: 2022-11-23 | Stop reason: HOSPADM

## 2022-11-23 RX ORDER — PROPARACAINE HYDROCHLORIDE 5 MG/ML
1 SOLUTION/ DROPS OPHTHALMIC
Status: DISCONTINUED | OUTPATIENT
Start: 2022-11-23 | End: 2022-11-23 | Stop reason: HOSPADM

## 2022-11-23 RX ORDER — PHENYLEPHRINE HYDROCHLORIDE 100 MG/ML
1 SOLUTION/ DROPS OPHTHALMIC
Status: DISCONTINUED | OUTPATIENT
Start: 2022-11-23 | End: 2022-11-23 | Stop reason: HOSPADM

## 2022-11-23 RX ORDER — LIDOCAINE HYDROCHLORIDE 40 MG/ML
INJECTION, SOLUTION RETROBULBAR
Status: DISCONTINUED | OUTPATIENT
Start: 2022-11-23 | End: 2022-11-23 | Stop reason: HOSPADM

## 2022-11-23 RX ORDER — PHENYLEPHRINE HYDROCHLORIDE 25 MG/ML
1 SOLUTION/ DROPS OPHTHALMIC
Status: COMPLETED | OUTPATIENT
Start: 2022-11-23 | End: 2022-11-23

## 2022-11-23 RX ORDER — LIDOCAINE HYDROCHLORIDE 10 MG/ML
INJECTION, SOLUTION EPIDURAL; INFILTRATION; INTRACAUDAL; PERINEURAL
Status: DISCONTINUED | OUTPATIENT
Start: 2022-11-23 | End: 2022-11-23 | Stop reason: HOSPADM

## 2022-11-23 RX ORDER — MOXIFLOXACIN 5 MG/ML
1 SOLUTION/ DROPS OPHTHALMIC
Status: COMPLETED | OUTPATIENT
Start: 2022-11-23 | End: 2022-11-23

## 2022-11-23 RX ORDER — MIDAZOLAM HYDROCHLORIDE 1 MG/ML
INJECTION INTRAMUSCULAR; INTRAVENOUS
Status: DISCONTINUED | OUTPATIENT
Start: 2022-11-23 | End: 2022-11-23

## 2022-11-23 RX ORDER — ACETAMINOPHEN 325 MG/1
650 TABLET ORAL EVERY 4 HOURS PRN
Status: DISCONTINUED | OUTPATIENT
Start: 2022-11-23 | End: 2022-11-23 | Stop reason: HOSPADM

## 2022-11-23 RX ORDER — SODIUM CHLORIDE 0.9 % (FLUSH) 0.9 %
2 SYRINGE (ML) INJECTION
Status: DISCONTINUED | OUTPATIENT
Start: 2022-11-23 | End: 2022-11-23 | Stop reason: HOSPADM

## 2022-11-23 RX ADMIN — TETRACAINE HYDROCHLORIDE 1 DROP: 5 SOLUTION OPHTHALMIC at 07:11

## 2022-11-23 RX ADMIN — TROPICAMIDE 1 DROP: 10 SOLUTION/ DROPS OPHTHALMIC at 07:11

## 2022-11-23 RX ADMIN — PHENYLEPHRINE HYDROCHLORIDE 1 DROP: 25 SOLUTION/ DROPS OPHTHALMIC at 07:11

## 2022-11-23 RX ADMIN — MOXIFLOXACIN 1 DROP: 5 SOLUTION/ DROPS OPHTHALMIC at 07:11

## 2022-11-23 RX ADMIN — MOXIFLOXACIN 1 DROP: 5 SOLUTION/ DROPS OPHTHALMIC at 09:11

## 2022-11-23 RX ADMIN — MIDAZOLAM HYDROCHLORIDE 2 MG: 1 INJECTION, SOLUTION INTRAMUSCULAR; INTRAVENOUS at 08:11

## 2022-11-23 NOTE — ANESTHESIA POSTPROCEDURE EVALUATION
Anesthesia Post Evaluation    Patient: Lissy Palencia    Procedure(s) Performed: Procedure(s) (LRB):  EXTRACTION, CATARACT, WITH IOL INSERTION (Right)    Final Anesthesia Type: MAC      Patient location during evaluation: [facility]  Patient participation: Yes- Able to Participate  Level of consciousness: awake and alert and oriented  Post-procedure vital signs: reviewed and stable  Pain management: adequate  Airway patency: patent    PONV status at discharge: No PONV  Anesthetic complications: no      Cardiovascular status: blood pressure returned to baseline and hemodynamically stable  Respiratory status: unassisted, spontaneous ventilation and room air  Hydration status: euvolemic  Follow-up not needed.          Vitals Value Taken Time   /77 11/23/22 0730   Temp 36.7 °C (98 °F) 11/23/22 0730   Pulse 69 11/23/22 0730   Resp 18 11/23/22 0730   SpO2 97 % 11/23/22 0730         No case tracking events are documented in the log.      Pain/Sahil Score: No data recorded

## 2022-11-23 NOTE — OP NOTE
SURGEON: CASPER ABBASI MD    DATE OF PROCEDURE: 11/23/2022    PREOPERATIVE DIAGNOSIS:  1. Senile nuclear sclerotic cataract right eye.  2. Presbyopia right eye    POSTOPERATIVE DIAGNOSIS: Senile nuclear sclerotic cataract right eye. 2. Presbyopia right eye    PROCEDURE PERFORMED:  Phacoemulsification with placement of MULTIFOCAL intraocular lens, right eye.    IMPLANT: ZXROOV 21.5 D    ANESTHESIA:  Topical and MAC    COMPLICATIONS: none    ESTIMATED BLOOD LOSS: <1cc    SPECIMENS: none    INDICATIONS FOR PROCEDURE:   The patient has a history of painless progressive vision loss.  The patient has described difficulties with activities of daily living, which specifically include driving, which is secondary to cataract formation and progression. After we had a thorough discussion about risks, benefits, and alternatives to cataract surgery, the patient agreed to proceed with phacoemulsification and implantation of a lens in the right eye.  These risks include, but are not limited to, hemorrhage, pain, infection, need for additional surgery, need for glasses or contacts, loss of vision, or even loss of the eye.    PROCEDURE IN DETAIL:  The patient was met in the preop holding area.  Consent was confirmed to be signed.  The operative site was marked.  The patient was brought into the operating room by the anesthesia team and placed under monitored anesthesia care.  The right eye was prepped and draped in a sterile ophthalmic fashion.  A Caleb speculum was placed into the right eye.   A paracentesis site was made and 1% preservative-free lidocaine was injected into the anterior chamber.  Viscoelastic  material was injected into the anterior chamber.  A keratome blade was used to make a clear corneal incision.  A cystotome was used to initiate the continuous curvilinear capsulorrhexis which was completed with Utrata forceps.  BSS on a mccracken cannula was used to perform hydrodissection.  The phacoemulsification tip was  introduced into the eye and the nucleus was removed in a standard divide-and-conquer fashion.  Remaining cortical material was removed from the eye using irrigation-aspiration.  The capsular bag was filled with viscoelastic material and the intraocular lens was injected and positioned into place. Remaining viscoelastic material was removed from the eye using irrigation and aspiration.  The corneal wounds were hydrated.  The eye was filled to physiologic pressure. The wounds were found to be watertight. Drops of Vigamox and prednisilone were placed into the eye.  The eye was washed, dried, and shielded.  The patient tolerated the procedure well and knows to follow up with me tomorrow morning, sooner if needed.    Intraoperative aberrometer (ORA) was used to confirm calculations.

## 2022-11-23 NOTE — PLAN OF CARE
Lissy Palencia has met all discharge criteria from Phase II. Vital Signs are stable, ambulating  without difficulty. Discharge instructions given, patient verbalized understanding. Discharged from facility via wheelchair in stable condition.

## 2022-11-23 NOTE — ANESTHESIA PREPROCEDURE EVALUATION
11/23/2022  Lissy Palencia is a 77 y.o., female.      Pre-op Assessment    I have reviewed the Patient Summary Reports.     I have reviewed the Nursing Notes. I have reviewed the NPO Status.      Review of Systems  Anesthesia Hx:  Denies Family Hx of Anesthesia complications.   Denies Personal Hx of Anesthesia complications.   Social:  Non-Smoker    Hematology/Oncology:  Hematology Normal   Oncology Normal     Cardiovascular:   CAD      Pulmonary:   Sleep Apnea    Hepatic/GI:   GERD Liver Disease,    Musculoskeletal:   Arthritis     Endocrine:   Diabetes Hypothyroidism  Obesity / BMI > 30      Physical Exam  General: Cooperative, Oriented and Alert    Airway:  Mallampati: II   Mouth Opening: Normal  TM Distance: Normal  Tongue: Normal  Neck ROM: Normal ROM        Anesthesia Plan  Type of Anesthesia, risks & benefits discussed:    Anesthesia Type: MAC  Intra-op Monitoring Plan: Standard ASA Monitors  Post Op Pain Control Plan: multimodal analgesia  Induction:  IV  Informed Consent: Informed consent signed with the Patient and all parties understand the risks and agree with anesthesia plan.  All questions answered.   ASA Score: 3    Ready For Surgery From Anesthesia Perspective.     .

## 2022-11-23 NOTE — BRIEF OP NOTE
BRIEF DISCHARGE NOTE:    Date of discharge: 11/23/2022    Reason for hospitalization -  Cataract surgery     Final Diagnosis - Visually significant Cataract    Procedures and treatment provided - Status post phacoemulsification with placement of intraocular lens     Diet - Advance to regular as tolerated    Activity - as tolerated    Disposition at the end of the case - Good.    Discharge: to home    The patient tolerated the procedure well and knows to follow up with me tomorrow morning in the eye clinic, sooner if needed.    Patient and family instructions (as appropriate) - Given to patient on discharge    Ciara Mclaughlin MD

## 2022-11-25 ENCOUNTER — OFFICE VISIT (OUTPATIENT)
Dept: OPHTHALMOLOGY | Facility: CLINIC | Age: 77
End: 2022-11-25
Payer: MEDICARE

## 2022-11-25 DIAGNOSIS — Z96.1 STATUS POST CATARACT EXTRACTION AND INSERTION OF INTRAOCULAR LENS, RIGHT: Primary | ICD-10-CM

## 2022-11-25 DIAGNOSIS — Z98.41 STATUS POST CATARACT EXTRACTION AND INSERTION OF INTRAOCULAR LENS, RIGHT: Primary | ICD-10-CM

## 2022-11-25 LAB — POCT GLUCOSE: 115 MG/DL (ref 70–110)

## 2022-11-25 PROCEDURE — 99999 PR PBB SHADOW E&M-EST. PATIENT-LVL II: ICD-10-PCS | Mod: PBBFAC,,, | Performed by: OPHTHALMOLOGY

## 2022-11-25 PROCEDURE — 99024 PR POST-OP FOLLOW-UP VISIT: ICD-10-PCS | Mod: POP,,, | Performed by: OPHTHALMOLOGY

## 2022-11-25 PROCEDURE — 99024 POSTOP FOLLOW-UP VISIT: CPT | Mod: POP,,, | Performed by: OPHTHALMOLOGY

## 2022-11-25 PROCEDURE — 99212 OFFICE O/P EST SF 10 MIN: CPT | Mod: PBBFAC | Performed by: OPHTHALMOLOGY

## 2022-11-25 PROCEDURE — 99999 PR PBB SHADOW E&M-EST. PATIENT-LVL II: CPT | Mod: PBBFAC,,, | Performed by: OPHTHALMOLOGY

## 2022-11-25 NOTE — PROGRESS NOTES
HPI    Dr Mcintosh/ Dr Conner / Dr. Jack     Glaucoma suspect OS   ZACK OU   Cataract OS  S/p phaco OD (multifocal) 11/23/2022    Restasis BID OU  Combo tid OD    Patient here today for POD # 2. Doing well, denies any pain and no f/f.  Last edited by Brittni Amador on 11/25/2022 10:20 AM.            Assessment /Plan     For exam results, see Encounter Report.    Status post cataract extraction and insertion of intraocular lens, right    Slit Lamp Exam  L/L - normal  C/s - quiet  Cornea - clear  A/C - 1+ cell  Lens - PCIOL    POD #1 s/p phaco/IOL  - doing well  - continue the following drops:    vigamox or ocuflox TID x 1 wk then stop  Pred forte or durezol or dexamethasone TID x  4 wks  Ketorolac TID until runs out    Versus:    Combination drop - 1 drop TID x total of 1 month    Appropriate precautions and post op medications reviewed.  Patient instructed to call or come in if symptoms of redness, decreased vision, or pain are experienced.    -f/up 1-2wks, sooner PRN.     Distance and near    Cat OS - can sign consent next if ready to proceed.

## 2022-12-03 ENCOUNTER — NURSE TRIAGE (OUTPATIENT)
Dept: ADMINISTRATIVE | Facility: CLINIC | Age: 77
End: 2022-12-03
Payer: MEDICARE

## 2022-12-04 NOTE — TELEPHONE ENCOUNTER
Pt reports she was at a birthday party and ate sweets, started to feel bad, having blurred vision, so checked her blood sugar and it was 206, pt started to drink some water and now blood sugar is 190. Pt advised home care per protocol and to continue to drink water and recheck blood sugar in a couple of hours, Pt encouraged to call back with any worsening symptoms or questions and verbalized understanding.      Reason for Disposition   Blood glucose  mg/dL (3.9 -13.3 mmol/L)    Additional Information   Negative: Unconscious or difficult to awaken   Negative: Acting confused (e.g., disoriented, slurred speech)   Negative: Very weak (e.g., can't stand)   Negative: Sounds like a life-threatening emergency to the triager   Negative: [1] Vomiting AND [2] signs of dehydration (e.g., very dry mouth, lightheaded, dark urine)   Negative: [1] Blood glucose > 240 mg/dL (13.3 mmol/L) AND [2] rapid breathing   Negative: Blood glucose > 500 mg/dL (27.8 mmol/L)   Negative: [1] Blood glucose > 240 mg/dL (13.3 mmol/L) AND [2] urine ketones moderate-large (or more than 1+)   Negative: [1] Blood glucose > 240 mg/dL (13.3 mmol/L) AND [2] blood ketones > 1.4 mmol/L   Negative: [1] Blood glucose > 240 mg/dL (13.3 mmol/L) AND [2] vomiting AND [3] unable to check for ketones (in blood or urine)   Negative: [1] New-onset diabetes suspected (e.g., frequent urination, weak, weight loss) AND [2] vomiting or rapid breathing   Negative: Vomiting lasts > 4 hours   Negative: Patient sounds very sick or weak to the triager   Negative: Fever > 100.4 F (38.0 C)   Negative: Blood glucose > 400 mg/dL (22.2 mmol/L)   Negative: [1] Blood glucose > 300 mg/dL (16.7 mmol/L) AND [2] two or more times in a row   Negative: Urine ketones moderate - large (or blood ketones > 1.4 mmol/L)   Negative: [1] Caller has URGENT medication or insulin pump question AND [2] triager unable to answer question   Negative: [1] Symptoms of high blood sugar (e.g., frequent  urination, weak, weight loss) AND [2] not able to test blood glucose   Negative: New-onset diabetes suspected (e.g., frequent urination, weakness, weight loss)   Negative: [1] Caller has NON-URGENT medication or insulin pump question AND [2] triager unable to answer question   Negative: [1] Blood glucose > 300 mg/dL (16.7 mmol/L) AND [2] uses insulin (e.g., insulin-dependent, all people with type 1 diabetes)   Negative: [1] Blood glucose 240 - 300 mg/dL (13.3 - 16.7 mmol/L) AND [2] uses insulin (e.g., insulin-dependent, all people with type 1 diabetes)   Negative: [1] Blood glucose > 300 mg/dL (16.7 mmol/L) AND [2] does not  use insulin (e.g., not insulin-dependent; most people with type 2 diabetes)   Negative: [1] Blood glucose 240 - 300 mg/dL (13.3 - 16.7 mmol/L) AND [2] does not  use insulin (e.g., not insulin-dependent; most people with type 2 diabetes)    Protocols used: Diabetes - High Blood Sugar-A-

## 2022-12-05 ENCOUNTER — TELEPHONE (OUTPATIENT)
Dept: OPHTHALMOLOGY | Facility: CLINIC | Age: 77
End: 2022-12-05
Payer: MEDICARE

## 2022-12-05 DIAGNOSIS — H25.12 NUCLEAR SCLEROTIC CATARACT OF LEFT EYE: Primary | ICD-10-CM

## 2022-12-05 RX ORDER — PREDNISOLONE ACETATE-GATIFLOXACIN-BROMFENAC .75; 5; 1 MG/ML; MG/ML; MG/ML
1 SUSPENSION/ DROPS OPHTHALMIC 3 TIMES DAILY
Qty: 5 ML | Refills: 3 | Status: SHIPPED | OUTPATIENT
Start: 2022-12-05 | End: 2023-12-11

## 2022-12-09 ENCOUNTER — OFFICE VISIT (OUTPATIENT)
Dept: OPHTHALMOLOGY | Facility: CLINIC | Age: 77
End: 2022-12-09
Payer: MEDICARE

## 2022-12-09 DIAGNOSIS — Z96.1 STATUS POST CATARACT EXTRACTION AND INSERTION OF INTRAOCULAR LENS, RIGHT: Primary | ICD-10-CM

## 2022-12-09 DIAGNOSIS — Z98.41 STATUS POST CATARACT EXTRACTION AND INSERTION OF INTRAOCULAR LENS, RIGHT: Primary | ICD-10-CM

## 2022-12-09 DIAGNOSIS — H25.12 NUCLEAR SCLEROTIC CATARACT OF LEFT EYE: ICD-10-CM

## 2022-12-09 PROCEDURE — 99024 POSTOP FOLLOW-UP VISIT: CPT | Mod: POP,,, | Performed by: OPHTHALMOLOGY

## 2022-12-09 PROCEDURE — 99024 PR POST-OP FOLLOW-UP VISIT: ICD-10-PCS | Mod: POP,,, | Performed by: OPHTHALMOLOGY

## 2022-12-09 PROCEDURE — 92136 IOL MASTER - OU - BOTH EYES: ICD-10-PCS | Mod: 26,S$PBB,LT, | Performed by: OPHTHALMOLOGY

## 2022-12-09 PROCEDURE — 92136 OPHTHALMIC BIOMETRY: CPT | Mod: PBBFAC | Performed by: OPHTHALMOLOGY

## 2022-12-09 NOTE — PROGRESS NOTES
HPI    Dr Mcintosh/ Dr Conner / Dr. Jack     Glaucoma suspect OS   ZACK OU   Cataract OS  S/p phaco OD (multifocal) 11/23/2022    Restasis BID OU  Combo tid OD    Patient here today for POW 1. Doing well, denies any pain and no f/f.  Last edited by Sharlnee Oquendo on 12/9/2022 11:17 AM.            Assessment /Plan     For exam results, see Encounter Report.    Status post cataract extraction and insertion of intraocular lens, right    Nuclear sclerotic cataract of left eye  -     IOL Master - OU - Both Eyes    PO week #1 s/p phaco/IOL -    - doing well, no issues    Continue combo drops for a total of 1 month versus: d/c abx gtt, continue PF/ketorolocTID for total of 1 month    - Interfering with activities of daily living.  Pt desires cataract surgery for [facility].   - R/B/A discussed and pt agrees to proceed with surgery.   - IOL options discussed according to patient's goals and concomitant ocular pathology; and pt content with mf lens.    - Target: plano.        (ZXROOV 21.5 range OS) - aim minus  Ora - use zcu    Discussed risk of glare / halo after sx, pt understands and wishes to proceed.  Agrees to fee for service cost of $2250 per eye.                 Aim near

## 2022-12-14 NOTE — H&P
History    Chief complaint:  Painless progressive vision loss    Present Ilness/Diagnosis: Nuclear sclerotic Cataract    Past Medical History:  has a past medical history of Allergy, Arthritis, Coronary artery disease, Cystocele (01/19/2016), GERD (gastroesophageal reflux disease), Hemorrhoids, History of cholelithiasis, Hypothyroidism, Obesity, Right shoulder pain (10/09/2012), Trigger finger, right 3rd finger (07/30/2014), Vaginal atrophy (01/19/2016), and Vaginal vault prolapse (01/19/2016).    Family History/Social History: refer to chart    Allergies:   Review of patient's allergies indicates:   Allergen Reactions    Compazine  [prochlorperazine edisylate]      Other reaction(s): SPASMS    Parafon forte      Other reaction(s): Hives       Current Medications: No current facility-administered medications for this encounter.    Current Outpatient Medications:     alendronate (FOSAMAX) 70 MG tablet, Take 1 tablet (70 mg total) by mouth every 7 days. Mondays.Then after an hour take your thyroid tablet, 30 minutes later your pantoprazole., Disp: 12 tablet, Rfl: 1    atorvastatin (LIPITOR) 80 MG tablet, Take 1 tablet (80 mg total) by mouth once daily. TAKE 1 TABLET DAILY, Disp: 90 tablet, Rfl: 3    azelastine (ASTELIN) 137 mcg (0.1 %) nasal spray, USE 1 SPRAY NASALLY TWICE DAILY, Disp: 30 mL, Rfl: 11    blood sugar diagnostic Strp, To check BG ONCE daily, to use with insurance preferred meter, Disp: 100 strip, Rfl: 3    blood-glucose meter kit, To check BG ONCE THE daily, to use with insurance preferred meter, Disp: 1 each, Rfl: 1    lancets Misc, To check BG ONCE daily, to use with insurance preferred meter, Disp: 100 each, Rfl: 3    levothyroxine (SYNTHROID) 88 MCG tablet, Take first thing in the morning on an empty stomach, Disp: 90 tablet, Rfl: 1    metFORMIN (GLUCOPHAGE) 500 MG tablet, TAKE 1 TABLET DAILY WITH BREAKFAST, Disp: 90 tablet, Rfl: 3    pantoprazole (PROTONIX) 40 MG tablet, Take 1 tablet (40 mg total)  by mouth once daily., Disp: 90 tablet, Rfl: 1    prednisolon/gatiflox/bromfenac (PREDNISOL ACE-GATIFLOX-BROMFEN) 1-0.5-0.075 % DrpS, Apply 1 drop to eye 3 (three) times daily. One drop 3 times a day in surgical eye, Disp: 5 mL, Rfl: 3    prednisolon/gatiflox/bromfenac (PREDNISOL ACE-GATIFLOX-BROMFEN) 1-0.5-0.075 % DrpS, Apply 1 drop to eye 3 (three) times daily. One drop 3 times a day in surgical eye, Disp: 5 mL, Rfl: 3    RESTASIS 0.05 % ophthalmic emulsion, INSTILL 1 DROP IN BOTH EYES TWICE A DAY, Disp: 60 each, Rfl: 11    vitamin D (VITAMIN D3) 1000 units Tab, Take 1 tablet (1,000 Units total) by mouth once daily., Disp: 90 tablet, Rfl: 3    Physical Exam    BP: Vital signs stable  General: No apparent distress  HEENT: nuclear sclerotic cataract  Lungs: adequate respirations  Heart: + pulses  Abdomen: soft  Rectal/pelvic: deferred    Impression: Visually significant Cataract.    See previous clinic notes for surgical indications.    Plan: Phacoemulsification with implantation of Intraocular lens

## 2022-12-20 ENCOUNTER — TELEPHONE (OUTPATIENT)
Dept: OPHTHALMOLOGY | Facility: CLINIC | Age: 77
End: 2022-12-20
Payer: MEDICARE

## 2022-12-22 ENCOUNTER — ANESTHESIA EVENT (OUTPATIENT)
Dept: SURGERY | Facility: OTHER | Age: 77
End: 2022-12-22
Payer: MEDICARE

## 2022-12-22 ENCOUNTER — HOSPITAL ENCOUNTER (OUTPATIENT)
Facility: OTHER | Age: 77
Discharge: HOME | End: 2022-12-22
Attending: OPHTHALMOLOGY | Admitting: OPHTHALMOLOGY
Payer: MEDICARE

## 2022-12-22 ENCOUNTER — ANESTHESIA (OUTPATIENT)
Dept: SURGERY | Facility: OTHER | Age: 77
End: 2022-12-22
Payer: MEDICARE

## 2022-12-22 VITALS
SYSTOLIC BLOOD PRESSURE: 129 MMHG | WEIGHT: 170 LBS | RESPIRATION RATE: 16 BRPM | DIASTOLIC BLOOD PRESSURE: 59 MMHG | HEART RATE: 55 BPM | HEIGHT: 62 IN | BODY MASS INDEX: 31.28 KG/M2 | OXYGEN SATURATION: 92 % | TEMPERATURE: 98 F

## 2022-12-22 DIAGNOSIS — H25.12 AGE-RELATED NUCLEAR CATARACT, LEFT: ICD-10-CM

## 2022-12-22 DIAGNOSIS — H25.12 NUCLEAR SCLEROTIC CATARACT OF LEFT EYE: Primary | ICD-10-CM

## 2022-12-22 LAB — POCT GLUCOSE: 129 MG/DL (ref 70–110)

## 2022-12-22 PROCEDURE — 37000008 HC ANESTHESIA 1ST 15 MINUTES: Performed by: OPHTHALMOLOGY

## 2022-12-22 PROCEDURE — 66984 PR REMOVAL, CATARACT, W/INSRT INTRAOC LENS, W/O ENDO CYCLO: ICD-10-PCS | Mod: 79,LT,, | Performed by: OPHTHALMOLOGY

## 2022-12-22 PROCEDURE — 36000707: Performed by: OPHTHALMOLOGY

## 2022-12-22 PROCEDURE — 66984 XCAPSL CTRC RMVL W/O ECP: CPT | Mod: 79,LT,, | Performed by: OPHTHALMOLOGY

## 2022-12-22 PROCEDURE — 37000009 HC ANESTHESIA EA ADD 15 MINS: Performed by: OPHTHALMOLOGY

## 2022-12-22 PROCEDURE — 25000003 PHARM REV CODE 250: Performed by: OPHTHALMOLOGY

## 2022-12-22 PROCEDURE — 71000015 HC POSTOP RECOV 1ST HR: Performed by: OPHTHALMOLOGY

## 2022-12-22 PROCEDURE — 66999 PR FEMTO MFIOL: ICD-10-PCS | Mod: CSM,LT,, | Performed by: OPHTHALMOLOGY

## 2022-12-22 PROCEDURE — V2788 PRESBYOPIA-CORRECT FUNCTION: HCPCS | Performed by: OPHTHALMOLOGY

## 2022-12-22 PROCEDURE — 63600175 PHARM REV CODE 636 W HCPCS: Performed by: STUDENT IN AN ORGANIZED HEALTH CARE EDUCATION/TRAINING PROGRAM

## 2022-12-22 PROCEDURE — 66999 UNLISTED PX ANT SEGMENT EYE: CPT | Mod: CSM,LT,, | Performed by: OPHTHALMOLOGY

## 2022-12-22 PROCEDURE — 36000706: Performed by: OPHTHALMOLOGY

## 2022-12-22 RX ORDER — MIDAZOLAM HYDROCHLORIDE 1 MG/ML
INJECTION INTRAMUSCULAR; INTRAVENOUS
Status: DISCONTINUED | OUTPATIENT
Start: 2022-12-22 | End: 2022-12-22

## 2022-12-22 RX ORDER — ALENDRONATE SODIUM 70 MG/1
70 TABLET ORAL
Qty: 12 TABLET | Refills: 1 | Status: SHIPPED | OUTPATIENT
Start: 2022-12-22 | End: 2023-06-06

## 2022-12-22 RX ORDER — MOXIFLOXACIN 5 MG/ML
SOLUTION/ DROPS OPHTHALMIC
Status: DISCONTINUED | OUTPATIENT
Start: 2022-12-22 | End: 2022-12-22 | Stop reason: HOSPADM

## 2022-12-22 RX ORDER — PHENYLEPHRINE HYDROCHLORIDE 100 MG/ML
1 SOLUTION/ DROPS OPHTHALMIC
Status: ACTIVE | OUTPATIENT
Start: 2022-12-22

## 2022-12-22 RX ORDER — LIDOCAINE HYDROCHLORIDE 40 MG/ML
INJECTION, SOLUTION RETROBULBAR
Status: DISCONTINUED | OUTPATIENT
Start: 2022-12-22 | End: 2022-12-22 | Stop reason: HOSPADM

## 2022-12-22 RX ORDER — TETRACAINE HYDROCHLORIDE 5 MG/ML
1 SOLUTION OPHTHALMIC
Status: COMPLETED | OUTPATIENT
Start: 2022-12-22 | End: 2022-12-22

## 2022-12-22 RX ORDER — PHENYLEPHRINE HYDROCHLORIDE 25 MG/ML
1 SOLUTION/ DROPS OPHTHALMIC
Status: COMPLETED | OUTPATIENT
Start: 2022-12-22 | End: 2022-12-22

## 2022-12-22 RX ORDER — MOXIFLOXACIN 5 MG/ML
1 SOLUTION/ DROPS OPHTHALMIC
Status: COMPLETED | OUTPATIENT
Start: 2022-12-22 | End: 2022-12-22

## 2022-12-22 RX ORDER — PROPARACAINE HYDROCHLORIDE 5 MG/ML
1 SOLUTION/ DROPS OPHTHALMIC
Status: DISCONTINUED | OUTPATIENT
Start: 2022-12-22 | End: 2022-12-22 | Stop reason: HOSPADM

## 2022-12-22 RX ORDER — PREDNISOLONE ACETATE 10 MG/ML
SUSPENSION/ DROPS OPHTHALMIC
Status: DISCONTINUED | OUTPATIENT
Start: 2022-12-22 | End: 2022-12-22 | Stop reason: HOSPADM

## 2022-12-22 RX ORDER — TROPICAMIDE 10 MG/ML
1 SOLUTION/ DROPS OPHTHALMIC
Status: COMPLETED | OUTPATIENT
Start: 2022-12-22 | End: 2022-12-22

## 2022-12-22 RX ORDER — ACETAMINOPHEN 325 MG/1
650 TABLET ORAL EVERY 4 HOURS PRN
Status: DISCONTINUED | OUTPATIENT
Start: 2022-12-22 | End: 2022-12-22 | Stop reason: HOSPADM

## 2022-12-22 RX ORDER — TETRACAINE HYDROCHLORIDE 5 MG/ML
SOLUTION OPHTHALMIC
Status: DISCONTINUED | OUTPATIENT
Start: 2022-12-22 | End: 2022-12-22 | Stop reason: HOSPADM

## 2022-12-22 RX ORDER — LIDOCAINE HYDROCHLORIDE 10 MG/ML
INJECTION, SOLUTION EPIDURAL; INFILTRATION; INTRACAUDAL; PERINEURAL
Status: DISCONTINUED | OUTPATIENT
Start: 2022-12-22 | End: 2022-12-22 | Stop reason: HOSPADM

## 2022-12-22 RX ORDER — SODIUM CHLORIDE 0.9 % (FLUSH) 0.9 %
2 SYRINGE (ML) INJECTION
Status: ACTIVE | OUTPATIENT
Start: 2022-12-22

## 2022-12-22 RX ADMIN — MIDAZOLAM HYDROCHLORIDE 2 MG: 1 INJECTION, SOLUTION INTRAMUSCULAR; INTRAVENOUS at 07:12

## 2022-12-22 RX ADMIN — MOXIFLOXACIN 1 DROP: 5 SOLUTION/ DROPS OPHTHALMIC at 08:12

## 2022-12-22 RX ADMIN — TROPICAMIDE 1 DROP: 10 SOLUTION/ DROPS OPHTHALMIC at 07:12

## 2022-12-22 RX ADMIN — MOXIFLOXACIN 1 DROP: 5 SOLUTION/ DROPS OPHTHALMIC at 06:12

## 2022-12-22 RX ADMIN — MOXIFLOXACIN 1 DROP: 5 SOLUTION/ DROPS OPHTHALMIC at 07:12

## 2022-12-22 RX ADMIN — TETRACAINE HYDROCHLORIDE 1 DROP: 5 SOLUTION OPHTHALMIC at 06:12

## 2022-12-22 RX ADMIN — TETRACAINE HYDROCHLORIDE 1 DROP: 5 SOLUTION OPHTHALMIC at 07:12

## 2022-12-22 RX ADMIN — TROPICAMIDE 1 DROP: 10 SOLUTION/ DROPS OPHTHALMIC at 06:12

## 2022-12-22 RX ADMIN — PHENYLEPHRINE HYDROCHLORIDE 1 DROP: 25 SOLUTION/ DROPS OPHTHALMIC at 07:12

## 2022-12-22 RX ADMIN — PHENYLEPHRINE HYDROCHLORIDE 1 DROP: 25 SOLUTION/ DROPS OPHTHALMIC at 06:12

## 2022-12-22 NOTE — BRIEF OP NOTE
BRIEF DISCHARGE NOTE:    Date of discharge: 12/22/2022    Reason for hospitalization -  Cataract surgery     Final Diagnosis - Visually significant Cataract    Procedures and treatment provided - Status post phacoemulsification with placement of intraocular lens     Diet - Advance to regular as tolerated    Activity - as tolerated    Disposition at the end of the case - Good.    Discharge: to home    The patient tolerated the procedure well and knows to follow up with me tomorrow morning in the eye clinic, sooner if needed.    Patient and family instructions (as appropriate) - Given to patient on discharge    Ciara Mclaughlin MD

## 2022-12-22 NOTE — TELEPHONE ENCOUNTER
----- Message from Annie Olivas sent at 12/22/2022  9:20 AM CST -----  Requesting an RX refill or new RX.  Is this a refill or new RX: Refill  RX name and strength alendronate (FOSAMAX) 70 MG tablet  Is this a 30 day or 90 day RX:   Pharmacy name and phone # [facility] - [address] [place]:  [phone] Fax:  [phone]

## 2022-12-22 NOTE — ANESTHESIA POSTPROCEDURE EVALUATION
Anesthesia Post Evaluation    Patient: Lissy Palencia    Procedure(s) Performed: Procedure(s) (LRB):  EXTRACTION, CATARACT, WITH IOL INSERTION (Left)    Final Anesthesia Type: MAC      Patient location during evaluation: [facility]  Patient participation: Yes- Able to Participate  Level of consciousness: awake and awake and alert  Post-procedure vital signs: reviewed and stable  Pain management: adequate  Airway patency: patent    PONV status at discharge: No PONV  Anesthetic complications: no      Cardiovascular status: blood pressure returned to baseline and hemodynamically stable  Respiratory status: unassisted and room air  Hydration status: euvolemic  Follow-up not needed.          Vitals Value Taken Time   /69 12/22/22 0802   Temp 98 12/22/22 0802   Pulse 52 12/22/22 0802   Resp 18 12/22/22 0802   SpO2 98 12/22/22 0802         No case tracking events are documented in the log.      Pain/Sahil Score: No data recorded

## 2022-12-22 NOTE — OP NOTE
SURGEON: CASPER ABBASI MD    DATE OF PROCEDURE: 12/22/2022    PREOPERATIVE DIAGNOSIS:  1. Senile nuclear sclerotic cataract left eye.  2. Presbyopia left eye    POSTOPERATIVE DIAGNOSIS: 1.Senile nuclear sclerotic cataract left eye. 2. Presbyopia left eye    PROCEDURE PERFORMED:  Phacoemulsification with placement of MULTIFOCAL intraocular lens, left eye.    IMPLANT:  zxroov 21.0 D    ANESTHESIA:  Topical and MAC    COMPLICATIONS: none    ESTIMATED BLOOD LOSS: <1cc    SPECIMENS: none    INDICATIONS FOR PROCEDURE:   The patient has a history of painless progressive vision loss.  The patient has described difficulties with activities of daily living, which specifically include driving, which is secondary to cataract formation and progression. After we had a thorough discussion about risks, benefits, and alternatives to cataract surgery, the patient agreed to proceed with phacoemulsification and implantation of a lens in the left eye.  These risks include, but are not limited to, hemorrhage, pain, infection, need for additional surgery, need for glasses or contacts, loss of vision, or even loss of the eye.    PROCEDURE IN DETAIL:  The patient was met in the preop holding area.  Consent was confirmed to be signed.  The operative site was marked.  The patient was brought into the operating room by the anesthesia team and placed under monitored anesthesia care.  The left eye was prepped and draped in a sterile ophthalmic fashion.  A Caleb speculum was placed into the left eye.   A paracentesis site was made and 1% preservative-free lidocaine was injected into the anterior chamber.  Viscoelastic  material was injected into the anterior chamber.  A keratome blade was used to make a clear corneal incision.  A cystotome was used to initiate the continuous curvilinear capsulorrhexis which was completed with Utrata forceps.  BSS on a mccracken cannula was used to perform hydrodissection.  The phacoemulsification tip was  introduced into the eye and the nucleus was removed in a standard divide-and-conquer fashion.  Remaining cortical material was removed from the eye using irrigation-aspiration.  The capsular bag was filled with viscoelastic material and the intraocular lens was injected and positioned into place. Remaining viscoelastic material was removed from the eye using irrigation and aspiration.  The corneal wounds were hydrated.  The eye was filled to physiologic pressure. The wounds were found to be watertight. Drops of Vigamox and prednisilone were placed into the eye.  The eye was washed, dried, and shielded.  The patient tolerated the procedure well and knows to follow up with me tomorrow morning, sooner if needed.    Intraoperative aberrometer (ORA) was used to confirm calculations.

## 2022-12-22 NOTE — ANESTHESIA PREPROCEDURE EVALUATION
12/22/2022  Lissy Palencia is a 77 y.o., female.      Pre-op Assessment    I have reviewed the Patient Summary Reports.     I have reviewed the Nursing Notes. I have reviewed the NPO Status.      Review of Systems  Anesthesia Hx:  Denies Family Hx of Anesthesia complications.   Denies Personal Hx of Anesthesia complications.   Social:  Non-Smoker    Hematology/Oncology:  Hematology Normal   Oncology Normal     Cardiovascular:   CAD      Pulmonary:   Sleep Apnea    Hepatic/GI:   GERD Liver Disease,    Musculoskeletal:   Arthritis     Endocrine:   Diabetes Hypothyroidism  Obesity / BMI > 30      Physical Exam  General: Cooperative, Oriented and Alert    Airway:  Mallampati: II   Mouth Opening: Normal  TM Distance: Normal  Tongue: Normal  Neck ROM: Normal ROM        Anesthesia Plan  Type of Anesthesia, risks & benefits discussed:    Anesthesia Type: MAC  Intra-op Monitoring Plan: Standard ASA Monitors  Post Op Pain Control Plan: multimodal analgesia  Induction:  IV  Informed Consent: Informed consent signed with the Patient and all parties understand the risks and agree with anesthesia plan.  All questions answered.   ASA Score: 3  Anesthesia Plan Notes: Right Cataract 1 month ago with NAAC    Ready For Surgery From Anesthesia Perspective.     .

## 2022-12-23 ENCOUNTER — OFFICE VISIT (OUTPATIENT)
Dept: OPHTHALMOLOGY | Facility: CLINIC | Age: 77
End: 2022-12-23
Payer: MEDICARE

## 2022-12-23 DIAGNOSIS — Z98.41 STATUS POST CATARACT EXTRACTION AND INSERTION OF INTRAOCULAR LENS, RIGHT: ICD-10-CM

## 2022-12-23 DIAGNOSIS — Z98.42 STATUS POST CATARACT EXTRACTION AND INSERTION OF INTRAOCULAR LENS, LEFT: Primary | ICD-10-CM

## 2022-12-23 DIAGNOSIS — Z96.1 STATUS POST CATARACT EXTRACTION AND INSERTION OF INTRAOCULAR LENS, LEFT: Primary | ICD-10-CM

## 2022-12-23 DIAGNOSIS — Z96.1 STATUS POST CATARACT EXTRACTION AND INSERTION OF INTRAOCULAR LENS, RIGHT: ICD-10-CM

## 2022-12-23 PROCEDURE — 99024 PR POST-OP FOLLOW-UP VISIT: ICD-10-PCS | Mod: POP,,, | Performed by: OPHTHALMOLOGY

## 2022-12-23 PROCEDURE — 99024 POSTOP FOLLOW-UP VISIT: CPT | Mod: POP,,, | Performed by: OPHTHALMOLOGY

## 2022-12-23 NOTE — PROGRESS NOTES
HPI    Dr Mcintosh/ Dr Conner / Dr. Jack     Glaucoma suspect OS   ZACK OU   S/P phaco OS 12/22/2022 - optiblue  S/p phaco OD (multifocal) 11/23/2022    Restasis BID OU  Combo tid OD    Patient here today for POD 1. Doing well, denies any pain and no f/f.  Last edited by Ciara Mclaughlin MD on 12/23/2022  8:39 AM.            Assessment /Plan     For exam results, see Encounter Report.    Status post cataract extraction and insertion of intraocular lens, left    Status post cataract extraction and insertion of intraocular lens, right      Slit Lamp Exam  L/L - normal  C/s - quiet  Cornea - clear  A/C - 1+ cell  Lens - PCIOL    POD #1 s/p phaco/IOL  - doing well  - continue the following drops:    vigamox or ocuflox TID x 1 wk then stop  Pred forte or durezol or dexamethasone TID x  4 wks  Ketorolac TID until runs out    Versus:    Combination drop - 1 drop TID x total of 1 month    Appropriate precautions and post op medications reviewed.  Patient instructed to call or come in if symptoms of redness, decreased vision, or pain are experienced.    -f/up 4wks, sooner PRN.

## 2022-12-26 DIAGNOSIS — J31.0 CHRONIC RHINITIS: ICD-10-CM

## 2022-12-26 NOTE — TELEPHONE ENCOUNTER
Care Due:                  Date            Visit Type   Department     Provider  --------------------------------------------------------------------------------                                NP -                              PRIMARY      [facility] PRIMARY  Last Visit: 09-      CARE ([facility])   AISSATOU Nicole                               -                              PRIMARY      [facility] PRIMARY  Next Visit: 03-      CARE ([facility])   AISSATOU Nicole                                                            Last  Test          Frequency    Reason                     Performed    Due Date  --------------------------------------------------------------------------------    Cr..........  12 months..  metFORMIN................  09- 09-    HBA1C.......  6 months...  metFORMIN................  09- 03-    [facility] Embedded Care Gaps. Reference number: [id number]. 12/26/2022   11:07:38 AM CST

## 2022-12-28 RX ORDER — AZELASTINE 1 MG/ML
1 SPRAY, METERED NASAL 2 TIMES DAILY
Qty: 30 ML | Refills: 3 | Status: SHIPPED | OUTPATIENT
Start: 2022-12-28

## 2022-12-28 RX ORDER — LIDOCAINE 50 MG/G
PATCH TOPICAL DAILY
Qty: 30 PATCH | Refills: 0 | Status: SHIPPED | OUTPATIENT
Start: 2022-12-28

## 2022-12-29 ENCOUNTER — TELEPHONE (OUTPATIENT)
Dept: ORTHOPEDICS | Facility: CLINIC | Age: 77
End: 2022-12-29
Payer: MEDICARE

## 2022-12-29 DIAGNOSIS — M79.641 RIGHT HAND PAIN: Primary | ICD-10-CM

## 2022-12-29 NOTE — TELEPHONE ENCOUNTER
Spoke c pt. Confirmed 8:45 XR but asked her to at 8:15 & appt a 9:15 c Dr. Mcknight. Advised pt that XR is located on 1st floor of [facility]. Pt expressed understanding & was thankful.

## 2023-01-03 DIAGNOSIS — E03.9 PRIMARY HYPOTHYROIDISM: ICD-10-CM

## 2023-01-03 RX ORDER — LEVOTHYROXINE SODIUM 88 UG/1
TABLET ORAL
Qty: 90 TABLET | Refills: 3 | Status: SHIPPED | OUTPATIENT
Start: 2023-01-03

## 2023-01-04 ENCOUNTER — TELEPHONE (OUTPATIENT)
Dept: INTERNAL MEDICINE | Facility: CLINIC | Age: 78
End: 2023-01-04
Payer: MEDICARE

## 2023-01-04 NOTE — TELEPHONE ENCOUNTER
----- Message from Zohreh Gaona sent at 1/4/2023  9:31 AM CST -----  Contact: [phone]  Pt said she needs a call back about when she swallowing she get pain under her jaw to her ear and feels a lump under her jaw area. Pt said her throat don't hurts. Please call pt back.

## 2023-01-05 ENCOUNTER — HOSPITAL ENCOUNTER (OUTPATIENT)
Dept: RADIOLOGY | Facility: OTHER | Age: 78
Discharge: HOME | End: 2023-01-05
Attending: ORTHOPAEDIC SURGERY
Payer: MEDICARE

## 2023-01-05 ENCOUNTER — OFFICE VISIT (OUTPATIENT)
Dept: ORTHOPEDICS | Facility: CLINIC | Age: 78
End: 2023-01-05
Payer: MEDICARE

## 2023-01-05 VITALS — WEIGHT: 170 LBS | BODY MASS INDEX: 31.28 KG/M2 | HEIGHT: 62 IN

## 2023-01-05 DIAGNOSIS — G56.03 CARPAL TUNNEL SYNDROME, BILATERAL: ICD-10-CM

## 2023-01-05 DIAGNOSIS — M79.641 RIGHT HAND PAIN: ICD-10-CM

## 2023-01-05 DIAGNOSIS — R52 PAIN: Primary | ICD-10-CM

## 2023-01-05 DIAGNOSIS — M65.331 TRIGGER MIDDLE FINGER OF RIGHT HAND: Primary | ICD-10-CM

## 2023-01-05 PROCEDURE — 20550 TENDON SHEATH: ICD-10-PCS | Mod: S$PBB,F7,, | Performed by: ORTHOPAEDIC SURGERY

## 2023-01-05 PROCEDURE — 73130 X-RAY EXAM OF HAND: CPT | Mod: TC,FY,RT

## 2023-01-05 PROCEDURE — 20550 NJX 1 TENDON SHEATH/LIGAMENT: CPT | Mod: PBBFAC,RT | Performed by: ORTHOPAEDIC SURGERY

## 2023-01-05 PROCEDURE — 99213 PR OFFICE/OUTPT VISIT, EST, LEVL III, 20-29 MIN: ICD-10-PCS | Mod: 25,S$PBB,, | Performed by: ORTHOPAEDIC SURGERY

## 2023-01-05 PROCEDURE — 99213 OFFICE O/P EST LOW 20 MIN: CPT | Mod: PBBFAC,25 | Performed by: ORTHOPAEDIC SURGERY

## 2023-01-05 PROCEDURE — 99999 PR PBB SHADOW E&M-EST. PATIENT-LVL III: CPT | Mod: PBBFAC,,, | Performed by: ORTHOPAEDIC SURGERY

## 2023-01-05 PROCEDURE — 99213 OFFICE O/P EST LOW 20 MIN: CPT | Mod: 25,S$PBB,, | Performed by: ORTHOPAEDIC SURGERY

## 2023-01-05 PROCEDURE — 99999 PR PBB SHADOW E&M-EST. PATIENT-LVL III: ICD-10-PCS | Mod: PBBFAC,,, | Performed by: ORTHOPAEDIC SURGERY

## 2023-01-05 PROCEDURE — 73130 X-RAY EXAM OF HAND: CPT | Mod: 26,RT,, | Performed by: RADIOLOGY

## 2023-01-05 PROCEDURE — 73130 XR HAND COMPLETE 3 VIEW RIGHT: ICD-10-PCS | Mod: 26,RT,, | Performed by: RADIOLOGY

## 2023-01-05 RX ORDER — DEXAMETHASONE SODIUM PHOSPHATE 4 MG/ML
4 INJECTION, SOLUTION INTRA-ARTICULAR; INTRALESIONAL; INTRAMUSCULAR; INTRAVENOUS; SOFT TISSUE
Status: DISCONTINUED | OUTPATIENT
Start: 2023-01-05 | End: 2023-01-05 | Stop reason: HOSPADM

## 2023-01-05 RX ADMIN — DEXAMETHASONE SODIUM PHOSPHATE 4 MG: 4 INJECTION INTRA-ARTICULAR; INTRALESIONAL; INTRAMUSCULAR; INTRAVENOUS; SOFT TISSUE at 09:01

## 2023-01-05 NOTE — PROCEDURES
Tendon Sheath    Date/Time: 1/5/2023 9:15 AM  Performed by: Shaneka Richardson MD  Authorized by: Shaneka Richardson MD     Consent Done?:  Yes (Verbal)  Indications:  Pain  Timeout: prior to procedure the correct patient, procedure, and site was verified    Prep: patient was prepped and draped in usual sterile fashion      Local anesthesia used?: Yes    Anesthesia:  Local infiltration  Local anesthetic:  Lidocaine 1% without epinephrine  Location:  Long finger  Site:  R long MCP  Ultrasonic guidance for needle placement?: Yes    Needle size:  25 G  Approach:  Volar  Medications:  4 mg dexAMETHasone 4 mg/mL

## 2023-01-05 NOTE — PROGRESS NOTES
Pt has + N/T , hand pain and right long trigger   Paln for injection trigger finger, discussed brace  CTS_ EMG/NCS, discussed brace

## 2023-01-05 NOTE — PROGRESS NOTES
Subjective:      Patient ID: Lissy Palencia is a 77 y.o. female.    Chief Complaint: Pain of the Right Hand      HPI  Lissy Palencia is a 77 y.o. female presenting today for evaluation of the right long finger.  She notes pain and catching in her right long finger which started approximately 3 months ago. She states it catches and she has to manually straighten it. In addition she has intermittent pain and paresthesias in the bilateral hands. She has night time symptoms.        Review of patient's allergies indicates:   Allergen Reactions    Compazine  [prochlorperazine edisylate]      Other reaction(s): SPASMS    Parafon forte      Other reaction(s): Hives         Current Outpatient Medications   Medication Sig Dispense Refill    alendronate (FOSAMAX) 70 MG tablet Take 1 tablet (70 mg total) by mouth every 7 days. Mondays.Then after an hour take your thyroid tablet, 30 minutes later your pantoprazole. 12 tablet 1    atorvastatin (LIPITOR) 80 MG tablet Take 1 tablet (80 mg total) by mouth once daily. TAKE 1 TABLET DAILY 90 tablet 3    azelastine (ASTELIN) 137 mcg (0.1 %) nasal spray 1 spray (137 mcg total) by Nasal route 2 (two) times daily. 30 mL 3    blood sugar diagnostic Strp To check BG ONCE daily, to use with insurance preferred meter 100 strip 3    flu vac 2022 [id number],PF, (FLUAD QUAD 2022-23,65Y UP,,PF,) 60 mcg (15 mcg x 4)/0.5 mL Syrg Inject into the muscle. 0.5 mL 0    lancets Misc To check BG ONCE daily, to use with insurance preferred meter 100 each 3    levothyroxine (SYNTHROID) 88 MCG tablet TAKE FIRST THING IN THE MORNING ON AN EMPTY STOMACH 90 tablet 3    LIDOcaine (LIDODERM) 5 % Place onto the skin once daily. 30 patch 0    metFORMIN (GLUCOPHAGE) 500 MG tablet TAKE 1 TABLET DAILY WITH BREAKFAST 90 tablet 3    pantoprazole (PROTONIX) 40 MG tablet Take 1 tablet (40 mg total) by mouth once daily. 90 tablet 1    prednisolon/gatiflox/bromfenac (PREDNISOL ACE-GATIFLOX-BROMFEN) 1-0.5-0.075 % DrpS  Apply 1 drop to eye 3 (three) times daily. One drop 3 times a day in surgical eye 5 mL 3    prednisolon/gatiflox/bromfenac (PREDNISOL ACE-GATIFLOX-BROMFEN) 1-0.5-0.075 % DrpS Apply 1 drop to eye 3 (three) times daily. One drop 3 times a day in surgical eye 5 mL 3    RESTASIS 0.05 % ophthalmic emulsion INSTILL 1 DROP IN BOTH EYES TWICE A DAY 60 each 11    vitamin D (VITAMIN D3) 1000 units Tab Take 1 tablet (1,000 Units total) by mouth once daily. 90 tablet 3    blood-glucose meter kit To check BG ONCE THE daily, to use with insurance preferred meter 1 each 1     No current facility-administered medications for this visit.     Facility-Administered Medications Ordered in Other Visits   Medication Dose Route Frequency Provider Last Rate Last Admin    balanced salt irrigation intra-ocular solution 1 drop  1 drop Left Eye On Call Procedure Ciara Mclaughlin MD        phenylephrine HCL 10% ophthalmic solution 1 drop  1 drop Left Eye PRN Ciara Mclaughlin MD        sodium chloride 0.9% flush 2 mL  2 mL Intravenous PRN Ciara Mclaughlin MD           Past Medical History:   Diagnosis Date    Allergy     Arthritis     Coronary artery disease     Cystocele 01/19/2016    Diabetes mellitus     GERD (gastroesophageal reflux disease)     Hemorrhoids     History of cholelithiasis     Hypothyroidism     Obesity     Right shoulder pain 10/09/2012    Sleep apnea     Trigger finger, right 3rd finger 07/30/2014    Vaginal atrophy 01/19/2016    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       Past Surgical History:   Procedure Laterality Date    ABLATION COLPOCLESIS      ADENOIDECTOMY      CATARACT EXTRACTION W/  INTRAOCULAR LENS IMPLANT Right 11/23/2022    Procedure: EXTRACTION, CATARACT, WITH IOL INSERTION;  Surgeon: Ciara Mclaughlin MD;  Location: [facility];  Service: Ophthalmology;  Laterality: Right;    CATARACT EXTRACTION W/  INTRAOCULAR LENS IMPLANT Left 12/22/2022    Procedure: EXTRACTION, CATARACT, WITH IOL INSERTION;  Surgeon: Ciara VAZQUEZ  "MD Lissette;  Location: [facility] OR;  Service: Ophthalmology;  Laterality: Left;    CHOLECYSTECTOMY      COLONOSCOPY N/A 11/28/2018    Procedure: COLONOSCOPY;  Surgeon: Richi Mcintosh MD;  Location: [facility] ENDO (4TH FLR);  Service: Endoscopy;  Laterality: N/A;    CORONARY STENT PLACEMENT      ESOPHAGOGASTRODUODENOSCOPY N/A 11/28/2018    Procedure: ESOPHAGOGASTRODUODENOSCOPY (EGD);  Surgeon: Richi Mcintosh MD;  Location: [facility] ENDO ([facility] FLR);  Service: Endoscopy;  Laterality: N/A;    heart cath  2005    non-obstructive disease    OOPHORECTOMY      SHOULDER ARTHROSCOPY W/ ROTATOR CUFF REPAIR      Right    TONSILLECTOMY      TOTAL ABDOMINAL HYSTERECTOMY      with BSO    TUBAL LIGATION         Review of Systems:  Constitutional: Negative for chills and fever.   Respiratory: Negative for cough and shortness of breath.    Gastrointestinal: Negative for nausea and vomiting.   Skin: Negative for rash.   Neurological: Negative for dizziness and headaches.   Psychiatric/Behavioral: Negative for depression.   MSK as in HPI       OBJECTIVE:     PHYSICAL EXAM:  Ht 5' 2" (1.575 m)   Wt 77.1 kg (170 lb)   BMI 31.09 kg/m²     GEN:  NAD, well-developed, well-groomed.  NEURO: Awake, alert, and oriented. Normal attention and concentration.    PSYCH: Normal mood and affect. Behavior is normal.  HEENT: No cervical lymphadenopathy noted.  CARDIOVASCULAR: Radial pulses 2+ bilaterally. No LE edema noted.  PULMONARY: Breath sounds normal. No respiratory distress.  SKIN: Intact, no rashes.      MSK:   BUE:  Good active ROM of the wrist and fingers. Right long finger with ttp at the A1 pulley and triggering. Positive durkans bilaterally, negative tinels.AIN/PIN/Radial/Median/Ulnar Nerves assessed in isolation without deficit. Radial & Ulnar arteries palpated 2+. Capillary Refill <3s.    RADIOGRAPHS:  Xray right hand 1/5/23   FINDINGS:  Hand complete three views right: There is baseline DJD most severe at the 1st carpometacarpal joint.  There " is ulnar negative variance.  No fracture, dislocation, or bone destruction seen.  No acute process seen.     Impression:     No acute process seen.  Comments: I have personally reviewed the imaging and I agree with the above radiologist's report.    ASSESSMENT/PLAN:       ICD-10-CM ICD-9-CM   1. Trigger middle finger of right hand  M65.331 727.03       Orders Placed This Encounter    Tendon Sheath          Plan:   Plan for right long trigger finger injection today   EMG ordered  RTC following above we discussed she may require right carpal tunnel release and right long trigger finger release         PROCEDURE:  I have explained the risks, benefits, and alternatives of the procedure in detail.  The patient voices understanding and all questions have been answered. US used. The patient agrees to proceed as planned. So after a sterile prep of the skin in the normal fashion the right long flexor tendon sheath is injected from the volar  approach using a 25 gauge needle with a combination of 0.5cc 1% plain lidocaine and 4 mg of dexamethasone.  The patient is cautioned and immediate relief of pain is secondary to the local anesthetic and will be temporary.  After the anesthetic wears off there may be a increase in pain that may last for a few hours or a few days and they should use ice to help alleviate this flair up of pain. Patient tolerated the procedure well.       The patient indicates understanding of these issues and agrees to the plan.    Katina Louie PA-C  [facility]   [facility] [place]

## 2023-01-06 ENCOUNTER — OFFICE VISIT (OUTPATIENT)
Dept: INTERNAL MEDICINE | Facility: CLINIC | Age: 78
End: 2023-01-06
Payer: MEDICARE

## 2023-01-06 VITALS
SYSTOLIC BLOOD PRESSURE: 120 MMHG | BODY MASS INDEX: 30.86 KG/M2 | HEIGHT: 62 IN | OXYGEN SATURATION: 97 % | TEMPERATURE: 98 F | HEART RATE: 71 BPM | RESPIRATION RATE: 18 BRPM | DIASTOLIC BLOOD PRESSURE: 80 MMHG | WEIGHT: 167.69 LBS

## 2023-01-06 DIAGNOSIS — E03.9 PRIMARY HYPOTHYROIDISM: ICD-10-CM

## 2023-01-06 DIAGNOSIS — E11.69 HYPERLIPIDEMIA ASSOCIATED WITH TYPE 2 DIABETES MELLITUS: ICD-10-CM

## 2023-01-06 DIAGNOSIS — H61.21 IMPACTED CERUMEN OF RIGHT EAR: ICD-10-CM

## 2023-01-06 DIAGNOSIS — E11.9 TYPE 2 DIABETES MELLITUS WITHOUT COMPLICATION, WITHOUT LONG-TERM CURRENT USE OF INSULIN: ICD-10-CM

## 2023-01-06 DIAGNOSIS — H60.501 ACUTE OTITIS EXTERNA OF RIGHT EAR, UNSPECIFIED TYPE: ICD-10-CM

## 2023-01-06 DIAGNOSIS — M81.0 OSTEOPOROSIS, UNSPECIFIED OSTEOPOROSIS TYPE, UNSPECIFIED PATHOLOGICAL FRACTURE PRESENCE: ICD-10-CM

## 2023-01-06 DIAGNOSIS — G25.0 ESSENTIAL TREMOR: ICD-10-CM

## 2023-01-06 DIAGNOSIS — E78.5 HYPERLIPIDEMIA ASSOCIATED WITH TYPE 2 DIABETES MELLITUS: ICD-10-CM

## 2023-01-06 DIAGNOSIS — I25.10 CORONARY ARTERY DISEASE INVOLVING NATIVE CORONARY ARTERY OF NATIVE HEART WITHOUT ANGINA PECTORIS: ICD-10-CM

## 2023-01-06 DIAGNOSIS — I70.0 AORTIC ATHEROSCLEROSIS: ICD-10-CM

## 2023-01-06 DIAGNOSIS — G47.33 OSA (OBSTRUCTIVE SLEEP APNEA): Primary | ICD-10-CM

## 2023-01-06 PROCEDURE — 99214 OFFICE O/P EST MOD 30 MIN: CPT | Mod: S$PBB,,, | Performed by: INTERNAL MEDICINE

## 2023-01-06 PROCEDURE — 99999 PR PBB SHADOW E&M-EST. PATIENT-LVL IV: ICD-10-PCS | Mod: PBBFAC,,, | Performed by: INTERNAL MEDICINE

## 2023-01-06 PROCEDURE — 99999 PR PBB SHADOW E&M-EST. PATIENT-LVL IV: CPT | Mod: PBBFAC,,, | Performed by: INTERNAL MEDICINE

## 2023-01-06 PROCEDURE — 99214 OFFICE O/P EST MOD 30 MIN: CPT | Mod: PBBFAC | Performed by: INTERNAL MEDICINE

## 2023-01-06 PROCEDURE — 99214 PR OFFICE/OUTPT VISIT, EST, LEVL IV, 30-39 MIN: ICD-10-PCS | Mod: S$PBB,,, | Performed by: INTERNAL MEDICINE

## 2023-01-06 RX ORDER — NEOMYCIN SULFATE, POLYMYXIN B SULFATE AND HYDROCORTISONE 10; 3.5; 1 MG/ML; MG/ML; [USP'U]/ML
3 SUSPENSION/ DROPS AURICULAR (OTIC) 3 TIMES DAILY
Qty: 10 ML | Refills: 0 | Status: SHIPPED | OUTPATIENT
Start: 2023-01-06

## 2023-01-06 NOTE — PROGRESS NOTES
[facility] Note    Chief Complaint      Chief Complaint   Patient presents with    Jaw Pain       History of Present Illness      Lissy Palencia is a 77 y.o. female who presents today for   Chief Complaint   Patient presents with    Jaw Pain   .  Patient comes to appointment here for acute visit related to above . She describes as pain with swallowing in right nec from jaw to ear . She states she has had abx for tooth infection in October and November but on the opposite side . She also states she has a lymph node present but she states it has gotten smaller .     Problem List Items Addressed This Visit          Neuro    Essential tremor, slight head vane    Overview     stable            ENT    Impacted cerumen of right ear    Overview     otc removal kit   Cortisporin otci             Cardiac/Vascular    Hyperlipidemia associated with type 2 diabetes mellitus    Overview     Cont lipitor. Lipid panel pending           Coronary artery disease involving native coronary artery of native heart without angina pectoris    Overview     Now seeing dr linder . Lipid panel pending . Cont current regimen         Aortic atherosclerosis    Overview     stable            Endocrine    Primary hypothyroidism    Overview     Cont replacement , need stsh          Type 2 diabetes mellitus without complication, without long-term current use of insulin    Overview      Due in March A1c.  Cont metfornin .            Orthopedic    Osteoporosis    Overview     Stable on fosamax            Other    HEMAL (obstructive sleep apnea) - Primary    Overview     Cannot tolerate cpap is on no treatment cu rrently and declines           Other Visit Diagnoses       Acute otitis externa of right ear, unspecified type                  Past Medical History:  Past Medical History:   Diagnosis Date    Allergy     Arthritis     Coronary artery disease     Cystocele 01/19/2016    Diabetes mellitus     GERD (gastroesophageal  "reflux disease)     Hemorrhoids     History of cholelithiasis     Hypothyroidism     Obesity     Right shoulder pain 10/09/2012    Sleep apnea     Trigger finger, right 3rd finger 07/30/2014    Vaginal atrophy 01/19/2016    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       Past Surgical History:  Past Surgical History:   Procedure Laterality Date    ABLATION COLPOCLESIS      ADENOIDECTOMY      CATARACT EXTRACTION W/  INTRAOCULAR LENS IMPLANT Right 11/23/2022    Procedure: EXTRACTION, CATARACT, WITH IOL INSERTION;  Surgeon: Ciara Mclaughlin MD;  Location: [facility] OR;  Service: Ophthalmology;  Laterality: Right;    CATARACT EXTRACTION W/  INTRAOCULAR LENS IMPLANT Left 12/22/2022    Procedure: EXTRACTION, CATARACT, WITH IOL INSERTION;  Surgeon: Ciara Mclaughlin MD;  Location: [facility] OR;  Service: Ophthalmology;  Laterality: Left;    CHOLECYSTECTOMY      COLONOSCOPY N/A 11/28/2018    Procedure: COLONOSCOPY;  Surgeon: Richi Mcintosh MD;  Location: [facility] ENDO (4TH FLR);  Service: Endoscopy;  Laterality: N/A;    CORONARY STENT PLACEMENT      ESOPHAGOGASTRODUODENOSCOPY N/A 11/28/2018    Procedure: ESOPHAGOGASTRODUODENOSCOPY (EGD);  Surgeon: Richi Mcintosh MD;  Location: [facility] ENDO ([facility] FLR);  Service: Endoscopy;  Laterality: N/A;    heart cath  2005    non-obstructive disease    OOPHORECTOMY      SHOULDER ARTHROSCOPY W/ ROTATOR CUFF REPAIR      Right    TONSILLECTOMY      TOTAL ABDOMINAL HYSTERECTOMY      with BSO    TUBAL LIGATION         Family History:  family history is not on file.    Social History:  Social History     Socioeconomic History    Marital status:    Occupational History     Employer: [employer]   Tobacco Use    Smoking status: Never    Smokeless tobacco: Never   Substance and Sexual Activity    Alcohol use: Yes     Alcohol/week: 1.0 standard drink     Types: 1 Glasses of wine per week     Comment: "maybe a glass of wine every 6 months"    Drug use: No    Sexual activity: Not Currently     Partners: " Male     Birth control/protection: None       Review of Systems:   Review of Systems   Constitutional:  Negative for fever and weight loss.   HENT:  Negative for congestion, hearing loss and sore throat.    Eyes:  Negative for blurred vision.   Respiratory:  Negative for cough and shortness of breath.    Cardiovascular:  Negative for chest pain, palpitations, claudication and leg swelling.   Gastrointestinal:  Negative for abdominal pain, constipation, diarrhea and heartburn.   Genitourinary:  Negative for dysuria.   Musculoskeletal:  Negative for back pain and myalgias.   Skin:  Negative for rash.   Neurological:  Negative for focal weakness and headaches.   Psychiatric/Behavioral:  Negative for depression, memory loss and suicidal ideas. The patient is not nervous/anxious.        Medications:  Outpatient Encounter Medications as of 1/6/2023   Medication Sig Dispense Refill    alendronate (FOSAMAX) 70 MG tablet Take 1 tablet (70 mg total) by mouth every 7 days. Mondays.Then after an hour take your thyroid tablet, 30 minutes later your pantoprazole. 12 tablet 1    atorvastatin (LIPITOR) 80 MG tablet Take 1 tablet (80 mg total) by mouth once daily. TAKE 1 TABLET DAILY 90 tablet 3    azelastine (ASTELIN) 137 mcg (0.1 %) nasal spray 1 spray (137 mcg total) by Nasal route 2 (two) times daily. 30 mL 3    blood sugar diagnostic Strp To check BG ONCE daily, to use with insurance preferred meter 100 strip 3    flu vac 2022 [id number],PF, (FLUAD QUAD 2022-23,65Y UP,,PF,) 60 mcg (15 mcg x 4)/0.5 mL Syrg Inject into the muscle. 0.5 mL 0    lancets Misc To check BG ONCE daily, to use with insurance preferred meter 100 each 3    levothyroxine (SYNTHROID) 88 MCG tablet TAKE FIRST THING IN THE MORNING ON AN EMPTY STOMACH 90 tablet 3    LIDOcaine (LIDODERM) 5 % Place onto the skin once daily. 30 patch 0    metFORMIN (GLUCOPHAGE) 500 MG tablet TAKE 1 TABLET DAILY WITH BREAKFAST 90 tablet 3    pantoprazole (PROTONIX) 40 MG tablet  Take 1 tablet (40 mg total) by mouth once daily. 90 tablet 1    prednisolon/gatiflox/bromfenac (PREDNISOL ACE-GATIFLOX-BROMFEN) 1-0.5-0.075 % DrpS Apply 1 drop to eye 3 (three) times daily. One drop 3 times a day in surgical eye 5 mL 3    prednisolon/gatiflox/bromfenac (PREDNISOL ACE-GATIFLOX-BROMFEN) 1-0.5-0.075 % DrpS Apply 1 drop to eye 3 (three) times daily. One drop 3 times a day in surgical eye 5 mL 3    RESTASIS 0.05 % ophthalmic emulsion INSTILL 1 DROP IN BOTH EYES TWICE A DAY 60 each 11    vitamin D (VITAMIN D3) 1000 units Tab Take 1 tablet (1,000 Units total) by mouth once daily. 90 tablet 3    blood-glucose meter kit To check BG ONCE THE daily, to use with insurance preferred meter 1 each 1    neomycin-polymyxin-hydrocortisone (CORTISPORIN) 3.5-10,000-1 mg/mL-unit/mL-% otic suspension Place 3 drops into the right ear 3 (three) times daily. 10 mL 0     Facility-Administered Encounter Medications as of 1/6/2023   Medication Dose Route Frequency Provider Last Rate Last Admin    balanced salt irrigation intra-ocular solution 1 drop  1 drop Left Eye On Call Procedure Ciara Mclaughlin MD        phenylephrine HCL 10% ophthalmic solution 1 drop  1 drop Left Eye PRN Ciara Mclaughlin MD        sodium chloride 0.9% flush 2 mL  2 mL Intravenous PRN Ciara Mclaughlin MD        [DISCONTINUED] dexAMETHasone injection 4 mg  4 mg Intra-articular  Shaneka Richardson MD   4 mg at 01/05/23 0915        Allergies:  Review of patient's allergies indicates:   Allergen Reactions    Compazine  [prochlorperazine edisylate]      Other reaction(s): SPASMS    Parafon forte      Other reaction(s): Hives         Physical Exam         Vitals:    01/06/23 1103   BP: 120/80   Pulse: 71   Resp: 18   Temp: 97.8 °F (36.6 °C)         Physical Exam  Constitutional:       Appearance: She is well-developed.   HENT:      Right Ear: Tenderness present. There is impacted cerumen.   Eyes:      Pupils: Pupils are equal, round, and reactive to  light.   Neck:      Thyroid: No thyromegaly.   Cardiovascular:      Rate and Rhythm: Normal rate.      Heart sounds: Normal heart sounds. No murmur heard.    No friction rub. No gallop.   Pulmonary:      Breath sounds: Normal breath sounds.   Abdominal:      General: Bowel sounds are normal.      Palpations: Abdomen is soft.   Musculoskeletal:         General: Normal range of motion.      Cervical back: Normal range of motion.   Lymphadenopathy:      Cervical: No cervical adenopathy.   Skin:     General: Skin is warm.      Findings: No rash.   Neurological:      Mental Status: She is alert and oriented to person, place, and time.      Cranial Nerves: No cranial nerve deficit.   Psychiatric:         Behavior: Behavior normal.        Laboratory:  CBC:  No results for input(s): WBC, RBC, HGB, HCT, PLT, MCV, MCH, MCHC in the last 2160 hours.  CMP:  No results for input(s): GLU, CALCIUM, ALBUMIN, PROT, NA, K, CO2, CL, BUN, ALKPHOS, ALT, AST, BILITOT in the last 2160 hours.    Invalid input(s): CREATININ  URINALYSIS:  No results for input(s): COLORU, CLARITYU, SPECGRAV, PHUR, PROTEINUA, GLUCOSEU, BILIRUBINCON, BLOODU, WBCU, RBCU, BACTERIA, MUCUS, NITRITE, LEUKOCYTESUR, UROBILINOGEN, HYALINECASTS in the last 2160 hours.   LIPIDS:  No results for input(s): TSH, HDL, CHOL, TRIG, LDLCALC, CHOLHDL, NONHDLCHOL, TOTALCHOLEST in the last 2160 hours.  TSH:  No results for input(s): TSH in the last 2160 hours.  A1C:  No results for input(s): HGBA1C in the last 2160 hours.    Radiology:        Assessment:     Lissy Palencia is a 77 y.o.female with:    HEMAL (obstructive sleep apnea)    Aortic atherosclerosis    Coronary artery disease involving native coronary artery of native heart without angina pectoris    Essential tremor, slight head vane    Hyperlipidemia associated with type 2 diabetes mellitus    Primary hypothyroidism    Type 2 diabetes mellitus without complication, without long-term current use of insulin    Osteoporosis,  unspecified osteoporosis type, unspecified pathological fracture presence    Impacted cerumen of right ear  -     neomycin-polymyxin-hydrocortisone (CORTISPORIN) 3.5-10,000-1 mg/mL-unit/mL-% otic suspension; Place 3 drops into the right ear 3 (three) times daily.  Dispense: 10 mL; Refill: 0    Acute otitis externa of right ear, unspecified type  -     neomycin-polymyxin-hydrocortisone (CORTISPORIN) 3.5-10,000-1 mg/mL-unit/mL-% otic suspension; Place 3 drops into the right ear 3 (three) times daily.  Dispense: 10 mL; Refill: 0          Plan:     Problem List Items Addressed This Visit          Neuro    Essential tremor, slight head vane    Overview     stable            ENT    Impacted cerumen of right ear    Overview     otc removal kit   Cortisporin otci             Cardiac/Vascular    Hyperlipidemia associated with type 2 diabetes mellitus    Overview     Cont lipitor. Lipid panel pending           Coronary artery disease involving native coronary artery of native heart without angina pectoris    Overview     Now seeing dr linder . Lipid panel pending . Cont current regimen         Aortic atherosclerosis    Overview     stable            Endocrine    Primary hypothyroidism    Overview     Cont replacement , need [facility]          Type 2 diabetes mellitus without complication, without long-term current use of insulin    Overview      Due in March A1c.  Cont metfornin .            Orthopedic    Osteoporosis    Overview     Stable on fosamax            Other    HEMAL (obstructive sleep apnea) - Primary    Overview     Cannot tolerate cpap is on no treatment cu rrently and declines           Other Visit Diagnoses       Acute otitis externa of right ear, unspecified type                As above, continue current medications and maintain follow up with specialists.  Return to clinic in 3 m       [facility] [place]

## 2023-01-17 ENCOUNTER — PES CALL (OUTPATIENT)
Dept: ADMINISTRATIVE | Facility: CLINIC | Age: 78
End: 2023-01-17
Payer: MEDICARE

## 2023-01-18 ENCOUNTER — OFFICE VISIT (OUTPATIENT)
Dept: OPTOMETRY | Facility: CLINIC | Age: 78
End: 2023-01-18
Payer: MEDICARE

## 2023-01-18 DIAGNOSIS — Z98.41 S/P BILATERAL CATARACT EXTRACTION: ICD-10-CM

## 2023-01-18 DIAGNOSIS — H52.201 ASTIGMATISM OF RIGHT EYE, UNSPECIFIED TYPE: ICD-10-CM

## 2023-01-18 DIAGNOSIS — H26.493 POSTERIOR CAPSULAR OPACIFICATION NON VISUALLY SIGNIFICANT OF BOTH EYES: ICD-10-CM

## 2023-01-18 DIAGNOSIS — Z98.890 POST-OPERATIVE STATE: Primary | ICD-10-CM

## 2023-01-18 DIAGNOSIS — Z98.42 S/P BILATERAL CATARACT EXTRACTION: ICD-10-CM

## 2023-01-18 DIAGNOSIS — Z96.1 PSEUDOPHAKIA OF BOTH EYES: ICD-10-CM

## 2023-01-18 PROCEDURE — 99024 POSTOP FOLLOW-UP VISIT: CPT | Mod: POP,,, | Performed by: OPTOMETRIST

## 2023-01-18 PROCEDURE — 99024 PR POST-OP FOLLOW-UP VISIT: ICD-10-PCS | Mod: POP,,, | Performed by: OPTOMETRIST

## 2023-01-18 PROCEDURE — 99999 PR PBB SHADOW E&M-EST. PATIENT-LVL III: ICD-10-PCS | Mod: PBBFAC,,, | Performed by: OPTOMETRIST

## 2023-01-18 PROCEDURE — 92015 DETERMINE REFRACTIVE STATE: CPT | Mod: ,,, | Performed by: OPTOMETRIST

## 2023-01-18 PROCEDURE — 99999 PR PBB SHADOW E&M-EST. PATIENT-LVL III: CPT | Mod: PBBFAC,,, | Performed by: OPTOMETRIST

## 2023-01-18 PROCEDURE — 92015 PR REFRACTION: ICD-10-PCS | Mod: ,,, | Performed by: OPTOMETRIST

## 2023-01-18 PROCEDURE — 99213 OFFICE O/P EST LOW 20 MIN: CPT | Mod: PBBFAC | Performed by: OPTOMETRIST

## 2023-01-19 NOTE — PATIENT INSTRUCTIONS
S/P cataract surgery in both eyes, with bilateral posterior chamber intraocular lens.   Ms. Palencia expresses disappointment that she needs.spectacle lens correction to achieve optimal best-corrected visual acuity.    Residual astigmatic refractive error in the right eye.  Emmetropia at distance in the left eye,  Best-corrected VA of 20/20-2 in the right eye and 20/20-1 in the left eye,    Mild posterior capsular opacification in each eye, but no compelling need for YAG laser posterior capsulotomy in either eye at this time.  Discussed with Ms. Palencia.    Spectacle lens Rx issued for use as desired.  Suggest return in three to four months for recheck - or prior, if she notes bothersome decrease in visual acuity in the interim

## 2023-01-19 NOTE — PROGRESS NOTES
"HPI     Post-op Evaluation            Comments: In for post-operative evaluation.  S/P bilateral cataract extraction (Dr. Mclaughlin).  Disappointed that she needs reading glasses for reading/close work.  Unaided distance VA better than at near, but notes that she can only see   signs with bigger letters.           Comments    Patient in for progress check.    Being followed for (diagnosis):   1 month post Cataract Extraction-   bilateral     Still using post-cataract eyedrops in the left eye, and also has   continued use of the drops in the right eye.     S/P phaco OS- 12/22/22  S/P phaco OD- MF    Date last seen:  12/23/2022    Doctor last seen:      Prescribed eye medications(s) using:  Post-cataract drops TID OU    OTC eye medication(s) using:  No     Signs/symptoms of condition resolved/better/stable/worse?:  Pt present to   clinic with concerns  of blurry near vision. Eyes has been teary, burning,   and FB sensation feeling.     PD  59/55  Reading distance = 14.5"                   Last edited by Roberto Katz, OD on 1/18/2023 11:20 AM.            Assessment /Plan     For exam results, see Encounter Report.  1. Post-operative state        2. S/P bilateral cataract extraction        3. Pseudophakia of both eyes        4. Posterior capsular opacification non visually significant of both eyes        5. Astigmatism of right eye, unspecified type                       S/P cataract surgery in both eyes, with bilateral posterior chamber intraocular lens.   Ms. Palencia expresses disappointment that she needs.spectacle lens correction to achieve optimal best-corrected visual acuity.    Residual astigmatic refractive error in the right eye.  Emmetropia at distance in the left eye,  Best-corrected VA of 20/20-2 in the right eye and 20/20-1 in the left eye,    Mild posterior capsular opacification in each eye, but no compelling need for YAG laser posterior capsulotomy in either eye at this time.  Discussed " with Ms. Talha.    Spectacle lens Rx issued for use as desired.  Suggest return in three to four months for recheck - or prior, if she notes bothersome decrease in visual acuity in the interim

## 2023-01-23 ENCOUNTER — LAB VISIT (OUTPATIENT)
Dept: LAB | Facility: HOSPITAL | Age: 78
End: 2023-01-23
Attending: INTERNAL MEDICINE
Payer: MEDICARE

## 2023-01-23 ENCOUNTER — OFFICE VISIT (OUTPATIENT)
Dept: CARDIOLOGY | Facility: CLINIC | Age: 78
End: 2023-01-23
Payer: MEDICARE

## 2023-01-23 VITALS
HEART RATE: 63 BPM | DIASTOLIC BLOOD PRESSURE: 59 MMHG | BODY MASS INDEX: 30.91 KG/M2 | WEIGHT: 168 LBS | HEIGHT: 62 IN | SYSTOLIC BLOOD PRESSURE: 140 MMHG

## 2023-01-23 DIAGNOSIS — I70.0 AORTIC ATHEROSCLEROSIS: ICD-10-CM

## 2023-01-23 DIAGNOSIS — E11.69 HYPERLIPIDEMIA ASSOCIATED WITH TYPE 2 DIABETES MELLITUS: ICD-10-CM

## 2023-01-23 DIAGNOSIS — E78.5 HYPERLIPIDEMIA ASSOCIATED WITH TYPE 2 DIABETES MELLITUS: ICD-10-CM

## 2023-01-23 DIAGNOSIS — E11.9 TYPE 2 DIABETES MELLITUS WITHOUT COMPLICATION, WITHOUT LONG-TERM CURRENT USE OF INSULIN: ICD-10-CM

## 2023-01-23 DIAGNOSIS — G47.33 OSA (OBSTRUCTIVE SLEEP APNEA): ICD-10-CM

## 2023-01-23 DIAGNOSIS — I25.10 CORONARY ARTERY DISEASE INVOLVING NATIVE CORONARY ARTERY OF NATIVE HEART WITHOUT ANGINA PECTORIS: Primary | ICD-10-CM

## 2023-01-23 LAB
ALBUMIN SERPL BCP-MCNC: 4.1 G/DL (ref 3.5–5.2)
ALP SERPL-CCNC: 97 U/L (ref 55–135)
ALT SERPL W/O P-5'-P-CCNC: 22 U/L (ref 10–44)
ANION GAP SERPL CALC-SCNC: 10 MMOL/L (ref 8–16)
AST SERPL-CCNC: 20 U/L (ref 10–40)
BILIRUB SERPL-MCNC: 1.1 MG/DL (ref 0.1–1)
BUN SERPL-MCNC: 14 MG/DL (ref 8–23)
CALCIUM SERPL-MCNC: 9.9 MG/DL (ref 8.7–10.5)
CHLORIDE SERPL-SCNC: 106 MMOL/L (ref 95–110)
CHOLEST SERPL-MCNC: 168 MG/DL (ref 120–199)
CHOLEST/HDLC SERPL: 3.7 {RATIO} (ref 2–5)
CO2 SERPL-SCNC: 26 MMOL/L (ref 23–29)
CREAT SERPL-MCNC: 1 MG/DL (ref 0.5–1.4)
EST. GFR  (NO RACE VARIABLE): 58 ML/MIN/1.73 M^2
GLUCOSE SERPL-MCNC: 124 MG/DL (ref 70–110)
HDLC SERPL-MCNC: 45 MG/DL (ref 40–75)
HDLC SERPL: 26.8 % (ref 20–50)
LDLC SERPL CALC-MCNC: 85.4 MG/DL (ref 63–159)
NONHDLC SERPL-MCNC: 123 MG/DL
POTASSIUM SERPL-SCNC: 5 MMOL/L (ref 3.5–5.1)
PROT SERPL-MCNC: 7.6 G/DL (ref 6–8.4)
SODIUM SERPL-SCNC: 142 MMOL/L (ref 136–145)
TRIGL SERPL-MCNC: 188 MG/DL (ref 30–150)

## 2023-01-23 PROCEDURE — 80061 LIPID PANEL: CPT | Performed by: INTERNAL MEDICINE

## 2023-01-23 PROCEDURE — 36415 COLL VENOUS BLD VENIPUNCTURE: CPT | Performed by: INTERNAL MEDICINE

## 2023-01-23 PROCEDURE — 99999 PR PBB SHADOW E&M-EST. PATIENT-LVL V: CPT | Mod: PBBFAC,,, | Performed by: PHYSICIAN ASSISTANT

## 2023-01-23 PROCEDURE — 99215 OFFICE O/P EST HI 40 MIN: CPT | Mod: PBBFAC | Performed by: PHYSICIAN ASSISTANT

## 2023-01-23 PROCEDURE — 99999 PR PBB SHADOW E&M-EST. PATIENT-LVL V: ICD-10-PCS | Mod: PBBFAC,,, | Performed by: PHYSICIAN ASSISTANT

## 2023-01-23 PROCEDURE — 99214 OFFICE O/P EST MOD 30 MIN: CPT | Mod: S$PBB,,, | Performed by: PHYSICIAN ASSISTANT

## 2023-01-23 PROCEDURE — 80053 COMPREHEN METABOLIC PANEL: CPT | Performed by: INTERNAL MEDICINE

## 2023-01-23 PROCEDURE — 99214 PR OFFICE/OUTPT VISIT, EST, LEVL IV, 30-39 MIN: ICD-10-PCS | Mod: S$PBB,,, | Performed by: PHYSICIAN ASSISTANT

## 2023-01-23 RX ORDER — NITROGLYCERIN 0.4 MG/1
0.4 TABLET SUBLINGUAL EVERY 5 MIN PRN
Qty: 1 TABLET | Refills: 12 | Status: SHIPPED | OUTPATIENT
Start: 2023-01-23 | End: 2023-01-24 | Stop reason: SDUPTHER

## 2023-01-23 RX ORDER — ASPIRIN 81 MG/1
81 TABLET ORAL DAILY
COMMUNITY

## 2023-01-23 NOTE — PROGRESS NOTES
General Cardiology Clinic Note  Reason for Visit: 3 month follow up   Last Clinic Visit: 10/20/2022  General Cardiologist: Dr. Brown    HPI:   Lissy Palencia is a 77 y.o. female who presents for 3 month follow up.     Pt continues to have the chest pain reported at last visit. It is a cramping pain that radiates from the center of her chest through to her back. Often occurs in the morning when she first sits up in bed. It lasts 5 minutes and then suddenly resolves. It occurs maybe twice a week. It is not worsening. She thinks sometimes she gets it on the treadmill too, but it doesn't keep her from exercising. She has mild VALLE when walking on the treadmill. She reports constant fatigue for the past 2 months, but also states she has not been sleeping well at night because she naps during the day. She reports chronic cough and chest congestion. She denies pedal edema, orthopnea, PND, sudden weight gain, sustained palpitations, lightheadedness, syncope. She has resumed taking Aspirin since her last visit. She is not sure if she increased the Atorvastatin or not. She exercises on the treadmill for 20 minutes, every other day. She also does strengthening exercises at the gym. She has HEMAL but cannot tolerate the CPAP.      Medical: CAD s/p PCI to LAD (1997), hypothyroidism, GERD, aortic calcification (CXR), DM   Surgical: Reviewed, as below.  Family: Reviewed, as below. No premature CAD, HF, SCD.  Social: Reviewed, as below. Never smoked.      ROS:      Review of Systems   Constitutional: Positive for malaise/fatigue. Negative for diaphoresis, weight gain and weight loss.   HENT:  Positive for congestion. Negative for nosebleeds.    Eyes:  Negative for vision loss in left eye, vision loss in right eye and visual disturbance.   Cardiovascular:  Positive for chest pain and dyspnea on exertion. Negative for claudication, irregular heartbeat, leg swelling, near-syncope, orthopnea, palpitations, paroxysmal nocturnal  dyspnea and syncope.   Respiratory:  Positive for cough and snoring. Negative for shortness of breath, sleep disturbances due to breathing and wheezing.    Hematologic/Lymphatic: Negative for bleeding problem. Does not bruise/bleed easily.   Skin:  Negative for poor wound healing and rash.   Musculoskeletal:  Positive for joint pain. Negative for muscle cramps and myalgias.   Gastrointestinal:  Negative for bloating, abdominal pain, diarrhea, heartburn, melena, nausea and vomiting.   Genitourinary:  Negative for hematuria and nocturia.   Neurological:  Negative for brief paralysis, dizziness, headaches, light-headedness, numbness and weakness.   Psychiatric/Behavioral:  Negative for depression. The patient has insomnia.    Allergic/Immunologic: Negative for hives.     PMH:     Past Medical History:   Diagnosis Date    Allergy     Arthritis     Coronary artery disease     Cystocele 01/19/2016    Diabetes mellitus     GERD (gastroesophageal reflux disease)     Hemorrhoids     History of cholelithiasis     Hypothyroidism     Obesity     Right shoulder pain 10/09/2012    Sleep apnea     Trigger finger, right 3rd finger 07/30/2014    Vaginal atrophy 01/19/2016    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     Past Surgical History:   Procedure Laterality Date    ABLATION COLPOCLESIS      ADENOIDECTOMY      CATARACT EXTRACTION W/  INTRAOCULAR LENS IMPLANT Right 11/23/2022    Procedure: EXTRACTION, CATARACT, WITH IOL INSERTION;  Surgeon: Ciara Mclaughlin MD;  Location: [facility];  Service: Ophthalmology;  Laterality: Right;    CATARACT EXTRACTION W/  INTRAOCULAR LENS IMPLANT Left 12/22/2022    Procedure: EXTRACTION, CATARACT, WITH IOL INSERTION;  Surgeon: Ciara Mclaughlin MD;  Location: [facility] OR;  Service: Ophthalmology;  Laterality: Left;    CHOLECYSTECTOMY      COLONOSCOPY N/A 11/28/2018    Procedure: COLONOSCOPY;  Surgeon: Richi Mcintosh MD;  Location: [facility] ENDO [address]);  Service: Endoscopy;  Laterality: N/A;    CORONARY  STENT PLACEMENT      ESOPHAGOGASTRODUODENOSCOPY N/A 11/28/2018    Procedure: ESOPHAGOGASTRODUODENOSCOPY (EGD);  Surgeon: Richi Mcintosh MD;  Location: [facility] ([address] [place] [address]);  Service: Endoscopy;  Laterality: N/A;    heart cath  2005    non-obstructive disease    OOPHORECTOMY      SHOULDER ARTHROSCOPY W/ ROTATOR CUFF REPAIR      Right    TONSILLECTOMY      TOTAL ABDOMINAL HYSTERECTOMY      with BSO    TUBAL LIGATION       Allergies:     Review of patient's allergies indicates:   Allergen Reactions    Compazine  [prochlorperazine edisylate]      Other reaction(s): SPASMS    Parafon forte      Other reaction(s): Hives     Medications:     Current Outpatient Medications on File Prior to Visit   Medication Sig Dispense Refill    alendronate (FOSAMAX) 70 MG tablet Take 1 tablet (70 mg total) by mouth every 7 days. Mondays.Then after an hour take your thyroid tablet, 30 minutes later your pantoprazole. 12 tablet 1    atorvastatin (LIPITOR) 80 MG tablet Take 1 tablet (80 mg total) by mouth once daily. TAKE 1 TABLET DAILY 90 tablet 3    azelastine (ASTELIN) 137 mcg (0.1 %) nasal spray 1 spray (137 mcg total) by Nasal route 2 (two) times daily. 30 mL 3    blood sugar diagnostic Strp To check BG ONCE daily, to use with insurance preferred meter 100 strip 3    blood-glucose meter kit To check BG ONCE THE daily, to use with insurance preferred meter 1 each 1    flu vac 2022 [id number],PF, (FLUAD QUAD 2022-23,65Y UP,,PF,) 60 mcg (15 mcg x 4)/0.5 mL Syrg Inject into the muscle. 0.5 mL 0    lancets Misc To check BG ONCE daily, to use with insurance preferred meter 100 each 3    levothyroxine (SYNTHROID) 88 MCG tablet TAKE FIRST THING IN THE MORNING ON AN EMPTY STOMACH 90 tablet 3    LIDOcaine (LIDODERM) 5 % Place onto the skin once daily. 30 patch 0    metFORMIN (GLUCOPHAGE) 500 MG tablet TAKE 1 TABLET DAILY WITH BREAKFAST 90 tablet 3    neomycin-polymyxin-hydrocortisone (CORTISPORIN) 3.5-10,000-1 mg/mL-unit/mL-% otic suspension  "Place 3 drops into the right ear 3 (three) times daily. 10 mL 0    pantoprazole (PROTONIX) 40 MG tablet Take 1 tablet (40 mg total) by mouth once daily. 90 tablet 1    prednisolon/gatiflox/bromfenac (PREDNISOL ACE-GATIFLOX-BROMFEN) 1-0.5-0.075 % DrpS Apply 1 drop to eye 3 (three) times daily. One drop 3 times a day in surgical eye 5 mL 3    prednisolon/gatiflox/bromfenac (PREDNISOL ACE-GATIFLOX-BROMFEN) 1-0.5-0.075 % DrpS Apply 1 drop to eye 3 (three) times daily. One drop 3 times a day in surgical eye 5 mL 3    RESTASIS 0.05 % ophthalmic emulsion INSTILL 1 DROP IN BOTH EYES TWICE A DAY 60 each 11    vitamin D (VITAMIN D3) 1000 units Tab Take 1 tablet (1,000 Units total) by mouth once daily. 90 tablet 3     Current Facility-Administered Medications on File Prior to Visit   Medication Dose Route Frequency Provider Last Rate Last Admin    balanced salt irrigation intra-ocular solution 1 drop  1 drop Left Eye On Call Procedure Ciara Mclaughlin MD        phenylephrine HCL 10% ophthalmic solution 1 drop  1 drop Left Eye PRN Ciara Mclaughlin MD        sodium chloride 0.9% flush 2 mL  2 mL Intravenous PRN Ciara Mclaughlin MD         Social History:     Social History     Tobacco Use    Smoking status: Never    Smokeless tobacco: Never   Substance Use Topics    Alcohol use: Yes     Alcohol/week: 1.0 standard drink     Types: 1 Glasses of wine per week     Comment: "maybe a glass of wine every 6 months"     Family History:     Family History   Problem Relation Age of Onset    Breast cancer Neg Hx     Ovarian cancer Neg Hx     Glaucoma Neg Hx     Cataracts Neg Hx     Diabetes Neg Hx     Macular degeneration Neg Hx     Retinal detachment Neg Hx     Melanoma Neg Hx     Cervical cancer Neg Hx     Endometrial cancer Neg Hx     Vaginal cancer Neg Hx      Physical Exam:   BP (!) 140/59 (BP Location: Right arm, Patient Position: Sitting)   Pulse 63   Ht 5' 2" (1.575 m)   Wt 76.2 kg (167 lb 15.9 oz)   BMI 30.73 kg/m²  "       Physical Exam  Vitals and nursing note reviewed.   Constitutional:       General: She is not in acute distress.     Appearance: Normal appearance. She is not ill-appearing.   HENT:      Head: Normocephalic and atraumatic.   Eyes:      General: No scleral icterus.     Conjunctiva/sclera: Conjunctivae normal.   Neck:      Thyroid: No thyromegaly.      Vascular: No carotid bruit or JVD.   Cardiovascular:      Rate and Rhythm: Normal rate and regular rhythm.      Pulses: Normal pulses.      Heart sounds: Normal heart sounds. No murmur heard.    No friction rub. No gallop.   Pulmonary:      Effort: Pulmonary effort is normal.      Breath sounds: Normal breath sounds. No wheezing, rhonchi or rales.   Chest:      Chest wall: No tenderness.   Abdominal:      General: Bowel sounds are normal. There is no distension.      Palpations: Abdomen is soft.      Tenderness: There is no abdominal tenderness.   Musculoskeletal:         General: No swelling.      Cervical back: Neck supple.      Right lower leg: No edema.      Left lower leg: No edema.   Skin:     General: Skin is warm and dry.      Coloration: Skin is not pale.      Findings: No erythema or rash.      Nails: There is no clubbing.   Neurological:      Mental Status: She is alert and oriented to person, place, and time. Mental status is at baseline.   Psychiatric:         Mood and Affect: Mood and affect normal.         Behavior: Behavior normal.        Labs:     Lab Results   Component Value Date     01/23/2023    K 5.0 01/23/2023     01/23/2023    CO2 26 01/23/2023    BUN 14 01/23/2023    CREATININE 1.0 01/23/2023    ANIONGAP 10 01/23/2023     Lab Results   Component Value Date    HGBA1C 6.7 (H) 09/19/2022     No results found for: BNP, BNPTRIAGEBLO Lab Results   Component Value Date    WBC 7.20 07/24/2020    HGB 14.7 07/24/2020    HCT 46.8 07/24/2020     07/24/2020    GRAN 3.7 07/24/2020    GRAN 51.2 07/24/2020     Lab Results   Component  Value Date    CHOL 168 01/23/2023    HDL 45 01/23/2023    LDLCALC 85.4 01/23/2023    TRIG 188 (H) 01/23/2023          Imaging:      Echocardiogram  None     Stress testing  Stress echo 10/28/2022  During stress, the following significant arrhythmias were observed: rare PACs, occasional PVCs.  The patient's exercise capacity was average.  The test was stopped because the patient experienced fatigue.  The ECG portion of this study is negative for myocardial ischemia.  The left ventricle is normal in size with normal systolic function.  The estimated ejection fraction is 58%.  Normal left ventricular diastolic function.  Normal right ventricular size with low normal right ventricular systolic function.  Normal central venous pressure (3 mmHg).  The estimated PA systolic pressure is 28 mmHg.  The stress echo portion of this study is negative for myocardial ischemia.    PET 8/16/16  CONCLUSIONS: NORMAL MYOCARDIAL PERFUSION PET STRESS TEST   1. The perfusion scan is free of evidence for myocardial ischemia or injury.   2. Resting wall motion is physiologic. Stress wall motion is physiologic.   3. Visually estimated LV function is normal at rest and normal at stress.   4. The ventricular volumes are normal at rest and stress.   5. The extracardiac distribution of radioactivity is normal.   6. There was no previous study available to compare.      MABEL 7/27/16  CONCLUSIONS     1 - Normal left ventricular diastolic function.     2 - Normal left ventricular diastolic function.     3 - Normal right ventricular systolic function .     4 - Trivial pericardial effusion.     Positive stress echocardiographic study demonstrating an ischemic response involving the mid anteroseptum, apical septum, apical inferior wall, mid inferior wall, basal inferior wall. Non-specific finding demonstrating failure to augment involving the   anterolateral wall which may be associated with ischemia; clinical correlation required.     These results  suggest ischemia in a multi-vessel distribution.      MABEL 13  CONCLUSIONS     1 - Normal left ventricular function (EF 60%).     2 - Normal diastolic function.     3 - Normal right ventricular function .     4 - Trivial to mild mitral regurgitation.     5 - Trivial tricuspid regurgitation.     No evidence of stress induced myocardial ischemia.      Cath Lab  None     Other  CXR 22  There is calcification and tortuosity of the aorta.       US abd 1/10/18  The abdominal aorta is normal in caliber      EK/9/22 - NSR (personally reviewed)    Assessment:     1. Coronary artery disease involving native coronary artery of native heart without angina pectoris    2. Hyperlipidemia associated with type 2 diabetes mellitus    3. Aortic atherosclerosis    4. Type 2 diabetes mellitus without complication, without long-term current use of insulin    5. HEMAL (obstructive sleep apnea)      Plan:     Coronary artery disease involving native coronary artery of native heart   She has stable, atypical chest pain. Stress echo in October was negative for ischemia and showed normal cardiac function. CP is likely noncardiac.   Continue ASA 81mg qd  Continue Lipitor 80 mg daily. She is going to confirm her dose when she gets home. Her LDL has not improved whatsoever, so she likely has been taking 40 mg.      Aortic atherosclerosis  Hyperlipidemia associated with type 2 diabetes mellitus  LDL >70  Continue Lipitor 80 mg daily. She likely has not been taking the correct dose since her last appt.   Check lipids in 4 months     Type 2 diabetes mellitus without complication, without long-term current use of insulin  A1c at goal, 6.7%     HEMAL (obstructive sleep apnea)  Intolerant to CPAP     Follow up in 4 months with lipid panel.     Signed:  Melvi Warren PA-C  Cardiology   [phone] - General  2023 10:12 AM

## 2023-01-24 RX ORDER — NITROGLYCERIN 0.4 MG/1
0.4 TABLET SUBLINGUAL EVERY 5 MIN PRN
Qty: 1 TABLET | Refills: 12 | Status: SHIPPED | OUTPATIENT
Start: 2023-01-24 | End: 2023-01-25 | Stop reason: SDUPTHER

## 2023-01-25 RX ORDER — NITROGLYCERIN 0.4 MG/1
0.4 TABLET SUBLINGUAL EVERY 5 MIN PRN
Qty: 25 TABLET | Refills: 4 | OUTPATIENT
Start: 2023-01-25

## 2023-01-25 RX ORDER — NITROGLYCERIN 0.4 MG/1
0.4 TABLET SUBLINGUAL EVERY 5 MIN PRN
Qty: 25 TABLET | Refills: 4 | Status: SHIPPED | OUTPATIENT
Start: 2023-01-25

## 2023-01-26 ENCOUNTER — TELEPHONE (OUTPATIENT)
Dept: OPHTHALMOLOGY | Facility: CLINIC | Age: 78
End: 2023-01-26
Payer: MEDICARE

## 2023-01-26 NOTE — TELEPHONE ENCOUNTER
----- Message from iN Chau sent at 1/26/2023  8:43 AM CST -----  Regarding: Appt Request  Pt called to schedule  -f/up 4wks, sooner PRN. First available appt found was March, Pt asked to speak to office.     Pts call back: [phone] (home)

## 2023-02-07 ENCOUNTER — PROCEDURE VISIT (OUTPATIENT)
Dept: NEUROLOGY | Facility: CLINIC | Age: 78
End: 2023-02-07
Payer: MEDICARE

## 2023-02-07 DIAGNOSIS — G56.03 CARPAL TUNNEL SYNDROME, BILATERAL: ICD-10-CM

## 2023-02-07 DIAGNOSIS — M65.331 TRIGGER MIDDLE FINGER OF RIGHT HAND: ICD-10-CM

## 2023-02-07 PROCEDURE — 95911 NRV CNDJ TEST 9-10 STUDIES: CPT | Mod: PBBFAC | Performed by: PHYSICAL MEDICINE & REHABILITATION

## 2023-02-07 PROCEDURE — 95886 MUSC TEST DONE W/N TEST COMP: CPT | Mod: PBBFAC | Performed by: PHYSICAL MEDICINE & REHABILITATION

## 2023-02-07 PROCEDURE — 95911 PR NERVE CONDUCTION STUDY; 9-10 STUDIES: ICD-10-PCS | Mod: 26,S$PBB,, | Performed by: PHYSICAL MEDICINE & REHABILITATION

## 2023-02-07 PROCEDURE — 95911 NRV CNDJ TEST 9-10 STUDIES: CPT | Mod: 26,S$PBB,, | Performed by: PHYSICAL MEDICINE & REHABILITATION

## 2023-02-07 PROCEDURE — 95886 PR EMG COMPLETE, W/ NERVE CONDUCTION STUDIES, 5+ MUSCLES: ICD-10-PCS | Mod: 26,S$PBB,, | Performed by: PHYSICAL MEDICINE & REHABILITATION

## 2023-02-07 PROCEDURE — 95886 MUSC TEST DONE W/N TEST COMP: CPT | Mod: 26,S$PBB,, | Performed by: PHYSICAL MEDICINE & REHABILITATION

## 2023-02-07 NOTE — PROCEDURES
Test Date:  2023    Patient: Lissy Palencia : 1945 Physician: Marty Madrigal D.O.   ID#:  SEX: Female Ref. Phys: Katina Louie PA-C     HPI: Lissy Palencia is a 77 y.o.female who presents for NCS/EMG to evaluate CTS bilaterally.      NCV & EMG Findings:  All nerves (as indicated in the following tables) were within normal limits.  Needle evaluation of the left deltoid, the left Biceps Brachii, and the left Triceps Brachii muscles showed diminished recruitment.  The right First Dorsal Interosseous muscle showed increased motor unit amplitude.  All remaining muscles (as indicated in the following table) showed no evidence of electrical instability.    Impression:  There is evidence to suggest a left C6 (C5 possible) radiculopathy without any active/ongoing denervation.  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  In isolation to one muscle, this is of limited diagnostic significance.  No electrophysiologic evidence of median mononeuropathy on either side.      ___________________________  Marty Madrigal D.O.        NCS+  Motor Nerve Results      Latency Amplitude F-Lat Segment Distance CV Comment   Site (ms) Norm (mV) Norm (ms)  (cm) (m/s) Norm    Left Median (APB)   Wrist 3.1  < 4.4 7.0  > 3.8  Wrist-Palm - - -    Elbow 7.0 - 6.6 -  Elbow-Wrist 21 54  > 51    Right Median (APB)   Wrist 3.8  < 4.4 6.7  > 3.8  Wrist-Palm - - -    Elbow 7.0 - 4.3 -  Elbow-Wrist 20 63  > 51    Left Ulnar (ADM)   Wrist 2.6  < 3.7 9.7  > 3.0         Bel Elbow 5.9 - 8.3 -  Bel Elbow-Wrist 20 61  > 52    Abv Elbow 7.6 - 7.2 -  Abv Elbow-Bel Elbow 9 53  > 43    Right Ulnar (ADM)   Wrist 2.5  < 3.7 8.0  > 3.0         Bel Elbow 6.1 - 8.3 -  Bel Elbow-Wrist 20 56  > 52    Abv Elbow 7.9 - 8.4 -  Abv Elbow-Bel Elbow 9.5 53  > 43      Sensory Nerve Results      Latency (Peak) Amplitude (P-P) Segment Distance CV Comment   Site (ms) Norm (µV) Norm  (cm) (m/s) Norm    Left Median    Wrist-Dig I 2.5 - 56 - Wrist-Dig I 10 40 -    Wrist-Dig II 3.2  < 4.0 30  > 8 Wrist-Dig II 14 44  > 39    Palm-Wrist 1.70 - 62 - Palm-Wrist - - -    Right Median   Wrist-Dig I 3.1 - 19 - Wrist-Dig I 10 32 -    Wrist-Dig II 3.4  < 4.0 21  > 8 Wrist-Dig II 14 41  > 39    Palm-Wrist 2.0 - 24 - Palm-Wrist - - -    Left Ulnar   Palm-Wrist 2.0 - 41 - Palm-Wrist - - -    Right Ulnar   Wrist-Dig V 2.7  < 4.0 26  > 4 Wrist-Dig V 14 52  > 38    Palm-Wrist 2.1 - 18 - Palm-Wrist - - -    Left Radial   Wrist-Dig I 2.7 - 10 - Wrist-Dig I 10 37 -    Right Radial   Wrist-Dig I 2.9 - 9 - Wrist-Dig I 10 34 -      Inter-Nerve Comparisons     Nerve 1 Value 1 Nerve 2 Value 2 Parameter Result Normal   Sensory Sites   R Median Wrist-Dig I 3.1 ms R Radial Wrist-Dig I 2.9 ms Peak Lat Diff 0.20 ms <0.40   L Median Wrist-Dig I 2.5 ms L Radial Wrist-Dig I 2.7 ms Peak Lat Diff 0.20 ms <0.40   R Median Palm-Wrist 2.0 ms R Ulnar Palm-Wrist 2.1 ms Peak Lat Diff 0.10 ms <0.30   L Median Palm-Wrist 1.7 ms L Ulnar Palm-Wrist 2.0 ms Peak Lat Diff 0.30 ms <0.30     EMG+     Side Muscle Nerve Root Ins Act Fibs Psw Amp Dur Poly Recrt Int Pat Comment   Left Deltoid Axillary C5-C6 Nml Nml Nml Nml Nml 0 *Reduced Nml    Left Biceps Musculocut C5-C6 Nml Nml Nml Nml Nml 0 *Reduced Nml    Left Triceps Radial C6-C8 Nml Nml Nml Nml Nml 0 *Reduced Nml mild red rect with FR=7.5   Left Pronator Teres Median C6-C7 Nml Nml Nml Nml Nml 0 Nml Nml    Left FDI Ulnar C8-T1 Nml Nml Nml Nml Nml 0 Nml Nml    Right Deltoid Axillary C5-C6 Nml Nml Nml Nml Nml 0 Nml Nml    Right Biceps Musculocut C5-C6 Nml Nml Nml Nml Nml 0 Nml Nml    Right Triceps Radial C6-C8 Nml Nml Nml Nml Nml 0 Nml Nml    Right Pronator Teres Median C6-C7 Nml Nml Nml Nml Nml 0 Nml Nml    Right FDI Ulnar C8-T1 Nml Nml Nml *Incr Nml 0 Nml Nml    Right APB Median C8-T1 Nml Nml Nml Nml Nml 0 Nml Nml    Right ADM Ulnar C8-T1 Nml Nml Nml Nml Nml 0 Nml Nml    Right Cervical Parasp (Lower) Rami C7-C8 Nml Nml Nml          Left Cervical Parasp (Lower) Rami C7-C8 Nml Nml Nml                 Waveforms:    Motor              Sensory

## 2023-02-09 ENCOUNTER — TELEPHONE (OUTPATIENT)
Dept: ORTHOPEDICS | Facility: CLINIC | Age: 78
End: 2023-02-09
Payer: MEDICARE

## 2023-02-14 ENCOUNTER — OFFICE VISIT (OUTPATIENT)
Dept: ORTHOPEDICS | Facility: CLINIC | Age: 78
End: 2023-02-14
Payer: MEDICARE

## 2023-02-14 VITALS
BODY MASS INDEX: 30.73 KG/M2 | HEART RATE: 60 BPM | WEIGHT: 167 LBS | HEIGHT: 62 IN | SYSTOLIC BLOOD PRESSURE: 144 MMHG | DIASTOLIC BLOOD PRESSURE: 77 MMHG

## 2023-02-14 DIAGNOSIS — E11.9 TYPE 2 DIABETES MELLITUS WITHOUT COMPLICATION, WITHOUT LONG-TERM CURRENT USE OF INSULIN: Primary | ICD-10-CM

## 2023-02-14 PROCEDURE — 99999 PR PBB SHADOW E&M-EST. PATIENT-LVL III: ICD-10-PCS | Mod: PBBFAC,,, | Performed by: ORTHOPAEDIC SURGERY

## 2023-02-14 PROCEDURE — 99214 OFFICE O/P EST MOD 30 MIN: CPT | Mod: S$PBB,,, | Performed by: ORTHOPAEDIC SURGERY

## 2023-02-14 PROCEDURE — 99999 PR PBB SHADOW E&M-EST. PATIENT-LVL III: CPT | Mod: PBBFAC,,, | Performed by: ORTHOPAEDIC SURGERY

## 2023-02-14 PROCEDURE — 99213 OFFICE O/P EST LOW 20 MIN: CPT | Mod: PBBFAC | Performed by: ORTHOPAEDIC SURGERY

## 2023-02-14 PROCEDURE — 99214 PR OFFICE/OUTPT VISIT, EST, LEVL IV, 30-39 MIN: ICD-10-PCS | Mod: S$PBB,,, | Performed by: ORTHOPAEDIC SURGERY

## 2023-02-14 NOTE — PROGRESS NOTES
Subjective:      Patient ID: Lissy Palencia is a 77 y.o. female.    Chief Complaint: Follow-up and Pain of the Left Hand and Follow-up and Pain of the Right Hand      HPI  Lissy Palencia is a 77 y.o. female presenting today for evaluation of the right long finger.  She notes pain and catching in her right long finger which started approximately 3 months ago. She states it catches and she has to manually straighten it. In addition she has intermittent pain and paresthesias in the bilateral hands. She has night time symptoms.    Interval update 02/14/2023: Patient reports prior right long finger trigger injection was not helpful.  She actually reports her trigger finger symptoms have gotten worse.  She reports continued locking of the digit she reports it is painful to unlock the digit.  She is not interested in trying another injection today.  She is interested in discussing surgery.  Of note she also reports similar symptoms developing in the right ring finger.  She denies any dayton locking of the digit but does have pain which reminds her of her other digit which is also painful.  She also reports continued pain at bilateral thumb bases.  In addition to this patient also was evaluated with an EMG after her last visit complaining of some intermittent bilateral hand numbness and tingling.    Review of patient's allergies indicates:   Allergen Reactions    Compazine  [prochlorperazine edisylate]      Other reaction(s): SPASMS    Parafon forte      Other reaction(s): Hives         Current Outpatient Medications   Medication Sig Dispense Refill    alendronate (FOSAMAX) 70 MG tablet Take 1 tablet (70 mg total) by mouth every 7 days. Mondays.Then after an hour take your thyroid tablet, 30 minutes later your pantoprazole. 12 tablet 1    aspirin (ECOTRIN) 81 MG EC tablet Take 81 mg by mouth once daily.      atorvastatin (LIPITOR) 80 MG tablet Take 1 tablet (80 mg total) by mouth once daily. TAKE 1 TABLET DAILY 90 tablet  3    azelastine (ASTELIN) 137 mcg (0.1 %) nasal spray 1 spray (137 mcg total) by Nasal route 2 (two) times daily. 30 mL 3    blood sugar diagnostic Strp To check BG ONCE daily, to use with insurance preferred meter 100 strip 3    flu vac 2022 65up-[id number],PF, (FLUAD QUAD 2022-23,65Y UP,,PF,) 60 mcg (15 mcg x 4)/0.5 mL Syrg Inject into the muscle. 0.5 mL 0    lancets Misc To check BG ONCE daily, to use with insurance preferred meter 100 each 3    levothyroxine (SYNTHROID) 88 MCG tablet TAKE FIRST THING IN THE MORNING ON AN EMPTY STOMACH 90 tablet 3    LIDOcaine (LIDODERM) 5 % Place onto the skin once daily. 30 patch 0    metFORMIN (GLUCOPHAGE) 500 MG tablet TAKE 1 TABLET DAILY WITH BREAKFAST 90 tablet 3    neomycin-polymyxin-hydrocortisone (CORTISPORIN) 3.5-10,000-1 mg/mL-unit/mL-% otic suspension Place 3 drops into the right ear 3 (three) times daily. 10 mL 0    nitroGLYCERIN (NITROSTAT) 0.4 MG SL tablet Place 1 tablet (0.4 mg total) under the tongue every 5 (five) minutes as needed for Chest pain. You can take up to 3 doses at home. If chest pain persists after second dose, go the ER. 25 tablet 4    pantoprazole (PROTONIX) 40 MG tablet Take 1 tablet (40 mg total) by mouth once daily. 90 tablet 1    prednisolon/gatiflox/bromfenac (PREDNISOL ACE-GATIFLOX-BROMFEN) 1-0.5-0.075 % DrpS Apply 1 drop to eye 3 (three) times daily. One drop 3 times a day in surgical eye 5 mL 3    prednisolon/gatiflox/bromfenac (PREDNISOL ACE-GATIFLOX-BROMFEN) 1-0.5-0.075 % DrpS Apply 1 drop to eye 3 (three) times daily. One drop 3 times a day in surgical eye 5 mL 3    RESTASIS 0.05 % ophthalmic emulsion INSTILL 1 DROP IN BOTH EYES TWICE A DAY 60 each 11    vitamin D (VITAMIN D3) 1000 units Tab Take 1 tablet (1,000 Units total) by mouth once daily. 90 tablet 3    blood-glucose meter kit To check BG ONCE THE daily, to use with insurance preferred meter 1 each 1     No current facility-administered medications for this visit.      Facility-Administered Medications Ordered in Other Visits   Medication Dose Route Frequency Provider Last Rate Last Admin    balanced salt irrigation intra-ocular solution 1 drop  1 drop Left Eye On Call Procedure Ciara Mclaughlin MD        phenylephrine HCL 10% ophthalmic solution 1 drop  1 drop Left Eye PRN Ciara Mclaughlin MD        sodium chloride 0.9% flush 2 mL  2 mL Intravenous PRN Ciara Mclaughlin MD           Past Medical History:   Diagnosis Date    Allergy     Arthritis     Coronary artery disease     Cystocele 01/19/2016    Diabetes mellitus     GERD (gastroesophageal reflux disease)     Hemorrhoids     History of cholelithiasis     Hypothyroidism     Obesity     Right shoulder pain 10/09/2012    Sleep apnea     Trigger finger, right 3rd finger 07/30/2014    Vaginal atrophy 01/19/2016    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       Past Surgical History:   Procedure Laterality Date    ABLATION COLPOCLESIS      ADENOIDECTOMY      CATARACT EXTRACTION W/  INTRAOCULAR LENS IMPLANT Right 11/23/2022    Procedure: EXTRACTION, CATARACT, WITH IOL INSERTION;  Surgeon: Ciara Mclaughlin MD;  Location: [facility];  Service: Ophthalmology;  Laterality: Right;    CATARACT EXTRACTION W/  INTRAOCULAR LENS IMPLANT Left 12/22/2022    Procedure: EXTRACTION, CATARACT, WITH IOL INSERTION;  Surgeon: Ciara Mclaughlin MD;  Location: [facility] OR;  Service: Ophthalmology;  Laterality: Left;    CHOLECYSTECTOMY      COLONOSCOPY N/A 11/28/2018    Procedure: COLONOSCOPY;  Surgeon: Richi Mcintosh MD;  Location: [facility] ([address] [place] [address]);  Service: Endoscopy;  Laterality: N/A;    CORONARY STENT PLACEMENT      ESOPHAGOGASTRODUODENOSCOPY N/A 11/28/2018    Procedure: ESOPHAGOGASTRODUODENOSCOPY (EGD);  Surgeon: Richi Mcintosh MD;  Location: [address]);  Service: Endoscopy;  Laterality: N/A;    heart cath  2005    non-obstructive disease    OOPHORECTOMY      SHOULDER ARTHROSCOPY W/ ROTATOR CUFF REPAIR      Right    TONSILLECTOMY       "TOTAL ABDOMINAL HYSTERECTOMY      with BSO    TUBAL LIGATION         Review of Systems:  Constitutional: Negative for chills and fever.   Respiratory: Negative for cough and shortness of breath.    Gastrointestinal: Negative for nausea and vomiting.   Skin: Negative for rash.   Neurological: Negative for dizziness and headaches.   Psychiatric/Behavioral: Negative for depression.   MSK as in HPI       OBJECTIVE:     PHYSICAL EXAM:  BP (!) 144/77   Pulse 60   Ht 5' 2" (1.575 m)   Wt 75.8 kg (167 lb)   BMI 30.54 kg/m²     GEN:  NAD, well-developed, well-groomed.  NEURO: Awake, alert, and oriented. Normal attention and concentration.    PSYCH: Normal mood and affect. Behavior is normal.  HEENT: No cervical lymphadenopathy noted.  CARDIOVASCULAR: Radial pulses 2+ bilaterally. No LE edema noted.  PULMONARY: Breath sounds normal. No respiratory distress.  SKIN: Intact, no rashes.      MSK:   BUE:  Good active ROM of the wrist and fingers. Right long finger with ttp at the A1 pulley and triggering. .  Mild tenderness to palpation over the right ring A1 pulley but no visible triggering.  Tender to palpation at bilateral thumb CMC joints Positive durkans bilaterally, negative tinels.AIN/PIN/Radial/Median/Ulnar Nerves assessed in isolation without deficit. Radial & Ulnar arteries palpated 2+. Capillary Refill <3s.    RADIOGRAPHS:  Xray right hand 1/5/23   FINDINGS:  Hand complete three views right: There is baseline DJD most severe at the 1st carpometacarpal joint.  There is ulnar negative variance.  No fracture, dislocation, or bone destruction seen.  No acute process seen.     Impression:     No acute process seen.  Comments: I have personally reviewed the imaging and I agree with the above radiologist's report.    EMG shows a left C6 and possible C5 radiculopathy without any active/ongoing denervation.  No electrophysiologic evidence of median neuropathy on either side.  ASSESSMENT/PLAN:     Patient with right long and " ring trigger fingers.  Bilateral thumb CMC arthritis.  Cervical radiculopathy.  Plan for right long and ring trigger finger releases.  Consents were signed in clinic today.  With regard to the thumb CMC arthritis we discussed conservative treatment consisting of topical anti-inflammatory medications, paraffin wax bath, anti-inflammatory diet and comfort cool brace wear.  Also discussed that her EMG today did not show any evidence of carpal tunnel or cubital tunnel syndrome.  She does have known cervical spondylosis and her EMG showed C5/6 radiculopathy.

## 2023-02-14 NOTE — PROGRESS NOTES
I have personally taken the history and examined this patient. I agree with the resident's note as stated above.   Has middle and ring finger trigger finger the trigger fingers early she states it is fairly painful and she feels like it is going to start triggering her middle finger is actively triggering we noticed this she is here today also to review her nerve test which we did we told him this is coming from her neck she also has bilateral thumb base pain specifically CMC arthritis we discussed treatment options paraffin anti-inflammatory diet bracing Voltaren cream we did not discuss surgical options for her thumb base at that time but did tell her that there are surgical options if needed patient was ready for surgery for her trigger finger because it is so painful to her

## 2023-02-15 ENCOUNTER — TELEPHONE (OUTPATIENT)
Dept: ORTHOPEDICS | Facility: CLINIC | Age: 78
End: 2023-02-15
Payer: MEDICARE

## 2023-02-15 DIAGNOSIS — M65.331 TRIGGER MIDDLE FINGER OF RIGHT HAND: Primary | ICD-10-CM

## 2023-02-15 NOTE — TELEPHONE ENCOUNTER
Patient called at stated she only wants to have surgery on the middle finger and not the ring finger. She is scared bc of her diabetes of being cut in 2 spots.

## 2023-02-22 DIAGNOSIS — H04.123 BILATERAL DRY EYES: Primary | ICD-10-CM

## 2023-02-22 RX ORDER — CYCLOSPORINE 0.5 MG/ML
1 EMULSION OPHTHALMIC 2 TIMES DAILY
Qty: 60 EACH | Refills: 5 | Status: SHIPPED | OUTPATIENT
Start: 2023-02-22 | End: 2023-03-02

## 2023-02-23 ENCOUNTER — TELEPHONE (OUTPATIENT)
Dept: OPHTHALMOLOGY | Facility: CLINIC | Age: 78
End: 2023-02-23
Payer: MEDICARE

## 2023-02-23 DIAGNOSIS — H04.123 BILATERAL DRY EYES: ICD-10-CM

## 2023-02-23 NOTE — TELEPHONE ENCOUNTER
Called patient regarding her concerns     ----- Message from Daniele Piedra MA sent at 2/22/2023  2:11 PM CST -----  Regarding: FW: Pt Call Back    ----- Message -----  From: Lizzie Helms  Sent: 2/22/2023   8:12 AM CST  To: Gianna CARIAS Staff  Subject: Pt Call Back                                     Pt called stating that she ad cataract surgery at the end of Nov. And Dec. And her eyes are bothering her she stated that they are very dry like something is in them. He would like to know if she can get a script for dry eyes or be seen sooner    Contact Lissy [phone]    [id number] [address] AT [facility] [address]  [address]  [facility] [address]  Phone: [phone] Fax: [phone]

## 2023-02-27 ENCOUNTER — TELEPHONE (OUTPATIENT)
Dept: OPHTHALMOLOGY | Facility: CLINIC | Age: 78
End: 2023-02-27
Payer: MEDICARE

## 2023-02-27 NOTE — TELEPHONE ENCOUNTER
Called patient lvm regarding  suggest she ise systane complete and artifical tears as need until  her drops come in

## 2023-03-02 RX ORDER — CYCLOSPORINE 0.5 MG/ML
1 EMULSION OPHTHALMIC 2 TIMES DAILY
Qty: 90 EACH | Refills: 0 | Status: SHIPPED | OUTPATIENT
Start: 2023-03-02 | End: 2023-06-23 | Stop reason: SDUPTHER

## 2023-03-04 DIAGNOSIS — K21.9 GASTROESOPHAGEAL REFLUX DISEASE, UNSPECIFIED WHETHER ESOPHAGITIS PRESENT: ICD-10-CM

## 2023-03-04 NOTE — TELEPHONE ENCOUNTER
Care Due:                  Date            Visit Type   Department     Provider  --------------------------------------------------------------------------------                                EP -                              PRIMARY      [facility] PRIMARY  Last Visit: 01-      CARE ([facility])   CARE           Drew Nicole                               -                              PRIMARY  Next Visit: 03-      CARE ([facility])   None Found     Drew Nicole                                                            Last  Test          Frequency    Reason                     Performed    Due Date  --------------------------------------------------------------------------------    HBA1C.......  6 months...  metFORMIN................  09- 03-    [facility] Embedded Care Gaps. Reference number: [id number]. 3/04/2023   12:47:36 PM CST

## 2023-03-05 NOTE — TELEPHONE ENCOUNTER
Refill Routing Note   Medication(s) are not appropriate for processing by [facility] for the following reason(s):       No active prescription written by PCP    [facility] action(s):  Defer   Requires labs : Yes    Medication Therapy Plan: Labs (a1c) due 3/18/2023    Appointments  past 12m or future 3m with PCP    Date Provider   Last Visit   1/6/2023 Drew Nicole MD   Next Visit   3/13/2023 Drew Nicole MD   ED visits in past 90 days: 0        Note composed:3:15 PM 03/05/2023

## 2023-03-06 ENCOUNTER — TELEPHONE (OUTPATIENT)
Dept: ORTHOPEDICS | Facility: CLINIC | Age: 78
End: 2023-03-06
Payer: MEDICARE

## 2023-03-06 RX ORDER — PANTOPRAZOLE SODIUM 40 MG/1
40 TABLET, DELAYED RELEASE ORAL DAILY
Qty: 90 TABLET | Refills: 3 | Status: SHIPPED | OUTPATIENT
Start: 2023-03-06

## 2023-03-06 NOTE — TELEPHONE ENCOUNTER
Patient called to let us know she is having a tooth pulled and need to reschedule her surgery. She will call when she is in the clear

## 2023-03-31 ENCOUNTER — OFFICE VISIT (OUTPATIENT)
Dept: OPHTHALMOLOGY | Facility: CLINIC | Age: 78
End: 2023-03-31
Payer: MEDICARE

## 2023-03-31 DIAGNOSIS — T85.9XXA: Primary | ICD-10-CM

## 2023-03-31 PROCEDURE — 99214 OFFICE O/P EST MOD 30 MIN: CPT | Mod: S$PBB,,, | Performed by: OPHTHALMOLOGY

## 2023-03-31 PROCEDURE — 99214 PR OFFICE/OUTPT VISIT, EST, LEVL IV, 30-39 MIN: ICD-10-PCS | Mod: S$PBB,,, | Performed by: OPHTHALMOLOGY

## 2023-03-31 PROCEDURE — 99999 PR PBB SHADOW E&M-EST. PATIENT-LVL III: ICD-10-PCS | Mod: PBBFAC,,, | Performed by: OPHTHALMOLOGY

## 2023-03-31 PROCEDURE — 99999 PR PBB SHADOW E&M-EST. PATIENT-LVL III: CPT | Mod: PBBFAC,,, | Performed by: OPHTHALMOLOGY

## 2023-03-31 PROCEDURE — 99213 OFFICE O/P EST LOW 20 MIN: CPT | Mod: PBBFAC | Performed by: OPHTHALMOLOGY

## 2023-03-31 NOTE — PROGRESS NOTES
HPI    Dr Mcintosh/ Dr Conner / Dr. Jack     Glaucoma suspect OS   ZACK OU   S/P phaco OS 12/22/2022 - optiblue  S/p phaco OD (multifocal) 11/23/2022    Restasis BID OU    Pt c/o blurry VA at mid range and up close  Even very blurry with new specs on   Last edited by Sharlene Oquendo on 3/31/2023  8:48 AM.            Assessment /Plan     For exam results, see Encounter Report.    Complication of intraocular lens implant, unspecified complication, initial encounter      S/P phaco OS 12/22/2022 - optiblue  S/p phaco OD (multifocal) 11/23/2022    Pt unhappy with intermediate and near VA OU.      Pt does have PCO, however I suspect only minimally VS.           Plan for IOL exchange OS.    Cnwtto 21.0 OS range - retrobulbar block.  Ora  N/c

## 2023-05-07 ENCOUNTER — TELEPHONE (OUTPATIENT)
Dept: OPHTHALMOLOGY | Facility: CLINIC | Age: 78
End: 2023-05-07
Payer: MEDICARE

## 2023-05-07 DIAGNOSIS — T85.9XXA: Primary | ICD-10-CM

## 2023-05-17 DIAGNOSIS — T85.9XXA: Primary | ICD-10-CM

## 2023-05-23 DIAGNOSIS — H25.12 NUCLEAR SCLEROTIC CATARACT OF LEFT EYE: Primary | ICD-10-CM

## 2023-05-23 RX ORDER — KETOROLAC TROMETHAMINE 5 MG/ML
1 SOLUTION OPHTHALMIC 3 TIMES DAILY
Qty: 5 ML | Refills: 3 | Status: SHIPPED | OUTPATIENT
Start: 2023-05-23

## 2023-05-23 RX ORDER — OFLOXACIN 3 MG/ML
1 SOLUTION/ DROPS OPHTHALMIC 3 TIMES DAILY
Qty: 5 ML | Refills: 3 | Status: SHIPPED | OUTPATIENT
Start: 2023-05-23 | End: 2023-12-11

## 2023-05-23 RX ORDER — PREDNISOLONE ACETATE 10 MG/ML
1 SUSPENSION/ DROPS OPHTHALMIC 3 TIMES DAILY
Qty: 5 ML | Refills: 3 | Status: SHIPPED | OUTPATIENT
Start: 2023-05-23 | End: 2023-12-11

## 2023-05-23 NOTE — TELEPHONE ENCOUNTER
----- Message from Ni Chau sent at 5/23/2023 12:35 PM CDT -----  Regarding: Eye Drops  Pt called about changing pharmacy [facility] for surgery eye drops [facility]    Call [phone] or [phone]

## 2023-06-06 RX ORDER — ALENDRONATE SODIUM 70 MG/1
TABLET ORAL
Qty: 12 TABLET | Refills: 3 | Status: SHIPPED | OUTPATIENT
Start: 2023-06-06 | End: 2024-03-13 | Stop reason: SDUPTHER

## 2023-06-06 NOTE — TELEPHONE ENCOUNTER
----- Message from Nohemy Almazan sent at 6/6/2023  1:53 PM CDT -----  Contact: Pt Mobile [phone]  Patient would like to put in an order for lab work to stay updated on her Diabetes condition.

## 2023-06-08 NOTE — PRE-PROCEDURE INSTRUCTIONS
- Nothing to eat or drink after midinight, except AM meds with small sips of water  - No Diabetic meds or Fluid pills  - Hold All non-insulin shots until after surgery (7 days)  - All B/P meds are ok to take, except those that contain a fluid pill  - Hold all vits and herbal meds AM of surgery  - Use inhalers as needed and bring AM of surgery  - Use eye drops as directed  - Shower and wash face with dial soap for 3 mins PM prior and AM of surgery  - No powder, lotions, creams, (makeup),  or jewelry    - Wear comfortable clothing (button up shirt)    (Patients ride may not leave while patient is in surgery)    -- 2nd floor surgery ctr at [facility] @ [address] [facility] [place] [address]       Pt voiced understanding

## 2023-06-09 ENCOUNTER — TELEPHONE (OUTPATIENT)
Dept: OPHTHALMOLOGY | Facility: CLINIC | Age: 78
End: 2023-06-09
Payer: MEDICARE

## 2023-06-12 ENCOUNTER — TELEPHONE (OUTPATIENT)
Dept: OPHTHALMOLOGY | Facility: CLINIC | Age: 78
End: 2023-06-12
Payer: MEDICARE

## 2023-06-12 NOTE — H&P
History    Chief complaint:  Painless progressive vision loss    Present Ilness/Diagnosis: Nuclear sclerotic Cataract    Past Medical History:  has a past medical history of Allergy, Arthritis, Coronary artery disease, Cystocele (01/19/2016), Diabetes mellitus, GERD (gastroesophageal reflux disease), Hemorrhoids, History of cholelithiasis, Hypothyroidism, Obesity, Right shoulder pain (10/09/2012), Sleep apnea, Trigger finger, right 3rd finger (07/30/2014), Vaginal atrophy (01/19/2016), and Vaginal vault prolapse (01/19/2016).    Family History/Social History: refer to chart    Allergies:   Review of patient's allergies indicates:   Allergen Reactions    Compazine  [prochlorperazine edisylate]      Other reaction(s): SPASMS    Parafon forte      Other reaction(s): Hives       Current Medications: No current facility-administered medications for this encounter.    Current Outpatient Medications:     aspirin (ECOTRIN) 81 MG EC tablet, Take 81 mg by mouth once daily., Disp: , Rfl:     atorvastatin (LIPITOR) 80 MG tablet, Take 1 tablet (80 mg total) by mouth once daily. TAKE 1 TABLET DAILY, Disp: 90 tablet, Rfl: 3    azelastine (ASTELIN) 137 mcg (0.1 %) nasal spray, 1 spray (137 mcg total) by Nasal route 2 (two) times daily., Disp: 30 mL, Rfl: 3    levothyroxine (SYNTHROID) 88 MCG tablet, TAKE FIRST THING IN THE MORNING ON AN EMPTY STOMACH, Disp: 90 tablet, Rfl: 3    pantoprazole (PROTONIX) 40 MG tablet, Take 1 tablet (40 mg total) by mouth once daily., Disp: 90 tablet, Rfl: 3    alendronate (FOSAMAX) 70 MG tablet, TAKE 1 TABLET EVERY 7 DAYS ON MONDAYS, THEN AFTER AN HOUR TAKE YOUR THYROID TABLET, 30 MINUTES LATER YOUR PANTOPRAZOLE (Patient taking differently: Tues), Disp: 12 tablet, Rfl: 3    blood sugar diagnostic Strp, To check BG ONCE daily, to use with insurance preferred meter, Disp: 100 strip, Rfl: 3    blood-glucose meter kit, To check BG ONCE THE daily, to use with insurance preferred meter, Disp: 1 each, Rfl:  1    cycloSPORINE (RESTASIS) 0.05 % ophthalmic emulsion, Place 1 drop into both eyes 2 (two) times daily., Disp: 90 each, Rfl: 0    flu vac 2022 65up-[id number],PF, (FLUAD QUAD 2022-23,65Y UP,,PF,) 60 mcg (15 mcg x 4)/0.5 mL Syrg, Inject into the muscle., Disp: 0.5 mL, Rfl: 0    ketorolac 0.5% (ACULAR) 0.5 % Drop, Place 1 drop into the left eye 3 (three) times daily., Disp: 5 mL, Rfl: 3    lancets Misc, To check BG ONCE daily, to use with insurance preferred meter, Disp: 100 each, Rfl: 3    LIDOcaine (LIDODERM) 5 %, Place onto the skin once daily., Disp: 30 patch, Rfl: 0    metFORMIN (GLUCOPHAGE) 500 MG tablet, TAKE 1 TABLET DAILY WITH BREAKFAST (Patient not taking: Reported on 6/8/2023), Disp: 90 tablet, Rfl: 3    neomycin-polymyxin-hydrocortisone (CORTISPORIN) 3.5-10,000-1 mg/mL-unit/mL-% otic suspension, Place 3 drops into the right ear 3 (three) times daily., Disp: 10 mL, Rfl: 0    nitroGLYCERIN (NITROSTAT) 0.4 MG SL tablet, Place 1 tablet (0.4 mg total) under the tongue every 5 (five) minutes as needed for Chest pain. You can take up to 3 doses at home. If chest pain persists after second dose, go the ER., Disp: 25 tablet, Rfl: 4    ofloxacin (OCUFLOX) 0.3 % ophthalmic solution, Place 1 drop into the left eye 3 (three) times daily., Disp: 5 mL, Rfl: 3    prednisolon/gatiflox/bromfenac (PREDNISOL ACE-GATIFLOX-BROMFEN) 1-0.5-0.075 % DrpS, Apply 1 drop to eye 3 (three) times daily. One drop 3 times a day in surgical eye, Disp: 5 mL, Rfl: 3    prednisolon/gatiflox/bromfenac (PREDNISOL ACE-GATIFLOX-BROMFEN) 1-0.5-0.075 % DrpS, Apply 1 drop to eye 3 (three) times daily. One drop 3 times a day in surgical eye, Disp: 5 mL, Rfl: 3    prednisolon/gatiflox/bromfenac (PREDNISOLONE-GATIFLOX-BROMFENAC) 1-0.5-0.09 % ophthalmic solution, Place 1 drop into the left eye 3 (three) times daily., Disp: 5 mL, Rfl: 3    prednisoLONE acetate (PRED FORTE) 1 % DrpS, Place 1 drop into the left eye 3 (three) times daily., Disp: 5 mL, Rfl:  3    RESTASIS 0.05 % ophthalmic emulsion, INSTILL 1 DROP IN BOTH EYES TWICE A DAY, Disp: 60 each, Rfl: 11    vitamin D (VITAMIN D3) 1000 units Tab, Take 1 tablet (1,000 Units total) by mouth once daily., Disp: 90 tablet, Rfl: 3    Facility-Administered Medications Ordered in Other Encounters:     balanced salt irrigation intra-ocular solution 1 drop, 1 drop, Left Eye, On Call Procedure, Ciara Mclaughlin MD    phenylephrine HCL 10% ophthalmic solution 1 drop, 1 drop, Left Eye, PRN, Ciara Mclaughlin MD    sodium chloride 0.9% flush 2 mL, 2 mL, Intravenous, PRN, Ciara Mclaughlin MD    Physical Exam    BP: Vital signs stable  General: No apparent distress  HEENT: nuclear sclerotic cataract  Lungs: adequate respirations  Heart: + pulses  Abdomen: soft  Rectal/pelvic: deferred    Impression: Visually significant Cataract.    See previous clinic notes for surgical indications.    Plan: Phacoemulsification with implantation of Intraocular lens

## 2023-06-13 ENCOUNTER — ANESTHESIA EVENT (OUTPATIENT)
Dept: SURGERY | Facility: HOSPITAL | Age: 78
End: 2023-06-13
Payer: MEDICARE

## 2023-06-13 NOTE — ANESTHESIA PREPROCEDURE EVALUATION
06/13/2023  Lissy Palencia is a 78 y.o., female.      Pre-op Assessment    I have reviewed the Patient Summary Reports.       I have reviewed the Medications.     Review of Systems  Anesthesia Hx:  Denies Family Hx of Anesthesia complications.   Denies Personal Hx of Anesthesia complications.   Social:  Non-Smoker    Hematology/Oncology:  Hematology Normal   Oncology Normal     Cardiovascular:   CAD      Pulmonary:   Sleep Apnea    Hepatic/GI:   GERD Liver Disease,    Musculoskeletal:   Arthritis     Neurological:   Neuromuscular Disease,    Endocrine:   Diabetes Hypothyroidism  Diabetes  Obesity / BMI > 30      Physical Exam  General: Cooperative, Oriented and Alert    Airway:  Mallampati: II   Mouth Opening: Normal  TM Distance: Normal  Tongue: Normal  Neck ROM: Normal ROM      Arthritis History of cholelithiasis   Hypothyroidism Allergy   GERD (gastroesophageal reflux disease) Right shoulder pain   Trigger finger, right 3rd finger Hemorrhoids   Obesity Cystocele   Vaginal vault prolapse Vaginal atrophy   Coronary artery disease Diabetes mellitus   Sleep apnea      Surgical History    TONSILLECTOMY SHOULDER ARTHROSCOPY W/ ROTATOR CUFF REPAIR   TOTAL ABDOMINAL HYSTERECTOMY ADENOIDECTOMY   TUBAL LIGATION heart cath   CHOLECYSTECTOMY OOPHORECTOMY   ABLATION COLPOCLESIS CORONARY STENT PLACEMENT   ESOPHAGOGASTRODUODENOSCOPY COLONOSCOPY         Anesthesia Plan  Type of Anesthesia, risks & benefits discussed:    Anesthesia Type: MAC  Intra-op Monitoring Plan: Standard ASA Monitors  Post Op Pain Control Plan: multimodal analgesia and IV/PO Opioids PRN  Induction:  IV  Informed Consent: Informed consent signed with the Patient and all parties understand the risks and agree with anesthesia plan.  All questions answered.   ASA Score: 3  Day of Surgery Review of History & Physical: H&P Update referred to the  surgeon/provider.H&P completed by Anesthesiologist.  Anesthesia Plan Notes: Right Cataract 1 month ago with [facility]    Ready For Surgery From Anesthesia Perspective.     .

## 2023-06-14 ENCOUNTER — ANESTHESIA (OUTPATIENT)
Dept: SURGERY | Facility: HOSPITAL | Age: 78
End: 2023-06-14
Payer: MEDICARE

## 2023-06-14 ENCOUNTER — HOSPITAL ENCOUNTER (OUTPATIENT)
Facility: HOSPITAL | Age: 78
Discharge: HOME | End: 2023-06-14
Attending: OPHTHALMOLOGY | Admitting: OPHTHALMOLOGY
Payer: MEDICARE

## 2023-06-14 VITALS
HEIGHT: 62 IN | WEIGHT: 145 LBS | SYSTOLIC BLOOD PRESSURE: 118 MMHG | OXYGEN SATURATION: 95 % | HEART RATE: 55 BPM | DIASTOLIC BLOOD PRESSURE: 60 MMHG | TEMPERATURE: 98 F | BODY MASS INDEX: 26.68 KG/M2 | RESPIRATION RATE: 16 BRPM

## 2023-06-14 DIAGNOSIS — H25.12 AGE-RELATED NUCLEAR CATARACT, LEFT: ICD-10-CM

## 2023-06-14 DIAGNOSIS — H25.12 NUCLEAR SCLEROTIC CATARACT OF LEFT EYE: Primary | ICD-10-CM

## 2023-06-14 PROCEDURE — 63600175 PHARM REV CODE 636 W HCPCS: Performed by: NURSE ANESTHETIST, CERTIFIED REGISTERED

## 2023-06-14 PROCEDURE — 82962 GLUCOSE BLOOD TEST: CPT | Performed by: OPHTHALMOLOGY

## 2023-06-14 PROCEDURE — 94761 N-INVAS EAR/PLS OXIMETRY MLT: CPT

## 2023-06-14 PROCEDURE — 36000706: Performed by: OPHTHALMOLOGY

## 2023-06-14 PROCEDURE — 99900035 HC TECH TIME PER 15 MIN (STAT)

## 2023-06-14 PROCEDURE — 71000015 HC POSTOP RECOV 1ST HR: Performed by: OPHTHALMOLOGY

## 2023-06-14 PROCEDURE — 36000707: Performed by: OPHTHALMOLOGY

## 2023-06-14 PROCEDURE — 25000003 PHARM REV CODE 250: Performed by: OPHTHALMOLOGY

## 2023-06-14 PROCEDURE — 66986 PR EXCHANGE LENS PROSTHESIS: ICD-10-PCS | Mod: LT,,, | Performed by: OPHTHALMOLOGY

## 2023-06-14 PROCEDURE — 66986 EXCHANGE LENS PROSTHESIS: CPT | Mod: LT,,, | Performed by: OPHTHALMOLOGY

## 2023-06-14 PROCEDURE — D9220A PRA ANESTHESIA: Mod: ,,, | Performed by: NURSE ANESTHETIST, CERTIFIED REGISTERED

## 2023-06-14 PROCEDURE — D9220A PRA ANESTHESIA: ICD-10-PCS | Mod: ,,, | Performed by: NURSE ANESTHETIST, CERTIFIED REGISTERED

## 2023-06-14 PROCEDURE — 37000009 HC ANESTHESIA EA ADD 15 MINS: Performed by: OPHTHALMOLOGY

## 2023-06-14 PROCEDURE — V2788 PRESBYOPIA-CORRECT FUNCTION: HCPCS | Performed by: OPHTHALMOLOGY

## 2023-06-14 PROCEDURE — 37000008 HC ANESTHESIA 1ST 15 MINUTES: Performed by: OPHTHALMOLOGY

## 2023-06-14 RX ORDER — LIDOCAINE HYDROCHLORIDE 40 MG/ML
INJECTION, SOLUTION RETROBULBAR
Status: DISCONTINUED | OUTPATIENT
Start: 2023-06-14 | End: 2023-06-14 | Stop reason: HOSPADM

## 2023-06-14 RX ORDER — PHENYLEPHRINE HYDROCHLORIDE 100 MG/ML
1 SOLUTION/ DROPS OPHTHALMIC
Status: ACTIVE | OUTPATIENT
Start: 2023-06-14

## 2023-06-14 RX ORDER — ACETAMINOPHEN 325 MG/1
650 TABLET ORAL EVERY 4 HOURS PRN
Status: DISCONTINUED | OUTPATIENT
Start: 2023-06-14 | End: 2023-06-14 | Stop reason: HOSPADM

## 2023-06-14 RX ORDER — TETRACAINE HYDROCHLORIDE 5 MG/ML
SOLUTION OPHTHALMIC
Status: DISCONTINUED | OUTPATIENT
Start: 2023-06-14 | End: 2023-06-14 | Stop reason: HOSPADM

## 2023-06-14 RX ORDER — CYCLOP/TROP/PROPA/PHEN/KET/WAT 1-1-0.1%
1 DROPS (EA) OPHTHALMIC (EYE)
Status: COMPLETED | OUTPATIENT
Start: 2023-06-14 | End: 2023-06-14

## 2023-06-14 RX ORDER — MIDAZOLAM HYDROCHLORIDE 1 MG/ML
INJECTION, SOLUTION INTRAMUSCULAR; INTRAVENOUS
Status: DISCONTINUED | OUTPATIENT
Start: 2023-06-14 | End: 2023-06-14

## 2023-06-14 RX ORDER — MOXIFLOXACIN 5 MG/ML
SOLUTION/ DROPS OPHTHALMIC
Status: DISCONTINUED | OUTPATIENT
Start: 2023-06-14 | End: 2023-06-14 | Stop reason: HOSPADM

## 2023-06-14 RX ORDER — PROPARACAINE HYDROCHLORIDE 5 MG/ML
1 SOLUTION/ DROPS OPHTHALMIC
Status: DISCONTINUED | OUTPATIENT
Start: 2023-06-14 | End: 2023-06-14 | Stop reason: HOSPADM

## 2023-06-14 RX ORDER — MOXIFLOXACIN 5 MG/ML
1 SOLUTION/ DROPS OPHTHALMIC
Status: COMPLETED | OUTPATIENT
Start: 2023-06-14 | End: 2023-06-14

## 2023-06-14 RX ORDER — PREDNISOLONE ACETATE 10 MG/ML
SUSPENSION/ DROPS OPHTHALMIC
Status: DISCONTINUED | OUTPATIENT
Start: 2023-06-14 | End: 2023-06-14 | Stop reason: HOSPADM

## 2023-06-14 RX ORDER — SODIUM CHLORIDE 0.9 % (FLUSH) 0.9 %
2 SYRINGE (ML) INJECTION
Status: ACTIVE | OUTPATIENT
Start: 2023-06-14

## 2023-06-14 RX ORDER — LIDOCAINE HYDROCHLORIDE 10 MG/ML
INJECTION, SOLUTION EPIDURAL; INFILTRATION; INTRACAUDAL; PERINEURAL
Status: DISCONTINUED | OUTPATIENT
Start: 2023-06-14 | End: 2023-06-14 | Stop reason: HOSPADM

## 2023-06-14 RX ADMIN — MOXIFLOXACIN 1 DROP: 5 SOLUTION/ DROPS OPHTHALMIC at 12:06

## 2023-06-14 RX ADMIN — MOXIFLOXACIN OPHTHALMIC 1 DROP: 5 SOLUTION/ DROPS OPHTHALMIC at 11:06

## 2023-06-14 RX ADMIN — Medication 1 DROP: at 10:06

## 2023-06-14 RX ADMIN — Medication 1 DROP: at 11:06

## 2023-06-14 RX ADMIN — MIDAZOLAM HYDROCHLORIDE 2 MG: 1 INJECTION, SOLUTION INTRAMUSCULAR; INTRAVENOUS at 11:06

## 2023-06-14 RX ADMIN — MOXIFLOXACIN OPHTHALMIC 1 DROP: 5 SOLUTION/ DROPS OPHTHALMIC at 10:06

## 2023-06-14 NOTE — DISCHARGE INSTRUCTIONS
Dr. Mclaughlin     Cataract Post-Operative Instructions       Day of surgery:     -Resume drops THREE times daily into the operative eye.     -Do not rub your eye     -Wear protective sunglasses during the day.     -Resume moderate activity.     -Bathe/shower/wash face normally     -Do not apply makeup around the operative eye for 1 week.     -You should expect:     Blurry vision and halos for 24-48 hours     Dilated pupil for 24-48 hours     Scratchy feeling in the eye for 1-2 days     Curved shadow in your peripheral vision for 2-3 weeks     Occasional flickering of lights for up to 1 week     -If you experience severe pain or nausea, call Dr. Mclaughlin or the on-call doctor at [phone].       Plan to see Dr. Mclaughlin tomorrow at:     [facility] [address]     10TH FLOOR     [facility] [address]     **Most patients can drive the next morning.  If you do not feel comfortable driving, please arrange for transportation. **

## 2023-06-14 NOTE — OP NOTE
DATE OF PROCEDURE: 6/14/23    SURGEON: CASPER ABBASI MD    PREOPERATIVE DIAGNOSIS:  Refractive surprise following cataract surgery left eye.     POSTOPERATIVE DIAGNOSIS:  Refractive surprise following cataract surgery left eye.     PROCEDURE PERFORMED:  IOL exchange left eye    IMPLANT:  cnwtto 21.5    ANESTHESIA:  Topical and MAC      COMPLICATIONS: none    ESTIMATED BLOOD LOSS: <1cc    SPECIMENS: none     PROCEDURE IN DETAIL:  The patient was met in the preop holding area.  Consent was confirmed to be signed.  The operative site was marked.  The patient was brought into the operating room by the anesthesia team and placed under monitored anesthesia care.  A retrobulbar injection was given without complication.  The left eye was prepped and draped in a sterile ophthalmic fashion.  A Caleb speculum was placed into the left eye.   A paracentesis site was made and healon was injected into the anterior chamber.  The old IOL was carefully dissected from the capsular bag with healon, and was placed into the anterior chamber.  The new IOL was injected into the bag and positioned.  Viscoat was added to help coat and protect the endothelium. The old IOL was bissected and removed from the temporal incision.  Remaining viscoelastic material was removed from the eye using irrigation and aspiration.  The corneal wounds were hydrated.  The eye was filled to physiologic pressure. The wounds were found to be watertight.   Drops of Vigamox and prednisilone were placed into the eye.  The eye was washed, dried, patched and shielded.  The patient tolerated the procedure well and knows to follow up with me tomorrow morning, sooner if needed.

## 2023-06-14 NOTE — ANESTHESIA POSTPROCEDURE EVALUATION
Anesthesia Post Evaluation    Patient: Lissy Palencia    Procedure(s) Performed: Procedure(s) (LRB):  REPLACEMENT, IOL (Left)    Final Anesthesia Type: MAC      Patient location during evaluation: PACU  Patient participation: Yes- Able to Participate  Level of consciousness: awake and alert  Post-procedure vital signs: reviewed and stable  Pain management: adequate  Airway patency: patent    PONV status at discharge: No PONV  Anesthetic complications: no      Cardiovascular status: blood pressure returned to baseline  Respiratory status: unassisted, spontaneous ventilation and room air  Hydration status: euvolemic  Follow-up not needed.          Vitals Value Taken Time   /60 06/14/23 1248   Temp 36.7 °C (98.1 °F) 06/14/23 1230   Pulse 55 06/14/23 1249   Resp 16 06/14/23 1245   SpO2 95 % 06/14/23 1249   Vitals shown include unvalidated device data.      No case tracking events are documented in the log.      Pain/Sahil Score: No data recorded

## 2023-06-14 NOTE — TRANSFER OF CARE
"Anesthesia Transfer of Care Note    Patient: Lissy Palencia    Procedure(s) Performed: Procedure(s) (LRB):  REPLACEMENT, IOL (Left)    Patient location: PACU    Anesthesia Type: MAC    Transport from OR: Transported from OR on room air with adequate spontaneous ventilation    Post pain: adequate analgesia    Post assessment: no apparent anesthetic complications    Post vital signs: stable    Level of consciousness: responds to stimulation    Nausea/Vomiting: no nausea/vomiting    Complications: none    Transfer of care protocol was followed      Last vitals:   Visit Vitals  BP (!) 154/71   Pulse (!) 56   Temp 36.7 °C (98.1 °F) (Temporal)   Resp 16   Ht 5' 2" (1.575 m)   Wt 65.8 kg (145 lb)   SpO2 96%   Breastfeeding No   BMI 26.52 kg/m²     "

## 2023-06-14 NOTE — PLAN OF CARE
Pt in preop bay 37, VSS, meds given and IV inserted. Pt denies any open wounds on body. Pt ready to roll.   Procedural consents verified with pt.

## 2023-06-14 NOTE — DISCHARGE SUMMARY
[facility]  Discharge Note  Short Stay    Procedure(s) (LRB):  REPLACEMENT, IOL (Left)    BRIEF DISCHARGE NOTE:    Date of discharge: 06/14/2023    Reason for hospitalization -  Cataract surgery     Final Diagnosis - Visually significant Cataract    Procedures and treatment provided - Status post phacoemulsification with placement of intraocular lens     Diet - Advance to regular as tolerated    Activity - as tolerated    Disposition at the end of the case - Good.    Discharge: to home    The patient tolerated the procedure well and knows to follow up with me tomorrow morning in the eye clinic, sooner if needed.    Patient and family instructions (as appropriate) - Given to patient on discharge    Ciara Mclaughlin MD

## 2023-06-15 ENCOUNTER — OFFICE VISIT (OUTPATIENT)
Dept: OPHTHALMOLOGY | Facility: CLINIC | Age: 78
End: 2023-06-15
Payer: MEDICARE

## 2023-06-15 DIAGNOSIS — Z98.42 STATUS POST CATARACT EXTRACTION AND INSERTION OF INTRAOCULAR LENS, LEFT: Primary | ICD-10-CM

## 2023-06-15 DIAGNOSIS — Z96.1 STATUS POST CATARACT EXTRACTION AND INSERTION OF INTRAOCULAR LENS, LEFT: Primary | ICD-10-CM

## 2023-06-15 PROCEDURE — 99999 PR PBB SHADOW E&M-EST. PATIENT-LVL III: CPT | Mod: PBBFAC,,, | Performed by: OPHTHALMOLOGY

## 2023-06-15 PROCEDURE — 99024 POSTOP FOLLOW-UP VISIT: CPT | Mod: POP,,, | Performed by: OPHTHALMOLOGY

## 2023-06-15 PROCEDURE — 99213 OFFICE O/P EST LOW 20 MIN: CPT | Mod: PBBFAC | Performed by: OPHTHALMOLOGY

## 2023-06-15 PROCEDURE — 99024 PR POST-OP FOLLOW-UP VISIT: ICD-10-PCS | Mod: POP,,, | Performed by: OPHTHALMOLOGY

## 2023-06-15 PROCEDURE — 99999 PR PBB SHADOW E&M-EST. PATIENT-LVL III: ICD-10-PCS | Mod: PBBFAC,,, | Performed by: OPHTHALMOLOGY

## 2023-06-15 NOTE — PROGRESS NOTES
"HPI    Dr Mcintosh/ Dr Conner / Dr. Jack     S/P REPLACEMENT, IOL OS 06/14/2023    Glaucoma suspect OS   ZACK OU   S/P phaco OS 12/22/2022 - optiblue  S/p phaco OD (multifocal) 11/23/2022    Restasis BID OU Stopped using 06/05/2023    Gtts: Combo 3 x a day.    Patient is here for a 1 day PO OS. Pt. States having partially blurry VA   and has a pulling sensation on the left side of her OS. "It feels like is   glued together." Pain level 0.  Last edited by Jennifer Salazar on 6/15/2023  3:00 PM.            Assessment /Plan     For exam results, see Encounter Report.    Status post cataract extraction and insertion of intraocular lens, left        S/P REPLACEMENT, IOL OS 06/14/2023    Doing well, start abx/steroids TID    F/up 1wk - va/IOP OU distance and near.    Quick mrx if needed.                 "

## 2023-06-16 ENCOUNTER — TELEPHONE (OUTPATIENT)
Dept: OPHTHALMOLOGY | Facility: CLINIC | Age: 78
End: 2023-06-16
Payer: MEDICARE

## 2023-06-23 ENCOUNTER — OFFICE VISIT (OUTPATIENT)
Dept: OPHTHALMOLOGY | Facility: CLINIC | Age: 78
End: 2023-06-23
Payer: MEDICARE

## 2023-06-23 DIAGNOSIS — Z96.1 STATUS POST CATARACT EXTRACTION AND INSERTION OF INTRAOCULAR LENS, LEFT: Primary | ICD-10-CM

## 2023-06-23 DIAGNOSIS — H16.223 KERATOCONJUNCTIVITIS SICCA NOT SPECIFIED AS SJOGREN'S, BILATERAL: ICD-10-CM

## 2023-06-23 DIAGNOSIS — H04.123 BILATERAL DRY EYES: ICD-10-CM

## 2023-06-23 DIAGNOSIS — Z98.42 STATUS POST CATARACT EXTRACTION AND INSERTION OF INTRAOCULAR LENS, LEFT: Primary | ICD-10-CM

## 2023-06-23 PROCEDURE — 99024 POSTOP FOLLOW-UP VISIT: CPT | Mod: POP,,, | Performed by: OPHTHALMOLOGY

## 2023-06-23 PROCEDURE — 99024 PR POST-OP FOLLOW-UP VISIT: ICD-10-PCS | Mod: POP,,, | Performed by: OPHTHALMOLOGY

## 2023-06-23 RX ORDER — CYCLOSPORINE 0.5 MG/ML
1 EMULSION OPHTHALMIC 2 TIMES DAILY
Qty: 90 EACH | Refills: 11 | Status: SHIPPED | OUTPATIENT
Start: 2023-06-23

## 2023-06-23 RX ORDER — CYCLOSPORINE 0.5 MG/ML
1 EMULSION OPHTHALMIC 2 TIMES DAILY
Qty: 90 EACH | Refills: 11 | Status: SHIPPED | OUTPATIENT
Start: 2023-06-23 | End: 2023-06-23 | Stop reason: SDUPTHER

## 2023-06-23 NOTE — PROGRESS NOTES
HPI    Dr Mcintosh/ Dr Conner / Dr. Jack     S/P REPLACEMENT, IOL OS 06/14/2023  ZACK  Glaucoma suspect OS   ZACK OU   S/P phaco OS 12/22/2022 - optiblue  S/p phaco OD (multifocal) 11/23/2022    Restasis BID OU Stopped using 06/05/2023    Gtts: Combo 3 x a day.    Patient is here for a 1 wk PO OS. Pt c/o film in VA OS and trouble   focusing at near OU  Last edited by Ciara Mclaughlin MD on 6/23/2023  3:57 PM.            Assessment /Plan     For exam results, see Encounter Report.    Status post cataract extraction and insertion of intraocular lens, left    Keratoconjunctivitis sicca not specified as Sjogren's, bilateral    Bilateral dry eyes  -     cycloSPORINE (RESTASIS) 0.05 % ophthalmic emulsion; Place 1 drop into both eyes 2 (two) times daily.  Dispense: 90 each; Refill: 11      S/P REPLACEMENT, IOL OS 06/14/2023    Doing great, VA improved, surface very dry    Restart restasis BID OU,    Combo drops TID x 3 more weeks    Start PF ATs tid ou, gel qhs    F/up 4-6 wks - va OU distance and near.

## 2023-07-07 ENCOUNTER — TELEPHONE (OUTPATIENT)
Dept: OPTOMETRY | Facility: CLINIC | Age: 78
End: 2023-07-07
Payer: MEDICARE

## 2023-07-26 ENCOUNTER — TELEPHONE (OUTPATIENT)
Dept: PRIMARY CARE CLINIC | Facility: CLINIC | Age: 78
End: 2023-07-26
Payer: MEDICARE

## 2023-07-26 DIAGNOSIS — E11.9 TYPE 2 DIABETES MELLITUS WITHOUT COMPLICATION, WITHOUT LONG-TERM CURRENT USE OF INSULIN: Primary | ICD-10-CM

## 2023-07-26 NOTE — TELEPHONE ENCOUNTER
----- Message from Juan David Houston sent at 7/26/2023  7:48 AM CDT -----  Pt is requesting orders for labs    Pt can be reached at [phone].    Thanks

## 2023-08-04 ENCOUNTER — TELEPHONE (OUTPATIENT)
Dept: OPHTHALMOLOGY | Facility: CLINIC | Age: 78
End: 2023-08-04
Payer: MEDICARE

## 2023-08-04 NOTE — TELEPHONE ENCOUNTER
Spoke to pt about symptoms. Pt informed me that they have improved and she can wait to see Dr. Mclaughlin on 8/21.    ----- Message from Ciara Mclaughlin MD sent at 8/3/2023 10:38 AM CDT -----  Regarding: RE: Sooner Appt  Contact: Pt  She's on the restasis? Could add eysuvis    ----- Message -----  From: Zenaida Moon MA  Sent: 7/31/2023  11:51 AM CDT  To: Ciara Mclaughlin MD  Subject: FW: Sooner Appt                                  Is there anything else we can do to help her dry eye symptoms until she sees you again?      ----- Message -----  From: Marlene Alcaraz  Sent: 7/26/2023   8:24 AM CDT  To: Zenaida Moon MA; Jennifer Salazar  Subject: FW: Sooner Appt                                    ----- Message -----  From: Luna Vail  Sent: 7/26/2023   8:16 AM CDT  To: Lissette Soto  Subject: Sooner Appt                                      Pt is requesting a callback regarding a sooner appt. Pt stated her eyes are crusting and irritated. She said the drops are no helping. Please adv pt                Confirmed contact below:   Contact Name:Lissy Palencia  Phone Number: [phone]

## 2023-08-21 ENCOUNTER — OFFICE VISIT (OUTPATIENT)
Dept: OPHTHALMOLOGY | Facility: CLINIC | Age: 78
End: 2023-08-21
Payer: MEDICARE

## 2023-08-21 DIAGNOSIS — H02.831 DERMATOCHALASIS OF BOTH UPPER EYELIDS: ICD-10-CM

## 2023-08-21 DIAGNOSIS — H16.223 KERATOCONJUNCTIVITIS SICCA NOT SPECIFIED AS SJOGREN'S, BILATERAL: ICD-10-CM

## 2023-08-21 DIAGNOSIS — H02.834 DERMATOCHALASIS OF BOTH UPPER EYELIDS: ICD-10-CM

## 2023-08-21 DIAGNOSIS — H26.492 POSTERIOR CAPSULAR OPACIFICATION VISUALLY SIGNIFICANT, LEFT EYE: ICD-10-CM

## 2023-08-21 DIAGNOSIS — H26.491 POSTERIOR CAPSULAR OPACIFICATION VISUALLY SIGNIFICANT, RIGHT EYE: Primary | ICD-10-CM

## 2023-08-21 PROCEDURE — 99999 PR PBB SHADOW E&M-EST. PATIENT-LVL III: ICD-10-PCS | Mod: PBBFAC,,, | Performed by: OPHTHALMOLOGY

## 2023-08-21 PROCEDURE — 99024 POSTOP FOLLOW-UP VISIT: CPT | Mod: POP,,, | Performed by: OPHTHALMOLOGY

## 2023-08-21 PROCEDURE — 99213 OFFICE O/P EST LOW 20 MIN: CPT | Mod: PBBFAC | Performed by: OPHTHALMOLOGY

## 2023-08-21 PROCEDURE — 99024 PR POST-OP FOLLOW-UP VISIT: ICD-10-PCS | Mod: POP,,, | Performed by: OPHTHALMOLOGY

## 2023-08-21 PROCEDURE — 99999 PR PBB SHADOW E&M-EST. PATIENT-LVL III: CPT | Mod: PBBFAC,,, | Performed by: OPHTHALMOLOGY

## 2023-08-21 RX ORDER — AMOXICILLIN 875 MG/1
875 TABLET, FILM COATED ORAL 2 TIMES DAILY
COMMUNITY
Start: 2023-08-11 | End: 2023-12-11

## 2023-08-21 NOTE — Clinical Note
Pls add to bleph list for ronnie - pt willing to have cosmetic procedure if not covered by insurance.

## 2023-08-21 NOTE — PROGRESS NOTES
HPI    Dr Mcintosh/ Dr Conner / Dr. Jack     ZACK  Glaucoma suspect OS   ZACK OU   S/P phaco OS 12/22/2022 - optiblue, S/P REPLACEMENT, IOL OS 06/14/2023 -   panoptix  S/p phaco OD (optiblue) 11/23/2022    Gtts: Restasis BID OU    Lubricating drops am and pm      Patient is here today for 3 weeks follow up. Pt.states vision at distance   and near is still blurry. Pt. States having a strain to see her vision   goes in and out of focus. Pt. States having itchy lids in OU.Pt. Denies   diplopia, pain or discomfort.  Last edited by Ciara Mclaughlin MD on 8/22/2023  9:08 AM.            Assessment /Plan     For exam results, see Encounter Report.    Posterior capsular opacification visually significant, right eye    Posterior capsular opacification visually significant, left eye    Keratoconjunctivitis sicca not specified as Sjogren's, bilateral    Dermatochalasis of both upper eyelids      S/P phaco OS 12/22/2022 - optiblue, S/P REPLACEMENT, IOL OS 06/14/2023 -     Va dropped due to PCO -- will plan for YAG CAP OU next.    ZACK - cont restasis    DC - eval with romulo - blegabriele trejo.             Va OU sc distance and near next.

## 2023-08-28 ENCOUNTER — TELEPHONE (OUTPATIENT)
Dept: OPHTHALMOLOGY | Facility: CLINIC | Age: 78
End: 2023-08-28
Payer: MEDICARE

## 2023-08-28 NOTE — TELEPHONE ENCOUNTER
----- Message from Ciara Mclaughlin MD sent at 8/22/2023  9:11 AM CDT -----  Pls add to bleph list for ronnie - pt willing to have cosmetic procedure if not covered by insurance.

## 2023-09-13 ENCOUNTER — TELEPHONE (OUTPATIENT)
Dept: PRIMARY CARE CLINIC | Facility: CLINIC | Age: 78
End: 2023-09-13
Payer: MEDICARE

## 2023-09-13 NOTE — TELEPHONE ENCOUNTER
----- Message from Paige Childs sent at 9/13/2023 12:24 PM CDT -----  Regarding: sooner appt  Type:  Sooner Appointment Request         Caller is requesting a sooner appointment.         Caller declined first available appointment listed below. 12/11/2023         Name of Caller:Lissy         When is the first available appointment? Next year 2024         Symptoms:              Would the patient rather a call back or a response via [facility]? Call back            Best Call Back Number:[phone] or [phone]                    Additional Information:

## 2023-09-18 ENCOUNTER — TELEPHONE (OUTPATIENT)
Dept: ORTHOPEDICS | Facility: CLINIC | Age: 78
End: 2023-09-18
Payer: MEDICARE

## 2023-09-19 ENCOUNTER — LAB VISIT (OUTPATIENT)
Dept: LAB | Facility: OTHER | Age: 78
End: 2023-09-19
Attending: PHYSICIAN ASSISTANT
Payer: MEDICARE

## 2023-09-19 ENCOUNTER — OFFICE VISIT (OUTPATIENT)
Dept: ORTHOPEDICS | Facility: CLINIC | Age: 78
End: 2023-09-19
Payer: MEDICARE

## 2023-09-19 VITALS — BODY MASS INDEX: 26.69 KG/M2 | HEIGHT: 62 IN | WEIGHT: 145.06 LBS

## 2023-09-19 DIAGNOSIS — E11.9 TYPE 2 DIABETES MELLITUS WITHOUT COMPLICATIONS: ICD-10-CM

## 2023-09-19 DIAGNOSIS — E11.9 TYPE 2 DIABETES MELLITUS WITHOUT COMPLICATION, UNSPECIFIED WHETHER LONG TERM INSULIN USE: ICD-10-CM

## 2023-09-19 DIAGNOSIS — M65.331 TRIGGER MIDDLE FINGER OF RIGHT HAND: ICD-10-CM

## 2023-09-19 DIAGNOSIS — M65.341 TRIGGER RING FINGER OF RIGHT HAND: ICD-10-CM

## 2023-09-19 DIAGNOSIS — M65.331 TRIGGER MIDDLE FINGER OF RIGHT HAND: Primary | ICD-10-CM

## 2023-09-19 LAB
ESTIMATED AVG GLUCOSE: 131 MG/DL (ref 68–131)
HBA1C MFR BLD: 6.2 % (ref 4–5.6)

## 2023-09-19 PROCEDURE — 99214 OFFICE O/P EST MOD 30 MIN: CPT | Mod: S$PBB,,, | Performed by: PHYSICIAN ASSISTANT

## 2023-09-19 PROCEDURE — 99214 PR OFFICE/OUTPT VISIT, EST, LEVL IV, 30-39 MIN: ICD-10-PCS | Mod: S$PBB,,, | Performed by: PHYSICIAN ASSISTANT

## 2023-09-19 PROCEDURE — 99213 OFFICE O/P EST LOW 20 MIN: CPT | Mod: PBBFAC | Performed by: PHYSICIAN ASSISTANT

## 2023-09-19 PROCEDURE — 36415 COLL VENOUS BLD VENIPUNCTURE: CPT | Performed by: PHYSICIAN ASSISTANT

## 2023-09-19 PROCEDURE — 99999 PR PBB SHADOW E&M-EST. PATIENT-LVL III: ICD-10-PCS | Mod: PBBFAC,,, | Performed by: PHYSICIAN ASSISTANT

## 2023-09-19 PROCEDURE — 83036 HEMOGLOBIN GLYCOSYLATED A1C: CPT | Performed by: PHYSICIAN ASSISTANT

## 2023-09-19 PROCEDURE — 99999 PR PBB SHADOW E&M-EST. PATIENT-LVL III: CPT | Mod: PBBFAC,,, | Performed by: PHYSICIAN ASSISTANT

## 2023-09-19 NOTE — H&P (VIEW-ONLY)
Subjective:      Patient ID: Lissy Palencia is a 78 y.o. female.    Chief Complaint: Pain of the Right Hand    Interval HPI 9/19/23  Pt presents for follow up right long and ring trigger fingers. She was previously scheduled for surgery but had to delay due to dental infection. Continues to have pain at both A1 pulleys and triggering of the long.  States every morning she has to slowly straighten her finger with associated pain and locking. She is interested in surgery.     Review of patient's allergies indicates:   Allergen Reactions    Compazine  [prochlorperazine edisylate]      Other reaction(s): SPASMS    Parafon forte      Other reaction(s): Hives         Current Outpatient Medications   Medication Sig Dispense Refill    alendronate (FOSAMAX) 70 MG tablet TAKE 1 TABLET EVERY 7 DAYS ON MONDAYS, THEN AFTER AN HOUR TAKE YOUR THYROID TABLET, 30 MINUTES LATER YOUR PANTOPRAZOLE (Patient taking differently: Tues) 12 tablet 3    amoxicillin (AMOXIL) 875 MG tablet Take 875 mg by mouth 2 (two) times daily.      aspirin (ECOTRIN) 81 MG EC tablet Take 81 mg by mouth once daily.      atorvastatin (LIPITOR) 80 MG tablet Take 1 tablet (80 mg total) by mouth once daily. TAKE 1 TABLET DAILY 90 tablet 3    azelastine (ASTELIN) 137 mcg (0.1 %) nasal spray 1 spray (137 mcg total) by Nasal route 2 (two) times daily. 30 mL 3    blood sugar diagnostic Strp To check BG ONCE daily, to use with insurance preferred meter 100 strip 3    cycloSPORINE (RESTASIS) 0.05 % ophthalmic emulsion Place 1 drop into both eyes 2 (two) times daily. 90 each 11    flu vac 2022 [id number]-[id number],PF, (FLUAD QUAD 2022-23,65Y UP,,PF,) 60 mcg (15 mcg x 4)/0.5 mL Syrg Inject into the muscle. 0.5 mL 0    ketorolac 0.5% (ACULAR) 0.5 % Drop Place 1 drop into the left eye 3 (three) times daily. 5 mL 3    lancets Misc To check BG ONCE daily, to use with insurance preferred meter 100 each 3    levothyroxine (SYNTHROID) 88 MCG tablet TAKE FIRST THING IN THE MORNING  ON AN EMPTY STOMACH 90 tablet 3    LIDOcaine (LIDODERM) 5 % Place onto the skin once daily. 30 patch 0    metFORMIN (GLUCOPHAGE) 500 MG tablet TAKE 1 TABLET DAILY WITH BREAKFAST 90 tablet 3    neomycin-polymyxin-hydrocortisone (CORTISPORIN) 3.5-10,000-1 mg/mL-unit/mL-% otic suspension Place 3 drops into the right ear 3 (three) times daily. 10 mL 0    nitroGLYCERIN (NITROSTAT) 0.4 MG SL tablet Place 1 tablet (0.4 mg total) under the tongue every 5 (five) minutes as needed for Chest pain. You can take up to 3 doses at home. If chest pain persists after second dose, go the ER. 25 tablet 4    ofloxacin (OCUFLOX) 0.3 % ophthalmic solution Place 1 drop into the left eye 3 (three) times daily. 5 mL 3    pantoprazole (PROTONIX) 40 MG tablet Take 1 tablet (40 mg total) by mouth once daily. 90 tablet 3    prednisolon/gatiflox/bromfenac (PREDNISOL ACE-GATIFLOX-BROMFEN) 1-0.5-0.075 % DrpS Apply 1 drop to eye 3 (three) times daily. One drop 3 times a day in surgical eye 5 mL 3    prednisolon/gatiflox/bromfenac (PREDNISOL ACE-GATIFLOX-BROMFEN) 1-0.5-0.075 % DrpS Apply 1 drop to eye 3 (three) times daily. One drop 3 times a day in surgical eye 5 mL 3    prednisolon/gatiflox/bromfenac (PREDNISOLONE-GATIFLOX-BROMFENAC) 1-0.5-0.09 % ophthalmic solution Place 1 drop into the left eye 3 (three) times daily. 5 mL 3    prednisoLONE acetate (PRED FORTE) 1 % DrpS Place 1 drop into the left eye 3 (three) times daily. 5 mL 3    RESTASIS 0.05 % ophthalmic emulsion INSTILL 1 DROP IN BOTH EYES TWICE A DAY 60 each 11    vitamin D (VITAMIN D3) 1000 units Tab Take 1 tablet (1,000 Units total) by mouth once daily. 90 tablet 3    blood-glucose meter kit To check BG ONCE THE daily, to use with insurance preferred meter 1 each 1     No current facility-administered medications for this visit.     Facility-Administered Medications Ordered in Other Visits   Medication Dose Route Frequency Provider Last Rate Last Admin    balanced salt irrigation  intra-ocular solution 1 drop  1 drop Left Eye On Call Procedure Ciara Mclaughlin MD        balanced salt irrigation intra-ocular solution 1 drop  1 drop Left Eye On Call Procedure Ciara Mclaughlin MD        phenylephrine HCL 10% ophthalmic solution 1 drop  1 drop Left Eye Ciara Peña MD        phenylephrine HCL 10% ophthalmic solution 1 drop  1 drop Left Eye PRN Ciara Mclaughlin MD        sodium chloride 0.9% flush 2 mL  2 mL Intravenous PRN Ciara Mclaughlin MD        sodium chloride 0.9% flush 2 mL  2 mL Intravenous PRN Ciara Mclaughlin MD           Past Medical History:   Diagnosis Date    Allergy     Arthritis     Coronary artery disease     Cystocele 01/19/2016    Diabetes mellitus     GERD (gastroesophageal reflux disease)     Hemorrhoids     History of cholelithiasis     Hypothyroidism     Obesity     Right shoulder pain 10/09/2012    Sleep apnea     Trigger finger, right 3rd finger 07/30/2014    Vaginal atrophy 01/19/2016    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       Past Surgical History:   Procedure Laterality Date    ABLATION COLPOCLESIS      ADENOIDECTOMY      CATARACT EXTRACTION W/  INTRAOCULAR LENS IMPLANT Right 11/23/2022    Procedure: EXTRACTION, CATARACT, WITH IOL INSERTION;  Surgeon: Ciara Mclaughlin MD;  Location: [facility];  Service: Ophthalmology;  Laterality: Right;    CATARACT EXTRACTION W/  INTRAOCULAR LENS IMPLANT Left 12/22/2022    Procedure: EXTRACTION, CATARACT, WITH IOL INSERTION;  Surgeon: Ciara Mclaughlin MD;  Location: [facility];  Service: Ophthalmology;  Laterality: Left;    CHOLECYSTECTOMY      COLONOSCOPY N/A 11/28/2018    Procedure: COLONOSCOPY;  Surgeon: Richi Mcintosh MD;  Location: [address]);  Service: Endoscopy;  Laterality: N/A;    CORONARY STENT PLACEMENT      ESOPHAGOGASTRODUODENOSCOPY N/A 11/28/2018    Procedure: ESOPHAGOGASTRODUODENOSCOPY (EGD);  Surgeon: Richi Mcintosh MD;  Location: [facility] ([address] [place] [address]);  Service: Endoscopy;  Laterality: N/A;  "   heart cath  2005    non-obstructive disease    INTRAOCULAR LENS EXCHANGE Left 6/14/2023    Procedure: REPLACEMENT, IOL;  Surgeon: Ciara Mclaughlin MD;  Location: [facility] OR;  Service: Ophthalmology;  Laterality: Left;    OOPHORECTOMY      SHOULDER ARTHROSCOPY W/ ROTATOR CUFF REPAIR      Right    TONSILLECTOMY      TOTAL ABDOMINAL HYSTERECTOMY      with BSO    TUBAL LIGATION         Review of Systems:  Constitutional: Negative for chills and fever.   Respiratory: Negative for cough and shortness of breath.    Gastrointestinal: Negative for nausea and vomiting.   Skin: Negative for rash.   Neurological: Negative for dizziness and headaches.   Psychiatric/Behavioral: Negative for depression.   MSK as in HPI       OBJECTIVE:     PHYSICAL EXAM:  Ht 5' 2" (1.575 m)   Wt 65.8 kg (145 lb 1 oz)   BMI 26.53 kg/m²     GEN:  NAD, well-developed, well-groomed.  NEURO: Awake, alert, and oriented. Normal attention and concentration.    PSYCH: Normal mood and affect. Behavior is normal.  HEENT: No cervical lymphadenopathy noted.  CARDIOVASCULAR: Radial pulses 2+ bilaterally. No LE edema noted.  PULMONARY: Breath sounds normal. No respiratory distress.  SKIN: Intact, no rashes.      MSK:   BUE:  Good active ROM of the wrist and fingers. Right long and ring finger with ttp at the A1 pulley, active triggering of the long. AIN/PIN/Radial/Median/Ulnar Nerves assessed in isolation without deficit. Radial & Ulnar arteries palpated 2+. Capillary Refill <3s.    RADIOGRAPHS:  Xray right hand 1/5/23   FINDINGS:  Hand complete three views right: There is baseline DJD most severe at the 1st carpometacarpal joint.  There is ulnar negative variance.  No fracture, dislocation, or bone destruction seen.  No acute process seen.     Impression:     No acute process seen.  Comments: I have personally reviewed the imaging and I agree with the above radiologist's report.    EMG shows a left C6 and possible C5 radiculopathy without any active/ongoing " denervation.  No electrophysiologic evidence of median neuropathy on either side.    ASSESSMENT/PLAN:     Patient with right long and ring trigger fingers. She wishes to proceed with surgical release R long and ring trigger fingers. consents reviewed and signed in clinic. All questions answered. RTC PO.

## 2023-09-19 NOTE — PROGRESS NOTES
Subjective:      Patient ID: Lissy Palencia is a 78 y.o. female.    Chief Complaint: Pain of the Right Hand    Interval HPI 9/19/23  Pt presents for follow up right long and ring trigger fingers. She was previously scheduled for surgery but had to delay due to dental infection. Continues to have pain at both A1 pulleys and triggering of the long.  States every morning she has to slowly straighten her finger with associated pain and locking. She is interested in surgery.     Review of patient's allergies indicates:   Allergen Reactions    Compazine  [prochlorperazine edisylate]      Other reaction(s): SPASMS    Parafon forte      Other reaction(s): Hives         Current Outpatient Medications   Medication Sig Dispense Refill    alendronate (FOSAMAX) 70 MG tablet TAKE 1 TABLET EVERY 7 DAYS ON MONDAYS, THEN AFTER AN HOUR TAKE YOUR THYROID TABLET, 30 MINUTES LATER YOUR PANTOPRAZOLE (Patient taking differently: Tues) 12 tablet 3    amoxicillin (AMOXIL) 875 MG tablet Take 875 mg by mouth 2 (two) times daily.      aspirin (ECOTRIN) 81 MG EC tablet Take 81 mg by mouth once daily.      atorvastatin (LIPITOR) 80 MG tablet Take 1 tablet (80 mg total) by mouth once daily. TAKE 1 TABLET DAILY 90 tablet 3    azelastine (ASTELIN) 137 mcg (0.1 %) nasal spray 1 spray (137 mcg total) by Nasal route 2 (two) times daily. 30 mL 3    blood sugar diagnostic Strp To check BG ONCE daily, to use with insurance preferred meter 100 strip 3    cycloSPORINE (RESTASIS) 0.05 % ophthalmic emulsion Place 1 drop into both eyes 2 (two) times daily. 90 each 11    flu vac 2022 65up-[id number],PF, (FLUAD QUAD 2022-23,65Y UP,,PF,) 60 mcg (15 mcg x 4)/0.5 mL Syrg Inject into the muscle. 0.5 mL 0    ketorolac 0.5% (ACULAR) 0.5 % Drop Place 1 drop into the left eye 3 (three) times daily. 5 mL 3    lancets Misc To check BG ONCE daily, to use with insurance preferred meter 100 each 3    levothyroxine (SYNTHROID) 88 MCG tablet TAKE FIRST THING IN THE MORNING  ON AN EMPTY STOMACH 90 tablet 3    LIDOcaine (LIDODERM) 5 % Place onto the skin once daily. 30 patch 0    metFORMIN (GLUCOPHAGE) 500 MG tablet TAKE 1 TABLET DAILY WITH BREAKFAST 90 tablet 3    neomycin-polymyxin-hydrocortisone (CORTISPORIN) 3.5-10,000-1 mg/mL-unit/mL-% otic suspension Place 3 drops into the right ear 3 (three) times daily. 10 mL 0    nitroGLYCERIN (NITROSTAT) 0.4 MG SL tablet Place 1 tablet (0.4 mg total) under the tongue every 5 (five) minutes as needed for Chest pain. You can take up to 3 doses at home. If chest pain persists after second dose, go the ER. 25 tablet 4    ofloxacin (OCUFLOX) 0.3 % ophthalmic solution Place 1 drop into the left eye 3 (three) times daily. 5 mL 3    pantoprazole (PROTONIX) 40 MG tablet Take 1 tablet (40 mg total) by mouth once daily. 90 tablet 3    prednisolon/gatiflox/bromfenac (PREDNISOL ACE-GATIFLOX-BROMFEN) 1-0.5-0.075 % DrpS Apply 1 drop to eye 3 (three) times daily. One drop 3 times a day in surgical eye 5 mL 3    prednisolon/gatiflox/bromfenac (PREDNISOL ACE-GATIFLOX-BROMFEN) 1-0.5-0.075 % DrpS Apply 1 drop to eye 3 (three) times daily. One drop 3 times a day in surgical eye 5 mL 3    prednisolon/gatiflox/bromfenac (PREDNISOLONE-GATIFLOX-BROMFENAC) 1-0.5-0.09 % ophthalmic solution Place 1 drop into the left eye 3 (three) times daily. 5 mL 3    prednisoLONE acetate (PRED FORTE) 1 % DrpS Place 1 drop into the left eye 3 (three) times daily. 5 mL 3    RESTASIS 0.05 % ophthalmic emulsion INSTILL 1 DROP IN BOTH EYES TWICE A DAY 60 each 11    vitamin D (VITAMIN D3) 1000 units Tab Take 1 tablet (1,000 Units total) by mouth once daily. 90 tablet 3    blood-glucose meter kit To check BG ONCE THE daily, to use with insurance preferred meter 1 each 1     No current facility-administered medications for this visit.     Facility-Administered Medications Ordered in Other Visits   Medication Dose Route Frequency Provider Last Rate Last Admin    balanced salt irrigation  intra-ocular solution 1 drop  1 drop Left Eye On Call Procedure Ciara Mclaughlin MD        balanced salt irrigation intra-ocular solution 1 drop  1 drop Left Eye On Call Procedure Ciara Mclaughlin MD        phenylephrine HCL 10% ophthalmic solution 1 drop  1 drop Left Eye Ciara Peña MD        phenylephrine HCL 10% ophthalmic solution 1 drop  1 drop Left Eye PRN Ciara Mclaughlin MD        sodium chloride 0.9% flush 2 mL  2 mL Intravenous PRN Ciara Mclaughlin MD        sodium chloride 0.9% flush 2 mL  2 mL Intravenous PRN Ciara Mclaughlin MD           Past Medical History:   Diagnosis Date    Allergy     Arthritis     Coronary artery disease     Cystocele 01/19/2016    Diabetes mellitus     GERD (gastroesophageal reflux disease)     Hemorrhoids     History of cholelithiasis     Hypothyroidism     Obesity     Right shoulder pain 10/09/2012    Sleep apnea     Trigger finger, right 3rd finger 07/30/2014    Vaginal atrophy 01/19/2016    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       Past Surgical History:   Procedure Laterality Date    ABLATION COLPOCLESIS      ADENOIDECTOMY      CATARACT EXTRACTION W/  INTRAOCULAR LENS IMPLANT Right 11/23/2022    Procedure: EXTRACTION, CATARACT, WITH IOL INSERTION;  Surgeon: Ciara Mclaughlin MD;  Location: [facility];  Service: Ophthalmology;  Laterality: Right;    CATARACT EXTRACTION W/  INTRAOCULAR LENS IMPLANT Left 12/22/2022    Procedure: EXTRACTION, CATARACT, WITH IOL INSERTION;  Surgeon: Ciara Mclaughlin MD;  Location: [facility];  Service: Ophthalmology;  Laterality: Left;    CHOLECYSTECTOMY      COLONOSCOPY N/A 11/28/2018    Procedure: COLONOSCOPY;  Surgeon: Richi Mcintosh MD;  Location: [address]);  Service: Endoscopy;  Laterality: N/A;    CORONARY STENT PLACEMENT      ESOPHAGOGASTRODUODENOSCOPY N/A 11/28/2018    Procedure: ESOPHAGOGASTRODUODENOSCOPY (EGD);  Surgeon: Richi Mcintosh MD;  Location: [facility] ([address] [place] [address]);  Service: Endoscopy;  Laterality: N/A;  "   heart cath  2005    non-obstructive disease    INTRAOCULAR LENS EXCHANGE Left 6/14/2023    Procedure: REPLACEMENT, IOL;  Surgeon: Ciara Mclaughlin MD;  Location: [facility];  Service: Ophthalmology;  Laterality: Left;    OOPHORECTOMY      SHOULDER ARTHROSCOPY W/ ROTATOR CUFF REPAIR      Right    TONSILLECTOMY      TOTAL ABDOMINAL HYSTERECTOMY      with BSO    TUBAL LIGATION         Review of Systems:  Constitutional: Negative for chills and fever.   Respiratory: Negative for cough and shortness of breath.    Gastrointestinal: Negative for nausea and vomiting.   Skin: Negative for rash.   Neurological: Negative for dizziness and headaches.   Psychiatric/Behavioral: Negative for depression.   MSK as in HPI       OBJECTIVE:     PHYSICAL EXAM:  Ht 5' 2" (1.575 m)   Wt 65.8 kg (145 lb 1 oz)   BMI 26.53 kg/m²     GEN:  NAD, well-developed, well-groomed.  NEURO: Awake, alert, and oriented. Normal attention and concentration.    PSYCH: Normal mood and affect. Behavior is normal.  HEENT: No cervical lymphadenopathy noted.  CARDIOVASCULAR: Radial pulses 2+ bilaterally. No LE edema noted.  PULMONARY: Breath sounds normal. No respiratory distress.  SKIN: Intact, no rashes.      MSK:   BUE:  Good active ROM of the wrist and fingers. Right long and ring finger with ttp at the A1 pulley, active triggering of the long. AIN/PIN/Radial/Median/Ulnar Nerves assessed in isolation without deficit. Radial & Ulnar arteries palpated 2+. Capillary Refill <3s.    RADIOGRAPHS:  Xray right hand 1/5/23   FINDINGS:  Hand complete three views right: There is baseline DJD most severe at the 1st carpometacarpal joint.  There is ulnar negative variance.  No fracture, dislocation, or bone destruction seen.  No acute process seen.     Impression:     No acute process seen.  Comments: I have personally reviewed the imaging and I agree with the above radiologist's report.    EMG shows a left C6 and possible C5 radiculopathy without any active/ongoing " denervation.  No electrophysiologic evidence of median neuropathy on either side.    ASSESSMENT/PLAN:     Patient with right long and ring trigger fingers. She wishes to proceed with surgical release R long and ring trigger fingers. consents reviewed and signed in clinic. All questions answered. RTC PO.

## 2023-09-21 ENCOUNTER — TELEPHONE (OUTPATIENT)
Dept: OPHTHALMOLOGY | Facility: CLINIC | Age: 78
End: 2023-09-21
Payer: MEDICARE

## 2023-09-21 DIAGNOSIS — M65.341 TRIGGER RING FINGER OF RIGHT HAND: ICD-10-CM

## 2023-09-21 DIAGNOSIS — M65.331 TRIGGER MIDDLE FINGER OF RIGHT HAND: Primary | ICD-10-CM

## 2023-09-21 DIAGNOSIS — R52 PAIN: ICD-10-CM

## 2023-09-21 NOTE — TELEPHONE ENCOUNTER
Spoke to pt about other options other than Dr. Avalos for her eyelid problems.      ----- Message from Sharlene Oquendo sent at 9/21/2023  8:42 AM CDT -----  Dr. Valencia or dr. sebastian  ----- Message -----  From: Zenaida Moon MA  Sent: 9/21/2023   8:40 AM CDT  To: Sharlene Oquendo    What's the other doctor this pt can see outside of [facility] that does what Dr. Avalos does?  ----- Message -----  From: Marlene Alcaraz  Sent: 9/13/2023  11:44 AM CDT  To: Zenaida Moon MA; Jennifer Salazar      ----- Message -----  From: Jany Espinoza  Sent: 9/13/2023  11:19 AM CDT  To: Lissette VAZQUEZ Staff    Consult/Advisory    Name Of Caller:Lissy       Contact Preference:[phone]    Nature of call: Ptn called stating she has a dr she will like to see since the dr that was referred is booked she asked should the dr she will like to see be contacted by the office. Please call ptn to further assist

## 2023-10-02 ENCOUNTER — TELEPHONE (OUTPATIENT)
Dept: OPHTHALMOLOGY | Facility: CLINIC | Age: 78
End: 2023-10-02
Payer: MEDICARE

## 2023-10-09 ENCOUNTER — ANESTHESIA EVENT (OUTPATIENT)
Dept: SURGERY | Facility: HOSPITAL | Age: 78
End: 2023-10-09
Payer: MEDICARE

## 2023-10-09 NOTE — ANESTHESIA PREPROCEDURE EVALUATION
Stress echo 2022  Summary     During stress, the following significant arrhythmias were observed: rare PACs, occasional PVCs.   The patient's exercise capacity was average.   The test was stopped because the patient experienced fatigue.   The ECG portion of this study is negative for myocardial ischemia.   The left ventricle is normal in size with normal systolic function.   The estimated ejection fraction is 58%.   Normal left ventricular diastolic function.   Normal right ventricular size with low normal right ventricular systolic function.   Normal central venous pressure (3 mmHg).   The estimated PA systolic pressure is 28 mmHg.   The stress echo portion of this study is negative for myocardial ischemia.                                                                                                                    10/09/2023  Lissy Palencia is a 78 y.o., female.      Pre-op Assessment    I have reviewed the Patient Summary Reports.     I have reviewed the Nursing Notes. I have reviewed the NPO Status.   I have reviewed the Medications.     Review of Systems  Anesthesia Hx:  Denies Family Hx of Anesthesia complications.   Denies Personal Hx of Anesthesia complications.   Social:  Non-Smoker    Hematology/Oncology:  Hematology Normal   Oncology Normal     Cardiovascular:   CAD      Pulmonary:   Sleep Apnea    Hepatic/GI:   GERD Liver Disease,    Musculoskeletal:   Arthritis     Neurological:   Neuromuscular Disease,    Endocrine:   Diabetes Hypothyroidism  Diabetes  Obesity / BMI > 30      Physical Exam  General: Well nourished and Cooperative    Airway:  Mallampati: II   Mouth Opening: Normal  Neck ROM: Normal ROM        Anesthesia Plan  Type of Anesthesia, risks & benefits discussed:    Anesthesia Type: MAC, Gen Natural Airway  Intra-op Monitoring Plan: Standard ASA Monitors  Post Op Pain Control Plan: multimodal analgesia and IV/PO Opioids PRN  Induction:  IV  Informed Consent: Informed consent  signed with the Patient and all parties understand the risks and agree with anesthesia plan.  All questions answered.   ASA Score: 3    Ready For Surgery From Anesthesia Perspective.     .

## 2023-10-11 DIAGNOSIS — Z98.890 POST-OPERATIVE STATE: Primary | ICD-10-CM

## 2023-10-11 RX ORDER — TRAMADOL HYDROCHLORIDE 50 MG/1
50 TABLET ORAL EVERY 6 HOURS PRN
Qty: 10 TABLET | Refills: 0 | Status: SHIPPED | OUTPATIENT
Start: 2023-10-11 | End: 2023-12-11

## 2023-10-13 ENCOUNTER — TELEPHONE (OUTPATIENT)
Dept: ORTHOPEDICS | Facility: CLINIC | Age: 78
End: 2023-10-13
Payer: MEDICARE

## 2023-10-13 NOTE — TELEPHONE ENCOUNTER
Spoke c pt. Informed pt of 7:-00 a.m. arrival time for 10/16/23 surgery at the [facility]. Reminded pt of NPO status & PO appt. Pt expressed understanding & was thankful.

## 2023-10-13 NOTE — TELEPHONE ENCOUNTER
Spoke c pt. Informed pt of 7:00 a.m. arrival time for 10/16/23 surgery at the [facility]. Reminded pt of NPO status & PO appt.    Pt stated that she would like to have R long finger TFR only as her condition has improved on the ring finger. Advised pt to discuss this at her pre-op Monday morning and with Dr. Mcknight when she meets with her. Patient verbalized understanding and was thankful.

## 2023-10-16 ENCOUNTER — HOSPITAL ENCOUNTER (OUTPATIENT)
Facility: HOSPITAL | Age: 78
Discharge: HOME | End: 2023-10-16
Attending: ORTHOPAEDIC SURGERY | Admitting: ORTHOPAEDIC SURGERY
Payer: MEDICARE

## 2023-10-16 ENCOUNTER — ANESTHESIA (OUTPATIENT)
Dept: SURGERY | Facility: HOSPITAL | Age: 78
End: 2023-10-16
Payer: MEDICARE

## 2023-10-16 ENCOUNTER — TELEPHONE (OUTPATIENT)
Dept: ORTHOPEDICS | Facility: CLINIC | Age: 78
End: 2023-10-16
Payer: MEDICARE

## 2023-10-16 VITALS
DIASTOLIC BLOOD PRESSURE: 67 MMHG | SYSTOLIC BLOOD PRESSURE: 143 MMHG | HEART RATE: 50 BPM | HEIGHT: 62 IN | OXYGEN SATURATION: 98 % | RESPIRATION RATE: 20 BRPM | BODY MASS INDEX: 26.68 KG/M2 | TEMPERATURE: 98 F | WEIGHT: 145 LBS

## 2023-10-16 DIAGNOSIS — M65.30 TRIGGER FINGER OF RIGHT HAND, UNSPECIFIED FINGER: Primary | ICD-10-CM

## 2023-10-16 DIAGNOSIS — M65.30 TRIGGER FINGER OF RIGHT HAND: ICD-10-CM

## 2023-10-16 LAB
POCT GLUCOSE: 111 MG/DL (ref 70–110)
POCT GLUCOSE: 114 MG/DL (ref 70–110)

## 2023-10-16 PROCEDURE — D9220A PRA ANESTHESIA: Mod: CRNA,,, | Performed by: NURSE ANESTHETIST, CERTIFIED REGISTERED

## 2023-10-16 PROCEDURE — 36000707: Performed by: ORTHOPAEDIC SURGERY

## 2023-10-16 PROCEDURE — 63600175 PHARM REV CODE 636 W HCPCS

## 2023-10-16 PROCEDURE — 94761 N-INVAS EAR/PLS OXIMETRY MLT: CPT

## 2023-10-16 PROCEDURE — 82962 GLUCOSE BLOOD TEST: CPT | Performed by: ORTHOPAEDIC SURGERY

## 2023-10-16 PROCEDURE — 99900035 HC TECH TIME PER 15 MIN (STAT)

## 2023-10-16 PROCEDURE — 63600175 PHARM REV CODE 636 W HCPCS: Performed by: NURSE ANESTHETIST, CERTIFIED REGISTERED

## 2023-10-16 PROCEDURE — 36000706: Performed by: ORTHOPAEDIC SURGERY

## 2023-10-16 PROCEDURE — 25000003 PHARM REV CODE 250: Performed by: ANESTHESIOLOGY

## 2023-10-16 PROCEDURE — 71000033 HC RECOVERY, INTIAL HOUR: Performed by: ORTHOPAEDIC SURGERY

## 2023-10-16 PROCEDURE — 25000003 PHARM REV CODE 250: Performed by: ORTHOPAEDIC SURGERY

## 2023-10-16 PROCEDURE — 25000003 PHARM REV CODE 250

## 2023-10-16 PROCEDURE — 37000008 HC ANESTHESIA 1ST 15 MINUTES: Performed by: ORTHOPAEDIC SURGERY

## 2023-10-16 PROCEDURE — 27201423 OPTIME MED/SURG SUP & DEVICES STERILE SUPPLY: Performed by: ORTHOPAEDIC SURGERY

## 2023-10-16 PROCEDURE — 25000003 PHARM REV CODE 250: Performed by: NURSE ANESTHETIST, CERTIFIED REGISTERED

## 2023-10-16 PROCEDURE — D9220A PRA ANESTHESIA: ICD-10-PCS | Mod: ANES,,, | Performed by: ANESTHESIOLOGY

## 2023-10-16 PROCEDURE — 71000015 HC POSTOP RECOV 1ST HR: Performed by: ORTHOPAEDIC SURGERY

## 2023-10-16 PROCEDURE — 63600175 PHARM REV CODE 636 W HCPCS: Performed by: ORTHOPAEDIC SURGERY

## 2023-10-16 PROCEDURE — D9220A PRA ANESTHESIA: ICD-10-PCS | Mod: CRNA,,, | Performed by: NURSE ANESTHETIST, CERTIFIED REGISTERED

## 2023-10-16 PROCEDURE — D9220A PRA ANESTHESIA: Mod: ANES,,, | Performed by: ANESTHESIOLOGY

## 2023-10-16 PROCEDURE — 26055 INCISE FINGER TENDON SHEATH: CPT | Mod: F7,,, | Performed by: ORTHOPAEDIC SURGERY

## 2023-10-16 PROCEDURE — 26055 PR INCISE FINGER TENDON SHEATH: ICD-10-PCS | Mod: F7,,, | Performed by: ORTHOPAEDIC SURGERY

## 2023-10-16 PROCEDURE — 37000009 HC ANESTHESIA EA ADD 15 MINS: Performed by: ORTHOPAEDIC SURGERY

## 2023-10-16 RX ORDER — LIDOCAINE HYDROCHLORIDE 10 MG/ML
INJECTION, SOLUTION EPIDURAL; INFILTRATION; INTRACAUDAL; PERINEURAL
Status: DISCONTINUED | OUTPATIENT
Start: 2023-10-16 | End: 2023-10-16 | Stop reason: HOSPADM

## 2023-10-16 RX ORDER — HALOPERIDOL 5 MG/ML
0.5 INJECTION INTRAMUSCULAR EVERY 10 MIN PRN
Status: DISCONTINUED | OUTPATIENT
Start: 2023-10-16 | End: 2023-10-16 | Stop reason: HOSPADM

## 2023-10-16 RX ORDER — BACITRACIN ZINC 500 UNIT/G
OINTMENT (GRAM) TOPICAL
Status: DISCONTINUED | OUTPATIENT
Start: 2023-10-16 | End: 2023-10-16 | Stop reason: HOSPADM

## 2023-10-16 RX ORDER — PROPOFOL 10 MG/ML
VIAL (ML) INTRAVENOUS
Status: DISCONTINUED | OUTPATIENT
Start: 2023-10-16 | End: 2023-10-16

## 2023-10-16 RX ORDER — DEXAMETHASONE SODIUM PHOSPHATE 4 MG/ML
INJECTION, SOLUTION INTRA-ARTICULAR; INTRALESIONAL; INTRAMUSCULAR; INTRAVENOUS; SOFT TISSUE
Status: DISCONTINUED | OUTPATIENT
Start: 2023-10-16 | End: 2023-10-16

## 2023-10-16 RX ORDER — SODIUM CHLORIDE 0.9 % (FLUSH) 0.9 %
10 SYRINGE (ML) INJECTION
Status: DISCONTINUED | OUTPATIENT
Start: 2023-10-16 | End: 2023-10-16 | Stop reason: HOSPADM

## 2023-10-16 RX ORDER — ONDANSETRON 2 MG/ML
INJECTION INTRAMUSCULAR; INTRAVENOUS
Status: DISCONTINUED | OUTPATIENT
Start: 2023-10-16 | End: 2023-10-16

## 2023-10-16 RX ORDER — PROPOFOL 10 MG/ML
VIAL (ML) INTRAVENOUS CONTINUOUS PRN
Status: DISCONTINUED | OUTPATIENT
Start: 2023-10-16 | End: 2023-10-16

## 2023-10-16 RX ORDER — MUPIROCIN 20 MG/G
OINTMENT TOPICAL 2 TIMES DAILY
Status: DISCONTINUED | OUTPATIENT
Start: 2023-10-16 | End: 2023-10-16 | Stop reason: HOSPADM

## 2023-10-16 RX ORDER — MUPIROCIN 20 MG/G
OINTMENT TOPICAL
Status: DISPENSED | OUTPATIENT
Start: 2023-10-16

## 2023-10-16 RX ORDER — LIDOCAINE HYDROCHLORIDE 20 MG/ML
INJECTION INTRAVENOUS
Status: DISCONTINUED | OUTPATIENT
Start: 2023-10-16 | End: 2023-10-16

## 2023-10-16 RX ORDER — HYDROCODONE BITARTRATE AND ACETAMINOPHEN 5; 325 MG/1; MG/1
1 TABLET ORAL EVERY 4 HOURS PRN
Status: DISCONTINUED | OUTPATIENT
Start: 2023-10-16 | End: 2023-10-16 | Stop reason: HOSPADM

## 2023-10-16 RX ORDER — ACETAMINOPHEN 500 MG
1000 TABLET ORAL ONCE
Status: COMPLETED | OUTPATIENT
Start: 2023-10-16 | End: 2023-10-16

## 2023-10-16 RX ORDER — FENTANYL CITRATE 50 UG/ML
25 INJECTION, SOLUTION INTRAMUSCULAR; INTRAVENOUS EVERY 5 MIN PRN
Status: DISCONTINUED | OUTPATIENT
Start: 2023-10-16 | End: 2023-10-16 | Stop reason: HOSPADM

## 2023-10-16 RX ORDER — OXYCODONE HYDROCHLORIDE 5 MG/1
5 TABLET ORAL
Status: DISCONTINUED | OUTPATIENT
Start: 2023-10-16 | End: 2023-10-16 | Stop reason: HOSPADM

## 2023-10-16 RX ORDER — BUPIVACAINE HYDROCHLORIDE 2.5 MG/ML
INJECTION, SOLUTION EPIDURAL; INFILTRATION; INTRACAUDAL
Status: DISCONTINUED | OUTPATIENT
Start: 2023-10-16 | End: 2023-10-16 | Stop reason: HOSPADM

## 2023-10-16 RX ADMIN — MUPIROCIN: 20 OINTMENT TOPICAL at 07:10

## 2023-10-16 RX ADMIN — LIDOCAINE HYDROCHLORIDE 50 MG: 20 INJECTION INTRAVENOUS at 09:10

## 2023-10-16 RX ADMIN — CEFAZOLIN 2 G: 2 INJECTION, POWDER, FOR SOLUTION INTRAMUSCULAR; INTRAVENOUS at 09:10

## 2023-10-16 RX ADMIN — PROPOFOL 80 MG: 10 INJECTION, EMULSION INTRAVENOUS at 09:10

## 2023-10-16 RX ADMIN — ACETAMINOPHEN 1000 MG: 500 TABLET ORAL at 07:10

## 2023-10-16 RX ADMIN — DEXAMETHASONE SODIUM PHOSPHATE 4 MG: 4 INJECTION, SOLUTION INTRAMUSCULAR; INTRAVENOUS at 09:10

## 2023-10-16 RX ADMIN — PROPOFOL 150 MCG/KG/MIN: 10 INJECTION, EMULSION INTRAVENOUS at 09:10

## 2023-10-16 RX ADMIN — SODIUM CHLORIDE: 9 INJECTION, SOLUTION INTRAVENOUS at 08:10

## 2023-10-16 RX ADMIN — ONDANSETRON 4 MG: 2 INJECTION INTRAMUSCULAR; INTRAVENOUS at 09:10

## 2023-10-16 NOTE — ANESTHESIA POSTPROCEDURE EVALUATION
Anesthesia Post Evaluation    Patient: Lissy Palencia    Procedure(s) Performed: Procedure(s) (LRB):  RELEASE, TRIGGER FINGER,RIGHT LONG AND RING FINGER (Right)    Final Anesthesia Type: general      Patient location during evaluation: PACU  Patient participation: Yes- Able to Participate  Level of consciousness: awake and alert  Post-procedure vital signs: reviewed and stable  Pain management: adequate  Airway patency: patent    PONV status at discharge: No PONV  Anesthetic complications: no      Cardiovascular status: blood pressure returned to baseline  Respiratory status: unassisted  Hydration status: euvolemic  Follow-up not needed.          Vitals Value Taken Time   /67 10/16/23 1017   Temp 36.4 °C (97.6 °F) 10/16/23 0936   Pulse 50 10/16/23 1030   Resp 20 10/16/23 1030   SpO2 98 % 10/16/23 1030         Event Time   Out of Recovery 10:35:00         Pain/Sahil Score: Pain Rating Prior to Med Admin: 0 (10/16/2023  7:52 AM)  Sahil Score: 10 (10/16/2023 10:30 AM)

## 2023-10-16 NOTE — DISCHARGE INSTRUCTIONS
AFTER HAND SURGERY    DOS:  Keep affected wrist elevated above the level of your heart  Exercise fingers to promote circulation.  Advance diet as tolerated  Resume home medications today.  Keep dressing clean, dry, and intact till clinic visit.      DONT:  No driving for 24 hours or while taking narcotic pain medication  Don't remove dressing or get dressing wet.       CALL PHYSICIAN FOR:  Obvious bleeding  Excessive swelling  Drainage (pus) from the wound  Pain unrelieved by pain medication.             HAND SURGERY  Care of the Hand after Surgery       Post-Op Care  It is important to follow your orthopaedic surgeon's instructions carefully after you return home. You should ask   someone to check on you that evening. The protocols described here are general in nature. Every person and   every surgery is different so the information given here is for guidance only. If you have questions you should   contact us.   Day of Surgery  The hand may be placed in an ace wrap or a hard splint to protect any surgical repair that was performed at the time of surgery. Do not remove the splint unless advised to do so by your doctor.   The hand and fingers may be numb for quite some time after surgery. This is due to the local anesthetic used at the time of surgery for pain control.  Begin taking liquids and food as soon as you can. You should always eat some solid food, a sandwich or light meal, a little while before taking your pain meds.   Day 3  Things are much the same on the third day after your surgery. Usually you have less pain and feel like doing more. It is important to keep the incision absolutely dry while showering or bathing (use two plastic bags over your hand). If you feel that the pain medication you were given after surgery is stronger than you really need you can reduce the dose, take it less frequently or switch to ibuprofen or Tylenol. If you received antibiotics they should be taken until the entire  prescription is completed.  Day 10-14  We will see you back after your surgery and remove your stitches. We will review with you what was done in surgery and will talk about rehab and answer any questions you may have.        HAND SURGERY  You can drive if you are comfortable and have regained full finger movements and if you have sufficient power to control the vehicle. Timing of your return to work is variable according to your occupation and specific surgery. We should discuss this at your follow up visit.  Hand elevation is important to prevent swelling and stiffness of the fingers. One minute of leaving your hand dangling negates four hours of keeping it elevated. Please remember not to walk with your hand hanging down or to sit with your hand resting in your lap. It is fine, however, to lower your hand for light use and you should get back to normal light activities as soon as possible as guided by common sense. There are a number of exercises you should do to prevent stiffness.              Post-operative exercises  Bend your fingers  Relax your hand. Start with your fingers straight and close together. Bend the end and middle joints of your fingers. Keep your wrist and knuckles straight. Moving slowly and smoothly, return your hand to the starting position. Repeat with your other hand. If you can, perform multiple repetitions of this exercise on each hand.  Open your hand wide                     Spread your fingers apart as wide as you can and hold that position. Slowly relax your fingers and bring them together. Return to the open-wide position. Repeat with each hand and gradually add to the number of repetitions.         HAND SURGERY                  Post-operative Exercises  Make a fist  Start with your fingers straight and spread apart. Make a loose, gentle fist and wrap your thumb around the outside of your fingers. Be careful not to squeeze your fingers together too tightly. Moving slowly and  smoothly, return to the starting position. Repeat. Perform this exercise on both hands.  Touch your fingertips  Straighten your fingers and thumb. Bend your thumb across your palm, touching the tip of your thumb to the pad of your hand just below your pinky finger. If you can't make your thumb touch, just stretch as far as you can. Return your thumb to its starting position, as shown in images 1 through 3.  For the next exercise, form the letter O by touching your thumb to each fingertip, as shown in images 4 through 6. Moving slowly and smoothly, touch your index finger to your thumb, then straighten your fingers. Touch your middle finger to your thumb and straighten. Follow with your ring and pinky fingers.  Walk your fingers  Rest your hand on a flat surface, such as a tabletop, with your palm facing down and your fingers spread slightly apart. Moving one finger at a time, slowly walk your fingers toward your thumb. Start by lifting and moving your index finger toward your thumb. Follow by lifting and moving your middle finger toward your thumb. Proceed with moving your ring finger and then your pinky finger toward your thumb. Don't move your wrist or thumb while doing this exercise. Repeat with your other hand.            HAND SURGERY    Common Concerns and Frequently Asked Questions      When to Call the Doctor  Pain, burning, or numbness of the fingers or the back of the hand not relieved by elevation of the arm  Pale or cold finger; bluish nail beds  Red line going up the arm  Excessive swelling  Fever over 101ºF  Pain unrelieved by pain medication      Trigger Finger Release   It is important to begin moving the affected finger immediately. Keep the incision dry until your follow up appointment (see above).  The outcome after surgery is very favorable, with complete resolution of triggering in up to 97%.  Formal rehabilitation is generally not needed following this procedure.          HAND SURGERY    Tips  "for one armed living  It helps to have...   In the shower   Plastic bags and rubber bands to cover bandages - the bags that newspapers come in are good to cover the hand and wrist. Otherwise small trash can liners will do. Use two at a time.   Bottle sponge (soft sponge on a long stick) - for the armpit of your "good" hand.   Shower brush   A hair brush in the shower will help you to wash your hair.   Cotton tiffanie cloth bathrobe - to dry your back.    In the bathroom   Toothpaste, shampoo, etc. in flip-top or pump (not screw top) dispensers.   Consider an electric razor.   Flossers (dental floss on a "Y" shaped handle).    In the kitchen   Dycem mat (rubber jar opener mat) - to help open jars, but also keep things from sliding around while you are working on them.    Double suction cup pads ("little Octopus") - to hold items while you use or wash them.   Electric can opener with a lid magnet strong enough to hold the can in the air - for one handed use.   In the bedroom   Back scratcher.   Large sleeve shirts and tops.   Put away clothing which buttons, fastens or snaps in the back or which uses drawstrings.    Sports bra or a camisole instead of a bra.   [employer] nylons can be pulled on one handed - most others can't.   A "wash and wear" haircut.   "

## 2023-10-16 NOTE — OP NOTE
DATE OF PROCEDURE: 10/16/23  SERVICE: Orthopedics.   ATTENDING SURGEON: Shaneka Richardson M.D.   ASSISTANT SURGEON: Red Briggs MD Fellow  PREOPERATIVE DIAGNOSIS:right long trigger finger.   POSTOPERATIVE DIAGNOSIS: l  right long finger trigger finger.   PROCEDURE: right long finger A1 pulley release.   ANESTHESIA: Local placed by surgeon and MAC.   FLUID: Lactated Ringer's.   BLOOD LOSS: None, no blood was given.   TOURNIQUET TIME: 10 minutes.   PACKS AND DRAINS: None.   IMPLANTS: None.   SPECIMENS: None.   COMPLICATIONS: None.   INDICATIONS FOR PROCEDURE: Lissy Palencia is a 78 y.o.year-old who has failed   conservative treatment for  right long finger trigger finger; therefore,   operative intervention was deemed necessary. Risks and benefits were explained   to the patient in clinic. Consents were performed in clinic.   PROCEDURE IN DETAIL: After the correct site was marked with the patient's   participation in the Holding Area, the patient was brought to the Operating   Room, placed in supine position, underwent MAC anesthesia. A well-padded   nonsterile tourniquet was placed on the right forearm. Time-out was called for   correct site, procedure and patient to be indicated. Under sterile conditions,   an injection of lidocaine 1% without epinephrine was injected at the A1 pulley   space. The arm was prepped and draped in normal sterile fashion. Incision was   marked out in a longitudinal fashion along the pulley. The arm was   exsanguinated using Esmarch. Tourniquet was insufflated 250 mmHg and that is   where it remained for 10 minutes. The incision was made. Careful dissection   down was maintained to the A1 pulley. The neurovascular structures were   retracted out of the way. The A1 pulley was identified. It was sharply   incised. It was completely incised proximally and distally. Portion of    pulley was also incised. The tendon was then retracted out of the tendon sheath   using a right angle. The  finger was placed through range of motion, showed it   did not trigger. The area was irrigated with copious amounts of normal saline.   Nylon closed the skin. Sterile dressing was applied. Tourniquet was deflated.   Brisk cap refill ensued. The patient was transferred the Recovery Room in   stable condition.   POSTOPERATIVE PLAN FOR THIS PATIENT: The patient is to keep the dressing clean, dry and   intact. We will take the sutures out at two weeks.

## 2023-10-16 NOTE — TRANSFER OF CARE
"Anesthesia Transfer of Care Note    Patient: Lsisy Palencia    Procedure(s) Performed: Procedure(s) (LRB):  RELEASE, TRIGGER FINGER,RIGHT LONG AND RING FINGER (Right)    Patient location: PACU    Anesthesia Type: general    Transport from OR: Transported from OR on 6-10 L/min O2 by face mask with adequate spontaneous ventilation    Post pain: adequate analgesia    Post assessment: no apparent anesthetic complications    Post vital signs: stable    Level of consciousness: awake, alert and oriented    Nausea/Vomiting: no nausea/vomiting    Complications: none    Transfer of care protocol was followed      Last vitals:   Visit Vitals  BP (!) 148/67 (BP Location: Left arm, Patient Position: Lying)   Pulse 60   Temp 36.4 °C (97.6 °F) (Temporal)   Resp 16   Ht 5' 2" (1.575 m)   Wt 65.8 kg (145 lb)   SpO2 95%   Breastfeeding No   BMI 26.52 kg/m²     "

## 2023-10-16 NOTE — TELEPHONE ENCOUNTER
Spoke to patient and informed her that swelling and numbness is normal after sx. Advised her to elevate her hand, ice and move the fingers around. If it persist then to call us back tomorrow----- Message from Cheryl Toth sent at 10/16/2023  3:18 PM CDT -----  Regarding: pt call back  Name of Who is Calling: pt        What is the request in detail: pt's hand that she had surgery on is numb tingling and swollen, would like a call back, please advise.        Can the clinic reply by [facility]: no          [facility] [phone]

## 2023-10-16 NOTE — PLAN OF CARE
Discharge instructions given and explained to patient and spouse with verbalization of understanding all instructions. Patient is AAOx3,v/s stable, denies n/v and tolerating po, rates pain level tolerable, IV removed, and family at bedside. Patient discharged to home. Medications delivered to bedside. Patient transported off unit via wheelchair to private vehicle with family.

## 2023-10-16 NOTE — BRIEF OP NOTE
[facility] ([facility])  Brief Operative Note    Surgery Date: 10/16/2023     Surgeon(s) and Role:     * Shaneka Richardson MD - Primary    Assisting Surgeon: None    Pre-op Diagnosis:  Trigger middle finger of right hand [M65.331]  Trigger ring finger of right hand [M65.341]  Pain [R52]    Post-op Diagnosis:  Post-Op Diagnosis Codes:     * Trigger middle finger of right hand [M65.331]     * Trigger ring finger of right hand [M65.341]     * Pain [R52]    Procedure(s) (LRB):  RELEASE, TRIGGER FINGER,RIGHT LONG AND RING FINGER (Right)    Anesthesia: Local MAC    Operative Findings: right long trigger finger release    Estimated Blood Loss: * No values recorded between 10/16/2023  9:17 AM and 10/16/2023  9:35 AM *         Specimens:   Specimen (24h ago, onward)      None              Discharge Note    OUTCOME: Patient tolerated treatment/procedure well without complication and is now ready for discharge.    DISPOSITION: Home or Self Care    FINAL DIAGNOSIS:  Trigger finger of right hand    FOLLOWUP: In clinic    DISCHARGE INSTRUCTIONS:    Discharge Procedure Orders   Diet general     Call MD for:  temperature >100.4     Call MD for:  persistent nausea and vomiting     Call MD for:  severe uncontrolled pain     Call MD for:  difficulty breathing, headache or visual disturbances     Call MD for:  redness, tenderness, or signs of infection (pain, swelling, redness, odor or green/yellow discharge around incision site)     Call MD for:  hives     Call MD for:  persistent dizziness or light-headedness     Call MD for:  extreme fatigue     Keep surgical extremity elevated     No driving, operating heavy equipment or signing legal documents while taking pain medication.

## 2023-10-16 NOTE — INTERVAL H&P NOTE
The patient has been examined and the H&P has been reviewed:    I concur with the findings and no changes have occurred since H&P was written.    Anesthesia/Surgery risks, benefits and alternative options discussed and understood by patient/family.          Active Hospital Problems    Diagnosis  POA    *Trigger finger of right hand [M65.30]  Yes      Resolved Hospital Problems   No resolved problems to display.

## 2023-10-30 ENCOUNTER — TELEPHONE (OUTPATIENT)
Dept: ORTHOPEDICS | Facility: CLINIC | Age: 78
End: 2023-10-30
Payer: MEDICARE

## 2023-10-30 NOTE — TELEPHONE ENCOUNTER
Spoke c Pt. Confirmed appt. location & time on 11/06/23, angy Doyle.  Pt. expressed understanding & was thankful.

## 2023-10-31 ENCOUNTER — OFFICE VISIT (OUTPATIENT)
Dept: ORTHOPEDICS | Facility: CLINIC | Age: 78
End: 2023-10-31
Payer: MEDICARE

## 2023-10-31 ENCOUNTER — HOSPITAL ENCOUNTER (OUTPATIENT)
Dept: RADIOLOGY | Facility: OTHER | Age: 78
Discharge: HOME | End: 2023-10-31
Attending: PHYSICIAN ASSISTANT
Payer: MEDICARE

## 2023-10-31 VITALS — BODY MASS INDEX: 26.69 KG/M2 | WEIGHT: 145.06 LBS | HEIGHT: 62 IN

## 2023-10-31 DIAGNOSIS — R52 PAIN: ICD-10-CM

## 2023-10-31 DIAGNOSIS — Z98.890 POST-OPERATIVE STATE: Primary | ICD-10-CM

## 2023-10-31 DIAGNOSIS — R52 PAIN: Primary | ICD-10-CM

## 2023-10-31 PROCEDURE — 99999 PR PBB SHADOW E&M-EST. PATIENT-LVL IV: ICD-10-PCS | Mod: PBBFAC,,, | Performed by: PHYSICIAN ASSISTANT

## 2023-10-31 PROCEDURE — 73130 X-RAY EXAM OF HAND: CPT | Mod: 26,RT,, | Performed by: RADIOLOGY

## 2023-10-31 PROCEDURE — 99024 PR POST-OP FOLLOW-UP VISIT: ICD-10-PCS | Mod: POP,,, | Performed by: PHYSICIAN ASSISTANT

## 2023-10-31 PROCEDURE — 73130 X-RAY EXAM OF HAND: CPT | Mod: TC,FY,RT

## 2023-10-31 PROCEDURE — 99999 PR PBB SHADOW E&M-EST. PATIENT-LVL IV: CPT | Mod: PBBFAC,,, | Performed by: PHYSICIAN ASSISTANT

## 2023-10-31 PROCEDURE — 99024 POSTOP FOLLOW-UP VISIT: CPT | Mod: POP,,, | Performed by: PHYSICIAN ASSISTANT

## 2023-10-31 PROCEDURE — 99214 OFFICE O/P EST MOD 30 MIN: CPT | Mod: PBBFAC | Performed by: PHYSICIAN ASSISTANT

## 2023-10-31 PROCEDURE — 73130 XR HAND COMPLETE 3 VIEW RIGHT: ICD-10-PCS | Mod: 26,RT,, | Performed by: RADIOLOGY

## 2023-10-31 NOTE — PROGRESS NOTES
"Ms. Palencia is here today for a post-operative visit.  She is 15 days status post right long trigger finger release by Dr. Richardson on 10/16/23. She reports that she is doing okay. She did have a fall a couple days ago. She reports pain in the long PIP and DIP that is improved with moving the joints. She denies fever, chills, and sweats since the time of the surgery.     Physical exam:    Vitals:    10/31/23 0847   Weight: 65.8 kg (145 lb 1 oz)   Height: 5' 2" (1.575 m)   PainSc:   4   PainLoc: Hand     Vital signs are stable, patient is afebrile.  Patient is well dressed and well groomed, no acute distress.  Alert and oriented to person, place, and time.  Post op dressing taken down.  Incision is clean, dry and intact.  There is no erythema or exudate.  There is no sign of any infection. She is NVI. Sutures removed without difficulty.  Ttp at the PIP and DIP of the long finger as well as the PIP of the index and ring. Good ROM.    Assessment:  status post right long trigger finger release by Dr. Richardson on 10/16/23    Plan:  Lissy was seen today for swelling, numbness and pain.    Diagnoses and all orders for this visit:    Post-operative state  -     Ambulatory referral/consult to Physical/Occupational Therapy; Future      PO instruction reviewed and provided to patient  Plan for xray of the right hand today due to fall rule out fracture though discussed with pt likely arthritis    Therapy ordered  RTC 4 wks         "

## 2023-11-02 ENCOUNTER — OFFICE VISIT (OUTPATIENT)
Dept: PRIMARY CARE CLINIC | Facility: CLINIC | Age: 78
End: 2023-11-02
Payer: MEDICARE

## 2023-11-02 ENCOUNTER — HOSPITAL ENCOUNTER (OUTPATIENT)
Dept: RADIOLOGY | Facility: HOSPITAL | Age: 78
Discharge: HOME | End: 2023-11-02
Attending: NURSE PRACTITIONER
Payer: MEDICARE

## 2023-11-02 VITALS
SYSTOLIC BLOOD PRESSURE: 122 MMHG | DIASTOLIC BLOOD PRESSURE: 60 MMHG | BODY MASS INDEX: 28.19 KG/M2 | OXYGEN SATURATION: 97 % | WEIGHT: 154.13 LBS | HEART RATE: 66 BPM

## 2023-11-02 DIAGNOSIS — M54.9 ACUTE BILATERAL BACK PAIN, UNSPECIFIED BACK LOCATION: ICD-10-CM
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
## 2023-11-02 DIAGNOSIS — M54.2 NECK PAIN: ICD-10-CM

## 2023-11-02 DIAGNOSIS — M54.2 NECK PAIN: Primary | ICD-10-CM

## 2023-11-02 DIAGNOSIS — S09.90XA HEAD TRAUMA, INITIAL ENCOUNTER: ICD-10-CM

## 2023-11-02 PROCEDURE — 72070 X-RAY EXAM THORAC SPINE 2VWS: CPT | Mod: TC

## 2023-11-02 PROCEDURE — 70450 CT HEAD/BRAIN W/O DYE: CPT | Mod: 26,,, | Performed by: RADIOLOGY

## 2023-11-02 PROCEDURE — 72050 X-RAY EXAM NECK SPINE 4/5VWS: CPT | Mod: TC

## 2023-11-02 PROCEDURE — 99215 OFFICE O/P EST HI 40 MIN: CPT | Mod: PBBFAC | Performed by: NURSE PRACTITIONER

## 2023-11-02 PROCEDURE — 72050 X-RAY EXAM NECK SPINE 4/5VWS: CPT | Mod: 26,,, | Performed by: RADIOLOGY

## 2023-11-02 PROCEDURE — 72110 X-RAY EXAM L-2 SPINE 4/>VWS: CPT | Mod: 26,,, | Performed by: RADIOLOGY

## 2023-11-02 PROCEDURE — 99214 OFFICE O/P EST MOD 30 MIN: CPT | Mod: S$PBB,,, | Performed by: NURSE PRACTITIONER

## 2023-11-02 PROCEDURE — 72110 XR LUMBAR SPINE COMPLETE 5 VIEW: ICD-10-PCS | Mod: 26,,, | Performed by: RADIOLOGY

## 2023-11-02 PROCEDURE — 70450 CT HEAD/BRAIN W/O DYE: CPT | Mod: TC

## 2023-11-02 PROCEDURE — 72070 XR THORACIC SPINE AP LATERAL: ICD-10-PCS | Mod: 26,,, | Performed by: RADIOLOGY

## 2023-11-02 PROCEDURE — 99999 PR PBB SHADOW E&M-EST. PATIENT-LVL V: ICD-10-PCS | Mod: PBBFAC,,, | Performed by: NURSE PRACTITIONER

## 2023-11-02 PROCEDURE — 99999 PR PBB SHADOW E&M-EST. PATIENT-LVL V: CPT | Mod: PBBFAC,,, | Performed by: NURSE PRACTITIONER

## 2023-11-02 PROCEDURE — 72050 XR CERVICAL SPINE COMPLETE 5 VIEW: ICD-10-PCS | Mod: 26,,, | Performed by: RADIOLOGY

## 2023-11-02 PROCEDURE — 99214 PR OFFICE/OUTPT VISIT, EST, LEVL IV, 30-39 MIN: ICD-10-PCS | Mod: S$PBB,,, | Performed by: NURSE PRACTITIONER

## 2023-11-02 PROCEDURE — 72070 X-RAY EXAM THORAC SPINE 2VWS: CPT | Mod: 26,,, | Performed by: RADIOLOGY

## 2023-11-02 PROCEDURE — 72110 X-RAY EXAM L-2 SPINE 4/>VWS: CPT | Mod: TC

## 2023-11-02 PROCEDURE — 72220 XR SACRUM AND COCCYX: ICD-10-PCS | Mod: 26,,, | Performed by: RADIOLOGY

## 2023-11-02 PROCEDURE — 72220 X-RAY EXAM SACRUM TAILBONE: CPT | Mod: 26,,, | Performed by: RADIOLOGY

## 2023-11-02 PROCEDURE — 70450 CT HEAD WITHOUT CONTRAST: ICD-10-PCS | Mod: 26,,, | Performed by: RADIOLOGY

## 2023-11-02 PROCEDURE — 72220 X-RAY EXAM SACRUM TAILBONE: CPT | Mod: TC

## 2023-11-02 RX ORDER — TIZANIDINE 2 MG/1
2 TABLET ORAL DAILY PRN
Qty: 20 TABLET | Refills: 0 | Status: SHIPPED | OUTPATIENT
Start: 2023-11-02 | End: 2023-11-17

## 2023-11-02 NOTE — PROGRESS NOTES
"[facility] Note    Chief Complaint      Chief Complaint   Patient presents with    Neck Pain    Shoulder Pain       History of Present Illness      Lissy Palencia is a 78 y.o. female with chronic conditions of  tremor, aortic atherosclerosis, cad, hld, osteoporosis, hypothyroidism, DM2, gerd, chronic low  back pain, trigger finger of right hand, kevin, who presents today for: pt fell at [employer] on 10/29/23. Pt stated she slipped backwards on "grease and water on the floor" did not loose consciousness. denies headache, vomiting. Did not go to ER. Pt complaining of upper neck pain that radiates up towards head and down to bilateral shoulders, and upper and lower back pain that does not radiate to bilateral legs. Worse with movement, better at rest. Describes as sore 6/10. Pt also complaining of right hand pain after fall. Saw Dr. Uriostegui had an x-ray no evidence of a fracture Taking ibuprofen with minimal relief. Denies bowel or bladder incontinence.   Denies chest pain/sob.    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       Past Surgical History:   has a past surgical history that includes Tonsillectomy; Shoulder arthroscopy w/ rotator cuff repair; Total abdominal hysterectomy; Adenoidectomy; Tubal ligation; heart cath (2005); Cholecystectomy; Oophorectomy; Ablation colpoclesis; Coronary stent placement; Esophagogastroduodenoscopy (N/A, 11/28/2018); Colonoscopy (N/A, 11/28/2018); Cataract extraction w/  intraocular lens implant (Right, 11/23/2022); Cataract extraction w/  intraocular lens implant " "(Left, 12/22/2022); Intraocular lens exchange (Left, 6/14/2023); and Trigger finger release (Right, 10/16/2023).    Family History:  family history is not on file.     Social History:  Social History     Tobacco Use    Smoking status: Never    Smokeless tobacco: Never   Substance Use Topics    Alcohol use: Yes     Alcohol/week: 1.0 standard drink of alcohol     Types: 1 Glasses of wine per week     Comment: "maybe a glass of wine every 6 months"    Drug use: No       Review of Systems   Constitutional:  Negative for chills and fever.   Eyes:  Positive for pain. Negative for double vision.   Respiratory:  Negative for cough and shortness of breath.    Cardiovascular:  Negative for chest pain and palpitations.   Gastrointestinal:  Negative for constipation, diarrhea, nausea and vomiting.   Genitourinary:  Negative for dysuria and hematuria.   Musculoskeletal:  Positive for back pain, joint pain and neck pain. Negative for falls.   Neurological:  Positive for dizziness. Negative for headaches.        Medications:  Outpatient Encounter Medications as of 11/2/2023   Medication Sig Note Dispense Refill    alendronate (FOSAMAX) 70 MG tablet TAKE 1 TABLET EVERY 7 DAYS ON MONDAYS, THEN AFTER AN HOUR TAKE YOUR THYROID TABLET, 30 MINUTES LATER YOUR PANTOPRAZOLE (Patient taking differently: Tues)  12 tablet 3    amoxicillin (AMOXIL) 875 MG tablet Take 875 mg by mouth 2 (two) times daily.       aspirin (ECOTRIN) 81 MG EC tablet Take 81 mg by mouth once daily. 6/8/2023: Take AM of surgrery      atorvastatin (LIPITOR) 80 MG tablet Take 1 tablet (80 mg total) by mouth once daily. TAKE 1 TABLET DAILY 6/8/2023: Take AM of surgery 90 tablet 3    azelastine (ASTELIN) 137 mcg (0.1 %) nasal spray 1 spray (137 mcg total) by Nasal route 2 (two) times daily.  30 mL 3    blood sugar diagnostic Strp To check BG ONCE daily, to use with insurance preferred meter  100 strip 3    blood-glucose meter kit To check BG ONCE THE daily, to use with " insurance preferred meter  1 each 1    cycloSPORINE (RESTASIS) 0.05 % ophthalmic emulsion Place 1 drop into both eyes 2 (two) times daily.  90 each 11    flu vac 2022 65up-lnbKR26C,PF, (FLUAD QUAD 2022-23,65Y UP,,PF,) 60 mcg (15 mcg x 4)/0.5 mL Syrg Inject into the muscle.  0.5 mL 0    ketorolac 0.5% (ACULAR) 0.5 % Drop Place 1 drop into the left eye 3 (three) times daily.  5 mL 3    lancets Misc To check BG ONCE daily, to use with insurance preferred meter  100 each 3    levothyroxine (SYNTHROID) 88 MCG tablet TAKE FIRST THING IN THE MORNING ON AN EMPTY STOMACH 6/8/2023: Take AM of surgery 90 tablet 3    LIDOcaine (LIDODERM) 5 % Place onto the skin once daily.  30 patch 0    metFORMIN (GLUCOPHAGE) 500 MG tablet TAKE 1 TABLET DAILY WITH BREAKFAST  90 tablet 3    neomycin-polymyxin-hydrocortisone (CORTISPORIN) 3.5-10,000-1 mg/mL-unit/mL-% otic suspension Place 3 drops into the right ear 3 (three) times daily.  10 mL 0    nitroGLYCERIN (NITROSTAT) 0.4 MG SL tablet Place 1 tablet (0.4 mg total) under the tongue every 5 (five) minutes as needed for Chest pain. You can take up to 3 doses at home. If chest pain persists after second dose, go the ER. 6/14/2023: Pt never took med 25 tablet 4    ofloxacin (OCUFLOX) 0.3 % ophthalmic solution Place 1 drop into the left eye 3 (three) times daily.  5 mL 3    pantoprazole (PROTONIX) 40 MG tablet Take 1 tablet (40 mg total) by mouth once daily.  90 tablet 3    prednisolon/gatiflox/bromfenac (PREDNISOL ACE-GATIFLOX-BROMFEN) 1-0.5-0.075 % DrpS Apply 1 drop to eye 3 (three) times daily. One drop 3 times a day in surgical eye  5 mL 3    prednisolon/gatiflox/bromfenac (PREDNISOL ACE-GATIFLOX-BROMFEN) 1-0.5-0.075 % DrpS Apply 1 drop to eye 3 (three) times daily. One drop 3 times a day in surgical eye  5 mL 3    prednisolon/gatiflox/bromfenac (PREDNISOLONE-GATIFLOX-BROMFENAC) 1-0.5-0.09 % ophthalmic solution Place 1 drop into the left eye 3 (three) times daily.  5 mL 3    prednisoLONE  acetate (PRED FORTE) 1 % DrpS Place 1 drop into the left eye 3 (three) times daily.  5 mL 3    RESTASIS 0.05 % ophthalmic emulsion INSTILL 1 DROP IN BOTH EYES TWICE A DAY  60 each 11    traMADoL (ULTRAM) 50 mg tablet Take 1 tablet (50 mg total) by mouth every 6 (six) hours as needed for Pain.  10 tablet 0    vitamin D (VITAMIN D3) 1000 units Tab Take 1 tablet (1,000 Units total) by mouth once daily.  90 tablet 3     Facility-Administered Encounter Medications as of 11/2/2023   Medication Dose Route Frequency Provider Last Rate Last Admin    balanced salt irrigation intra-ocular solution 1 drop  1 drop Left Eye On Call Procedure Ciara Mclaughlin MD        balanced salt irrigation intra-ocular solution 1 drop  1 drop Left Eye On Call Procedure Ciara Mclaughlin MD        mupirocin 2 % ointment   Nasal On Call Procedure Caesar Finch MD   Given at 10/16/23 0752    phenylephrine HCL 10% ophthalmic solution 1 drop  1 drop Left Eye PRN Ciara Mclaughlin MD        phenylephrine HCL 10% ophthalmic solution 1 drop  1 drop Left Eye PRN Ciara Mclaughlin MD        sodium chloride 0.9% flush 2 mL  2 mL Intravenous PRN Ciara Mclaughlin MD        sodium chloride 0.9% flush 2 mL  2 mL Intravenous PRN Ciara Mclaughlin MD           Allergies:  Review of patient's allergies indicates:   Allergen Reactions    Compazine  [prochlorperazine edisylate]      Other reaction(s): SPASMS    Parafon forte      Other reaction(s): Hives       Health Maintenance:  Immunization History   Administered Date(s) Administered    COVID-19, MRNA, LN-S, PF (Pfizer) (Purple Cap) 03/07/2021, 03/26/2021, 11/30/2021    Influenza (FLUAD) - Quadrivalent - Adjuvanted - PF *Preferred* (65+) 10/14/2021    Influenza - High Dose - PF (65 years and older) 10/09/2012, 12/26/2014, 10/05/2016, 01/03/2018, 10/23/2018    Influenza Split 12/01/2008, 11/06/2009, 01/28/2011    Pneumococcal Conjugate - 13 Valent 12/26/2014    Pneumococcal Polysaccharide - 23 Valent  10/05/2016    Tdap 01/03/2018    Zoster 12/26/2014    Zoster Recombinant 09/15/2021, 11/15/2021      Health Maintenance   Topic Date Due    DEXA Scan  09/22/2023    Mammogram  10/26/2023    Eye Exam  11/04/2023    Lipid Panel  01/23/2024    Hemoglobin A1c  03/19/2024    Aspirin/Antiplatelet Therapy  10/31/2024    TETANUS VACCINE  01/03/2028    Hepatitis C Screening  Completed    Shingles Vaccine  Completed        Physical Exam      Vital Signs  Pulse: 66  SpO2: 97 %  BP: 122/60  BP Location: Right arm  Pain Score:   6  Height and Weight  Weight: 69.9 kg (154 lb 1.6 oz)]    Physical Exam  Constitutional:       Appearance: She is well-developed.   HENT:      Head: Normocephalic and atraumatic.      Mouth/Throat:      Mouth: Mucous membranes are moist.   Eyes:      Pupils: Pupils are equal, round, and reactive to light.   Cardiovascular:      Rate and Rhythm: Normal rate and regular rhythm.      Heart sounds: Normal heart sounds. No murmur heard.  Pulmonary:      Effort: Pulmonary effort is normal. No respiratory distress.      Breath sounds: Normal breath sounds.   Abdominal:      General: There is no distension.      Palpations: Abdomen is soft.      Tenderness: There is no abdominal tenderness. There is no guarding.   Musculoskeletal:         General: Tenderness (ttp to bilateral trapezius area, and lower lumbar spine. negative straight leg test.) present. Normal range of motion.      Cervical back: Normal range of motion. Tenderness present.   Skin:     General: Skin is warm and dry.      Findings: No bruising or erythema.   Neurological:      General: No focal deficit present.      Mental Status: She is alert. Mental status is at baseline.   Psychiatric:         Behavior: Behavior normal.          Laboratory:  CBC:      CMP:  Recent Labs   Lab 09/16/21  0735 01/23/23  0900   Glucose 108 124 H   Calcium 9.1 9.9   Albumin 3.9 4.1   Total Protein 7.1 7.6   Sodium 141 142   Potassium 4.3 5.0   CO2 27 26   Chloride 105  106   BUN 19 14   Alkaline Phosphatase 96 97   ALT 22 22   AST 22 20   Total Bilirubin 1.3 H 1.1 H     URINALYSIS:       LIPIDS:  Recent Labs   Lab 01/25/21  0916 06/01/21  0826 09/16/21  0735 06/07/22  0714 01/23/23  0900   TSH  --  0.772 5.870 H  5.870 H 2.235  --    HDL 39 L  --  36 L  --  45   Cholesterol 164  --  153  --  168   Triglycerides 207 H  --  204 H  --  188 H   LDL Cholesterol 83.6  --  76.2  --  85.4   HDL/Cholesterol Ratio 23.8  --  23.5  --  26.8   Non-HDL Cholesterol 125  --  117  --  123   Total Cholesterol/HDL Ratio 4.2  --  4.3  --  3.7     TSH:  Recent Labs   Lab 06/01/21  0826 09/16/21  0735 06/07/22  0714   TSH 0.772 5.870 H  5.870 H 2.235     A1C:  Recent Labs   Lab 01/25/21  0916 03/02/21  0847 06/01/21  0826 09/16/21  0735 06/07/22  0714 09/19/22  0715 09/19/23  1522   Hemoglobin A1C 8.3 H 7.1 H 5.9 H 6.1 H 6.4 H 6.7 H 6.2 H       Assessment/Plan     Lissy Palencia is a 78 y.o.female with:    1. Neck pain  - X-Ray Cervical Spine Complete 5 view; Future  Taking Ibuprofen 600 mg, added a light dose muscle relaxer just at night    2. Acute bilateral back pain, unspecified back location  - X-Ray Lumbar Spine 5 View; Future  - X-Ray Thoracic Spine AP Lateral; Future  - X-Ray Sacrum And Coccyx; Future    3. Fall, initial encounter  - CT Head Without Contrast; Future    4. Head trauma, initial encounter  - CT Head Without Contrast; Future     Will follow up with results and make recs.   Chronic conditions status updated as per HPI.  Other than changes above, cont current medications and maintain follow up with specialists.  No follow-ups on file.    Future Appointments   Date Time Provider Department Center   11/30/2023  9:00 AM Yang Ibrahim PA-C [facility] HAND [facility]   11/30/2023  3:30 PM Melvi Warren PA-C [facility] CARDIO [facility]   12/4/2023  8:00 AM [facility]   12/11/2023  1:15 PM Drew Nicole MD [facility]AM [facility]        KEITH Donohue  [facility]

## 2023-11-07 ENCOUNTER — TELEPHONE (OUTPATIENT)
Dept: PRIMARY CARE CLINIC | Facility: CLINIC | Age: 78
End: 2023-11-07
Payer: MEDICARE
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
## 2023-11-07 DIAGNOSIS — S09.90XA HEAD TRAUMA, INITIAL ENCOUNTER: Primary | ICD-10-CM

## 2023-11-07 DIAGNOSIS — M54.2 NECK PAIN: ICD-10-CM

## 2023-11-07 DIAGNOSIS — M54.2 NECK PAIN: Primary | ICD-10-CM

## 2023-11-07 DIAGNOSIS — M54.9 ACUTE BILATERAL BACK PAIN, UNSPECIFIED BACK LOCATION: ICD-10-CM

## 2023-11-07 RX ORDER — NAPROXEN 500 MG/1
500 TABLET ORAL 2 TIMES DAILY WITH MEALS
Qty: 14 TABLET | Refills: 0 | Status: SHIPPED | OUTPATIENT
Start: 2023-11-07 | End: 2023-11-14

## 2023-11-07 NOTE — TELEPHONE ENCOUNTER
----- Message from Soila Livingston sent at 11/7/2023  9:06 AM CST -----  Contact: [phone]  Name of test: ct, xray and other    Date of test: 11/02/23    Ordering provider: Itzel Rodriguez NP    Where was the test performed: [facility], Radiology    Comments:

## 2023-11-07 NOTE — TELEPHONE ENCOUNTER
Spoke with patient, referred her to Ortho, Neuro, and PT for further evaluation and management. Reviewed all x-rays and CT scan.

## 2023-11-08 ENCOUNTER — IMMUNIZATION (OUTPATIENT)
Dept: INTERNAL MEDICINE | Facility: CLINIC | Age: 78
End: 2023-11-08
Payer: MEDICARE

## 2023-11-08 DIAGNOSIS — Z23 NEED FOR VACCINATION: Primary | ICD-10-CM

## 2023-11-08 PROCEDURE — 99999PBSHW FLU VACCINE - QUADRIVALENT - ADJUVANTED: Mod: PBBFAC,,,

## 2023-11-08 PROCEDURE — 99999PBSHW COVID-19 VAC, MRNA 2023 (MODERNA)(PF) 50 MCG/0.5 ML IM SUSR (12+): ICD-10-PCS | Mod: PBBFAC,,,

## 2023-11-08 PROCEDURE — 91322 SARSCOV2 VAC 50 MCG/0.5ML IM: CPT | Mod: PBBFAC

## 2023-11-08 PROCEDURE — 99999PBSHW FLU VACCINE - QUADRIVALENT - ADJUVANTED: ICD-10-PCS | Mod: PBBFAC,,,

## 2023-11-08 PROCEDURE — 99999PBSHW COVID-19 VAC, MRNA 2023 (MODERNA)(PF) 50 MCG/0.5 ML IM SUSR (12+): Mod: PBBFAC,,,

## 2023-11-08 PROCEDURE — G0008 ADMIN INFLUENZA VIRUS VAC: HCPCS | Mod: PBBFAC

## 2023-11-08 NOTE — PROGRESS NOTES
DATE: 11/17/2023  PATIENT: Lissy Palencia    Supervising Physician: Sage Champion M.D.    CHIEF COMPLAINT: neck and back pain    HISTORY:  Lissy Palencia is a 78 y.o. female with pmhx of osteoporosis here for initial evaluation of left  back and left leg pain (Back - 5, Leg - 2); neck pain (Neck - 5, Arm - 1).  The pain has been present since a fall on Oct. 31st. The patient describes the pain as aching.  The pain is worse with movement and improved by rest and heat. There is no associated numbness and tingling. There is no subjective weakness. Prior treatments have included Tylenol and advil, but no KEITH or surgery. She will start PT on 11/29/23.  The patient denies myelopathic symptoms such as handwriting changes or difficulty with buttons/coins/keys. Denies perineal paresthesias, bowel/bladder dysfunction.    PAST MEDICAL/SURGICAL HISTORY:  Past Medical History:   Diagnosis Date    Allergy     Arthritis     Coronary artery disease     Cystocele 01/19/2016    Diabetes mellitus     GERD (gastroesophageal reflux disease)     Hemorrhoids     History of cholelithiasis     Hypothyroidism     Obesity     Right shoulder pain 10/09/2012    Sleep apnea     Trigger finger, right 3rd finger 07/30/2014    Vaginal atrophy 01/19/2016    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     Past Surgical History:   Procedure Laterality Date    ABLATION COLPOCLESIS      ADENOIDECTOMY      CATARACT EXTRACTION W/  INTRAOCULAR LENS IMPLANT Right 11/23/2022    Procedure: EXTRACTION, CATARACT, WITH IOL INSERTION;  Surgeon: Ciara Mclaughlin MD;  Location: [facility];  Service: Ophthalmology;  Laterality: Right;    CATARACT EXTRACTION W/  INTRAOCULAR LENS IMPLANT Left 12/22/2022    Procedure: EXTRACTION, CATARACT, WITH IOL INSERTION;  Surgeon: Ciara Mclaughlin MD;  Location: [facility] OR;  Service: Ophthalmology;  Laterality: Left;    CHOLECYSTECTOMY      COLONOSCOPY N/A 11/28/2018    Procedure: COLONOSCOPY;  Surgeon: Richi Mcintosh MD;  Location: [facility]  ENDO (4TH FLR);  Service: Endoscopy;  Laterality: N/A;    CORONARY STENT PLACEMENT      ESOPHAGOGASTRODUODENOSCOPY N/A 11/28/2018    Procedure: ESOPHAGOGASTRODUODENOSCOPY (EGD);  Surgeon: Richi Mcintosh MD;  Location: [facility] ENDO (4TH FLR);  Service: Endoscopy;  Laterality: N/A;    heart cath  2005    non-obstructive disease    INTRAOCULAR LENS EXCHANGE Left 6/14/2023    Procedure: REPLACEMENT, IOL;  Surgeon: Ciara Mclaughlin MD;  Location: [facility] OR;  Service: Ophthalmology;  Laterality: Left;    OOPHORECTOMY      SHOULDER ARTHROSCOPY W/ ROTATOR CUFF REPAIR      Right    TONSILLECTOMY      TOTAL ABDOMINAL HYSTERECTOMY      with BSO    TRIGGER FINGER RELEASE Right 10/16/2023    Procedure: RELEASE, TRIGGER FINGER,RIGHT LONG AND RING FINGER;  Surgeon: Shaneka Richardson MD;  Location: [facility] OR;  Service: Orthopedics;  Laterality: Right;    TUBAL LIGATION         Medications:   Current Outpatient Medications on File Prior to Visit   Medication Sig Dispense Refill    alendronate (FOSAMAX) 70 MG tablet TAKE 1 TABLET EVERY 7 DAYS ON MONDAYS, THEN AFTER AN HOUR TAKE YOUR THYROID TABLET, 30 MINUTES LATER YOUR PANTOPRAZOLE (Patient taking differently: Tues) 12 tablet 3    amoxicillin (AMOXIL) 875 MG tablet Take 875 mg by mouth 2 (two) times daily.      aspirin (ECOTRIN) 81 MG EC tablet Take 81 mg by mouth once daily.      atorvastatin (LIPITOR) 80 MG tablet Take 1 tablet (80 mg total) by mouth once daily. TAKE 1 TABLET DAILY 90 tablet 3    azelastine (ASTELIN) 137 mcg (0.1 %) nasal spray 1 spray (137 mcg total) by Nasal route 2 (two) times daily. 30 mL 3    blood sugar diagnostic Strp To check BG ONCE daily, to use with insurance preferred meter 100 strip 3    cycloSPORINE (RESTASIS) 0.05 % ophthalmic emulsion Place 1 drop into both eyes 2 (two) times daily. 90 each 11    flu vac 2022 [id number],PF, (FLUAD QUAD 2022-23,65Y UP,,PF,) 60 mcg (15 mcg x 4)/0.5 mL Syrg Inject into the muscle. 0.5 mL 0    ketorolac 0.5%  (ACULAR) 0.5 % Drop Place 1 drop into the left eye 3 (three) times daily. 5 mL 3    lancets Misc To check BG ONCE daily, to use with insurance preferred meter 100 each 3    levothyroxine (SYNTHROID) 88 MCG tablet TAKE FIRST THING IN THE MORNING ON AN EMPTY STOMACH 90 tablet 3    LIDOcaine (LIDODERM) 5 % Place onto the skin once daily. 30 patch 0    metFORMIN (GLUCOPHAGE) 500 MG tablet TAKE 1 TABLET DAILY WITH BREAKFAST 90 tablet 3    neomycin-polymyxin-hydrocortisone (CORTISPORIN) 3.5-10,000-1 mg/mL-unit/mL-% otic suspension Place 3 drops into the right ear 3 (three) times daily. 10 mL 0    nitroGLYCERIN (NITROSTAT) 0.4 MG SL tablet Place 1 tablet (0.4 mg total) under the tongue every 5 (five) minutes as needed for Chest pain. You can take up to 3 doses at home. If chest pain persists after second dose, go the ER. 25 tablet 4    ofloxacin (OCUFLOX) 0.3 % ophthalmic solution Place 1 drop into the left eye 3 (three) times daily. 5 mL 3    pantoprazole (PROTONIX) 40 MG tablet Take 1 tablet (40 mg total) by mouth once daily. 90 tablet 3    prednisolon/gatiflox/bromfenac (PREDNISOL ACE-GATIFLOX-BROMFEN) 1-0.5-0.075 % DrpS Apply 1 drop to eye 3 (three) times daily. One drop 3 times a day in surgical eye 5 mL 3    prednisolon/gatiflox/bromfenac (PREDNISOL ACE-GATIFLOX-BROMFEN) 1-0.5-0.075 % DrpS Apply 1 drop to eye 3 (three) times daily. One drop 3 times a day in surgical eye 5 mL 3    prednisolon/gatiflox/bromfenac (PREDNISOLONE-GATIFLOX-BROMFENAC) 1-0.5-0.09 % ophthalmic solution Place 1 drop into the left eye 3 (three) times daily. 5 mL 3    prednisoLONE acetate (PRED FORTE) 1 % DrpS Place 1 drop into the left eye 3 (three) times daily. 5 mL 3    RESTASIS 0.05 % ophthalmic emulsion INSTILL 1 DROP IN BOTH EYES TWICE A DAY 60 each 11    traMADoL (ULTRAM) 50 mg tablet Take 1 tablet (50 mg total) by mouth every 6 (six) hours as needed for Pain. 10 tablet 0    vitamin D (VITAMIN D3) 1000 units Tab Take 1 tablet (1,000 Units  "total) by mouth once daily. 90 tablet 3    [DISCONTINUED] tiZANidine (ZANAFLEX) 2 MG tablet Take 1 tablet (2 mg total) by mouth daily as needed (spasms). 20 tablet 0    blood-glucose meter kit To check BG ONCE THE daily, to use with insurance preferred meter 1 each 1     Current Facility-Administered Medications on File Prior to Visit   Medication Dose Route Frequency Provider Last Rate Last Admin    balanced salt irrigation intra-ocular solution 1 drop  1 drop Left Eye On Call Procedure Ciara Mclaughlin MD        balanced salt irrigation intra-ocular solution 1 drop  1 drop Left Eye On Call Procedure Ciara Mclaughlin MD        mupirocin 2 % ointment   Nasal On Call Procedure Caesar Finch MD   Given at 10/16/23 0752    phenylephrine HCL 10% ophthalmic solution 1 drop  1 drop Left Eye Ciara Peña MD        phenylephrine HCL 10% ophthalmic solution 1 drop  1 drop Left Eye PRN Ciara Mclaughlin MD        sodium chloride 0.9% flush 2 mL  2 mL Intravenous PRN Ciara Mclaughlin MD        sodium chloride 0.9% flush 2 mL  2 mL Intravenous PRN Ciara Mclaughlin MD           Social History:   Social History     Socioeconomic History    Marital status:    Occupational History     Employer: [employer]   Tobacco Use    Smoking status: Never    Smokeless tobacco: Never   Substance and Sexual Activity    Alcohol use: Yes     Alcohol/week: 1.0 standard drink of alcohol     Types: 1 Glasses of wine per week     Comment: "maybe a glass of wine every 6 months"    Drug use: No    Sexual activity: Not Currently     Partners: Male     Birth control/protection: None     Social Determinants of Health     Financial Resource Strain: Low Risk  (1/3/2022)    Overall Financial Resource Strain (CARDIA)     Difficulty of Paying Living Expenses: Not hard at all   Food Insecurity: No Food Insecurity (1/3/2022)    Hunger Vital Sign     Worried About Running Out of Food in the Last Year: Never true     Ran Out of " "Food in the Last Year: Never true   Transportation Needs: No Transportation Needs (1/3/2022)    PRAPARE - Transportation     Lack of Transportation (Medical): No     Lack of Transportation (Non-Medical): No   Physical Activity: Sufficiently Active (8/10/2020)    Exercise Vital Sign     Days of Exercise per Week: 6 days     Minutes of Exercise per Session: 60 min   Stress: No Stress Concern Present (1/3/2022)    [employer] [place] [employer] - Occupational Stress Questionnaire     Feeling of Stress : Not at all   Social Connections: Moderately Isolated (1/3/2022)    Social Connection and Isolation Panel [NHANES]     Frequency of Communication with Friends and Family: More than three times a week     Frequency of Social Gatherings with Friends and Family: Once a week     Attends Shinto Services: Never     Active Member of Clubs or Organizations: No     Attends Club or Organization Meetings: Never     Marital Status:    Housing Stability: Low Risk  (1/3/2022)    Housing Stability Vital Sign     Unable to Pay for Housing in the Last Year: No     Number of Places Lived in the Last Year: 1     Unstable Housing in the Last Year: No       REVIEW OF SYSTEMS:  Constitution: Negative. Negative for chills, fever and night sweats.   Cardiovascular: Negative for chest pain and syncope.   Respiratory: Negative for cough and shortness of breath.   Gastrointestinal: See HPI. Negative for nausea/vomiting. Negative for abdominal pain.  Genitourinary: See HPI. Negative for discoloration or dysuria.  Skin: Negative for dry skin, itching and rash.   Hematologic/Lymphatic: Negative for bleeding problem. Does not bruise/bleed easily.   Musculoskeletal: Negative for falls and muscle weakness.   Neurological: See HPI. No seizures.   Endocrine: Negative for polydipsia, polyphagia and polyuria.   Allergic/Immunologic: Negative for hives and persistent infections.     EXAM:  Ht 5' 2" (1.575 m)   Wt 68.6 kg (151 lb 2 oz)   " BMI 27.64 kg/m²     PHYSICAL EXAMINATION:    General: The patient is a 78 y.o. female in no apparent distress, the patient is oriented to person, place and time.  Psych: Normal mood and affect  HEENT: Vision grossly intact, hearing intact to the spoken word.  Lungs: Respirations unlabored.  Gait: Normal station and gait, no difficulty with toe or heel walk.   Skin: Cervical skin and dorsal lumbar skin negative for rashes, lesions, hairy patches and surgical scars.    Range of motion: Cervical and lumbar range of motion is acceptable. There is mild tenderness to palpation of the paracervical muscles.  There is mild lumbar tenderness to palpation.  Spinal Balance: Global saggital and coronal spinal balance acceptable, no significant for scoliosis and kyphosis.  Musculoskeletal: No pain with the range of motion of the bilateral shoulders and elbows. Normal bulk and contour of the bilateral hands.  No pain with the range of motion of the bilateral hips. Mild bilateral trochanteric tenderness to palpation.  Vascular: Bilateral upper and lower extremities warm and well perfused, radial pulses 2+ bilaterally, dorsalis pedis pulses 2+ bilaterally.  Neurological: Normal strength and tone in all major motor groups in the bilateral upper and lower extremities. Normal sensation to light touch in the C5-T1 and L2-S1 dermatomes bilaterally.  Deep tendon reflexes symmetric 2+ in the bilateral upper and lower extremities.  Negative Inverted Radial Reflex and Zamora's bilaterally. Negative Babinski bilaterally. Negative straight leg raise bilaterally.     IMAGING:   Today I personally reviewed AP, Lat and Flex/Ex  upright C-spine films that demonstrate severe DDD from C4-7.    AP, Lat and Flex/Ex upright lumbar spine films demonstrate severe DDD at L4/5.     Body mass index is 27.64 kg/m².    Hemoglobin A1C   Date Value Ref Range Status   09/19/2023 6.2 (H) 4.0 - 5.6 % Final     Comment:     ADA Screening Guidelines:  5.7-6.4%   Consistent with prediabetes  >or=6.5%  Consistent with diabetes    High levels of fetal hemoglobin interfere with the HbA1C  assay. Heterozygous hemoglobin variants (HbS, HgC, etc)do  not significantly interfere with this assay.   However, presence of multiple variants may affect accuracy.     09/19/2022 6.7 (H) 4.0 - 5.6 % Final     Comment:     ADA Screening Guidelines:  5.7-6.4%  Consistent with prediabetes  >or=6.5%  Consistent with diabetes    High levels of fetal hemoglobin interfere with the HbA1C  assay. Heterozygous hemoglobin variants (HbS, HgC, etc)do  not significantly interfere with this assay.   However, presence of multiple variants may affect accuracy.     06/07/2022 6.4 (H) 4.0 - 5.6 % Final     Comment:     ADA Screening Guidelines:  5.7-6.4%  Consistent with prediabetes  >or=6.5%  Consistent with diabetes    High levels of fetal hemoglobin interfere with the HbA1C  assay. Heterozygous hemoglobin variants (HbS, HgC, etc)do  not significantly interfere with this assay.   However, presence of multiple variants may affect accuracy.           ASSESSMENT/PLAN:    Lissy was seen today for neck pain and low-back pain.    Diagnoses and all orders for this visit:    Neck pain  -     Ambulatory referral/consult to Orthopedics  -     predniSONE (DELTASONE) 20 MG tablet; Take 1 tablet (20 mg total) by mouth 2 (two) times daily.  -     tiZANidine (ZANAFLEX) 4 MG tablet; Take 1-2 tablets (4-8 mg total) by mouth every 8 (eight) hours as needed (muscle spasms).  -     meloxicam (MOBIC) 7.5 MG tablet; Take 1 tablet (7.5 mg total) by mouth once daily.    Acute bilateral back pain, unspecified back location  -     Ambulatory referral/consult to Orthopedics  -     predniSONE (DELTASONE) 20 MG tablet; Take 1 tablet (20 mg total) by mouth 2 (two) times daily.  -     tiZANidine (ZANAFLEX) 4 MG tablet; Take 1-2 tablets (4-8 mg total) by mouth every 8 (eight) hours as needed (muscle spasms).  -     meloxicam (MOBIC) 7.5 MG  tablet; Take 1 tablet (7.5 mg total) by mouth once daily.    Fall, sequela  -     Ambulatory referral/consult to Orthopedics  -     predniSONE (DELTASONE) 20 MG tablet; Take 1 tablet (20 mg total) by mouth 2 (two) times daily.  -     tiZANidine (ZANAFLEX) 4 MG tablet; Take 1-2 tablets (4-8 mg total) by mouth every 8 (eight) hours as needed (muscle spasms).  -     meloxicam (MOBIC) 7.5 MG tablet; Take 1 tablet (7.5 mg total) by mouth once daily.      Today we discussed at length all of the different treatment options including anti-inflammatories, acetaminophen, rest, ice, heat, physical therapy including strengthening and stretching exercises, home exercises, ROM, aerobic conditioning, aqua therapy, other modalities including ultrasound, massage, and dry needling, epidural steroid injections and finally surgical intervention.  medications sent to the pharmacy. No acute fractures, pain likely muscular. May follow up as needed. She is referred for left foot pain.

## 2023-11-16 ENCOUNTER — TELEPHONE (OUTPATIENT)
Dept: PRIMARY CARE CLINIC | Facility: CLINIC | Age: 78
End: 2023-11-16
Payer: MEDICARE

## 2023-11-16 DIAGNOSIS — Z12.31 SCREENING MAMMOGRAM FOR BREAST CANCER: Primary | ICD-10-CM

## 2023-11-16 NOTE — TELEPHONE ENCOUNTER
"----- Message from Carlota Montes sent at 11/16/2023  8:14 AM CST -----  Consult/Advisory:      Name Of Caller: Kenia Palencia / spouse     Contact Preference:   [phone]       What is the nature of the call?: Requesting orders for mammogram screening appt.       Additional Notes:  "Thank you for all that you do for our patients"          "

## 2023-11-17 ENCOUNTER — OFFICE VISIT (OUTPATIENT)
Dept: ORTHOPEDICS | Facility: CLINIC | Age: 78
End: 2023-11-17
Payer: MEDICARE

## 2023-11-17 VITALS — BODY MASS INDEX: 27.81 KG/M2 | WEIGHT: 151.13 LBS | HEIGHT: 62 IN

## 2023-11-17 DIAGNOSIS — M54.9 ACUTE BILATERAL BACK PAIN, UNSPECIFIED BACK LOCATION: ICD-10-CM
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
## 2023-11-17 DIAGNOSIS — M54.2 NECK PAIN: ICD-10-CM

## 2023-11-17 PROCEDURE — 99214 PR OFFICE/OUTPT VISIT, EST, LEVL IV, 30-39 MIN: ICD-10-PCS | Mod: S$PBB,,, | Performed by: REGISTERED NURSE

## 2023-11-17 PROCEDURE — 99999 PR PBB SHADOW E&M-EST. PATIENT-LVL IV: ICD-10-PCS | Mod: PBBFAC,,, | Performed by: REGISTERED NURSE

## 2023-11-17 PROCEDURE — 99214 OFFICE O/P EST MOD 30 MIN: CPT | Mod: PBBFAC | Performed by: REGISTERED NURSE

## 2023-11-17 PROCEDURE — 99999 PR PBB SHADOW E&M-EST. PATIENT-LVL IV: CPT | Mod: PBBFAC,,, | Performed by: REGISTERED NURSE

## 2023-11-17 PROCEDURE — 99214 OFFICE O/P EST MOD 30 MIN: CPT | Mod: S$PBB,,, | Performed by: REGISTERED NURSE

## 2023-11-17 RX ORDER — PREDNISONE 20 MG/1
20 TABLET ORAL 2 TIMES DAILY
Qty: 10 TABLET | Refills: 0 | Status: SHIPPED | OUTPATIENT
Start: 2023-11-17 | End: 2023-12-11

## 2023-11-17 RX ORDER — MELOXICAM 7.5 MG/1
7.5 TABLET ORAL DAILY
Qty: 14 TABLET | Refills: 0 | Status: SHIPPED | OUTPATIENT
Start: 2023-11-17

## 2023-11-17 RX ORDER — TIZANIDINE 4 MG/1
4-8 TABLET ORAL EVERY 8 HOURS PRN
Qty: 60 TABLET | Refills: 2 | Status: SHIPPED | OUTPATIENT
Start: 2023-11-17

## 2023-11-24 ENCOUNTER — HOSPITAL ENCOUNTER (OUTPATIENT)
Dept: RADIOLOGY | Facility: HOSPITAL | Age: 78
Discharge: HOME | End: 2023-11-24
Attending: INTERNAL MEDICINE
Payer: MEDICARE

## 2023-11-24 DIAGNOSIS — Z12.31 SCREENING MAMMOGRAM FOR BREAST CANCER: ICD-10-CM

## 2023-11-24 PROCEDURE — 77067 MAMMO DIGITAL SCREENING BILAT WITH TOMO: ICD-10-PCS | Mod: 26,,, | Performed by: RADIOLOGY

## 2023-11-24 PROCEDURE — 77063 BREAST TOMOSYNTHESIS BI: CPT | Mod: 26,,, | Performed by: RADIOLOGY

## 2023-11-24 PROCEDURE — 77067 SCR MAMMO BI INCL CAD: CPT | Mod: TC

## 2023-11-24 PROCEDURE — 77063 MAMMO DIGITAL SCREENING BILAT WITH TOMO: ICD-10-PCS | Mod: 26,,, | Performed by: RADIOLOGY

## 2023-11-24 PROCEDURE — 77067 SCR MAMMO BI INCL CAD: CPT | Mod: 26,,, | Performed by: RADIOLOGY

## 2023-11-27 ENCOUNTER — TELEPHONE (OUTPATIENT)
Dept: PRIMARY CARE CLINIC | Facility: CLINIC | Age: 78
End: 2023-11-27
Payer: MEDICARE

## 2023-11-29 ENCOUNTER — CLINICAL SUPPORT (OUTPATIENT)
Dept: REHABILITATION | Facility: HOSPITAL | Age: 78
End: 2023-11-29
Payer: MEDICARE

## 2023-11-29 ENCOUNTER — TELEPHONE (OUTPATIENT)
Dept: ORTHOPEDICS | Facility: CLINIC | Age: 78
End: 2023-11-29
Payer: MEDICARE

## 2023-11-29 DIAGNOSIS — M54.6 BACK PAIN OF THORACOLUMBAR REGION: Primary | ICD-10-CM

## 2023-11-29 DIAGNOSIS — M54.9 ACUTE BILATERAL BACK PAIN, UNSPECIFIED BACK LOCATION: ICD-10-CM
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
## 2023-11-29 DIAGNOSIS — M54.50 BACK PAIN OF THORACOLUMBAR REGION: Primary | ICD-10-CM

## 2023-11-29 DIAGNOSIS — M54.2 NECK PAIN: ICD-10-CM

## 2023-11-29 PROCEDURE — 97161 PT EVAL LOW COMPLEX 20 MIN: CPT

## 2023-11-29 NOTE — PLAN OF CARE
[facility]   Physical Therapy Initial Evaluation      Name: Lissy Palencia  Clinic Number: [id number]    Therapy Diagnosis:   Encounter Diagnoses   Name Primary?    Neck pain     Acute bilateral back pain, unspecified back location     Fall, initial encounter     Back pain of thoracolumbar region Yes        Physician: Order, Paper    Physician Orders: PT Eval and Treat   Medical Diagnosis from Referral:   M54.2 (ICD-10-CM) - Neck pain   M54.9 (ICD-10-CM) - Acute bilateral back pain, unspecified back location   W19.XXXA (ICD-10-CM) - Fall, initial encounter     Evaluation Date: 11/29/2023  Authorization Period Expiration: 12/31/23  Plan of Care Expiration: 1/12/24  Progress Note Due: 12/29/23  Visit # / Visits authorized: 1/ 1   FOTO: 1/ 3    Precautions: Standard and Diabetes     Time In: 9:21 am   Time Out: 10:04 am   Total Billable Time: 43 minutes    Subjective     Date of onset: 10/28/23    History of current condition - Lissy reports: that she fell backwards when she slipped on something on 10/28/23. Pt stated that she does have arthritis and did have slight pain here and there prior to fall. Pt stated that since she fell pain everywhere has returned all at once. Pt stated that she has pain in (B) neck that extends into (B) UT. Pt stated that every now and then she gets pulse headache if she uses neck more often. Pt stated that she had CT on her head after she fell and was told didn't find anything. Pt stated that MD told her it takes time for all muscles and ligaments to heel. Pt denies pain radiating into (B) UE. Pt reports that she has hx of (R) rotator cuff repair a few years ago. Pt stated that she is (R) hand dominant.     Pt stated that upper and lower back have been hurting and pain is more on (L) side than (R) side. Pt stated that she gets occasional pain in (B) buttocks depending on how she moves. Pt stated that she has had sciatica on (R) side for a long time and has had shot  "in the past. Pt stated that (R) sciatica sometimes comes and lasts for a week or two and then will go away and then will come back again.  Pt stated that she is scheduled to see an MD for (B) foot pain.     Falls: fall on 10/28/23 ( see subjective), also fell approximately a year ago when someone was helping her and piece of material knocked her down and tripped on step     Imaging: see imaging section    Prior Therapy: yes, none recently   Social History: Pt stated that she lives with family.  Pt stated that she has no steps to enter her [facility].   Occupation: Pt stated that she is not currently employed   Prior Level of Function: off and on pain due to arthritis, was going to gym 3x/week  Current Level of Function: report of pain performing aggravating factors listed below. Pt stated that she has not been to gym since fall     Pain:  Current 3/10, worst 6/10, best 2/10   Location: (B) neck and UT   Description: aching   Aggravating Factors: turning from side to side, looking up/down, quick movements of neck   Easing Factors: medicine and ice, gently try to move neck in different directions      Current 4/10, worst 6/10, best 3/10   Location: (B) upper/lower back, (L) > (R)  Description: tight, "like you want to pop it", grab   Aggravating Factors: turning from side to side, bending over to get something, trying to lift legs up  Easing Factors: medicine and ice    Patients goals: to remove the pain      Medical History:   Past Medical History:   Diagnosis Date    Allergy     Arthritis     Coronary artery disease     Cystocele 01/19/2016    Diabetes mellitus     GERD (gastroesophageal reflux disease)     Hemorrhoids     History of cholelithiasis     Hypothyroidism     Obesity     Right shoulder pain 10/09/2012    Sleep apnea     Trigger finger, right 3rd finger 07/30/2014    Vaginal atrophy 01/19/2016    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       Surgical History:   Lissy Palencia  has a past surgical history that " includes Tonsillectomy; Shoulder arthroscopy w/ rotator cuff repair; Total abdominal hysterectomy; Adenoidectomy; Tubal ligation; heart cath (2005); Cholecystectomy; Oophorectomy; Ablation colpoclesis; Coronary stent placement; Esophagogastroduodenoscopy (N/A, 11/28/2018); Colonoscopy (N/A, 11/28/2018); Cataract extraction w/  intraocular lens implant (Right, 11/23/2022); Cataract extraction w/  intraocular lens implant (Left, 12/22/2022); Intraocular lens exchange (Left, 6/14/2023); and Trigger finger release (Right, 10/16/2023).    Medications:   Lissy has a current medication list which includes the following prescription(s): alendronate, amoxicillin, aspirin, atorvastatin, azelastine, blood sugar diagnostic, blood-glucose meter, cyclosporine, fluad quad 2022-23(65y up)(pf), ketorolac 0.5%, lancets, levothyroxine, lidocaine, meloxicam, metformin, neomycin-polymyxin-hydrocortisone, nitroglycerin, ofloxacin, pantoprazole, prednisol ace-gatiflox-bromfen, prednisol ace-gatiflox-bromfen, prednisolone-gatiflox-bromfenac, prednisolone acetate, prednisone, restasis, tizanidine, tramadol, and vitamin d, and the following Facility-Administered Medications: balanced salt irrigation, balanced salt irrigation, mupirocin, phenylephrine hcl 10%, phenylephrine hcl 10%, sodium chloride 0.9%, and sodium chloride 0.9%.    Allergies:   Review of patient's allergies indicates:   Allergen Reactions    Compazine  [prochlorperazine edisylate]      Other reaction(s): SPASMS    Parafon forte      Other reaction(s): Hives        Objective      Cervical AROM: Pain/Dysfunction with Movement:   Flexion 55 degrees Stretch in thoracic region    Extension 25 degrees Report of pain in (B) neck    Right side bending 25 degrees Report of pain in (R) neck/UT   Left side bending 35 degrees Report of pain in (B) neck   Right rotation 55 degrees Report of pain in (R) neck    Left rotation 50 degrees Report of pain in (R) neck      Shoulder Right  Left   Pain/Dysfunction with Movement    AROM MMT AROM MMT Nt= not tested    flexion WFL 4/5 WFL 4/5    abduction WFL 4/5 WFL 4/5    Internal rotation WFL 4+/5 WFL 4+/5    ER at 90° abd WFL NT WFL NT    ER at 0° abd NT 4/5 NT 4/5        Lumbar AROM: Pain/Dysfunction with Movement:   Flexion 60 degrees Report of soreness in (B) back/buttocks/hamstring   Extension 10 degrees Report of soreness in (B) back/buttocks   Right side bending 30 degrees Report of soreness in (B) back/buttocks   Left side bending 25 degrees Report of soreness in (B) back      Hip Right  Left  Pain/Dysfunction with Movement    AROM MMT AROM MMT    Flexion WFL 4/5 WFL 4/5    Extension NT NT NT NT Pt performed fair bridge against gravity and reported experiencing cramp in (L) buttocks    Abduction WFL 4/5 WFL 4/5    External rotation WFL 4/5 WFL 4/5       Knee Right  Left  Pain/Dysfunction with Movement    AROM MMT AROM MMT    Flexion WFL 4+/5 WFL 4+/5    Extension WFL 5/5 WFL 5/5      Ankle Right  Left  Pain/Dysfunction with Movement    AROM MMT AROM MMT    Plantarflexion WFL 4/5 WFL 4/5    Dorsiflexion WFL 5/5 WFL 5/5          Intake Outcome Measure for FOTO Neck Survey    Therapist reviewed FOTO scores for Lissy Palencia on 11/29/2023.   FOTO report - see Media section or FOTO account episode details.    Intake Score: 50         Treatment     Total Treatment time (time-based codes) separate from Evaluation: 0 minutes         Patient Education and Home Exercises     Education provided:   - Role of PT - pt verbalized understanding      Assessment     Lissy is a 78 y.o. female referred to outpatient Physical Therapy with a medical diagnosis of neck pain, Acute bilateral back pain, unspecified back location, and fall initial encounter. Patient presents with report of pain when she performed cervical extension, (B) cervical SB, and (B) cervical rotation AROM, decreased UE MMT scores, report of soreness when she performed lumbar AROM in all planes measured  above, decreased LE MMT scores, and decreased functional mobility. Pt will benefit from skilled PT.     Patient prognosis is Good.   Patient will benefit from skilled outpatient Physical Therapy to address the deficits stated above and in the chart below, provide patient /family education, and to maximize patientt's level of independence.     Plan of care discussed with patient: Yes  Patient's spiritual, cultural and educational needs considered and patient is agreeable to the plan of care and goals as stated below:     Anticipated Barriers for therapy: none    Medical Necessity is demonstrated by the following  History  Co-morbidities and personal factors that may impact the plan of care [] LOW: no personal factors / co-morbidities  [x] MODERATE: 1-2 personal factors / co-morbidities  [] HIGH: 3+ personal factors / co-morbidities    Moderate / High Support Documentation:   Co-morbidities affecting plan of care: arthritis     Personal Factors:   no deficits     Examination  Body Structures and Functions, activity limitations and participation restrictions that may impact the plan of care [x] LOW: addressing 1-2 elements  [] MODERATE: 3+ elements  [] HIGH: 4+ elements (please support below)    Moderate / High Support Documentation: ROM, strength     Clinical Presentation [x] LOW: stable  [] MODERATE: Evolving  [] HIGH: Unstable     Decision Making/ Complexity Score: low       Goals:  Short Term Goals: 1 week  Pt will be compliant with HEP to supplement PT with decreasing pain and improving functional mobility    Long Term Goals: 6 weeks   Pt will improve FOTO score to 59 in order to demo improved functional mobility  Pt will improve LE MMT scores by at least 1/2 grade where deficits noted in order to improve strength for functional tasks  Pt will perform cervical AROM in all planes measured above without c/o pain in order to be able to perform ADLs with less difficulty  Pt will improve (B) cervical rotation AROM to  at least 70 degrees in order to be able to turn head when driving with less difficulty  Pt will perform lumbar AROM in all planes measured above without report of pain/soreness in order to be able to perform ADLs with less difficulty   Plan     Plan of care Certification: 11/29/2023 to 1/12/24.    Outpatient Physical Therapy 2 times weekly for 6 weeks to include the following interventions: Gait Training, Manual Therapy, Moist Heat/ Ice, Neuromuscular Re-ed, Patient Education, Self Care, Therapeutic Activities, Therapeutic Exercise, and IASTM, dry needling, and modalities prn .     Carmen Logan, PT        Physician's Signature: _________________________________________ Date: ________________

## 2023-11-30 ENCOUNTER — OFFICE VISIT (OUTPATIENT)
Dept: CARDIOLOGY | Facility: CLINIC | Age: 78
End: 2023-11-30
Payer: MEDICARE

## 2023-11-30 ENCOUNTER — OFFICE VISIT (OUTPATIENT)
Dept: ORTHOPEDICS | Facility: CLINIC | Age: 78
End: 2023-11-30
Payer: MEDICARE

## 2023-11-30 VITALS
WEIGHT: 155.19 LBS | HEIGHT: 62 IN | HEART RATE: 60 BPM | DIASTOLIC BLOOD PRESSURE: 65 MMHG | SYSTOLIC BLOOD PRESSURE: 139 MMHG | BODY MASS INDEX: 28.56 KG/M2 | OXYGEN SATURATION: 96 %

## 2023-11-30 VITALS
SYSTOLIC BLOOD PRESSURE: 122 MMHG | WEIGHT: 151.13 LBS | HEART RATE: 66 BPM | DIASTOLIC BLOOD PRESSURE: 60 MMHG | HEIGHT: 62 IN | BODY MASS INDEX: 27.81 KG/M2

## 2023-11-30 DIAGNOSIS — E11.69 HYPERLIPIDEMIA ASSOCIATED WITH TYPE 2 DIABETES MELLITUS: ICD-10-CM

## 2023-11-30 DIAGNOSIS — I70.0 AORTIC ATHEROSCLEROSIS: ICD-10-CM

## 2023-11-30 DIAGNOSIS — G47.33 OSA (OBSTRUCTIVE SLEEP APNEA): ICD-10-CM

## 2023-11-30 DIAGNOSIS — R03.0 ELEVATED BLOOD PRESSURE READING WITHOUT DIAGNOSIS OF HYPERTENSION: ICD-10-CM

## 2023-11-30 DIAGNOSIS — E11.9 TYPE 2 DIABETES MELLITUS WITHOUT COMPLICATION, WITHOUT LONG-TERM CURRENT USE OF INSULIN: ICD-10-CM

## 2023-11-30 DIAGNOSIS — E78.5 HYPERLIPIDEMIA ASSOCIATED WITH TYPE 2 DIABETES MELLITUS: ICD-10-CM

## 2023-11-30 DIAGNOSIS — I25.10 CORONARY ARTERY DISEASE INVOLVING NATIVE CORONARY ARTERY OF NATIVE HEART WITHOUT ANGINA PECTORIS: Primary | ICD-10-CM

## 2023-11-30 DIAGNOSIS — Z98.890 POST-OPERATIVE STATE: Primary | ICD-10-CM

## 2023-11-30 PROCEDURE — 99212 OFFICE O/P EST SF 10 MIN: CPT | Mod: PBBFAC | Performed by: SPECIALIST/TECHNOLOGIST

## 2023-11-30 PROCEDURE — 99999 PR PBB SHADOW E&M-EST. PATIENT-LVL II: ICD-10-PCS | Mod: PBBFAC,,, | Performed by: SPECIALIST/TECHNOLOGIST

## 2023-11-30 PROCEDURE — 99215 OFFICE O/P EST HI 40 MIN: CPT | Mod: PBBFAC,25 | Performed by: PHYSICIAN ASSISTANT

## 2023-11-30 PROCEDURE — 99024 POSTOP FOLLOW-UP VISIT: CPT | Mod: POP,,, | Performed by: SPECIALIST/TECHNOLOGIST

## 2023-11-30 PROCEDURE — 99024 PR POST-OP FOLLOW-UP VISIT: ICD-10-PCS | Mod: POP,,, | Performed by: SPECIALIST/TECHNOLOGIST

## 2023-11-30 PROCEDURE — 99999 PR PBB SHADOW E&M-EST. PATIENT-LVL II: CPT | Mod: PBBFAC,,, | Performed by: SPECIALIST/TECHNOLOGIST

## 2023-11-30 PROCEDURE — 99214 OFFICE O/P EST MOD 30 MIN: CPT | Mod: S$PBB,,, | Performed by: PHYSICIAN ASSISTANT

## 2023-11-30 PROCEDURE — 99214 PR OFFICE/OUTPT VISIT, EST, LEVL IV, 30-39 MIN: ICD-10-PCS | Mod: S$PBB,,, | Performed by: PHYSICIAN ASSISTANT

## 2023-11-30 PROCEDURE — 99999 PR PBB SHADOW E&M-EST. PATIENT-LVL V: CPT | Mod: PBBFAC,,, | Performed by: PHYSICIAN ASSISTANT

## 2023-11-30 PROCEDURE — 99999 PR PBB SHADOW E&M-EST. PATIENT-LVL V: ICD-10-PCS | Mod: PBBFAC,,, | Performed by: PHYSICIAN ASSISTANT

## 2023-11-30 NOTE — PROGRESS NOTES
Ms. Palencia is here today for a post-operative visit.  She is 6 weeks status post right long trigger finger release by Dr. Richardson on 10/16/23. She reports that she is in pain. She states that most of her pain is localized through the DIP and PIP joints of all fingers. Patient had a fall one-week postop.  She had subsequent x-rays that revealed no acute fracture.  Appeared to be arthritis.  Therapy was ordered at that time.  Patient states she is been doing at home therapy on her own.   She denies fever, chills, and sweats since the time of the surgery.     Physical exam:    There were no vitals filed for this visit.    Vital signs are stable, patient is afebrile.  Patient is well dressed and well groomed, no acute distress.  Alert and oriented to person, place, and time.  Incision is clean, dry and intact.  There is no erythema or exudate.  There is no sign of any infection. She is NVI. Ttp at the PIP and DIP of the long finger as well as the PIP of the index and ring. Good ROM.  No active triggering of any of her fingers.    Assessment:  status post right long trigger finger release by Dr. Richardson on 10/16/23    Plan:  There are no diagnoses linked to this encounter.  - lengthy discussion was had with the patient about her arthritis of her hands.  Recommended she begin a anti-inflammatory diet as well as performing paraffin bath daily.  Also recommended using Voltaren gel topically as needed.  Therapy was offered to patient, but she declined at this time.  Patient was very thankful and understood.  She will follow-up in clinic as needed.

## 2023-11-30 NOTE — PROGRESS NOTES
"    General Cardiology Clinic Note  Reason for Visit: Chest pain    Last Clinic Visit: 1/23/2023  General Cardiologist: Dr. Brown    HPI:   Lissy Palencia is a 78 y.o. female who presents for follow up.     Problems:   CAD s/p PCI to LAD (1997)  Hypothyroidism  GERD  Aortic calcification (CXR)  DM     Interval HPI   Patient presents for chest pain. She reports "sticking" pains under her left breast for the past couple months. CP occurs with exertion when walking on the treadmill. Also seems to occur at rest. She has difficulty remembering whether it's exclusively exertional. She also reports worsening VALLE when walking fast and climbing up stairs. She is having difficulty staying on the treadmill for 20 minutes. She reports similar symptoms prior to PCI in 1997. Of note, she described similar symptoms last year and had a negative stress test at that time. She also reports swelling in her ankles. She denies syncope, near syncope, orthopnea, PND, sustained palpitations, TIA. She has been checking her BP at home and states it is high but does not know the numbers.     1/23/2023 HPI  Pt continues to have the chest pain reported at last visit. It is a cramping pain that radiates from the center of her chest through to her back. Often occurs in the morning when she first sits up in bed. It lasts 5 minutes and then suddenly resolves. It occurs maybe twice a week. It is not worsening. She thinks sometimes she gets it on the treadmill too, but it doesn't keep her from exercising. She has mild VALLE when walking on the treadmill. She reports constant fatigue for the past 2 months, but also states she has not been sleeping well at night because she naps during the day. She reports chronic cough and chest congestion. She denies pedal edema, orthopnea, PND, sudden weight gain, sustained palpitations, lightheadedness, syncope. She has resumed taking Aspirin since her last visit. She is not sure if she increased the Atorvastatin or " not. She exercises on the treadmill for 20 minutes, every other day. She also does strengthening exercises at the gym. She has HEMAL but cannot tolerate the CPAP.     ROS:      Review of Systems   Constitutional: Negative for diaphoresis, malaise/fatigue, weight gain and weight loss.   HENT:  Negative for congestion and nosebleeds.    Eyes:  Negative for vision loss in left eye, vision loss in right eye and visual disturbance.   Cardiovascular:  Positive for chest pain, dyspnea on exertion and leg swelling. Negative for claudication, irregular heartbeat, near-syncope, orthopnea, palpitations, paroxysmal nocturnal dyspnea and syncope.   Respiratory:  Positive for cough. Negative for shortness of breath, sleep disturbances due to breathing, snoring and wheezing.    Hematologic/Lymphatic: Negative for bleeding problem. Does not bruise/bleed easily.   Skin:  Negative for poor wound healing and rash.   Musculoskeletal:  Negative for joint pain, muscle cramps and myalgias.   Gastrointestinal:  Negative for bloating, abdominal pain, diarrhea, heartburn, melena, nausea and vomiting.   Genitourinary:  Negative for hematuria and nocturia.   Neurological:  Negative for brief paralysis, dizziness, headaches, light-headedness, numbness and weakness.   Psychiatric/Behavioral:  Negative for depression. The patient does not have insomnia.    Allergic/Immunologic: Negative for hives.       PMH:     Past Medical History:   Diagnosis Date    Allergy     Arthritis     Coronary artery disease     Cystocele 01/19/2016    Diabetes mellitus     GERD (gastroesophageal reflux disease)     Hemorrhoids     History of cholelithiasis     Hypothyroidism     Obesity     Right shoulder pain 10/09/2012    Sleep apnea     Trigger finger, right 3rd finger 07/30/2014    Vaginal atrophy 01/19/2016    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     Past Surgical History:   Procedure Laterality Date    ABLATION COLPOCLESIS      ADENOIDECTOMY      CATARACT EXTRACTION  W/  INTRAOCULAR LENS IMPLANT Right 11/23/2022    Procedure: EXTRACTION, CATARACT, WITH IOL INSERTION;  Surgeon: Ciara Mclaughlin MD;  Location: [facility] OR;  Service: Ophthalmology;  Laterality: Right;    CATARACT EXTRACTION W/  INTRAOCULAR LENS IMPLANT Left 12/22/2022    Procedure: EXTRACTION, CATARACT, WITH IOL INSERTION;  Surgeon: Ciara Mclaughlin MD;  Location: [facility] OR;  Service: Ophthalmology;  Laterality: Left;    CHOLECYSTECTOMY      COLONOSCOPY N/A 11/28/2018    Procedure: COLONOSCOPY;  Surgeon: Richi Mcintosh MD;  Location: [facility] ENDO (4TH FLR);  Service: Endoscopy;  Laterality: N/A;    CORONARY STENT PLACEMENT      ESOPHAGOGASTRODUODENOSCOPY N/A 11/28/2018    Procedure: ESOPHAGOGASTRODUODENOSCOPY (EGD);  Surgeon: Richi Mcintosh MD;  Location: [facility] ENDO (4TH FLR);  Service: Endoscopy;  Laterality: N/A;    heart cath  2005    non-obstructive disease    INTRAOCULAR LENS EXCHANGE Left 6/14/2023    Procedure: REPLACEMENT, IOL;  Surgeon: Ciara Mclaughlin MD;  Location: [facility] OR;  Service: Ophthalmology;  Laterality: Left;    OOPHORECTOMY      SHOULDER ARTHROSCOPY W/ ROTATOR CUFF REPAIR      Right    TONSILLECTOMY      TOTAL ABDOMINAL HYSTERECTOMY      with BSO    TRIGGER FINGER RELEASE Right 10/16/2023    Procedure: RELEASE, TRIGGER FINGER,RIGHT LONG AND RING FINGER;  Surgeon: Shaneka Richardson MD;  Location: [facility] OR;  Service: Orthopedics;  Laterality: Right;    TUBAL LIGATION       Allergies:     Review of patient's allergies indicates:   Allergen Reactions    Compazine  [prochlorperazine edisylate]      Other reaction(s): SPASMS    Parafon forte      Other reaction(s): Hives     Medications:     Current Outpatient Medications on File Prior to Visit   Medication Sig Dispense Refill    alendronate (FOSAMAX) 70 MG tablet TAKE 1 TABLET EVERY 7 DAYS ON MONDAYS, THEN AFTER AN HOUR TAKE YOUR THYROID TABLET, 30 MINUTES LATER YOUR PANTOPRAZOLE (Patient taking differently: Tues) 12 tablet 3    amoxicillin  (AMOXIL) 875 MG tablet Take 875 mg by mouth 2 (two) times daily.      aspirin (ECOTRIN) 81 MG EC tablet Take 81 mg by mouth once daily.      atorvastatin (LIPITOR) 80 MG tablet Take 1 tablet (80 mg total) by mouth once daily. TAKE 1 TABLET DAILY 90 tablet 3    azelastine (ASTELIN) 137 mcg (0.1 %) nasal spray 1 spray (137 mcg total) by Nasal route 2 (two) times daily. 30 mL 3    blood sugar diagnostic Strp To check BG ONCE daily, to use with insurance preferred meter 100 strip 3    blood-glucose meter kit To check BG ONCE THE daily, to use with insurance preferred meter 1 each 1    cycloSPORINE (RESTASIS) 0.05 % ophthalmic emulsion Place 1 drop into both eyes 2 (two) times daily. 90 each 11    flu vac 2022 [id number],PF, (FLUAD QUAD 2022-23,65Y UP,,PF,) 60 mcg (15 mcg x 4)/0.5 mL Syrg Inject into the muscle. 0.5 mL 0    ketorolac 0.5% (ACULAR) 0.5 % Drop Place 1 drop into the left eye 3 (three) times daily. 5 mL 3    lancets Misc To check BG ONCE daily, to use with insurance preferred meter 100 each 3    levothyroxine (SYNTHROID) 88 MCG tablet TAKE FIRST THING IN THE MORNING ON AN EMPTY STOMACH 90 tablet 3    LIDOcaine (LIDODERM) 5 % Place onto the skin once daily. 30 patch 0    meloxicam (MOBIC) 7.5 MG tablet Take 1 tablet (7.5 mg total) by mouth once daily. 14 tablet 0    metFORMIN (GLUCOPHAGE) 500 MG tablet TAKE 1 TABLET DAILY WITH BREAKFAST 90 tablet 3    neomycin-polymyxin-hydrocortisone (CORTISPORIN) 3.5-10,000-1 mg/mL-unit/mL-% otic suspension Place 3 drops into the right ear 3 (three) times daily. 10 mL 0    nitroGLYCERIN (NITROSTAT) 0.4 MG SL tablet Place 1 tablet (0.4 mg total) under the tongue every 5 (five) minutes as needed for Chest pain. You can take up to 3 doses at home. If chest pain persists after second dose, go the ER. 25 tablet 4    ofloxacin (OCUFLOX) 0.3 % ophthalmic solution Place 1 drop into the left eye 3 (three) times daily. 5 mL 3    pantoprazole (PROTONIX) 40 MG tablet Take 1 tablet  (40 mg total) by mouth once daily. 90 tablet 3    prednisolon/gatiflox/bromfenac (PREDNISOL ACE-GATIFLOX-BROMFEN) 1-0.5-0.075 % DrpS Apply 1 drop to eye 3 (three) times daily. One drop 3 times a day in surgical eye 5 mL 3    prednisolon/gatiflox/bromfenac (PREDNISOL ACE-GATIFLOX-BROMFEN) 1-0.5-0.075 % DrpS Apply 1 drop to eye 3 (three) times daily. One drop 3 times a day in surgical eye 5 mL 3    prednisolon/gatiflox/bromfenac (PREDNISOLONE-GATIFLOX-BROMFENAC) 1-0.5-0.09 % ophthalmic solution Place 1 drop into the left eye 3 (three) times daily. 5 mL 3    prednisoLONE acetate (PRED FORTE) 1 % DrpS Place 1 drop into the left eye 3 (three) times daily. 5 mL 3    predniSONE (DELTASONE) 20 MG tablet Take 1 tablet (20 mg total) by mouth 2 (two) times daily. 10 tablet 0    RESTASIS 0.05 % ophthalmic emulsion INSTILL 1 DROP IN BOTH EYES TWICE A DAY 60 each 11    tiZANidine (ZANAFLEX) 4 MG tablet Take 1-2 tablets (4-8 mg total) by mouth every 8 (eight) hours as needed (muscle spasms). 60 tablet 2    traMADoL (ULTRAM) 50 mg tablet Take 1 tablet (50 mg total) by mouth every 6 (six) hours as needed for Pain. 10 tablet 0    vitamin D (VITAMIN D3) 1000 units Tab Take 1 tablet (1,000 Units total) by mouth once daily. 90 tablet 3     Current Facility-Administered Medications on File Prior to Visit   Medication Dose Route Frequency Provider Last Rate Last Admin    balanced salt irrigation intra-ocular solution 1 drop  1 drop Left Eye On Call Procedure Ciara Mclaughlin MD        balanced salt irrigation intra-ocular solution 1 drop  1 drop Left Eye On Call Procedure Ciara Mclaughlin MD        mupirocin 2 % ointment   Nasal On Call Procedure Caesar Finch MD   Given at 10/16/23 0752    phenylephrine HCL 10% ophthalmic solution 1 drop  1 drop Left Eye PRN Ciara Mclaughlin MD        phenylephrine HCL 10% ophthalmic solution 1 drop  1 drop Left Eye PRN Ciara Mclaughlin., MD        sodium chloride 0.9% flush 2 mL  2 mL  "Intravenous PRN Ciara Mclaughlin MD        sodium chloride 0.9% flush 2 mL  2 mL Intravenous PRN Ciara Mclaughlin MD         Social History:     Social History     Tobacco Use    Smoking status: Never    Smokeless tobacco: Never   Substance Use Topics    Alcohol use: Yes     Alcohol/week: 1.0 standard drink of alcohol     Types: 1 Glasses of wine per week     Comment: "maybe a glass of wine every 6 months"     Family History:     Family History   Problem Relation Age of Onset    Breast cancer Neg Hx     Ovarian cancer Neg Hx     Glaucoma Neg Hx     Cataracts Neg Hx     Diabetes Neg Hx     Macular degeneration Neg Hx     Retinal detachment Neg Hx     Melanoma Neg Hx     Cervical cancer Neg Hx     Endometrial cancer Neg Hx     Vaginal cancer Neg Hx      Physical Exam:   /65   Pulse 60   Ht 5' 2" (1.575 m)   Wt 70.4 kg (155 lb 3.3 oz)   SpO2 96%   BMI 28.39 kg/m²        Physical Exam  Vitals and nursing note reviewed.   Constitutional:       General: She is not in acute distress.     Appearance: Normal appearance. She is not ill-appearing.   HENT:      Head: Normocephalic and atraumatic.   Eyes:      General: No scleral icterus.     Conjunctiva/sclera: Conjunctivae normal.   Neck:      Thyroid: No thyromegaly.      Vascular: No carotid bruit or JVD.   Cardiovascular:      Rate and Rhythm: Normal rate and regular rhythm.      Pulses: Normal pulses.      Heart sounds: Normal heart sounds. No murmur heard.     No friction rub. No gallop.   Pulmonary:      Effort: Pulmonary effort is normal.      Breath sounds: Normal breath sounds. No wheezing, rhonchi or rales.   Chest:      Chest wall: No tenderness.   Abdominal:      General: Bowel sounds are normal. There is no distension.      Palpations: Abdomen is soft.      Tenderness: There is no abdominal tenderness.   Musculoskeletal:         General: No swelling.      Cervical back: Neck supple.      Right lower leg: No edema.      Left lower leg: No edema. " "  Skin:     General: Skin is warm and dry.      Coloration: Skin is not pale.      Findings: No erythema or rash.      Nails: There is no clubbing.   Neurological:      Mental Status: She is alert and oriented to person, place, and time. Mental status is at baseline.   Psychiatric:         Mood and Affect: Mood and affect normal.         Behavior: Behavior normal.          Labs:     Lab Results   Component Value Date     01/23/2023    K 5.0 01/23/2023     01/23/2023    CO2 26 01/23/2023    BUN 14 01/23/2023    CREATININE 1.0 01/23/2023    ANIONGAP 10 01/23/2023     Lab Results   Component Value Date    HGBA1C 6.2 (H) 09/19/2023     No results found for: "BNP", "BNPTRIAGEBLO" Lab Results   Component Value Date    WBC 7.20 07/24/2020    HGB 14.7 07/24/2020    HCT 46.8 07/24/2020     07/24/2020    GRAN 3.7 07/24/2020    GRAN 51.2 07/24/2020     Lab Results   Component Value Date    CHOL 168 01/23/2023    HDL 45 01/23/2023    LDLCALC 85.4 01/23/2023    TRIG 188 (H) 01/23/2023          Imaging:      Echocardiogram  None     Stress testing  Stress echo 10/28/2022  During stress, the following significant arrhythmias were observed: rare PACs, occasional PVCs.  The patient's exercise capacity was average.  The test was stopped because the patient experienced fatigue.  The ECG portion of this study is negative for myocardial ischemia.  The left ventricle is normal in size with normal systolic function.  The estimated ejection fraction is 58%.  Normal left ventricular diastolic function.  Normal right ventricular size with low normal right ventricular systolic function.  Normal central venous pressure (3 mmHg).  The estimated PA systolic pressure is 28 mmHg.  The stress echo portion of this study is negative for myocardial ischemia.    PET 8/16/16  CONCLUSIONS: NORMAL MYOCARDIAL PERFUSION PET STRESS TEST   1. The perfusion scan is free of evidence for myocardial ischemia or injury.   2. Resting wall motion " is physiologic. Stress wall motion is physiologic.   3. Visually estimated LV function is normal at rest and normal at stress.   4. The ventricular volumes are normal at rest and stress.   5. The extracardiac distribution of radioactivity is normal.   6. There was no previous study available to compare.      MABEL 16  CONCLUSIONS     1 - Normal left ventricular diastolic function.     2 - Normal left ventricular diastolic function.     3 - Normal right ventricular systolic function .     4 - Trivial pericardial effusion.     Positive stress echocardiographic study demonstrating an ischemic response involving the mid anteroseptum, apical septum, apical inferior wall, mid inferior wall, basal inferior wall. Non-specific finding demonstrating failure to augment involving the   anterolateral wall which may be associated with ischemia; clinical correlation required.     These results suggest ischemia in a multi-vessel distribution.      MABEL 13  CONCLUSIONS     1 - Normal left ventricular function (EF 60%).     2 - Normal diastolic function.     3 - Normal right ventricular function .     4 - Trivial to mild mitral regurgitation.     5 - Trivial tricuspid regurgitation.     No evidence of stress induced myocardial ischemia.      Cath Lab  None     Other  CXR 22  There is calcification and tortuosity of the aorta.       US abd 1/10/18  The abdominal aorta is normal in caliber      EK/9/22 - NSR (personally reviewed)    Assessment:     1. Coronary artery disease involving native coronary artery of native heart without angina pectoris    2. Aortic atherosclerosis    3. Hyperlipidemia associated with type 2 diabetes mellitus    4. Type 2 diabetes mellitus without complication, without long-term current use of insulin    5. HEMAL (obstructive sleep apnea)    6. Elevated blood pressure reading without diagnosis of hypertension      Plan:     Coronary artery disease involving native coronary artery of native  heart   Pt reports exertional chest pain. Obtain exercise stress echo.   Continue ASA 81mg qd  Continue Lipitor 80 mg daily.      Aortic atherosclerosis  Hyperlipidemia associated with type 2 diabetes mellitus  She is scheduled for recheck.   Continue Atorvastatin 80 mg daily      Elevated blood pressure reading  She reports elevated BP at home but is unsure of readings.   Instructed to keep BP log and send me readings after 2-3 weeks.     Type 2 diabetes mellitus without complication, without long-term current use of insulin  A1c at goal, 6.7%     HEMAL (obstructive sleep apnea)  Intolerant to CPAP     Follow up in one year if stress test is negative    Signed:  Melvi Warren PA-C  Cardiology   [phone] - General  11/30/2023 10:12 AM

## 2023-12-01 ENCOUNTER — CLINICAL SUPPORT (OUTPATIENT)
Dept: REHABILITATION | Facility: HOSPITAL | Age: 78
End: 2023-12-01
Payer: MEDICARE

## 2023-12-01 DIAGNOSIS — M54.50 BACK PAIN OF THORACOLUMBAR REGION: ICD-10-CM

## 2023-12-01 DIAGNOSIS — M54.2 NECK PAIN: Primary | ICD-10-CM

## 2023-12-01 DIAGNOSIS — M54.6 BACK PAIN OF THORACOLUMBAR REGION: ICD-10-CM

## 2023-12-01 PROCEDURE — 97112 NEUROMUSCULAR REEDUCATION: CPT

## 2023-12-01 PROCEDURE — 97110 THERAPEUTIC EXERCISES: CPT

## 2023-12-01 NOTE — PROGRESS NOTES
"  Physical Therapy Daily Treatment Note     Name: Lissy Palencia  Clinic Number: [id number]    Therapy Diagnosis:   Encounter Diagnoses   Name Primary?    Neck pain Yes    Back pain of thoracolumbar region      Physician: Itzel Rodriguez NP    Visit Date: 12/1/2023    Physician Orders: PT Eval and Treat   Medical Diagnosis from Referral:   M54.2 (ICD-10-CM) - Neck pain   M54.9 (ICD-10-CM) - Acute bilateral back pain, unspecified back location   W19.XXXA (ICD-10-CM) - Fall, initial encounter      Evaluation Date: 11/29/2023  Authorization Period Expiration: 12/31/23  Plan of Care Expiration: 1/12/24  Progress Note Due: 12/29/23  Visit # / Visits authorized: 1/ 1, 1/20  FOTO: 1/ 3    Time In: 7:04 am   Time Out: 7:54 am   Total Billable Time: 50 minutes    Precautions: Standard and Diabetes    Subjective     Pt reports: experiencing just stiffness in her neck. Pt stated that her back is hurting, more on (L) side. Pt stated that she has been moving around more which feels good.   She was not issued home exercise program.  Response to previous treatment: last session was initial evaluation   Functional change: progressing    Pain: 5/10 (B) lumbar, (L) > (R)       Objective       Lissy received therapeutic exercises to develop strength, ROM, and flexibility for 36 minutes including:  Seated hamstring stretch 3x30" B   (B) LS stretch 2x30"   (B) UT stretch 2x30"   Piriformis stretch 3x30" B  LTR 5'x10 B  Bridges 2x10    Lissy participated in neuromuscular re-education activities to improve: Posture and stabilization for 14 minutes. The following activities were included:  PPT 5" x20 w/TC initially   Scap retractions 2x10 3"   Chin tucks 3"x10       Home Exercises Provided and Patient Education Provided     Education provided:   - HEP    Written Home Exercises Provided: yes.  Exercises were reviewed and Lissy was able to demonstrate them prior to the end of the session.  Lissy demonstrated good  understanding of the education " provided.     See EMR under Patient Instructions for exercises provided 12/1/2023.      Assessment     Pt received VC to perform stretches in range where she felt a stretch, not increased pain. Pt received VC for upright posture when performing seated stretches. Pt received VC/TC for proper technique when performing PPT. Monitor response to therapy session and progress as tolerated.   Lissy Is progressing  towards her goals.   Pt prognosis is Good.     Pt will continue to benefit from skilled outpatient physical therapy to address the deficits listed in the problem list box on initial evaluation, provide pt/family education and to maximize pt's level of independence in the home and community environment.     Pt's spiritual, cultural and educational needs considered and pt agreeable to plan of care and goals.    Anticipated barriers to physical therapy: none    Goals:     Short Term Goals: 1 week  Pt will be compliant with HEP to supplement PT with decreasing pain and improving functional mobility     Long Term Goals: 6 weeks   Pt will improve FOTO score to 59 in order to demo improved functional mobility  Pt will improve LE MMT scores by at least 1/2 grade where deficits noted in order to improve strength for functional tasks  Pt will perform cervical AROM in all planes measured above without c/o pain in order to be able to perform ADLs with less difficulty  Pt will improve (B) cervical rotation AROM to at least 70 degrees in order to be able to turn head when driving with less difficulty  Pt will perform lumbar AROM in all planes measured above without report of pain/soreness in order to be able to perform ADLs with less difficulty     Plan     Continue per POC, progress as tolerated     Carmen Logan, PT

## 2023-12-04 ENCOUNTER — LAB VISIT (OUTPATIENT)
Dept: LAB | Facility: HOSPITAL | Age: 78
End: 2023-12-04
Payer: MEDICARE

## 2023-12-04 DIAGNOSIS — E11.69 HYPERLIPIDEMIA ASSOCIATED WITH TYPE 2 DIABETES MELLITUS: ICD-10-CM

## 2023-12-04 DIAGNOSIS — E11.9 TYPE 2 DIABETES MELLITUS WITHOUT COMPLICATION, WITHOUT LONG-TERM CURRENT USE OF INSULIN: ICD-10-CM

## 2023-12-04 DIAGNOSIS — E78.5 HYPERLIPIDEMIA ASSOCIATED WITH TYPE 2 DIABETES MELLITUS: ICD-10-CM

## 2023-12-04 LAB
CHOLEST SERPL-MCNC: 173 MG/DL (ref 120–199)
CHOLEST/HDLC SERPL: 3.7 {RATIO} (ref 2–5)
ESTIMATED AVG GLUCOSE: 134 MG/DL (ref 68–131)
HBA1C MFR BLD: 6.3 % (ref 4–5.6)
HDLC SERPL-MCNC: 47 MG/DL (ref 40–75)
HDLC SERPL: 27.2 % (ref 20–50)
LDLC SERPL CALC-MCNC: 90 MG/DL (ref 63–159)
NONHDLC SERPL-MCNC: 126 MG/DL
TRIGL SERPL-MCNC: 180 MG/DL (ref 30–150)

## 2023-12-04 PROCEDURE — 80061 LIPID PANEL: CPT | Performed by: PHYSICIAN ASSISTANT

## 2023-12-04 PROCEDURE — 36415 COLL VENOUS BLD VENIPUNCTURE: CPT | Performed by: PHYSICIAN ASSISTANT

## 2023-12-04 PROCEDURE — 83036 HEMOGLOBIN GLYCOSYLATED A1C: CPT | Performed by: INTERNAL MEDICINE

## 2023-12-05 ENCOUNTER — OFFICE VISIT (OUTPATIENT)
Dept: ORTHOPEDICS | Facility: CLINIC | Age: 78
End: 2023-12-05
Payer: MEDICARE

## 2023-12-05 ENCOUNTER — TELEPHONE (OUTPATIENT)
Dept: CARDIOLOGY | Facility: CLINIC | Age: 78
End: 2023-12-05
Payer: MEDICARE

## 2023-12-05 VITALS — HEIGHT: 62 IN | BODY MASS INDEX: 29.44 KG/M2 | WEIGHT: 160 LBS

## 2023-12-05 DIAGNOSIS — M79.672 CHRONIC FOOT PAIN, LEFT: ICD-10-CM

## 2023-12-05 DIAGNOSIS — M25.572 SINUS TARSI SYNDROME OF LEFT FOOT: Primary | ICD-10-CM

## 2023-12-05 DIAGNOSIS — G89.29 CHRONIC FOOT PAIN, LEFT: ICD-10-CM

## 2023-12-05 PROCEDURE — 99999 PR PBB SHADOW E&M-EST. PATIENT-LVL IV: ICD-10-PCS | Mod: PBBFAC,,, | Performed by: PHYSICIAN ASSISTANT

## 2023-12-05 PROCEDURE — 99999 PR PBB SHADOW E&M-EST. PATIENT-LVL IV: CPT | Mod: PBBFAC,,, | Performed by: PHYSICIAN ASSISTANT

## 2023-12-05 PROCEDURE — 99214 OFFICE O/P EST MOD 30 MIN: CPT | Mod: PBBFAC | Performed by: PHYSICIAN ASSISTANT

## 2023-12-05 PROCEDURE — 99214 PR OFFICE/OUTPT VISIT, EST, LEVL IV, 30-39 MIN: ICD-10-PCS | Mod: S$PBB,,, | Performed by: PHYSICIAN ASSISTANT

## 2023-12-05 PROCEDURE — 99214 OFFICE O/P EST MOD 30 MIN: CPT | Mod: S$PBB,,, | Performed by: PHYSICIAN ASSISTANT

## 2023-12-05 RX ORDER — EZETIMIBE 10 MG/1
10 TABLET ORAL DAILY
Qty: 90 TABLET | Refills: 3 | Status: SHIPPED | OUTPATIENT
Start: 2023-12-05 | End: 2024-11-29

## 2023-12-05 NOTE — TELEPHONE ENCOUNTER
Discussed cholesterol results with patient. LDL above goal on Lipitor 80 mg daily. Will add on Zetia 10 mg daily.

## 2023-12-05 NOTE — PROGRESS NOTES
"  SUBJECTIVE:     Chief Complaint & History of Present Illness:    Lissy Palencia is a 78 y.o. year old female here for intermittent left foot and ankle pain for years.  There is no recent injury.  The pain is located in the lateral and dorsal aspect of the foot.  The pain is described as achy.  It is aggravated by activity- pain worsens when she "rolls" her ankle occasionally.  Currently, the ankle is not bothering her.  When she has a flare, she has pain with walking and symptoms of instability.  There is not a history of previous injury or surgery to the foot.   The patient does not use an assistive device.    Review of patient's allergies indicates:   Allergen Reactions    Compazine  [prochlorperazine edisylate]      Other reaction(s): SPASMS    Parafon forte      Other reaction(s): Hives         Current Outpatient Medications   Medication Sig Dispense Refill    alendronate (FOSAMAX) 70 MG tablet TAKE 1 TABLET EVERY 7 DAYS ON MONDAYS, THEN AFTER AN HOUR TAKE YOUR THYROID TABLET, 30 MINUTES LATER YOUR PANTOPRAZOLE (Patient taking differently: Tues) 12 tablet 3    amoxicillin (AMOXIL) 875 MG tablet Take 875 mg by mouth 2 (two) times daily.      aspirin (ECOTRIN) 81 MG EC tablet Take 81 mg by mouth once daily.      atorvastatin (LIPITOR) 80 MG tablet Take 1 tablet (80 mg total) by mouth once daily. TAKE 1 TABLET DAILY 90 tablet 3    azelastine (ASTELIN) 137 mcg (0.1 %) nasal spray 1 spray (137 mcg total) by Nasal route 2 (two) times daily. 30 mL 3    blood sugar diagnostic Strp To check BG ONCE daily, to use with insurance preferred meter 100 strip 3    cycloSPORINE (RESTASIS) 0.05 % ophthalmic emulsion Place 1 drop into both eyes 2 (two) times daily. 90 each 11    flu vac 2022 65up-nzoLY31C,PF, (FLUAD QUAD 2022-23,65Y UP,,PF,) 60 mcg (15 mcg x 4)/0.5 mL Syrg Inject into the muscle. 0.5 mL 0    ketorolac 0.5% (ACULAR) 0.5 % Drop Place 1 drop into the left eye 3 (three) times daily. 5 mL 3    lancets Misc To check " BG ONCE daily, to use with insurance preferred meter 100 each 3    levothyroxine (SYNTHROID) 88 MCG tablet TAKE FIRST THING IN THE MORNING ON AN EMPTY STOMACH 90 tablet 3    LIDOcaine (LIDODERM) 5 % Place onto the skin once daily. 30 patch 0    meloxicam (MOBIC) 7.5 MG tablet Take 1 tablet (7.5 mg total) by mouth once daily. 14 tablet 0    metFORMIN (GLUCOPHAGE) 500 MG tablet TAKE 1 TABLET DAILY WITH BREAKFAST 90 tablet 3    neomycin-polymyxin-hydrocortisone (CORTISPORIN) 3.5-10,000-1 mg/mL-unit/mL-% otic suspension Place 3 drops into the right ear 3 (three) times daily. 10 mL 0    nitroGLYCERIN (NITROSTAT) 0.4 MG SL tablet Place 1 tablet (0.4 mg total) under the tongue every 5 (five) minutes as needed for Chest pain. You can take up to 3 doses at home. If chest pain persists after second dose, go the ER. 25 tablet 4    ofloxacin (OCUFLOX) 0.3 % ophthalmic solution Place 1 drop into the left eye 3 (three) times daily. 5 mL 3    pantoprazole (PROTONIX) 40 MG tablet Take 1 tablet (40 mg total) by mouth once daily. 90 tablet 3    prednisolon/gatiflox/bromfenac (PREDNISOL ACE-GATIFLOX-BROMFEN) 1-0.5-0.075 % DrpS Apply 1 drop to eye 3 (three) times daily. One drop 3 times a day in surgical eye 5 mL 3    prednisolon/gatiflox/bromfenac (PREDNISOL ACE-GATIFLOX-BROMFEN) 1-0.5-0.075 % DrpS Apply 1 drop to eye 3 (three) times daily. One drop 3 times a day in surgical eye 5 mL 3    prednisolon/gatiflox/bromfenac (PREDNISOLONE-GATIFLOX-BROMFENAC) 1-0.5-0.09 % ophthalmic solution Place 1 drop into the left eye 3 (three) times daily. 5 mL 3    prednisoLONE acetate (PRED FORTE) 1 % DrpS Place 1 drop into the left eye 3 (three) times daily. 5 mL 3    predniSONE (DELTASONE) 20 MG tablet Take 1 tablet (20 mg total) by mouth 2 (two) times daily. 10 tablet 0    RESTASIS 0.05 % ophthalmic emulsion INSTILL 1 DROP IN BOTH EYES TWICE A DAY 60 each 11    tiZANidine (ZANAFLEX) 4 MG tablet Take 1-2 tablets (4-8 mg total) by mouth every 8  (eight) hours as needed (muscle spasms). 60 tablet 2    traMADoL (ULTRAM) 50 mg tablet Take 1 tablet (50 mg total) by mouth every 6 (six) hours as needed for Pain. 10 tablet 0    vitamin D (VITAMIN D3) 1000 units Tab Take 1 tablet (1,000 Units total) by mouth once daily. 90 tablet 3    blood-glucose meter kit To check BG ONCE THE daily, to use with insurance preferred meter 1 each 1     No current facility-administered medications for this visit.     Facility-Administered Medications Ordered in Other Visits   Medication Dose Route Frequency Provider Last Rate Last Admin    balanced salt irrigation intra-ocular solution 1 drop  1 drop Left Eye On Call Procedure Ciara Mclaughlin MD        balanced salt irrigation intra-ocular solution 1 drop  1 drop Left Eye On Call Procedure Ciara Mclaughlin MD        mupirocin 2 % ointment   Nasal On Call Procedure Caesar Finch MD   Given at 10/16/23 0752    phenylephrine HCL 10% ophthalmic solution 1 drop  1 drop Left Eye PRN Ciara Mclaughlin MD        phenylephrine HCL 10% ophthalmic solution 1 drop  1 drop Left Eye PRN Ciara Mclaughlin MD        sodium chloride 0.9% flush 2 mL  2 mL Intravenous PRN Ciara Mclaughlin MD        sodium chloride 0.9% flush 2 mL  2 mL Intravenous PRN Ciara Mclaughlin MD           Past Medical History:   Diagnosis Date    Allergy     Arthritis     Coronary artery disease     Cystocele 01/19/2016    Diabetes mellitus     GERD (gastroesophageal reflux disease)     Hemorrhoids     History of cholelithiasis     Hypothyroidism     Obesity     Right shoulder pain 10/09/2012    Sleep apnea     Trigger finger, right 3rd finger 07/30/2014    Vaginal atrophy 01/19/2016    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       Past Surgical History:   Procedure Laterality Date    ABLATION COLPOCLESIS      ADENOIDECTOMY      CATARACT EXTRACTION W/  INTRAOCULAR LENS IMPLANT Right 11/23/2022    Procedure: EXTRACTION, CATARACT, WITH IOL INSERTION;  Surgeon: Ciara VAZQUEZ  "MD Lissette;  Location: [facility] OR;  Service: Ophthalmology;  Laterality: Right;    CATARACT EXTRACTION W/  INTRAOCULAR LENS IMPLANT Left 12/22/2022    Procedure: EXTRACTION, CATARACT, WITH IOL INSERTION;  Surgeon: Ciara Mclaughlin MD;  Location: [facility] OR;  Service: Ophthalmology;  Laterality: Left;    CHOLECYSTECTOMY      COLONOSCOPY N/A 11/28/2018    Procedure: COLONOSCOPY;  Surgeon: Richi Mcintosh MD;  Location: [facility] ENDO (4TH FLR);  Service: Endoscopy;  Laterality: N/A;    CORONARY STENT PLACEMENT      ESOPHAGOGASTRODUODENOSCOPY N/A 11/28/2018    Procedure: ESOPHAGOGASTRODUODENOSCOPY (EGD);  Surgeon: Richi Mcintosh MD;  Location: [facility] ENDO (4TH FLR);  Service: Endoscopy;  Laterality: N/A;    heart cath  2005    non-obstructive disease    INTRAOCULAR LENS EXCHANGE Left 6/14/2023    Procedure: REPLACEMENT, IOL;  Surgeon: Ciara Mclaughlin MD;  Location: [facility] OR;  Service: Ophthalmology;  Laterality: Left;    OOPHORECTOMY      SHOULDER ARTHROSCOPY W/ ROTATOR CUFF REPAIR      Right    TONSILLECTOMY      TOTAL ABDOMINAL HYSTERECTOMY      with BSO    TRIGGER FINGER RELEASE Right 10/16/2023    Procedure: RELEASE, TRIGGER FINGER,RIGHT LONG AND RING FINGER;  Surgeon: Shaneka Richardson MD;  Location: [facility];  Service: Orthopedics;  Laterality: Right;    TUBAL LIGATION           Review of Systems:  ROS:  Constitutional: no fever or chills  Eyes: no visual changes  ENT: no nasal congestion or sore throat  Respiratory: no cough or shortness of breath  Musculoskeletal: no arthralgias or myalgias  Behavioral/Psych: no auditory or visual hallucinations    OBJECTIVE:     PHYSICAL EXAM:  Height: 5' 2" (157.5 cm) Weight: 72.6 kg (160 lb), General Appearance: Well nourished, well developed, in no acute distress.  CV: 2+ UE and LE distal pulses bilaterally  RESP: Respirations equal and unlabored  GI: Abdomen soft and non-tender  Neurological: Mood & affect are normal.  Left foot/ankle  Skin intact  No swelling  FROM  TTP " along lateral foot, sinus tarsi  5/5 strength testing  NVI    IMAGING:  Patient declined new x-rays today.  X-rays of the left foot (2021), personally reviewed by me, demonstrate mild degenerative changes.  No fracture or dislocation.     ASSESSMENT/PLAN:     78 year year old female with chronic left foot pain    Plan:  - Discussed options including diagnostic/therapeutic subtalar CSI.  She is not hurting right now so we will hold of on this.  - continue HEP  - Shoe wear/modification  - follow up as needed

## 2023-12-06 NOTE — PROGRESS NOTES
"  Physical Therapy Daily Treatment Note     Name: Lissy Palencia  Clinic Number: [id number]    Therapy Diagnosis:   Encounter Diagnoses   Name Primary?    Neck pain Yes    Back pain of thoracolumbar region        Physician: Itzel Rodriguez NP    Visit Date: 12/7/2023    Physician Orders: PT Eval and Treat   Medical Diagnosis from Referral:   M54.2 (ICD-10-CM) - Neck pain   M54.9 (ICD-10-CM) - Acute bilateral back pain, unspecified back location   W19.XXXA (ICD-10-CM) - Fall, initial encounter      Evaluation Date: 11/29/2023  Authorization Period Expiration: 12/31/23  Plan of Care Expiration: 1/12/24  Progress Note Due: 12/29/23  Visit # / Visits authorized: 1/ 1, 2/20  FOTO: 1/ 3    Time In: 8:32 am   Time Out: 9:17 am   Total Billable Time: 45 minutes    Precautions: Standard and Diabetes    Subjective     Pt reports: that she has been doing her exercises at least once a day (before getting out of bed). Pt states that she would like to get into a good routine of HEP and gym activities.   She was not issued home exercise program.  Response to previous treatment: no adverse effects   Functional change: progressing    Pain: 5/10 (B) lumbar, (L) > (R)       Objective       Lissy received therapeutic exercises to develop strength, ROM, and flexibility for 35 minutes including:  Nu-step 5 min (for increased UE/LE joint lubrication) supervised   Seated cervical rotation c/ towel assist 5" 10x ea   Standing hip abduction 2x10 BLE   Seated clams GTB 2x10   (B) UT stretch 3x30"     Not performed today:   Seated hamstring stretch 3x30" B   (B) LS stretch 2x30"   Piriformis stretch 3x30" B  LTR 5'x10 B  Bridges 2x10      Lissy participated in neuromuscular re-education activities to improve: Posture and stabilization for 10 minutes. The following activities were included:  Rows + Scap retractions RTB 2x10 3"   No money RTB 2x10     Chin tucks 3"x10 (not performed)   PPT 5" x20 w/TC initially (not performed)     Home Exercises " Provided and Patient Education Provided     Education provided:   - HEP    Written Home Exercises Provided: yes.  Exercises were reviewed and Lissy was able to demonstrate them prior to the end of the session.  Lissy demonstrated good  understanding of the education provided.     See EMR under Patient Instructions for exercises provided 12/1/2023.      Assessment     Pt declined to perform previously established activities, as she had already performed them at home this morning. Incorporated additional activities for UE and LE strengthening, as well as hien-scapular strengthening and stabilization. Pt given tactile cues during standing hip abductions to maintain correct form to avoid hip flexor compensation. Will continue to monitor pt's response to therapy sessions and progress per her tolerance towards goals.      Lissy Is progressing  towards her goals.   Pt prognosis is Good.     Pt will continue to benefit from skilled outpatient physical therapy to address the deficits listed in the problem list box on initial evaluation, provide pt/family education and to maximize pt's level of independence in the home and community environment.     Pt's spiritual, cultural and educational needs considered and pt agreeable to plan of care and goals.    Anticipated barriers to physical therapy: none    Goals:     Short Term Goals: 1 week  Pt will be compliant with HEP to supplement PT with decreasing pain and improving functional mobility     Long Term Goals: 6 weeks   Pt will improve FOTO score to 59 in order to demo improved functional mobility  Pt will improve LE MMT scores by at least 1/2 grade where deficits noted in order to improve strength for functional tasks  Pt will perform cervical AROM in all planes measured above without c/o pain in order to be able to perform ADLs with less difficulty  Pt will improve (B) cervical rotation AROM to at least 70 degrees in order to be able to turn head when driving with less  difficulty  Pt will perform lumbar AROM in all planes measured above without report of pain/soreness in order to be able to perform ADLs with less difficulty     Plan     Continue per POC, progress as tolerated     Cheryl Castro PTA

## 2023-12-07 ENCOUNTER — CLINICAL SUPPORT (OUTPATIENT)
Dept: REHABILITATION | Facility: HOSPITAL | Age: 78
End: 2023-12-07
Payer: MEDICARE

## 2023-12-07 DIAGNOSIS — M54.50 BACK PAIN OF THORACOLUMBAR REGION: ICD-10-CM

## 2023-12-07 DIAGNOSIS — M54.2 NECK PAIN: Primary | ICD-10-CM

## 2023-12-07 DIAGNOSIS — M54.6 BACK PAIN OF THORACOLUMBAR REGION: ICD-10-CM

## 2023-12-07 PROCEDURE — 97110 THERAPEUTIC EXERCISES: CPT | Mod: CQ

## 2023-12-07 PROCEDURE — 97112 NEUROMUSCULAR REEDUCATION: CPT | Mod: CQ

## 2023-12-11 ENCOUNTER — OFFICE VISIT (OUTPATIENT)
Dept: PRIMARY CARE CLINIC | Facility: CLINIC | Age: 78
End: 2023-12-11
Payer: MEDICARE

## 2023-12-11 VITALS
RESPIRATION RATE: 18 BRPM | HEART RATE: 64 BPM | WEIGHT: 154.75 LBS | BODY MASS INDEX: 28.48 KG/M2 | DIASTOLIC BLOOD PRESSURE: 80 MMHG | OXYGEN SATURATION: 96 % | HEIGHT: 62 IN | TEMPERATURE: 98 F | SYSTOLIC BLOOD PRESSURE: 120 MMHG

## 2023-12-11 DIAGNOSIS — I70.0 AORTIC ATHEROSCLEROSIS: ICD-10-CM

## 2023-12-11 DIAGNOSIS — M81.0 OSTEOPOROSIS, UNSPECIFIED OSTEOPOROSIS TYPE, UNSPECIFIED PATHOLOGICAL FRACTURE PRESENCE: ICD-10-CM

## 2023-12-11 DIAGNOSIS — I25.10 CORONARY ARTERY DISEASE INVOLVING NATIVE CORONARY ARTERY OF NATIVE HEART WITHOUT ANGINA PECTORIS: Primary | ICD-10-CM

## 2023-12-11 DIAGNOSIS — E11.69 HYPERLIPIDEMIA ASSOCIATED WITH TYPE 2 DIABETES MELLITUS: ICD-10-CM

## 2023-12-11 DIAGNOSIS — E11.9 TYPE 2 DIABETES MELLITUS WITHOUT COMPLICATION, WITHOUT LONG-TERM CURRENT USE OF INSULIN: ICD-10-CM

## 2023-12-11 DIAGNOSIS — E78.5 HYPERLIPIDEMIA ASSOCIATED WITH TYPE 2 DIABETES MELLITUS: ICD-10-CM

## 2023-12-11 DIAGNOSIS — G47.33 OSA (OBSTRUCTIVE SLEEP APNEA): ICD-10-CM

## 2023-12-11 PROCEDURE — 99214 OFFICE O/P EST MOD 30 MIN: CPT | Mod: S$PBB,,, | Performed by: INTERNAL MEDICINE

## 2023-12-11 PROCEDURE — 99999 PR PBB SHADOW E&M-EST. PATIENT-LVL V: ICD-10-PCS | Mod: PBBFAC,,, | Performed by: INTERNAL MEDICINE

## 2023-12-11 PROCEDURE — 99999 PR PBB SHADOW E&M-EST. PATIENT-LVL V: CPT | Mod: PBBFAC,,, | Performed by: INTERNAL MEDICINE

## 2023-12-11 PROCEDURE — 99214 PR OFFICE/OUTPT VISIT, EST, LEVL IV, 30-39 MIN: ICD-10-PCS | Mod: S$PBB,,, | Performed by: INTERNAL MEDICINE

## 2023-12-11 PROCEDURE — 99215 OFFICE O/P EST HI 40 MIN: CPT | Mod: PBBFAC | Performed by: INTERNAL MEDICINE

## 2023-12-11 NOTE — PROGRESS NOTES
[facility] Note    Chief Complaint      Chief Complaint   Patient presents with    Follow-up     6m       History of Present Illness      Lissy Palencia is a 78 y.o. female who presents today for   Chief Complaint   Patient presents with    Follow-up     6m   .  Patient comes to appointment here for 6 m checkup for chronic issues as detailed below . She states in October she had a fall after slipping on wet floor at [employer] [employer] [facility] . She went to see ortho had xrays done is now in therapy .     Problem List Items Addressed This Visit          Cardiac/Vascular    Hyperlipidemia associated with type 2 diabetes mellitus    Overview     Cont lipitor and zetia          Coronary artery disease involving native coronary artery of native heart without angina pectoris - Primary    Overview     Now seeing dr linder . Lipid panel reviewed  . Cont current regimen added zetia         Aortic atherosclerosis    Overview     stable            Endocrine    Type 2 diabetes mellitus without complication, without long-term current use of insulin    Overview      Cont metfornin . A1c is good at 6.3 cont current          Osteoporosis    Overview     Stable on fosamax cont dexa scan            Other    HEMAL (obstructive sleep apnea)    Overview     Cannot tolerate cpap is on no treatment cu rrently and declines               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       Past Surgical History:  Past Surgical History:   Procedure Laterality Date    ABLATION COLPOCLESIS      ADENOIDECTOMY      CATARACT EXTRACTION  "W/  INTRAOCULAR LENS IMPLANT Right 11/23/2022    Procedure: EXTRACTION, CATARACT, WITH IOL INSERTION;  Surgeon: Ciara Mclaughlin MD;  Location: [facility] OR;  Service: Ophthalmology;  Laterality: Right;    CATARACT EXTRACTION W/  INTRAOCULAR LENS IMPLANT Left 12/22/2022    Procedure: EXTRACTION, CATARACT, WITH IOL INSERTION;  Surgeon: Ciara Mclaughlin MD;  Location: [facility] OR;  Service: Ophthalmology;  Laterality: Left;    CHOLECYSTECTOMY      COLONOSCOPY N/A 11/28/2018    Procedure: COLONOSCOPY;  Surgeon: Richi Mcintosh MD;  Location: [facility] ENDO (4TH FLR);  Service: Endoscopy;  Laterality: N/A;    CORONARY STENT PLACEMENT      ESOPHAGOGASTRODUODENOSCOPY N/A 11/28/2018    Procedure: ESOPHAGOGASTRODUODENOSCOPY (EGD);  Surgeon: Richi Mcintosh MD;  Location: [facility] ENDO (4TH FLR);  Service: Endoscopy;  Laterality: N/A;    heart cath  2005    non-obstructive disease    INTRAOCULAR LENS EXCHANGE Left 6/14/2023    Procedure: REPLACEMENT, IOL;  Surgeon: Ciara Mclaughlin MD;  Location: [facility] OR;  Service: Ophthalmology;  Laterality: Left;    OOPHORECTOMY      SHOULDER ARTHROSCOPY W/ ROTATOR CUFF REPAIR      Right    TONSILLECTOMY      TOTAL ABDOMINAL HYSTERECTOMY      with BSO    TRIGGER FINGER RELEASE Right 10/16/2023    Procedure: RELEASE, TRIGGER FINGER,RIGHT LONG AND RING FINGER;  Surgeon: Shaneka Richardson MD;  Location: [facility] OR;  Service: Orthopedics;  Laterality: Right;    TUBAL LIGATION         Family History:  family history is not on file.    Social History:  Social History     Socioeconomic History    Marital status:    Occupational History     Employer: [employer]   Tobacco Use    Smoking status: Never    Smokeless tobacco: Never   Substance and Sexual Activity    Alcohol use: Yes     Alcohol/week: 1.0 standard drink of alcohol     Types: 1 Glasses of wine per week     Comment: "maybe a glass of wine every 6 months"    Drug use: No    Sexual activity: Not Currently     Partners: Male     Birth " control/protection: None     Social Determinants of Health     Financial Resource Strain: Low Risk  (1/3/2022)    Overall Financial Resource Strain (CARDIA)     Difficulty of Paying Living Expenses: Not hard at all   Food Insecurity: No Food Insecurity (1/3/2022)    Hunger Vital Sign     Worried About Running Out of Food in the Last Year: Never true     Ran Out of Food in the Last Year: Never true   Transportation Needs: No Transportation Needs (1/3/2022)    PRAPARE - Transportation     Lack of Transportation (Medical): No     Lack of Transportation (Non-Medical): No   Physical Activity: Sufficiently Active (8/10/2020)    Exercise Vital Sign     Days of Exercise per Week: 6 days     Minutes of Exercise per Session: 60 min   Stress: No Stress Concern Present (1/3/2022)    [place] [employer] - Occupational Stress Questionnaire     Feeling of Stress : Not at all   Social Connections: Moderately Isolated (1/3/2022)    Social Connection and Isolation Panel [NHANES]     Frequency of Communication with Friends and Family: More than three times a week     Frequency of Social Gatherings with Friends and Family: Once a week     Attends Buddhism Services: Never     Active Member of Clubs or Organizations: No     Attends Club or Organization Meetings: Never     Marital Status:    Housing Stability: Low Risk  (1/3/2022)    Housing Stability Vital Sign     Unable to Pay for Housing in the Last Year: No     Number of Places Lived in the Last Year: 1     Unstable Housing in the Last Year: No       Review of Systems:   Review of Systems   Constitutional:  Negative for fever and weight loss.   HENT:  Negative for congestion, hearing loss and sore throat.    Eyes:  Negative for blurred vision.   Respiratory:  Negative for cough and shortness of breath.    Cardiovascular:  Negative for chest pain, palpitations, claudication and leg swelling.   Gastrointestinal:  Negative for abdominal pain, constipation,  diarrhea and heartburn.   Genitourinary:  Negative for dysuria.   Musculoskeletal:  Positive for back pain. Negative for myalgias.   Skin:  Negative for rash.   Neurological:  Negative for focal weakness and headaches.   Psychiatric/Behavioral:  Negative for depression and suicidal ideas. The patient is not nervous/anxious.          Medications:  Outpatient Encounter Medications as of 12/11/2023   Medication Sig Note Dispense Refill    alendronate (FOSAMAX) 70 MG tablet TAKE 1 TABLET EVERY 7 DAYS ON MONDAYS, THEN AFTER AN HOUR TAKE YOUR THYROID TABLET, 30 MINUTES LATER YOUR PANTOPRAZOLE (Patient taking differently: Tues)  12 tablet 3    aspirin (ECOTRIN) 81 MG EC tablet Take 81 mg by mouth once daily. 6/8/2023: Take AM of surgrery      atorvastatin (LIPITOR) 80 MG tablet Take 1 tablet (80 mg total) by mouth once daily. TAKE 1 TABLET DAILY 6/8/2023: Take AM of surgery 90 tablet 3    azelastine (ASTELIN) 137 mcg (0.1 %) nasal spray 1 spray (137 mcg total) by Nasal route 2 (two) times daily.  30 mL 3    blood sugar diagnostic Strp To check BG ONCE daily, to use with insurance preferred meter  100 strip 3    blood-glucose meter kit To check BG ONCE THE daily, to use with insurance preferred meter  1 each 1    cycloSPORINE (RESTASIS) 0.05 % ophthalmic emulsion Place 1 drop into both eyes 2 (two) times daily.  90 each 11    ezetimibe (ZETIA) 10 mg tablet Take 1 tablet (10 mg total) by mouth once daily.  90 tablet 3    flu vac 2022 65up-[id number],PF, (FLUAD QUAD 2022-23,65Y UP,,PF,) 60 mcg (15 mcg x 4)/0.5 mL Syrg Inject into the muscle.  0.5 mL 0    ketorolac 0.5% (ACULAR) 0.5 % Drop Place 1 drop into the left eye 3 (three) times daily.  5 mL 3    lancets Misc To check BG ONCE daily, to use with insurance preferred meter  100 each 3    levothyroxine (SYNTHROID) 88 MCG tablet TAKE FIRST THING IN THE MORNING ON AN EMPTY STOMACH 6/8/2023: Take AM of surgery 90 tablet 3    LIDOcaine (LIDODERM) 5 % Place onto the skin once  daily.  30 patch 0    meloxicam (MOBIC) 7.5 MG tablet Take 1 tablet (7.5 mg total) by mouth once daily.  14 tablet 0    metFORMIN (GLUCOPHAGE) 500 MG tablet TAKE 1 TABLET DAILY WITH BREAKFAST  90 tablet 3    neomycin-polymyxin-hydrocortisone (CORTISPORIN) 3.5-10,000-1 mg/mL-unit/mL-% otic suspension Place 3 drops into the right ear 3 (three) times daily.  10 mL 0    nitroGLYCERIN (NITROSTAT) 0.4 MG SL tablet Place 1 tablet (0.4 mg total) under the tongue every 5 (five) minutes as needed for Chest pain. You can take up to 3 doses at home. If chest pain persists after second dose, go the ER. 2023: Pt never took med 25 tablet 4    pantoprazole (PROTONIX) 40 MG tablet Take 1 tablet (40 mg total) by mouth once daily.  90 tablet 3    prednisolon/gatiflox/bromfenac (PREDNISOL ACE-GATIFLOX-BROMFEN) 1-0.5-0.075 % DrpS Apply 1 drop to eye 3 (three) times daily. One drop 3 times a day in surgical eye  5 mL 3    RESTASIS 0.05 % ophthalmic emulsion INSTILL 1 DROP IN BOTH EYES TWICE A DAY  60 each 11    tiZANidine (ZANAFLEX) 4 MG tablet Take 1-2 tablets (4-8 mg total) by mouth every 8 (eight) hours as needed (muscle spasms).  60 tablet 2    vitamin D (VITAMIN D3) 1000 units Tab Take 1 tablet (1,000 Units total) by mouth once daily.  90 tablet 3    [] naproxen (NAPROSYN) 500 MG tablet Take 1 tablet (500 mg total) by mouth 2 (two) times daily with meals. for 7 days  14 tablet 0    [DISCONTINUED] amoxicillin (AMOXIL) 875 MG tablet Take 875 mg by mouth 2 (two) times daily.       [DISCONTINUED] ofloxacin (OCUFLOX) 0.3 % ophthalmic solution Place 1 drop into the left eye 3 (three) times daily. (Patient not taking: Reported on 2023)  5 mL 3    [DISCONTINUED] prednisolon/gatiflox/bromfenac (PREDNISOL ACE-GATIFLOX-BROMFEN) 1-0.5-0.075 % DrpS Apply 1 drop to eye 3 (three) times daily. One drop 3 times a day in surgical eye (Patient not taking: Reported on 2023)  5 mL 3    [DISCONTINUED]  prednisolon/gatiflox/bromfenac (PREDNISOLONE-GATIFLOX-BROMFENAC) 1-0.5-0.09 % ophthalmic solution Place 1 drop into the left eye 3 (three) times daily. (Patient not taking: Reported on 12/11/2023)  5 mL 3    [DISCONTINUED] prednisoLONE acetate (PRED FORTE) 1 % DrpS Place 1 drop into the left eye 3 (three) times daily. (Patient not taking: Reported on 12/11/2023)  5 mL 3    [DISCONTINUED] predniSONE (DELTASONE) 20 MG tablet Take 1 tablet (20 mg total) by mouth 2 (two) times daily. (Patient not taking: Reported on 12/11/2023)  10 tablet 0    [DISCONTINUED] tiZANidine (ZANAFLEX) 2 MG tablet Take 1 tablet (2 mg total) by mouth daily as needed (spasms).  20 tablet 0    [DISCONTINUED] traMADoL (ULTRAM) 50 mg tablet Take 1 tablet (50 mg total) by mouth every 6 (six) hours as needed for Pain. (Patient not taking: Reported on 12/11/2023)  10 tablet 0     Facility-Administered Encounter Medications as of 12/11/2023   Medication Dose Route Frequency Provider Last Rate Last Admin    balanced salt irrigation intra-ocular solution 1 drop  1 drop Left Eye On Call Procedure Ciara Mclaughlin MD        balanced salt irrigation intra-ocular solution 1 drop  1 drop Left Eye On Call Procedure Ciara Mclaughlin MD        mupirocin 2 % ointment   Nasal On Call Procedure Caesar Finch MD   Given at 10/16/23 0752    phenylephrine HCL 10% ophthalmic solution 1 drop  1 drop Left Eye PRN Ciara Mclaughlin MD        phenylephrine HCL 10% ophthalmic solution 1 drop  1 drop Left Eye PRN Ciara Mclaughlin MD        sodium chloride 0.9% flush 2 mL  2 mL Intravenous PRN Ciara Mclaughlin MD        sodium chloride 0.9% flush 2 mL  2 mL Intravenous PRN Ciara Mclaughlin MD            Allergies:  Review of patient's allergies indicates:   Allergen Reactions    Compazine  [prochlorperazine edisylate]      Other reaction(s): SPASMS    Parafon forte      Other reaction(s): Hives         Physical Exam         Vitals:    12/11/23 1316   BP: 120/80  "  Pulse: 64   Resp: 18   Temp: 98 °F (36.7 °C)         Physical Exam  Constitutional:       Appearance: She is well-developed.   Eyes:      Pupils: Pupils are equal, round, and reactive to light.   Neck:      Thyroid: No thyromegaly.   Cardiovascular:      Rate and Rhythm: Normal rate.      Heart sounds: Normal heart sounds. No murmur heard.     No friction rub. No gallop.   Pulmonary:      Breath sounds: Normal breath sounds.   Abdominal:      General: Bowel sounds are normal.      Palpations: Abdomen is soft.   Musculoskeletal:         General: Normal range of motion.      Cervical back: Normal range of motion.   Lymphadenopathy:      Cervical: No cervical adenopathy.   Skin:     General: Skin is warm.      Findings: No rash.   Neurological:      Mental Status: She is alert and oriented to person, place, and time.      Cranial Nerves: No cranial nerve deficit.   Psychiatric:         Behavior: Behavior normal.          Laboratory:  CBC:  No results for input(s): "WBC", "RBC", "HGB", "HCT", "PLT", "MCV", "MCH", "MCHC" in the last 2160 hours.  CMP:  No results for input(s): "GLU", "CALCIUM", "ALBUMIN", "PROT", "NA", "K", "CO2", "CL", "BUN", "ALKPHOS", "ALT", "AST", "BILITOT" in the last 2160 hours.    Invalid input(s): "CREATININ"  URINALYSIS:  No results for input(s): "COLORU", "CLARITYU", "SPECGRAV", "PHUR", "PROTEINUA", "GLUCOSEU", "BILIRUBINCON", "BLOODU", "WBCU", "RBCU", "BACTERIA", "MUCUS", "NITRITE", "LEUKOCYTESUR", "UROBILINOGEN", "HYALINECASTS" in the last 2160 hours.   LIPIDS:  Recent Labs   Lab Result Units 12/04/23  0833   HDL mg/dL 47   Cholesterol mg/dL 173   Triglycerides mg/dL 180*   LDL Cholesterol mg/dL 90.0   HDL/Cholesterol Ratio % 27.2   Non-HDL Cholesterol mg/dL 126   Total Cholesterol/HDL Ratio  3.7     TSH:  No results for input(s): "TSH" in the last 2160 hours.  A1C:  Recent Labs   Lab Result Units 09/19/23  1522 12/04/23  0833   Hemoglobin A1C % 6.2* 6.3*       Radiology:        Assessment: "     Lissy Palencia is a 78 y.o.female with:    Coronary artery disease involving native coronary artery of native heart without angina pectoris    Aortic atherosclerosis    Hyperlipidemia associated with type 2 diabetes mellitus    HEMAL (obstructive sleep apnea)    Type 2 diabetes mellitus without complication, without long-term current use of insulin    Osteoporosis, unspecified osteoporosis type, unspecified pathological fracture presence  -     DXA Bone Density Axial Skeleton 1 or more sites; Future; Expected date: 12/11/2023          Plan:     Problem List Items Addressed This Visit          Cardiac/Vascular    Hyperlipidemia associated with type 2 diabetes mellitus    Overview     Cont lipitor and zetia          Coronary artery disease involving native coronary artery of native heart without angina pectoris - Primary    Overview     Now seeing dr linder . Lipid panel reviewed  . Cont current regimen added zetia         Aortic atherosclerosis    Overview     stable            Endocrine    Type 2 diabetes mellitus without complication, without long-term current use of insulin    Overview      Cont metfornin . A1c is good at 6.3 cont current          Osteoporosis    Overview     Stable on fosamax cont dexa scan            Other    HEMAL (obstructive sleep apnea)    Overview     Cannot tolerate cpap is on no treatment cu rrently and declines             As above, continue current medications and maintain follow up with specialists.  Return to clinic in 6 months.      Frederick W Dantagnan [facility]

## 2023-12-12 ENCOUNTER — CLINICAL SUPPORT (OUTPATIENT)
Dept: REHABILITATION | Facility: HOSPITAL | Age: 78
End: 2023-12-12
Payer: MEDICARE

## 2023-12-12 DIAGNOSIS — M54.2 NECK PAIN: Primary | ICD-10-CM

## 2023-12-12 DIAGNOSIS — M54.50 BACK PAIN OF THORACOLUMBAR REGION: ICD-10-CM

## 2023-12-12 DIAGNOSIS — M54.6 BACK PAIN OF THORACOLUMBAR REGION: ICD-10-CM

## 2023-12-12 PROCEDURE — 97110 THERAPEUTIC EXERCISES: CPT

## 2023-12-12 PROCEDURE — 97112 NEUROMUSCULAR REEDUCATION: CPT

## 2023-12-12 PROCEDURE — 97140 MANUAL THERAPY 1/> REGIONS: CPT

## 2023-12-12 NOTE — PROGRESS NOTES
"  Physical Therapy Daily Treatment Note     Name: Lissy Palencia  Clinic Number: [id number]    Therapy Diagnosis:   Encounter Diagnoses   Name Primary?    Neck pain Yes    Back pain of thoracolumbar region      Physician: Itzel Rodriguez NP    Visit Date: 12/12/2023    Physician Orders: PT Eval and Treat   Medical Diagnosis from Referral:   M54.2 (ICD-10-CM) - Neck pain   M54.9 (ICD-10-CM) - Acute bilateral back pain, unspecified back location   W19.XXXA (ICD-10-CM) - Fall, initial encounter      Evaluation Date: 11/29/2023  Authorization Period Expiration: 12/31/23  Plan of Care Expiration: 1/12/24  Progress Note Due: 12/29/23  Visit # / Visits authorized: 1/ 1, 3/20  FOTO: 1/ 3    Time In: 8:16 am   Time Out: 9:00 am   Total Billable Time: 44 minutes    Precautions: Standard and Diabetes    Subjective     Pt reports: that she feels her normal stiffness/soreness in the morning and did some of her exercises this morning so feeling less stiff compared to when she woke up. Pt stated that she is still experiencing constant pain in (L) upper back.  She was compliant with home exercise program.  Response to previous treatment: no adverse effects  Functional change: progressing     Pain: 3/10 (L) thoracic region      Objective     Lissy received the following manual therapy techniques:  were applied for 10 minutes, including:    STM to (L) thoracic paraspinals and along (L) scapula border     Lissy received therapeutic exercises to develop ROM, and flexibility for 23 minutes including:  Nu-step 5 min (for increased UE/LE joint lubrication)   Lower cervical/upper thoracic stretch 3x30"   Seated cervical rotation c/ towel assist 5" 10x B      Lissy participated in neuromuscular re-education activities to improve: Posture and stabilization for 11 minutes. The following activities were included:  Rows + Scap retractions RTB 2x10 3"   No money RTB 2x10   Horizontal shoulder abduction 2x10 RTB   Chin tucks 3"x10 (not performed) " "  PPT 5" x20 w/TC initially (not performed)       Home Exercises Provided and Patient Education Provided     Education provided:   - HEP     Written Home Exercises Provided: Patient instructed to cont prior HEP.   Lissy demonstrated good  understanding of the education provided.     See EMR under Patient Instructions for exercises provided prior visit.      Assessment     Trial of adding manual therapy noted above due to pt report of pain in (L) thoracic region. Also added lower cervical/upper thoracic stretch to try to help decrease pain /stiffness in this region. Monitor response to therapy session and progress as tolerated.   Lissy Is progressing  towards her goals.   Pt prognosis is Good.     Pt will continue to benefit from skilled outpatient physical therapy to address the deficits listed in the problem list box on initial evaluation, provide pt/family education and to maximize pt's level of independence in the home and community environment.     Pt's spiritual, cultural and educational needs considered and pt agreeable to plan of care and goals.    Anticipated barriers to physical therapy: none    Goals:     Short Term Goals: 1 week  Pt will be compliant with HEP to supplement PT with decreasing pain and improving functional mobility     Long Term Goals: 6 weeks   Pt will improve FOTO score to 59 in order to demo improved functional mobility  Pt will improve LE MMT scores by at least 1/2 grade where deficits noted in order to improve strength for functional tasks  Pt will perform cervical AROM in all planes measured above without c/o pain in order to be able to perform ADLs with less difficulty  Pt will improve (B) cervical rotation AROM to at least 70 degrees in order to be able to turn head when driving with less difficulty  Pt will perform lumbar AROM in all planes measured above without report of pain/soreness in order to be able to perform ADLs with less difficulty     Plan     Continue per POC, progress " as tolerated     Carmen Logan, PT

## 2023-12-14 ENCOUNTER — HOSPITAL ENCOUNTER (OUTPATIENT)
Dept: CARDIOLOGY | Facility: HOSPITAL | Age: 78
Discharge: HOME | End: 2023-12-14
Attending: PHYSICIAN ASSISTANT
Payer: MEDICARE

## 2023-12-14 ENCOUNTER — CLINICAL SUPPORT (OUTPATIENT)
Dept: REHABILITATION | Facility: HOSPITAL | Age: 78
End: 2023-12-14
Payer: MEDICARE

## 2023-12-14 DIAGNOSIS — M54.6 BACK PAIN OF THORACOLUMBAR REGION: ICD-10-CM

## 2023-12-14 DIAGNOSIS — M54.2 NECK PAIN: Primary | ICD-10-CM

## 2023-12-14 DIAGNOSIS — I25.10 CORONARY ARTERY DISEASE INVOLVING NATIVE CORONARY ARTERY OF NATIVE HEART WITHOUT ANGINA PECTORIS: ICD-10-CM

## 2023-12-14 DIAGNOSIS — M54.50 BACK PAIN OF THORACOLUMBAR REGION: ICD-10-CM

## 2023-12-14 PROCEDURE — 97112 NEUROMUSCULAR REEDUCATION: CPT

## 2023-12-14 PROCEDURE — 97110 THERAPEUTIC EXERCISES: CPT

## 2023-12-14 NOTE — PROGRESS NOTES
CERTIFICATE OF RETURN TO WORK    February 11, 2020      Khushboo Chino  [address] [place] [id number]                      To Whom It May Concern:    This letter is to certify that Khushboo Chino has been under my care 02/10/2020 and 02/12/2020. Due to symptoms, please excuse from work yesterday and today. Thank you. If any questions please call [phone].            Sincerely,          Samanta Oh, DO  [address] [place] [address]  [phone]     "  Physical Therapy Daily Treatment Note     Name: Lissy Palencia  Clinic Number: [id number]    Therapy Diagnosis:   Encounter Diagnoses   Name Primary?    Neck pain Yes    Back pain of thoracolumbar region      Physician: Itzel Rodriguez NP    Visit Date: 12/14/2023    Physician Orders: PT Eval and Treat   Medical Diagnosis from Referral:   M54.2 (ICD-10-CM) - Neck pain   M54.9 (ICD-10-CM) - Acute bilateral back pain, unspecified back location   W19.XXXA (ICD-10-CM) - Fall, initial encounter      Evaluation Date: 11/29/2023  Authorization Period Expiration: 12/31/23  Plan of Care Expiration: 1/12/24  Progress Note Due: 12/29/23  Visit # / Visits authorized: 1/ 1, 4/20  FOTO: 1/ 3    Time In: 9:16 am   Time Out: 9:58 am   Total Billable Time: 42 minutes    Precautions: Standard and Diabetes    Subjective     Pt reports: that she is planning to resume going to the gym on Monday. Pt stated that manual therapy performed past session relieved some of the pain in (L) thoracic region and is not as tender to touch. Pt stated that she has been doing HEP and would like to go over exercises today so she can continue at home and at gym at this time.   She was compliant with home exercise program.  Response to previous treatment: decreased pain/tenderness in (L) thoracic region  Functional change: progressing    Pain: 3/10 (L) thoracic region      Objective     Lissy received education and therapeutic exercises to develop ROM, strength, and flexibility for 33 minutes including:    Lower cervical/upper thoracic stretch 2x30"   Seated cervical rotation c/ towel assist 5" 5x B  Standing hip abduction RTB 2x10 B   Standing hip extension RTB 2x10 B   Bridges 1x10   Corner pec stretch 2x30"       Lissy participated in neuromuscular re-education activities to improve: Posture and stabilization for 9 minutes. The following activities were included:  Rows + Scap retractions RTB 2x10 3"   No money RTB 2x10 3"  Horizontal shoulder abduction " 2x10 RTB   Standing braced marches RTB 2x10 B       Home Exercises Provided and Patient Education Provided     Education provided: pt verbalized understanding of all education provided  - HEP - reviewed HEP with pt   -showed pt theracane massager if she was interested in getting one since she reports good response to manual therapy last visit  - gym - discussed with pt about exercises in gym     Written Home Exercises Provided: yes.   Lissy demonstrated good  understanding of the education provided.     See EMR under Patient Instructions for exercises provided 12/14/2023.      Assessment     Pt stated that she would like to continue with HEP and resume exercising at gym at this time. Pt tolerated all exercises/activities performed today without c/o increased pain. Reviewed HEP with pt. See plan below.       Pt's spiritual, cultural and educational needs considered and pt agreeable to plan of care and goals.    Anticipated barriers to physical therapy: none    Goals:     Short Term Goals: 1 week  Pt will be compliant with HEP to supplement PT with decreasing pain and improving functional mobility     Long Term Goals: 6 weeks   Pt will improve FOTO score to 59 in order to demo improved functional mobility  Pt will improve LE MMT scores by at least 1/2 grade where deficits noted in order to improve strength for functional tasks  Pt will perform cervical AROM in all planes measured above without c/o pain in order to be able to perform ADLs with less difficulty  Pt will improve (B) cervical rotation AROM to at least 70 degrees in order to be able to turn head when driving with less difficulty  Pt will perform lumbar AROM in all planes measured above without report of pain/soreness in order to be able to perform ADLs with less difficulty    Plan     Plan for pt to continue with HEP and resume exercising at gym at this time. Discussed with pt that I would wait approximately one month to discharge her and if within the month  pt experiences flare up of pain to contact clinic to schedule a follow up apt. If pt does not contact clinic to schedule a follow up apt then will plan to discharge pt. Pt verbalized understanding and was agreeable to plan.     Carmen Logan, PT

## 2024-01-03 ENCOUNTER — TELEPHONE (OUTPATIENT)
Dept: NEUROLOGY | Facility: CLINIC | Age: 79
End: 2024-01-03
Payer: MEDICARE

## 2024-01-03 NOTE — TELEPHONE ENCOUNTER
Spoke with patient and informed that she was scheduled incorrectly with the wrong neurology provider. Patient states that she previously fell and hit her head back in November 2023, had imaging done and reports that results were normal. Patient has not developed any other symptoms since then. Patient requested to have appt cancel.

## 2024-01-10 ENCOUNTER — HOSPITAL ENCOUNTER (OUTPATIENT)
Dept: CARDIOLOGY | Facility: HOSPITAL | Age: 79
Discharge: HOME | End: 2024-01-10
Attending: PHYSICIAN ASSISTANT
Payer: MEDICARE

## 2024-01-10 LAB
ASCENDING AORTA: 2.72 CM
CV ECHO LV RWT: 0.28 CM
CV STRESS BASE HR: 61 BPM
DIASTOLIC BLOOD PRESSURE: 74 MMHG
DOP CALC LVOT AREA: 2.8 CM2
DOP CALC LVOT DIAMETER: 1.89 CM
DOP CALC LVOT PEAK VEL: 0.93 M/S
DOP CALC LVOT STROKE VOLUME: 68.7 CM3
DOP CALC MV VTI: 32.98 CM
DOP CALCLVOT PEAK VEL VTI: 24.5 CM
E WAVE DECELERATION TIME: 242.2 MSEC
E/A RATIO: 0.64
E/E' RATIO: 10 M/S
ECHO LV POSTERIOR WALL: 0.67 CM (ref 0.6–1.1)
FRACTIONAL SHORTENING: 38 % (ref 28–44)
INTERVENTRICULAR SEPTUM: 0.69 CM (ref 0.6–1.1)
LA MAJOR: 3.44 CM
LA MINOR: 4.16 CM
LA WIDTH: 3.06 CM
LEFT ATRIUM SIZE: 3.75 CM
LEFT ATRIUM VOLUME MOD: 32.62 CM3
LEFT ATRIUM VOLUME: 36.73 CM3
LEFT INTERNAL DIMENSION IN SYSTOLE: 2.98 CM (ref 2.1–4)
LEFT VENTRICLE DIASTOLIC VOLUME: 105.76 ML
LEFT VENTRICLE SYSTOLIC VOLUME: 34.37 ML
LEFT VENTRICULAR INTERNAL DIMENSION IN DIASTOLE: 4.77 CM (ref 3.5–6)
LEFT VENTRICULAR MASS: 101.95 G
LV LATERAL E/E' RATIO: 8.75 M/S
LV SEPTAL E/E' RATIO: 11.67 M/S
MV A" WAVE DURATION": 15.6 MSEC
MV MEAN GRADIENT: 1 MMHG
MV PEAK A VEL: 1.1 M/S
MV PEAK E VEL: 0.7 M/S
MV PEAK GRADIENT: 5 MMHG
MV STENOSIS PRESSURE HALF TIME: 70.24 MS
MV VALVE AREA BY CONTINUITY EQUATION: 2.08 CM2
MV VALVE AREA P 1/2 METHOD: 3.13 CM2
OHS CV CPX 1 MINUTE RECOVERY HEART RATE: 108 BPM
OHS CV CPX 85 PERCENT MAX PREDICTED HEART RATE MALE: 121
OHS CV CPX ESTIMATED METS: 9
OHS CV CPX MAX PREDICTED HEART RATE: 142
OHS CV CPX PATIENT IS FEMALE: 1
OHS CV CPX PATIENT IS MALE: 0
OHS CV CPX PEAK DIASTOLIC BLOOD PRESSURE: 78 MMHG
OHS CV CPX PEAK HEAR RATE: 139 BPM
OHS CV CPX PEAK RATE PRESSURE PRODUCT: NORMAL
OHS CV CPX PEAK SYSTOLIC BLOOD PRESSURE: 215 MMHG
OHS CV CPX PERCENT MAX PREDICTED HEART RATE ACHIEVED: 101
OHS CV CPX RATE PRESSURE PRODUCT PRESENTING: 8418
PISA TR MAX VEL: 2.59 M/S
PULM VEIN S/D RATIO: 1.33
PV PEAK D VEL: 0.45 M/S
PV PEAK S VEL: 0.6 M/S
RA MAJOR: 3.65 CM
RA PRESSURE ESTIMATED: 3 MMHG
RA WIDTH: 2.63 CM
RIGHT VENTRICULAR END-DIASTOLIC DIMENSION: 2.44 CM
RV TB RVSP: 6 MMHG
SINUS: 2.58 CM
STJ: 2.6 CM
STRESS ECHO POST EXERCISE DUR MIN: 7 MINUTES
STRESS ECHO POST EXERCISE DUR SEC: 3 SECONDS
SYSTOLIC BLOOD PRESSURE: 138 MMHG
TDI LATERAL: 0.08 M/S
TDI SEPTAL: 0.06 M/S
TDI: 0.07 M/S
TR MAX PG: 27 MMHG
TRICUSPID ANNULAR PLANE SYSTOLIC EXCURSION: 2.02 CM
TV REST PULMONARY ARTERY PRESSURE: 30 MMHG

## 2024-01-10 PROCEDURE — 93351 STRESS TTE COMPLETE: CPT | Mod: 26,,, | Performed by: INTERNAL MEDICINE

## 2024-01-10 PROCEDURE — 93351 STRESS TTE COMPLETE: CPT

## 2024-01-11 RX ORDER — ATORVASTATIN CALCIUM 80 MG/1
80 TABLET, FILM COATED ORAL
Qty: 90 TABLET | Refills: 3 | Status: SHIPPED | OUTPATIENT
Start: 2024-01-11

## 2024-01-16 ENCOUNTER — TELEPHONE (OUTPATIENT)
Dept: CARDIOLOGY | Facility: CLINIC | Age: 79
End: 2024-01-16
Payer: MEDICARE

## 2024-01-16 NOTE — TELEPHONE ENCOUNTER
Pt is informed  ----- Message from Melvi Warren PA-C sent at 1/16/2024  8:57 AM CST -----  Patient has not read her test results. Please let her know:    Good news, your stress test is normal. It shows good cardiac function and no signs of blockages in your heart arteries.

## 2024-03-13 NOTE — TELEPHONE ENCOUNTER
Care Due:                  Date            Visit Type   Department     Provider  --------------------------------------------------------------------------------                                EP -                              PRIMARY      [facility] PRIMARY  Last Visit: 12-      CARE ([facility])   [facility]           Drew Nicole                              EP -                              PRIMARY      [facility] PRIMARY  Next Visit: 06-      CARE ([facility])   [facility]           Drew Nicole                                                            Last  Test          Frequency    Reason                     Performed    Due Date  --------------------------------------------------------------------------------    CMP.........  12 months..  alendronate, metFORMIN...  01- 01-    HBA1C.......  6 months...  metFORMIN................  12- 06-    Mg Level....  12 months..  alendronate..............  Not Found    Overdue    Phosphate...  12 months..  alendronate..............  Not Found    Overdue    Vitamin D...  12 months..  alendronate..............  Not Found    Overdue    [employer] Embedded Care Due Messages. Reference number: [id number].   3/13/2024 4:09:02 PM CDT

## 2024-03-13 NOTE — TELEPHONE ENCOUNTER
----- Message from Soilasanjay Livingston sent at 3/13/2024  4:03 PM CDT -----  Contact: [phone]  Requesting an RX refill or new RX.  Is this a refill or new RX: refill 1  RX name and strength (copy/paste from chart):  alendronate (FOSAMAX) 70 MG tablet  Is this a 30 day or 90 day RX:   Pharmacy name and phone # (copy/paste from chart):  [id number] [place] [address] [address] AT [facility] [address]   Phone: [phone]  Fax: [phone]        The doctors have asked that we provide their patients with the following 2 reminders -- prescription refills can take up to 72 hours, and a friendly reminder that in the future you can use your [facility] account to request refills:     Patient said that she ran out, and will take for mail order to arrive at least a week and half, to please sent 2 pills to make up for two weeks. Thank you.

## 2024-03-14 RX ORDER — ALENDRONATE SODIUM 70 MG/1
70 TABLET ORAL
Qty: 2 TABLET | Refills: 0 | Status: SHIPPED | OUTPATIENT
Start: 2024-03-14 | End: 2024-05-15

## 2024-03-14 NOTE — TELEPHONE ENCOUNTER
----- Message from Sanya Savage sent at 3/14/2024 10:38 AM CDT -----  Contact: Pt  [phone]  Requesting an RX refill or new RX.  Is this a refill or new RX: Refill   RX name and strength (copy/paste from chart):  alendronate (FOSAMAX) 70 MG tablet  Is this a 30 day or 90 day RX:   Pharmacy name and phone # (copy/paste from chart):  [facility] #[id number] [place] [address] [address] AT [facility] [address]   Phone: [phone]  Fax: [phone]        The doctors have asked that we provide their patients with the following 2 reminders -- prescription refills can take up to 72 hours, and a friendly reminder that in the future you can use your [facility] account to request refills: yes    Pt states she would like just 2 pills until she receives her mail order

## 2024-03-28 ENCOUNTER — TELEPHONE (OUTPATIENT)
Dept: OPHTHALMOLOGY | Facility: CLINIC | Age: 79
End: 2024-03-28
Payer: MEDICARE

## 2024-03-28 ENCOUNTER — TELEPHONE (OUTPATIENT)
Dept: OPTOMETRY | Facility: CLINIC | Age: 79
End: 2024-03-28
Payer: MEDICARE

## 2024-03-28 NOTE — TELEPHONE ENCOUNTER
Left pt message letting her know I was returning her call. Asked to call back.        ----- Message from PETEY Mcmahan sent at 3/28/2024  4:45 PM CDT -----  Regarding: FW: return call  Contact: Lissy    ----- Message -----  From: Debra Rowe  Sent: 3/28/2024   4:13 PM CDT  To: Lissette VAZQUEZ Staff  Subject: return call                                      Caller:Lissy Palencia          Returning call to: Sharlene, in regards to getting Laser surgery scheduled        Caller can be reached @: [phone], [phone] ([place]) , requesting a call back.

## 2024-03-28 NOTE — TELEPHONE ENCOUNTER
----- Message from An Cadena sent at 3/27/2024  4:57 PM CDT -----  Regarding: Appointment        Name Of Caller:   Lissy      Contact Preference:    [phone]      Nature of call:   Pt would like to schedule her laser procedure. She also has some questions.

## 2024-04-02 ENCOUNTER — NURSE TRIAGE (OUTPATIENT)
Dept: ADMINISTRATIVE | Facility: CLINIC | Age: 79
End: 2024-04-02
Payer: MEDICARE

## 2024-04-02 NOTE — TELEPHONE ENCOUNTER
Pt stated she doesn't understand why she have to go through all this to make an appt. Pt stated when she wake up in the morning her right hand is squashed like she is holding a ball. Pt then stated she can't talk right now because someone is at her door and hung up.   Reason for Disposition   Caller hangs up    Protocols used: Difficult Caller-A-AH

## 2024-04-03 DIAGNOSIS — K21.9 GASTROESOPHAGEAL REFLUX DISEASE, UNSPECIFIED WHETHER ESOPHAGITIS PRESENT: ICD-10-CM

## 2024-04-03 NOTE — TELEPHONE ENCOUNTER
No care due was identified.  [facility] Embedded Care Due Messages. Reference number: [id number].   4/03/2024 6:41:34 PM CDT

## 2024-04-04 RX ORDER — PANTOPRAZOLE SODIUM 40 MG/1
40 TABLET, DELAYED RELEASE ORAL
Qty: 90 TABLET | Refills: 2 | Status: SHIPPED | OUTPATIENT
Start: 2024-04-04

## 2024-04-04 NOTE — TELEPHONE ENCOUNTER
Refill Decision Note   Lissy Palencia  is requesting a refill authorization.  Brief Assessment and Rationale for Refill:  Approve     Medication Therapy Plan:        Comments:     Note composed:1:11 PM 04/04/2024

## 2024-04-23 ENCOUNTER — HOSPITAL ENCOUNTER (OUTPATIENT)
Dept: RADIOLOGY | Facility: HOSPITAL | Age: 79
Discharge: HOME | End: 2024-04-23
Attending: INTERNAL MEDICINE
Payer: MEDICARE

## 2024-04-23 ENCOUNTER — OFFICE VISIT (OUTPATIENT)
Dept: RHEUMATOLOGY | Facility: CLINIC | Age: 79
End: 2024-04-23
Payer: MEDICARE

## 2024-04-23 ENCOUNTER — TELEPHONE (OUTPATIENT)
Dept: PRIMARY CARE CLINIC | Facility: CLINIC | Age: 79
End: 2024-04-23
Payer: MEDICARE

## 2024-04-23 VITALS
HEART RATE: 49 BPM | SYSTOLIC BLOOD PRESSURE: 156 MMHG | HEIGHT: 62 IN | BODY MASS INDEX: 28.69 KG/M2 | DIASTOLIC BLOOD PRESSURE: 67 MMHG | WEIGHT: 155.88 LBS

## 2024-04-23 DIAGNOSIS — E11.9 TYPE 2 DIABETES MELLITUS WITHOUT COMPLICATION, WITHOUT LONG-TERM CURRENT USE OF INSULIN: Primary | ICD-10-CM

## 2024-04-23 DIAGNOSIS — M25.50 ARTHRALGIA, UNSPECIFIED JOINT: ICD-10-CM

## 2024-04-23 DIAGNOSIS — M25.50 ARTHRALGIA, UNSPECIFIED JOINT: Primary | ICD-10-CM

## 2024-04-23 PROCEDURE — 99999 PR PBB SHADOW E&M-EST. PATIENT-LVL IV: CPT | Mod: PBBFAC,GC,, | Performed by: INTERNAL MEDICINE

## 2024-04-23 PROCEDURE — 99214 OFFICE O/P EST MOD 30 MIN: CPT | Mod: PBBFAC,25 | Performed by: INTERNAL MEDICINE

## 2024-04-23 PROCEDURE — 73130 X-RAY EXAM OF HAND: CPT | Mod: TC,LT

## 2024-04-23 PROCEDURE — 99204 OFFICE O/P NEW MOD 45 MIN: CPT | Mod: S$PBB,GC,, | Performed by: INTERNAL MEDICINE

## 2024-04-23 PROCEDURE — 73130 X-RAY EXAM OF HAND: CPT | Mod: 26,LT,, | Performed by: RADIOLOGY

## 2024-04-23 NOTE — TELEPHONE ENCOUNTER
----- Message from Rosalie Marie sent at 4/23/2024  9:36 AM CDT -----  Contact: Pt @  [phone]  Would like to receive medical advice.  Would they like a call back or a response via [facility]:  Call Back  Additional information:      Pt is calling to see if orders can be placed for her diabetes blood work so she can have it completed prior to her appt on Friday.

## 2024-04-23 NOTE — TELEPHONE ENCOUNTER
Looks like Dr Hearn ordered labs today, do you want her to get anything done before visit on Friday? Last lipid and A1C was 12/4/2023. Dr Hearn ordered CBC and CMP already

## 2024-04-23 NOTE — PROGRESS NOTES
"Subjective:      Patient ID: Lissy Palencia is a 79 y.o. female.    Chief Complaint: Follow-up    79 year old female with pmh DM2, CAD, OA, Hypothyroid, Osteoporosis, fatty liver, trigger finger presents for evaluation of hand pain. Patient reports pain, especially at night, in her PIPs bilaterally associated with 15 min mourning stiffness. Pain does not wake her from sleep. It is worse with use. Patient also reports generalized pain throughout her body with significant issues with fatigue and unrestful sleep. WPI 16; SSS 7. Denies uveitis, enthesitis, PsO, nail changes, IBD symptoms, dactylitis, ulcerations, facial rash, serositis, raynauds, etc. Does report generalized increase in hair loss.     FH: no FH autoimmunity; No drugs; lives with  and is able to care for self. 3x healthy pregnancies. No history of clots.   Review of Systems   Constitutional:  Positive for fatigue. Negative for chills and fever.   Respiratory:  Negative for apnea, cough, choking, chest tightness, shortness of breath, wheezing and stridor.    Cardiovascular:  Negative for chest pain, palpitations and leg swelling.   Gastrointestinal:  Positive for constipation. Negative for abdominal distention, anal bleeding, blood in stool and diarrhea.   Musculoskeletal:  Positive for arthralgias, back pain and myalgias. Negative for gait problem, neck pain and neck stiffness.   Skin:  Negative for color change, pallor, rash and wound.   Neurological:  Negative for dizziness and headaches.      Objective:   BP (!) 156/67 (BP Location: Right arm)   Pulse (!) 49   Ht 5' 2" (1.575 m)   Wt 70.7 kg (155 lb 13.8 oz)   BMI 28.51 kg/m²   Physical Exam   Constitutional: normal appearance.  Non-toxic appearance. No distress. She does not appear ill.   HENT:   Head: Normocephalic and atraumatic.   Mouth/Throat: Mucous membranes are moist. No oropharyngeal exudate or posterior oropharyngeal erythema.   Eyes: Pupils are equal, round, and reactive to " light. Right eye exhibits no discharge. Left eye exhibits no discharge. No scleral icterus.   Cardiovascular: Normal rate, regular rhythm, normal heart sounds and normal pulses. Exam reveals no gallop and no friction rub.   No murmur heard.  Pulmonary/Chest: Effort normal and breath sounds normal. No stridor. No respiratory distress. She has no rales. She exhibits no tenderness.   Abdominal: She exhibits no distension and no mass. There is no abdominal tenderness. No hernia.   Musculoskeletal:         General: Tenderness present. No swelling. Normal range of motion.      Comments: Generalized tenderness without synovitis.    Neurological: She is alert.   Skin: Skin is warm and dry. No rash noted. She is not diaphoretic.   Vitals reviewed.    No data to display     Assessment:     1. Arthralgia, unspecified joint      79 year old female with pmh DM2, CAD, Osteoporosis on fosamax, fatty liver, trigger finger. Patient reporting generalized pain, as well as pain in her bilateral hands over the last several months. Non inflammatory by history and without synovitis on exam. She does report am stiffness and pain in her bilateral hip and shoulder girdles and in her neck though these are not her primary concerns. She has had several XR in the past which have mostly shown degenerative disease. WPI 16; SSS 7. Low clinical suspicion for SLE/RA/PsA/etc.   -Will check CBC/CMP/ESR/CRP. Consider polymyalgia if inflammatory markers elevated. No symptoms of GCA.   -Check EVE/RF/CCP for completeness.   -XR left hand  -OTC tylenol and voltaren topical recommended  -Consider treatment of fibromyalgia in the future. Patient reports that she does not want any treatment that would only help with symptoms. She would only want treatment if it would address underlying pathology or delay progression.     3 mo follow up    Plan:     Problem List Items Addressed This Visit    None  Visit Diagnoses       Arthralgia, unspecified joint    -   Primary    Relevant Orders    CBC W/ AUTO DIFFERENTIAL (Completed)    COMPREHENSIVE METABOLIC PANEL (Completed)    Sedimentation rate (Completed)    C-REACTIVE PROTEIN (Completed)    EVE Screen w/Reflex    RHEUMATOID FACTOR (Completed)    CYCLIC CITRUL PEPTIDE ANTIBODY, IGG (Completed)    X-Ray Hand Complete Left (Completed)          Basil Hearn

## 2024-04-25 NOTE — PROGRESS NOTES
I have personally taken the history and examined the patient to verify the fellow's notation, and I agree with the impression and plan stated.      Clinical picture is consistent with fibromyalgia but will evaluate to make sure no PMR

## 2024-04-26 ENCOUNTER — OFFICE VISIT (OUTPATIENT)
Dept: PRIMARY CARE CLINIC | Facility: CLINIC | Age: 79
End: 2024-04-26
Payer: MEDICARE

## 2024-04-26 VITALS
TEMPERATURE: 98 F | RESPIRATION RATE: 18 BRPM | WEIGHT: 156.31 LBS | DIASTOLIC BLOOD PRESSURE: 80 MMHG | BODY MASS INDEX: 28.76 KG/M2 | SYSTOLIC BLOOD PRESSURE: 140 MMHG | HEIGHT: 62 IN | HEART RATE: 60 BPM | OXYGEN SATURATION: 98 %

## 2024-04-26 DIAGNOSIS — I70.0 AORTIC ATHEROSCLEROSIS: ICD-10-CM

## 2024-04-26 DIAGNOSIS — R10.84 DIFFUSE ABDOMINAL PAIN: Primary | ICD-10-CM

## 2024-04-26 PROCEDURE — 99215 OFFICE O/P EST HI 40 MIN: CPT | Mod: PBBFAC | Performed by: INTERNAL MEDICINE

## 2024-04-26 PROCEDURE — 99999 PR PBB SHADOW E&M-EST. PATIENT-LVL V: CPT | Mod: PBBFAC,,, | Performed by: INTERNAL MEDICINE

## 2024-04-26 PROCEDURE — 99214 OFFICE O/P EST MOD 30 MIN: CPT | Mod: S$PBB,,, | Performed by: INTERNAL MEDICINE

## 2024-04-26 RX ORDER — HYOSCYAMINE SULFATE 0.12 MG/1
0.12 TABLET SUBLINGUAL EVERY 6 HOURS PRN
Qty: 30 TABLET | Refills: 1 | Status: SHIPPED | OUTPATIENT
Start: 2024-04-26

## 2024-04-26 NOTE — PROGRESS NOTES
[facility] Note    Chief Complaint      Chief Complaint   Patient presents with    Back Pain    Abdominal Pain    Sciatica       History of Present Illness      Lissy Palencia is a 79 y.o. female who presents today for   Chief Complaint   Patient presents with    Back Pain    Abdominal Pain    Sciatica   .  Patient comes to appointment here for acute visit related to above , she complains of complete abdominal pain , cramping type pain with also radiating to mid back . She feels almost like constipation but she did have a bm     Problem List Items Addressed This Visit          Cardiac/Vascular    Aortic atherosclerosis    Overview     stable            GI    Diffuse abdominal pain - Primary    Overview     Check abdominal u/s   Levsin 0.125 mg               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       Past Surgical History:  Past Surgical History:   Procedure Laterality Date    ABLATION COLPOCLESIS      ADENOIDECTOMY      CATARACT EXTRACTION W/  INTRAOCULAR LENS IMPLANT Right 11/23/2022    Procedure: EXTRACTION, CATARACT, WITH IOL INSERTION;  Surgeon: Ciara Mclaughlin MD;  Location: [facility] OR;  Service: Ophthalmology;  Laterality: Right;    CATARACT EXTRACTION W/  INTRAOCULAR LENS IMPLANT Left 12/22/2022    Procedure: EXTRACTION, CATARACT, WITH IOL INSERTION;  Surgeon: Ciara Mclaughlin MD;  Location: [facility] OR;  Service: Ophthalmology;  Laterality: Left;    CHOLECYSTECTOMY      COLONOSCOPY N/A 11/28/2018    Procedure: COLONOSCOPY;  Surgeon: Richi Mcintosh MD;  Location: [facility] ENDO (4TH FLR);  " Service: Endoscopy;  Laterality: N/A;    CORONARY STENT PLACEMENT      ESOPHAGOGASTRODUODENOSCOPY N/A 11/28/2018    Procedure: ESOPHAGOGASTRODUODENOSCOPY (EGD);  Surgeon: Richi Mcintosh MD;  Location: [address]);  Service: Endoscopy;  Laterality: N/A;    heart cath  2005    non-obstructive disease    INTRAOCULAR LENS EXCHANGE Left 6/14/2023    Procedure: REPLACEMENT, IOL;  Surgeon: Ciara Mclaughlin MD;  Location: [facility] OR;  Service: Ophthalmology;  Laterality: Left;    OOPHORECTOMY      SHOULDER ARTHROSCOPY W/ ROTATOR CUFF REPAIR      Right    TONSILLECTOMY      TOTAL ABDOMINAL HYSTERECTOMY      with BSO    TRIGGER FINGER RELEASE Right 10/16/2023    Procedure: RELEASE, TRIGGER FINGER,RIGHT LONG AND RING FINGER;  Surgeon: Shaneka Richardson MD;  Location: [facility];  Service: Orthopedics;  Laterality: Right;    TUBAL LIGATION         Family History:  family history is not on file.    Social History:  Social History     Socioeconomic History    Marital status:    Occupational History     Employer: [employer]   Tobacco Use    Smoking status: Never    Smokeless tobacco: Never   Substance and Sexual Activity    Alcohol use: Yes     Alcohol/week: 1.0 standard drink of alcohol     Types: 1 Glasses of wine per week     Comment: "maybe a glass of wine every 6 months"    Drug use: No    Sexual activity: Not Currently     Partners: Male     Birth control/protection: None     Social Determinants of Health     Financial Resource Strain: Low Risk  (1/3/2022)    Overall Financial Resource Strain (CARDIA)     Difficulty of Paying Living Expenses: Not hard at all   Food Insecurity: No Food Insecurity (1/3/2022)    Hunger Vital Sign     Worried About Running Out of Food in the Last Year: Never true     Ran Out of Food in the Last Year: Never true   Transportation Needs: No Transportation Needs (1/3/2022)    PRAPARE - Transportation     Lack of Transportation (Medical): No     Lack of Transportation " (Non-Medical): No   Physical Activity: Sufficiently Active (8/10/2020)    Exercise Vital Sign     Days of Exercise per Week: 6 days     Minutes of Exercise per Session: 60 min   Stress: No Stress Concern Present (1/3/2022)    [place] [employer] - Occupational Stress Questionnaire     Feeling of Stress : Not at all   Social Connections: Moderately Isolated (1/3/2022)    Social Connection and Isolation Panel [NHANES]     Frequency of Communication with Friends and Family: More than three times a week     Frequency of Social Gatherings with Friends and Family: Once a week     Attends Alevism Services: Never     Active Member of Clubs or Organizations: No     Attends Club or Organization Meetings: Never     Marital Status:    Housing Stability: Low Risk  (1/3/2022)    Housing Stability Vital Sign     Unable to Pay for Housing in the Last Year: No     Number of Places Lived in the Last Year: 1     Unstable Housing in the Last Year: No       Review of Systems:   Review of Systems   Constitutional:  Negative for fever and weight loss.   HENT:  Negative for congestion, hearing loss and sore throat.    Eyes:  Negative for blurred vision.   Respiratory:  Negative for cough and shortness of breath.    Cardiovascular:  Negative for chest pain, palpitations, claudication and leg swelling.   Gastrointestinal:  Positive for abdominal pain. Negative for constipation, diarrhea and heartburn.   Genitourinary:  Negative for dysuria.   Musculoskeletal:  Negative for back pain and myalgias.   Skin:  Negative for rash.   Neurological:  Negative for focal weakness and headaches.   Psychiatric/Behavioral:  Negative for depression and suicidal ideas. The patient is not nervous/anxious.          Medications:  Outpatient Encounter Medications as of 4/26/2024   Medication Sig Note Dispense Refill    alendronate (FOSAMAX) 70 MG tablet Take 1 tablet (70 mg total) by mouth every 7 days.  2 tablet 0    aspirin  (ECOTRIN) 81 MG EC tablet Take 81 mg by mouth once daily. 6/8/2023: Take AM of surgrery      atorvastatin (LIPITOR) 80 MG tablet TAKE 1 TABLET DAILY  90 tablet 3    azelastine (ASTELIN) 137 mcg (0.1 %) nasal spray 1 spray (137 mcg total) by Nasal route 2 (two) times daily.  30 mL 3    blood sugar diagnostic Strp To check BG ONCE daily, to use with insurance preferred meter  100 strip 3    cycloSPORINE (RESTASIS) 0.05 % ophthalmic emulsion Place 1 drop into both eyes 2 (two) times daily.  90 each 11    ezetimibe (ZETIA) 10 mg tablet Take 1 tablet (10 mg total) by mouth once daily.  90 tablet 3    flu vac 2022 65up-chfAK45W,PF, (FLUAD QUAD 2022-23,65Y UP,,PF,) 60 mcg (15 mcg x 4)/0.5 mL Syrg Inject into the muscle.  0.5 mL 0    ketorolac 0.5% (ACULAR) 0.5 % Drop Place 1 drop into the left eye 3 (three) times daily.  5 mL 3    lancets Misc To check BG ONCE daily, to use with insurance preferred meter  100 each 3    levothyroxine (SYNTHROID) 88 MCG tablet TAKE FIRST THING IN THE MORNING ON AN EMPTY STOMACH 6/8/2023: Take AM of surgery 90 tablet 3    LIDOcaine (LIDODERM) 5 % Place onto the skin once daily.  30 patch 0    meloxicam (MOBIC) 7.5 MG tablet Take 1 tablet (7.5 mg total) by mouth once daily.  14 tablet 0    metFORMIN (GLUCOPHAGE) 500 MG tablet TAKE 1 TABLET DAILY WITH BREAKFAST  90 tablet 3    neomycin-polymyxin-hydrocortisone (CORTISPORIN) 3.5-10,000-1 mg/mL-unit/mL-% otic suspension Place 3 drops into the right ear 3 (three) times daily.  10 mL 0    nitroGLYCERIN (NITROSTAT) 0.4 MG SL tablet Place 1 tablet (0.4 mg total) under the tongue every 5 (five) minutes as needed for Chest pain. You can take up to 3 doses at home. If chest pain persists after second dose, go the ER. 6/14/2023: Pt never took med 25 tablet 4    pantoprazole (PROTONIX) 40 MG tablet TAKE 1 TABLET DAILY  90 tablet 2    prednisolon/gatiflox/bromfenac (PREDNISOL ACE-GATIFLOX-BROMFEN) 1-0.5-0.075 % DrpS Apply 1 drop to eye 3 (three) times  daily. One drop 3 times a day in surgical eye  5 mL 3    RESTASIS 0.05 % ophthalmic emulsion INSTILL 1 DROP IN BOTH EYES TWICE A DAY  60 each 11    tiZANidine (ZANAFLEX) 4 MG tablet Take 1-2 tablets (4-8 mg total) by mouth every 8 (eight) hours as needed (muscle spasms).  60 tablet 2    vitamin D (VITAMIN D3) 1000 units Tab Take 1 tablet (1,000 Units total) by mouth once daily.  90 tablet 3    blood-glucose meter kit To check BG ONCE THE daily, to use with insurance preferred meter  1 each 1    hyoscyamine (LEVSIN) 0.125 mg Subl Place 1 tablet (0.125 mg total) under the tongue every 6 (six) hours as needed.  30 tablet 1     Facility-Administered Encounter Medications as of 4/26/2024   Medication Dose Route Frequency Provider Last Rate Last Admin    balanced salt irrigation intra-ocular solution 1 drop  1 drop Left Eye On Call Procedure Ciara Mclaughlin MD        balanced salt irrigation intra-ocular solution 1 drop  1 drop Left Eye On Call Procedure Ciara Mclaughlin MD        mupirocin 2 % ointment   Nasal On Call Procedure Caesar Finch MD   Given at 10/16/23 0752    phenylephrine HCL 10% ophthalmic solution 1 drop  1 drop Left Eye PRN Ciara Mclaughlin MD        phenylephrine HCL 10% ophthalmic solution 1 drop  1 drop Left Eye PRN Ciara Mclaughlin MD        sodium chloride 0.9% flush 2 mL  2 mL Intravenous PRN Ciara Mclaughlin MD        sodium chloride 0.9% flush 2 mL  2 mL Intravenous PRN Ciara Mclaughlin MD            Allergies:  Review of patient's allergies indicates:   Allergen Reactions    Compazine  [prochlorperazine edisylate]      Other reaction(s): SPASMS    Parafon forte      Other reaction(s): Hives         Physical Exam         Vitals:    04/26/24 0835   BP: (!) 140/80   Pulse: 60   Resp: 18   Temp: 98 °F (36.7 °C)         Physical Exam  Constitutional:       Appearance: She is well-developed.   Eyes:      Pupils: Pupils are equal, round, and reactive to light.   Neck:      Thyroid: No  "thyromegaly.   Cardiovascular:      Rate and Rhythm: Normal rate.      Heart sounds: Normal heart sounds. No murmur heard.     No friction rub. No gallop.   Pulmonary:      Breath sounds: Normal breath sounds.   Abdominal:      General: Bowel sounds are normal.      Palpations: Abdomen is soft.      Tenderness: There is generalized abdominal tenderness. There is no guarding. Negative signs include Bee's sign.   Musculoskeletal:         General: Normal range of motion.      Cervical back: Normal range of motion.   Lymphadenopathy:      Cervical: No cervical adenopathy.   Skin:     General: Skin is warm.      Findings: No rash.   Neurological:      Mental Status: She is alert and oriented to person, place, and time.      Cranial Nerves: No cranial nerve deficit.   Psychiatric:         Behavior: Behavior normal.          Laboratory:  CBC:  Recent Labs   Lab Result Units 04/23/24  0946   WBC K/uL 7.40   RBC M/uL 5.22   Hemoglobin g/dL 14.7   Hematocrit % 45.9   Platelets K/uL 169   MCV fL 88   MCH pg 28.2   MCHC g/dL 32.0     CMP:  Recent Labs   Lab Result Units 04/23/24  0946   Glucose mg/dL 98   Calcium mg/dL 10.2   Albumin g/dL 4.0   Total Protein g/dL 7.4   Sodium mmol/L 141   Potassium mmol/L 4.2   CO2 mmol/L 28   Chloride mmol/L 105   BUN mg/dL 15   Alkaline Phosphatase U/L 81   ALT U/L 24   AST U/L 22   Total Bilirubin mg/dL 0.8     URINALYSIS:  No results for input(s): "COLORU", "CLARITYU", "SPECGRAV", "PHUR", "PROTEINUA", "GLUCOSEU", "BILIRUBINCON", "BLOODU", "WBCU", "RBCU", "BACTERIA", "MUCUS", "NITRITE", "LEUKOCYTESUR", "UROBILINOGEN", "HYALINECASTS" in the last 2160 hours.   LIPIDS:  No results for input(s): "TSH", "HDL", "CHOL", "TRIG", "LDLCALC", "CHOLHDL", "NONHDLCHOL", "TOTALCHOLEST" in the last 2160 hours.  TSH:  No results for input(s): "TSH" in the last 2160 hours.  A1C:  No results for input(s): "HGBA1C" in the last 2160 hours.    Radiology:        Assessment:     Lissy Palencia is a 79 y.o.female " with:    Diffuse abdominal pain  -     hyoscyamine (LEVSIN) 0.125 mg Subl; Place 1 tablet (0.125 mg total) under the tongue every 6 (six) hours as needed.  Dispense: 30 tablet; Refill: 1  -     US Abdomen Complete; Future; Expected date: 04/26/2024    Aortic atherosclerosis          Plan:     Problem List Items Addressed This Visit          Cardiac/Vascular    Aortic atherosclerosis    Overview     stable            GI    Diffuse abdominal pain - Primary    Overview     Check abdominal u/s   Levsin 0.125 mg             As above, continue current medications and maintain follow up with specialists.  Return to clinic as scheduled       Frederick W Dantagnan [facility]

## 2024-04-30 ENCOUNTER — TELEPHONE (OUTPATIENT)
Dept: PRIMARY CARE CLINIC | Facility: CLINIC | Age: 79
End: 2024-04-30
Payer: MEDICARE

## 2024-04-30 DIAGNOSIS — E11.9 TYPE 2 DIABETES MELLITUS WITHOUT COMPLICATION, WITHOUT LONG-TERM CURRENT USE OF INSULIN: Primary | ICD-10-CM

## 2024-04-30 NOTE — TELEPHONE ENCOUNTER
"----- Message from Yarelis Booth sent at 4/30/2024  8:47 AM CDT -----  Regarding: Blood Labs Request  Contact: Lissy @ [phone]  type: Lab    Caller is requesting to schedule their Lab appointment prior to annual appointment.  Order is not listed in EPIC.  Please enter order and contact patient to schedule.    Name of Caller:Lissy Palencia    Preferred Date and Time of Labs:Before Friday    Date of Annual Physical Appointment:  Where would they like the lab performed? N/A    Would the patient rather a call back or a response via [facility]? Call    Best Call Back Number:Lissy @ [phone]    Additional Information:    "Do not send messages requesting lab orders prior to Physical appt on these providers: Jana Starr, Marcos Diana,  Kasie Moran and Last."  "

## 2024-05-01 ENCOUNTER — TELEPHONE (OUTPATIENT)
Dept: PRIMARY CARE CLINIC | Facility: CLINIC | Age: 79
End: 2024-05-01
Payer: MEDICARE

## 2024-05-01 NOTE — TELEPHONE ENCOUNTER
----- Message from Yarelis Booth sent at 5/1/2024  9:35 AM CDT -----  Contact: patient [phone] or [phone] Spouse  Patient is returning a phone call.  Who left a message for the patient: Brittni  Does patient know what this is regarding:  labs  Would you like a call back, or a response through your [facility] portal?:   call   Comments:

## 2024-05-03 ENCOUNTER — TELEPHONE (OUTPATIENT)
Dept: PRIMARY CARE CLINIC | Facility: CLINIC | Age: 79
End: 2024-05-03
Payer: MEDICARE

## 2024-05-03 ENCOUNTER — HOSPITAL ENCOUNTER (OUTPATIENT)
Dept: RADIOLOGY | Facility: HOSPITAL | Age: 79
Discharge: HOME | End: 2024-05-03
Attending: INTERNAL MEDICINE
Payer: MEDICARE

## 2024-05-03 DIAGNOSIS — R10.84 DIFFUSE ABDOMINAL PAIN: ICD-10-CM

## 2024-05-03 PROCEDURE — 76700 US EXAM ABDOM COMPLETE: CPT | Mod: 26,,, | Performed by: INTERNAL MEDICINE

## 2024-05-03 PROCEDURE — 76700 US EXAM ABDOM COMPLETE: CPT | Mod: TC

## 2024-05-03 NOTE — TELEPHONE ENCOUNTER
Spoke with pt, had questions about US and what area, advised US of abdomen.     Stated she has been in sever pain and woke up this morning very dizzy and nauseated. Advised to report to ED for eval and pt declined. Mentioned that she thought about it but is going to have US done instead and wait.

## 2024-05-03 NOTE — TELEPHONE ENCOUNTER
----- Message from Lela Adams sent at 5/3/2024 10:55 AM CDT -----  Contact: [phone]  1MEDICALADVICE     Patient is calling for Medical Advice regarding: Patient would like a call back about her US that is scheduled for today, please call and advise.

## 2024-05-03 NOTE — TELEPHONE ENCOUNTER
----- Message from Lela Adams sent at 5/3/2024 11:13 AM CDT -----  Contact: [phone]  Patient is returning a phone call.  Who left a message for the patient: Lu Christianson MA  Does patient know what this is regarding:  about test for today  Would you like a call back, or a response through your [facility] portal?:   call  Comments:

## 2024-05-07 ENCOUNTER — PATIENT MESSAGE (OUTPATIENT)
Dept: PRIMARY CARE CLINIC | Facility: CLINIC | Age: 79
End: 2024-05-07
Payer: MEDICARE

## 2024-05-07 NOTE — PROGRESS NOTES
No stones noted but common bile duct is a little dilated . Liver enzymes are wnl . Does she have a gastroenterologist that she has seen ion past . Needs gi referral for further assessment

## 2024-05-08 ENCOUNTER — TELEPHONE (OUTPATIENT)
Dept: PRIMARY CARE CLINIC | Facility: CLINIC | Age: 79
End: 2024-05-08
Payer: MEDICARE

## 2024-05-08 DIAGNOSIS — K83.8 INTRAHEPATIC BILE DUCT DILATION: ICD-10-CM

## 2024-05-08 DIAGNOSIS — K83.8 COMMON BILE DUCT DILATION: Primary | ICD-10-CM

## 2024-05-08 NOTE — TELEPHONE ENCOUNTER
----- Message from Zachariah Reynoso sent at 5/8/2024 10:48 AM CDT -----  Regarding: Results  Contact:  of patient [phone]  2TESTRESULTS    Type: Test Results    What test was performed? Abdomen Ultrasound    Who ordered the test? D[facility]agalo    When and where were the test performed? [facility], Radiology    Would you like response via Mychart: Call    Comments: Patients  would like results of lab

## 2024-05-13 ENCOUNTER — TELEPHONE (OUTPATIENT)
Dept: GASTROENTEROLOGY | Facility: CLINIC | Age: 79
End: 2024-05-13
Payer: MEDICARE

## 2024-05-13 NOTE — TELEPHONE ENCOUNTER
----- Message from Renetta Evangelista sent at 5/13/2024 11:15 AM CDT -----  Contact: [phone]  Caller is requesting an earlier appointment than what we can offer.      Did you offer to schedule the next available appt and put the patient on the wait list:  n/a    When is the first available appointment: n/a    Preference of timeframe to be scheduled:  first available    Symptoms: K83.8 (ICD-10-CM) - Intrahepatic bile duct dilation    Would the patient prefer a call back or a response via [email or web address]:  call    Additional Information:  please call to advise.

## 2024-05-14 ENCOUNTER — OFFICE VISIT (OUTPATIENT)
Dept: OPHTHALMOLOGY | Facility: CLINIC | Age: 79
End: 2024-05-14
Payer: MEDICARE

## 2024-05-14 DIAGNOSIS — H26.492 POSTERIOR CAPSULAR OPACIFICATION VISUALLY SIGNIFICANT, LEFT EYE: ICD-10-CM

## 2024-05-14 DIAGNOSIS — H26.491 POSTERIOR CAPSULAR OPACIFICATION VISUALLY SIGNIFICANT, RIGHT EYE: Primary | ICD-10-CM

## 2024-05-14 PROCEDURE — 66821 AFTER CATARACT LASER SURGERY: CPT | Mod: PBBFAC,LT | Performed by: OPHTHALMOLOGY

## 2024-05-14 PROCEDURE — 99215 OFFICE O/P EST HI 40 MIN: CPT | Mod: 57,S$PBB,, | Performed by: OPHTHALMOLOGY

## 2024-05-14 PROCEDURE — 99999 PR PBB SHADOW E&M-EST. PATIENT-LVL I: CPT | Mod: PBBFAC,,, | Performed by: OPHTHALMOLOGY

## 2024-05-14 PROCEDURE — 99211 OFF/OP EST MAY X REQ PHY/QHP: CPT | Mod: PBBFAC | Performed by: OPHTHALMOLOGY

## 2024-05-14 PROCEDURE — 66821 AFTER CATARACT LASER SURGERY: CPT | Mod: PBBFAC,RT | Performed by: OPHTHALMOLOGY

## 2024-05-14 NOTE — PROGRESS NOTES
HPI    Dr Mcintosh/ Dr Conner / Dr. Jack     ZACK  Glaucoma suspect OS   ZACK OU   S/P phaco OS 12/22/2022 - optiblue, S/P REPLACEMENT, IOL OS 06/14/2023 -   panoptix  S/p phaco OD (optiblue) 11/23/2022    Gtts: Restasis BID OU    Lubricating drops am and pm      Patient is here today for PCO check.  Pt. States vision is blurry with and without glasses. Mrx only help for   reading.  Pt. States eyes are feeling dry and sticky. Pt. States eyes are pulling   shut and have to pull eye to open them.   Pt. Denies pain or discomfort.   Last edited by Jennifer Salazar on 5/14/2024  2:14 PM.            Assessment /Plan     For exam results, see Encounter Report.    Posterior capsular opacification visually significant, right eye  -     Yag Capsulotomy - OS - Left Eye  -     Yag Capsulotomy - OD - Right Eye    Posterior capsular opacification visually significant, left eye  -     Yag Capsulotomy - OS - Left Eye  -     Yag Capsulotomy - OD - Right Eye      Visually significant posterior capsular opacity present.  ou  - discussed risks, benefits, and alternatives to laser surgery - pt wishes to proceed with yag laser  - Informed consent obtained and correct eye(s) verified with patient.  - Intraocular Pressure to be taken post procedure.   - PF QID x 4 days then d/c  - f/up as scheduled    DIAGNOSIS: Visually significant posterior capsular opacity    PROCEDURE: YAG Laser Capsulotomy ou    COMPLICATIONS: none     DESCRIPTION OF PROCEDURE IN DETAIL:  1 drop of topical Proparacaine and Iopidine instilled, and eye(s) dilated with 1% Tropicamide 2.5% Phenylephrine. YAG laser applied to posterior capsule in cruciate pattern.      DISPOSITION:  Patient tolerated procedure well.      Patient to call or message us if there are any concerns following the procedure.    Increase in floaters expected for the first few weeks after the procedure, and I anticipate these to subside.    F/up me 6 wks - check distance and near [facility] without correction  and updated mrx.

## 2024-05-15 RX ORDER — ALENDRONATE SODIUM 70 MG/1
70 TABLET ORAL
Qty: 12 TABLET | Refills: 0 | Status: SHIPPED | OUTPATIENT
Start: 2024-05-15

## 2024-05-15 NOTE — TELEPHONE ENCOUNTER
Care Due:                  Date            Visit Type   Department     Provider  --------------------------------------------------------------------------------                                EP -                              PRIMARY      [facility] PR[facility]  Last Visit: 04-      CARE ([facility])   AISSATOU Nicole                              EP -                              PRIMARY      [facility] PRIMARY  Next Visit: 06-      CARE ([facility])   [facility]           Drew Nicole                                                            Last  Test          Frequency    Reason                     Performed    Due Date  --------------------------------------------------------------------------------    HBA1C.......  6 months...  metFORMIN................  12- 06-    Mg Level....  12 months..  alendronate..............  Not Found    Overdue    Phosphate...  12 months..  alendronate..............  Not Found    Overdue    Vitamin D...  12 months..  alendronate..............  Not Found    Overdue    [employer] Embedded Care Due Messages. Reference number: [id number].   5/15/2024 9:38:00 AM CDT

## 2024-05-20 ENCOUNTER — PATIENT MESSAGE (OUTPATIENT)
Dept: ENDOSCOPY | Facility: HOSPITAL | Age: 79
End: 2024-05-20
Payer: MEDICARE

## 2024-05-21 ENCOUNTER — TELEPHONE (OUTPATIENT)
Dept: OPHTHALMOLOGY | Facility: CLINIC | Age: 79
End: 2024-05-21
Payer: MEDICARE

## 2024-05-21 NOTE — TELEPHONE ENCOUNTER
Left message stating scheduled patient for 1 month [facility]/ ck @ 6/28 @ 9:30 am at [facility].

## 2024-05-21 NOTE — TELEPHONE ENCOUNTER
----- Message from Olya Gonzalez sent at 5/20/2024  5:13 PM CDT -----  Type:  Patient Returning Call    Who Called:pt  Who Left Message for Patient:pt  Does the patient know what this is regarding?:pt need a call to get a 1 month f/u appt   Would the patient rather a call back or a response via [facility]? call  Best Call Back Number:[phone]  Additional Information: appt

## 2024-05-29 ENCOUNTER — PATIENT MESSAGE (OUTPATIENT)
Dept: ENDOSCOPY | Facility: HOSPITAL | Age: 79
End: 2024-05-29
Payer: MEDICARE

## 2024-06-07 ENCOUNTER — LAB VISIT (OUTPATIENT)
Dept: LAB | Facility: HOSPITAL | Age: 79
End: 2024-06-07
Attending: INTERNAL MEDICINE
Payer: MEDICARE

## 2024-06-07 DIAGNOSIS — E11.9 TYPE 2 DIABETES MELLITUS WITHOUT COMPLICATION, WITHOUT LONG-TERM CURRENT USE OF INSULIN: ICD-10-CM

## 2024-06-07 LAB
ESTIMATED AVG GLUCOSE: 143 MG/DL (ref 68–131)
HBA1C MFR BLD: 6.6 % (ref 4–5.6)

## 2024-06-07 PROCEDURE — 83036 HEMOGLOBIN GLYCOSYLATED A1C: CPT | Performed by: INTERNAL MEDICINE

## 2024-06-07 PROCEDURE — 36415 COLL VENOUS BLD VENIPUNCTURE: CPT | Performed by: INTERNAL MEDICINE

## 2024-06-11 ENCOUNTER — OFFICE VISIT (OUTPATIENT)
Dept: PRIMARY CARE CLINIC | Facility: CLINIC | Age: 79
End: 2024-06-11
Payer: MEDICARE

## 2024-06-11 VITALS
RESPIRATION RATE: 18 BRPM | TEMPERATURE: 98 F | OXYGEN SATURATION: 96 % | SYSTOLIC BLOOD PRESSURE: 120 MMHG | BODY MASS INDEX: 28.93 KG/M2 | HEIGHT: 62 IN | DIASTOLIC BLOOD PRESSURE: 80 MMHG | WEIGHT: 157.19 LBS | HEART RATE: 70 BPM

## 2024-06-11 DIAGNOSIS — I70.0 AORTIC ATHEROSCLEROSIS: ICD-10-CM

## 2024-06-11 DIAGNOSIS — E11.69 HYPERLIPIDEMIA ASSOCIATED WITH TYPE 2 DIABETES MELLITUS: ICD-10-CM

## 2024-06-11 DIAGNOSIS — E03.9 PRIMARY HYPOTHYROIDISM: ICD-10-CM

## 2024-06-11 DIAGNOSIS — G47.33 OSA (OBSTRUCTIVE SLEEP APNEA): ICD-10-CM

## 2024-06-11 DIAGNOSIS — I25.10 CORONARY ARTERY DISEASE INVOLVING NATIVE CORONARY ARTERY OF NATIVE HEART WITHOUT ANGINA PECTORIS: Primary | ICD-10-CM

## 2024-06-11 DIAGNOSIS — E11.9 TYPE 2 DIABETES MELLITUS WITHOUT COMPLICATION, WITHOUT LONG-TERM CURRENT USE OF INSULIN: ICD-10-CM

## 2024-06-11 DIAGNOSIS — E78.5 HYPERLIPIDEMIA ASSOCIATED WITH TYPE 2 DIABETES MELLITUS: ICD-10-CM

## 2024-06-11 DIAGNOSIS — R09.81 SINUS CONGESTION: ICD-10-CM

## 2024-06-11 DIAGNOSIS — R53.83 FATIGUE, UNSPECIFIED TYPE: ICD-10-CM

## 2024-06-11 PROCEDURE — 99215 OFFICE O/P EST HI 40 MIN: CPT | Mod: PBBFAC | Performed by: INTERNAL MEDICINE

## 2024-06-11 PROCEDURE — 99214 OFFICE O/P EST MOD 30 MIN: CPT | Mod: S$PBB,,, | Performed by: INTERNAL MEDICINE

## 2024-06-11 PROCEDURE — 99999 PR PBB SHADOW E&M-EST. PATIENT-LVL V: CPT | Mod: PBBFAC,,, | Performed by: INTERNAL MEDICINE

## 2024-06-11 NOTE — PROGRESS NOTES
[facility] Note    Chief Complaint      Chief Complaint   Patient presents with    Follow-up     6m    Joint Swelling     Belly button pain       History of Present Illness      Lissy Palencia is a 79 y.o. female who presents today for   Chief Complaint   Patient presents with    Follow-up     6m    Joint Swelling     Belly button pain   .  Patient comes to appointment here for 6m checkup for chronic issues as below . She has visit with gastro soon for colonoscopy and for interment umbilical pain . She needs full labs with this visit .      Problem List Items Addressed This Visit          Cardiac/Vascular    Hyperlipidemia associated with type 2 diabetes mellitus    Overview     Cont lipitor and zetia          Coronary artery disease involving native coronary artery of native heart without angina pectoris - Primary    Overview     Now seeing dr linder . Lipid panel reviewed  . Cont current regimen added zetia         Aortic atherosclerosis    Overview     stable            Endocrine    Primary hypothyroidism    Overview     Cont replacement , need stsh          Type 2 diabetes mellitus without complication, without long-term current use of insulin    Overview     Off  metfornin .  A1c 6.6 . Cont diet only            Other    HEMAL (obstructive sleep apnea)    Overview     Cannot tolerate cpap is on no treatment cu rrently and declines           Other Visit Diagnoses       Fatigue, unspecified type        Sinus congestion                  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       Past  Surgical History:  Past Surgical History:   Procedure Laterality Date    ABLATION COLPOCLESIS      ADENOIDECTOMY      CATARACT EXTRACTION W/  INTRAOCULAR LENS IMPLANT Right 11/23/2022    Procedure: EXTRACTION, CATARACT, WITH IOL INSERTION;  Surgeon: Ciara Mclaughlin MD;  Location: [facility] OR;  Service: Ophthalmology;  Laterality: Right;    CATARACT EXTRACTION W/  INTRAOCULAR LENS IMPLANT Left 12/22/2022    Procedure: EXTRACTION, CATARACT, WITH IOL INSERTION;  Surgeon: Ciara Mclaughlin MD;  Location: [facility] OR;  Service: Ophthalmology;  Laterality: Left;    CHOLECYSTECTOMY      COLONOSCOPY N/A 11/28/2018    Procedure: COLONOSCOPY;  Surgeon: Richi Mcintosh MD;  Location[place] [facility] ([place]  Service: Endoscopy;  Laterality: N/A;    CORONARY STENT PLACEMENT      ESOPHAGOGASTRODUODENOSCOPY N/A 11/28/2018    Procedure: ESOPHAGOGASTRODUODENOSCOPY (EGD);  Surgeon: Richi Mcintosh MD;  Location: [facility]O ([place]  Service: Endoscopy;  Laterality: N/A;    heart cath  2005    non-obstructive disease    INTRAOCULAR LENS EXCHANGE Left 6/14/2023    Procedure: REPLACEMENT, IOL;  Surgeon: Ciara Mclaughlin MD;  [facility];  Service: Ophthalmology;  Laterality: Left;    OOPHORECTOMY      SHOULDER ARTHROSCOPY W/ ROTATOR CUFF REPAIR      Right    TONSILLECTOMY      TOTAL ABDOMINAL HYSTERECTOMY      with BSO    TRIGGER FINGER RELEASE Right 10/16/2023    Procedure: RELEASE, TRIGGER FINGER,RIGHT LONG AND RING FINGER;  Surgeon: Shaneka Richardson MD;  Location: [facility];  Service: Orthopedics;  Laterality: Right;    TUBAL LIGATION         Family History:  family history is not on file.    Social History:  Social History     Socioeconomic History    Marital status:    Occupational History     Employer: [employer]   Tobacco Use    Smoking status: Never    Smokeless tobacco: Never   Substance and Sexual Activity    Alcohol use: Yes     Alcohol/week: 1.0 standard drink of alcohol     Types: 1 Glasses of wine per  "week     Comment: "maybe a glass of wine every 6 months"    Drug use: No    Sexual activity: Not Currently     Partners: Male     Birth control/protection: None     Social Determinants of Health     Financial Resource Strain: Low Risk  (1/3/2022)    Overall Financial Resource Strain (CARDIA)     Difficulty of Paying Living Expenses: Not hard at all   Food Insecurity: No Food Insecurity (1/3/2022)    Hunger Vital Sign     Worried About Running Out of Food in the Last Year: Never true     Ran Out of Food in the Last Year: Never true   Transportation Needs: No Transportation Needs (1/3/2022)    PRAPARE - Transportation     Lack of Transportation (Medical): No     Lack of Transportation (Non-Medical): No   Physical Activity: Sufficiently Active (8/10/2020)    Exercise Vital Sign     Days of Exercise per Week: 6 days     Minutes of Exercise per Session: 60 min   Stress: No Stress Concern Present (1/3/2022)    [place] [employer] - Occupational Stress Questionnaire     Feeling of Stress : Not at all   Housing Stability: Low Risk  (1/3/2022)    Housing Stability Vital Sign     Unable to Pay for Housing in the Last Year: No     Number of Places Lived in the Last Year: 1     Unstable Housing in the Last Year: No       Review of Systems:   Review of Systems   Constitutional:  Positive for malaise/fatigue. Negative for fever and weight loss.   HENT:  Negative for congestion, hearing loss and sore throat.    Eyes:  Negative for blurred vision.   Respiratory:  Negative for cough and shortness of breath.    Cardiovascular:  Negative for chest pain, palpitations, claudication and leg swelling.   Gastrointestinal:  Negative for abdominal pain, constipation, diarrhea and heartburn.   Genitourinary:  Negative for dysuria.   Musculoskeletal:  Negative for back pain and myalgias.   Skin:  Negative for rash.   Neurological:  Negative for focal weakness and headaches.   Psychiatric/Behavioral:  Negative for " depression and suicidal ideas. The patient is not nervous/anxious.          Medications:  Outpatient Encounter Medications as of 6/11/2024   Medication Sig Note Dispense Refill    alendronate (FOSAMAX) 70 MG tablet Take 1 tablet (70 mg total) by mouth every 7 days.  12 tablet 0    aspirin (ECOTRIN) 81 MG EC tablet Take 81 mg by mouth once daily. 6/8/2023: Take AM of surgrery      atorvastatin (LIPITOR) 80 MG tablet TAKE 1 TABLET DAILY  90 tablet 3    azelastine (ASTELIN) 137 mcg (0.1 %) nasal spray 1 spray (137 mcg total) by Nasal route 2 (two) times daily.  30 mL 3    blood sugar diagnostic Strp To check BG ONCE daily, to use with insurance preferred meter  100 strip 3    blood-glucose meter kit To check BG ONCE THE daily, to use with insurance preferred meter  1 each 1    cycloSPORINE (RESTASIS) 0.05 % ophthalmic emulsion Place 1 drop into both eyes 2 (two) times daily.  90 each 11    ezetimibe (ZETIA) 10 mg tablet Take 1 tablet (10 mg total) by mouth once daily.  90 tablet 3    flu vac 2022 65up-[id number],PF, (FLUAD QUAD 2022-23,65Y UP,,PF,) 60 mcg (15 mcg x 4)/0.5 mL Syrg Inject into the muscle.  0.5 mL 0    hyoscyamine (LEVSIN) 0.125 mg Subl Place 1 tablet (0.125 mg total) under the tongue every 6 (six) hours as needed.  30 tablet 1    ketorolac 0.5% (ACULAR) 0.5 % Drop Place 1 drop into the left eye 3 (three) times daily.  5 mL 3    lancets Misc To check BG ONCE daily, to use with insurance preferred meter  100 each 3    levothyroxine (SYNTHROID) 88 MCG tablet TAKE FIRST THING IN THE MORNING ON AN EMPTY STOMACH 6/8/2023: Take AM of surgery 90 tablet 3    LIDOcaine (LIDODERM) 5 % Place onto the skin once daily.  30 patch 0    meloxicam (MOBIC) 7.5 MG tablet Take 1 tablet (7.5 mg total) by mouth once daily.  14 tablet 0    neomycin-polymyxin-hydrocortisone (CORTISPORIN) 3.5-10,000-1 mg/mL-unit/mL-% otic suspension Place 3 drops into the right ear 3 (three) times daily.  10 mL 0    nitroGLYCERIN (NITROSTAT) 0.4 MG  SL tablet Place 1 tablet (0.4 mg total) under the tongue every 5 (five) minutes as needed for Chest pain. You can take up to 3 doses at home. If chest pain persists after second dose, go the ER. 6/14/2023: Pt never took med 25 tablet 4    pantoprazole (PROTONIX) 40 MG tablet TAKE 1 TABLET DAILY  90 tablet 2    prednisolon/gatiflox/bromfenac (PREDNISOL ACE-GATIFLOX-BROMFEN) 1-0.5-0.075 % DrpS Apply 1 drop to eye 3 (three) times daily. One drop 3 times a day in surgical eye  5 mL 3    RESTASIS 0.05 % ophthalmic emulsion INSTILL 1 DROP IN BOTH EYES TWICE A DAY  60 each 11    tiZANidine (ZANAFLEX) 4 MG tablet Take 1-2 tablets (4-8 mg total) by mouth every 8 (eight) hours as needed (muscle spasms).  60 tablet 2    vitamin D (VITAMIN D3) 1000 units Tab Take 1 tablet (1,000 Units total) by mouth once daily.  90 tablet 3    [DISCONTINUED] metFORMIN (GLUCOPHAGE) 500 MG tablet TAKE 1 TABLET DAILY WITH BREAKFAST  90 tablet 3    [DISCONTINUED] alendronate (FOSAMAX) 70 MG tablet Take 1 tablet (70 mg total) by mouth every 7 days.  2 tablet 0     Facility-Administered Encounter Medications as of 6/11/2024   Medication Dose Route Frequency Provider Last Rate Last Admin    balanced salt irrigation intra-ocular solution 1 drop  1 drop Left Eye On Call Procedure Ciara Mclaughlin MD        balanced salt irrigation intra-ocular solution 1 drop  1 drop Left Eye On Call Procedure Ciara Mclaughlin MD        mupirocin 2 % ointment   Nasal On Call Procedure Caesar Finch MD   Given at 10/16/23 0752    phenylephrine HCL 10% ophthalmic solution 1 drop  1 drop Left Eye PRN Ciara Mclaughlin MD        phenylephrine HCL 10% ophthalmic solution 1 drop  1 drop Left Eye PRN Ciara Mclaughlin MD        sodium chloride 0.9% flush 2 mL  2 mL Intravenous PRN Ciara Mclaughlin MD        sodium chloride 0.9% flush 2 mL  2 mL Intravenous PRN Ciara Mclaughlin MD            Allergies:  Review of patient's allergies indicates:   Allergen Reactions  "   Compazine  [prochlorperazine edisylate]      Other reaction(s): SPASMS    Parafon forte      Other reaction(s): Hives         Physical Exam         Vitals:    06/11/24 1317   BP: 120/80   Pulse: 70   Resp: 18   Temp: 98 °F (36.7 °C)         Physical Exam  Constitutional:       Appearance: She is well-developed.   Eyes:      Pupils: Pupils are equal, round, and reactive to light.   Neck:      Thyroid: No thyromegaly.   Cardiovascular:      Rate and Rhythm: Normal rate.      Heart sounds: Normal heart sounds. No murmur heard.     No friction rub. No gallop.   Pulmonary:      Breath sounds: Normal breath sounds.   Abdominal:      General: Bowel sounds are normal.      Palpations: Abdomen is soft.   Musculoskeletal:         General: Normal range of motion.      Cervical back: Normal range of motion.   Lymphadenopathy:      Cervical: No cervical adenopathy.   Skin:     General: Skin is warm.      Findings: No rash.   Neurological:      Mental Status: She is alert and oriented to person, place, and time.      Cranial Nerves: No cranial nerve deficit.   Psychiatric:         Behavior: Behavior normal.          Laboratory:  CBC:  Recent Labs   Lab Result Units 04/23/24  0946   WBC K/uL 7.40   RBC M/uL 5.22   Hemoglobin g/dL 14.7   Hematocrit % 45.9   Platelets K/uL 169   MCV fL 88   MCH pg 28.2   MCHC g/dL 32.0     CMP:  Recent Labs   Lab Result Units 04/23/24  0946   Glucose mg/dL 98   Calcium mg/dL 10.2   Albumin g/dL 4.0   Total Protein g/dL 7.4   Sodium mmol/L 141   Potassium mmol/L 4.2   CO2 mmol/L 28   Chloride mmol/L 105   BUN mg/dL 15   Alkaline Phosphatase U/L 81   ALT U/L 24   AST U/L 22   Total Bilirubin mg/dL 0.8     URINALYSIS:  No results for input(s): "COLORU", "CLARITYU", "SPECGRAV", "PHUR", "PROTEINUA", "GLUCOSEU", "BILIRUBINCON", "BLOODU", "WBCU", "RBCU", "BACTERIA", "MUCUS", "NITRITE", "LEUKOCYTESUR", "UROBILINOGEN", "HYALINECASTS" in the last 2160 hours.   LIPIDS:  No results for input(s): "TSH", " ""HDL", "CHOL", "TRIG", "LDLCALC", "CHOLHDL", "NONHDLCHOL", "TOTALCHOLEST" in the last 2160 hours.  TSH:  No results for input(s): "TSH" in the last 2160 hours.  A1C:  Recent Labs   Lab Result Units 06/07/24  0739   Hemoglobin A1C % 6.6*       Radiology:        Assessment:     Lissy Palencia is a 79 y.o.female with:    Coronary artery disease involving native coronary artery of native heart without angina pectoris    Primary hypothyroidism    Type 2 diabetes mellitus without complication, without long-term current use of insulin  -     Cancel: Hemoglobin A1C; Future; Expected date: 06/11/2024    HEMAL (obstructive sleep apnea)    Aortic atherosclerosis    Fatigue, unspecified type  -     CBC Auto Differential; Future; Expected date: 06/11/2024  -     TSH; Future; Expected date: 06/11/2024    Hyperlipidemia associated with type 2 diabetes mellitus  -     Comprehensive Metabolic Panel; Future; Expected date: 06/11/2024  -     Lipid Panel; Future; Expected date: 06/11/2024    Sinus congestion  -     Ambulatory referral/consult to ENT; Future; Expected date: 06/18/2024          Plan:     Problem List Items Addressed This Visit          Cardiac/Vascular    Hyperlipidemia associated with type 2 diabetes mellitus    Overview     Cont lipitor and zetia          Coronary artery disease involving native coronary artery of native heart without angina pectoris - Primary    Overview     Now seeing dr linder . Lipid panel reviewed  . Cont current regimen added zetia         Aortic atherosclerosis    Overview     stable            Endocrine    Primary hypothyroidism    Overview     Cont replacement , need stsh          Type 2 diabetes mellitus without complication, without long-term current use of insulin    Overview     Off  metfornin .  A1c 6.6 . Cont diet only            Other    HEMAL (obstructive sleep apnea)    Overview     Cannot tolerate cpap is on no treatment cu rrently and declines           Other Visit Diagnoses       " Fatigue, unspecified type        Sinus congestion                As above, continue current medications and maintain follow up with specialists.  Return to clinic in 6  months.  '  Frederick W Dantagnan [facility]

## 2024-06-18 ENCOUNTER — LAB VISIT (OUTPATIENT)
Dept: LAB | Facility: HOSPITAL | Age: 79
End: 2024-06-18
Attending: INTERNAL MEDICINE
Payer: MEDICARE

## 2024-06-18 DIAGNOSIS — E78.5 HYPERLIPIDEMIA ASSOCIATED WITH TYPE 2 DIABETES MELLITUS: ICD-10-CM

## 2024-06-18 DIAGNOSIS — R53.83 FATIGUE, UNSPECIFIED TYPE: ICD-10-CM

## 2024-06-18 DIAGNOSIS — E11.69 HYPERLIPIDEMIA ASSOCIATED WITH TYPE 2 DIABETES MELLITUS: ICD-10-CM

## 2024-06-18 LAB
ALBUMIN SERPL BCP-MCNC: 3.7 G/DL (ref 3.5–5.2)
ALP SERPL-CCNC: 95 U/L (ref 55–135)
ALT SERPL W/O P-5'-P-CCNC: 24 U/L (ref 10–44)
ANION GAP SERPL CALC-SCNC: 8 MMOL/L (ref 8–16)
AST SERPL-CCNC: 23 U/L (ref 10–40)
BASOPHILS # BLD AUTO: 0.04 K/UL (ref 0–0.2)
BASOPHILS NFR BLD: 0.6 % (ref 0–1.9)
BILIRUB SERPL-MCNC: 0.7 MG/DL (ref 0.1–1)
BUN SERPL-MCNC: 16 MG/DL (ref 8–23)
CALCIUM SERPL-MCNC: 9 MG/DL (ref 8.7–10.5)
CHLORIDE SERPL-SCNC: 105 MMOL/L (ref 95–110)
CHOLEST SERPL-MCNC: 165 MG/DL (ref 120–199)
CHOLEST/HDLC SERPL: 3.7 {RATIO} (ref 2–5)
CO2 SERPL-SCNC: 27 MMOL/L (ref 23–29)
CREAT SERPL-MCNC: 0.9 MG/DL (ref 0.5–1.4)
DIFFERENTIAL METHOD BLD: NORMAL
EOSINOPHIL # BLD AUTO: 0.3 K/UL (ref 0–0.5)
EOSINOPHIL NFR BLD: 4.4 % (ref 0–8)
ERYTHROCYTE [DISTWIDTH] IN BLOOD BY AUTOMATED COUNT: 13.4 % (ref 11.5–14.5)
EST. GFR  (NO RACE VARIABLE): >60 ML/MIN/1.73 M^2
GLUCOSE SERPL-MCNC: 128 MG/DL (ref 70–110)
HCT VFR BLD AUTO: 44 % (ref 37–48.5)
HDLC SERPL-MCNC: 45 MG/DL (ref 40–75)
HDLC SERPL: 27.3 % (ref 20–50)
HGB BLD-MCNC: 14.7 G/DL (ref 12–16)
IMM GRANULOCYTES # BLD AUTO: 0.02 K/UL (ref 0–0.04)
IMM GRANULOCYTES NFR BLD AUTO: 0.3 % (ref 0–0.5)
LDLC SERPL CALC-MCNC: 90.6 MG/DL (ref 63–159)
LYMPHOCYTES # BLD AUTO: 2.1 K/UL (ref 1–4.8)
LYMPHOCYTES NFR BLD: 31.7 % (ref 18–48)
MCH RBC QN AUTO: 28.8 PG (ref 27–31)
MCHC RBC AUTO-ENTMCNC: 33.4 G/DL (ref 32–36)
MCV RBC AUTO: 86 FL (ref 82–98)
MONOCYTES # BLD AUTO: 0.5 K/UL (ref 0.3–1)
MONOCYTES NFR BLD: 7.4 % (ref 4–15)
NEUTROPHILS # BLD AUTO: 3.6 K/UL (ref 1.8–7.7)
NEUTROPHILS NFR BLD: 55.6 % (ref 38–73)
NONHDLC SERPL-MCNC: 120 MG/DL
NRBC BLD-RTO: 0 /100 WBC
PLATELET # BLD AUTO: 168 K/UL (ref 150–450)
PMV BLD AUTO: 10.1 FL (ref 9.2–12.9)
POTASSIUM SERPL-SCNC: 5 MMOL/L (ref 3.5–5.1)
PROT SERPL-MCNC: 6.7 G/DL (ref 6–8.4)
RBC # BLD AUTO: 5.11 M/UL (ref 4–5.4)
SODIUM SERPL-SCNC: 140 MMOL/L (ref 136–145)
T4 FREE SERPL-MCNC: 1.09 NG/DL (ref 0.71–1.51)
TRIGL SERPL-MCNC: 147 MG/DL (ref 30–150)
TSH SERPL DL<=0.005 MIU/L-ACNC: 0.23 UIU/ML (ref 0.4–4)
WBC # BLD AUTO: 6.52 K/UL (ref 3.9–12.7)

## 2024-06-18 PROCEDURE — 80061 LIPID PANEL: CPT | Performed by: INTERNAL MEDICINE

## 2024-06-18 PROCEDURE — 84443 ASSAY THYROID STIM HORMONE: CPT | Performed by: INTERNAL MEDICINE

## 2024-06-18 PROCEDURE — 36415 COLL VENOUS BLD VENIPUNCTURE: CPT | Performed by: INTERNAL MEDICINE

## 2024-06-18 PROCEDURE — 85025 COMPLETE CBC W/AUTO DIFF WBC: CPT | Performed by: INTERNAL MEDICINE

## 2024-06-18 PROCEDURE — 80053 COMPREHEN METABOLIC PANEL: CPT | Performed by: INTERNAL MEDICINE

## 2024-06-18 PROCEDURE — 84439 ASSAY OF FREE THYROXINE: CPT | Performed by: INTERNAL MEDICINE

## 2024-06-19 ENCOUNTER — OFFICE VISIT (OUTPATIENT)
Dept: GASTROENTEROLOGY | Facility: CLINIC | Age: 79
End: 2024-06-19
Payer: MEDICARE

## 2024-06-19 VITALS
SYSTOLIC BLOOD PRESSURE: 138 MMHG | DIASTOLIC BLOOD PRESSURE: 70 MMHG | HEIGHT: 62 IN | HEART RATE: 57 BPM | BODY MASS INDEX: 28.72 KG/M2 | WEIGHT: 156.06 LBS

## 2024-06-19 DIAGNOSIS — K83.8 INTRAHEPATIC BILE DUCT DILATION: ICD-10-CM

## 2024-06-19 DIAGNOSIS — M94.0 COSTOCHONDRITIS: Primary | ICD-10-CM

## 2024-06-19 PROCEDURE — 99205 OFFICE O/P NEW HI 60 MIN: CPT | Mod: S$PBB,,, | Performed by: INTERNAL MEDICINE

## 2024-06-19 PROCEDURE — 99999 PR PBB SHADOW E&M-EST. PATIENT-LVL V: CPT | Mod: PBBFAC,,, | Performed by: INTERNAL MEDICINE

## 2024-06-19 PROCEDURE — 99215 OFFICE O/P EST HI 40 MIN: CPT | Mod: PBBFAC | Performed by: INTERNAL MEDICINE

## 2024-06-19 NOTE — PROGRESS NOTES
"GENERAL GI PATIENT INTAKE:    COVID symptoms in the last 7 days (runny nose, sore throat, congestion, cough, fever): No  PCP: Drew Nicole  If not PCP-  number given to establish [phone]: N/A    ALLERGIES REVIEWED:  Yes    CHIEF COMPLAINT:    Chief Complaint   Patient presents with    Abdominal Pain       VITAL SIGNS:  /70   Pulse (!) 57   Ht 5' 2" (1.575 m)   Wt 70.8 kg (156 lb 1.4 oz)   BMI 28.55 kg/m²      Change in medical, surgical, family or social history: No      REVIEWED MEDICATION LIST RECONCILED INCLUDING ABOVE MEDS:  Yes     "

## 2024-06-19 NOTE — PROGRESS NOTES
SUBJECTIVE:         Chief Complaint:   Chief Complaint   Patient presents with    Abdominal Pain       History of Present Illness:  Patient is a 79 y.o. female presents with  abdominal pain in the right upper quadrant.  It occasionally radiates to the back.  It can be excruciatingly painful it is often brought on by touching or palpation.  She had a laparoscopic cholecystectomy past.  She has recently undergone an abdominal ultrasound that showed a dilated common bile duct to 14 mm.  There were no stones evident.  She underwent recent CMP that was essentially normal.    She reports occasional dysphagia to liquids.  Solids appear to pass without trouble however when she initiate swallowing with a leak with she occasionally has to spit it out.        OBJECTIVE:     Vital Signs (Most Recent)  Pulse: (!) 57 (06/19/24 1145)  BP: 138/70 (06/19/24 1145)    General: well developed, well nourished Sharp pinpoint pain to palpation of her right  upper quadrant with pain being reproduced  upon palpating the costal margin    Diagnostic Results:  Labs: Reviewed  US: Reviewed      ASSESSMENT/PLAN:     1. For her likely costochondritis.  I would suggest and have planned for her to be referred to the pain clinic for injection therapy.    2. We will do an endoscopic ultrasound to assess the dilated bile duct I do not believe the violated bile duct is related to her abdominal pain.  At the time of this we will be assessing her esophagus for her trouble swallowing.

## 2024-06-20 ENCOUNTER — PATIENT MESSAGE (OUTPATIENT)
Dept: ENDOSCOPY | Facility: HOSPITAL | Age: 79
End: 2024-06-20
Payer: MEDICARE

## 2024-06-20 ENCOUNTER — TELEPHONE (OUTPATIENT)
Dept: ENDOSCOPY | Facility: HOSPITAL | Age: 79
End: 2024-06-20
Payer: MEDICARE

## 2024-06-20 DIAGNOSIS — R13.10 DYSPHAGIA, UNSPECIFIED TYPE: Primary | ICD-10-CM

## 2024-06-20 NOTE — TELEPHONE ENCOUNTER
Spoke to patient to schedule procedure(s) Upper Endoscopy (EGD)/EUS       Physician to perform procedure(s) Dr. ZAIRE Mann  Date of Procedure (s) 7/10/2024  Arrival Time 7:15 AM  Time of Procedure(s) 8:15 AM   Location of Procedure(s) [address]  Type of Rx Prep sent to patient: Other  Instructions provided to patient via [facility]    Patient was informed on the following information and verbalized understanding. Screening questionnaire reviewed with patient and complete. If procedure requires anesthesia, a responsible adult needs to be present to accompany the patient home, patient cannot drive after receiving anesthesia. Appointment details are tentative, especially check-in time. Patient will receive a prep-op call 7 days prior to confirm check-in time for procedure. If applicable the patient should contact their pharmacy to verify Rx for procedure prep is ready for pick-up. Patient was advised to call the scheduling department at [phone] if pharmacy states no Rx is available. Patient was advised to call the endoscopy scheduling department if any questions or concerns arise.      [facility] Scheduling Department

## 2024-06-25 DIAGNOSIS — E03.9 PRIMARY HYPOTHYROIDISM: ICD-10-CM

## 2024-06-25 RX ORDER — LEVOTHYROXINE SODIUM 88 UG/1
TABLET ORAL
Qty: 90 TABLET | Refills: 3 | Status: SHIPPED | OUTPATIENT
Start: 2024-06-25

## 2024-06-25 NOTE — TELEPHONE ENCOUNTER
Refill Decision Note   Lissy Palencia  is requesting a refill authorization.  Brief Assessment and Rationale for Refill:  Approve     Medication Therapy Plan: FREE T4-WNL      Comments:     Note composed:11:06 AM 06/25/2024

## 2024-06-25 NOTE — TELEPHONE ENCOUNTER
No care due was identified.  [facility] Embedded Care Due Messages. Reference number: [id number].   6/25/2024 10:44:23 AM CDT

## 2024-06-28 ENCOUNTER — OFFICE VISIT (OUTPATIENT)
Dept: OPHTHALMOLOGY | Facility: CLINIC | Age: 79
End: 2024-06-28
Payer: MEDICARE

## 2024-06-28 DIAGNOSIS — H16.223 KERATOCONJUNCTIVITIS SICCA NOT SPECIFIED AS SJOGREN'S, BILATERAL: Primary | ICD-10-CM

## 2024-06-28 DIAGNOSIS — H04.123 BILATERAL DRY EYES: ICD-10-CM

## 2024-06-28 PROCEDURE — 99213 OFFICE O/P EST LOW 20 MIN: CPT | Mod: PBBFAC | Performed by: OPHTHALMOLOGY

## 2024-06-28 PROCEDURE — 99999 PR PBB SHADOW E&M-EST. PATIENT-LVL III: CPT | Mod: PBBFAC,,, | Performed by: OPHTHALMOLOGY

## 2024-07-02 ENCOUNTER — PATIENT MESSAGE (OUTPATIENT)
Dept: ENDOSCOPY | Facility: HOSPITAL | Age: 79
End: 2024-07-02
Payer: MEDICARE

## 2024-07-02 ENCOUNTER — TELEPHONE (OUTPATIENT)
Dept: ENDOSCOPY | Facility: HOSPITAL | Age: 79
End: 2024-07-02
Payer: MEDICARE

## 2024-07-08 ENCOUNTER — OFFICE VISIT (OUTPATIENT)
Dept: PAIN MEDICINE | Facility: CLINIC | Age: 79
End: 2024-07-08
Payer: MEDICARE

## 2024-07-08 ENCOUNTER — OFFICE VISIT (OUTPATIENT)
Dept: OPHTHALMOLOGY | Facility: CLINIC | Age: 79
End: 2024-07-08
Payer: MEDICARE

## 2024-07-08 ENCOUNTER — HOSPITAL ENCOUNTER (OUTPATIENT)
Dept: RADIOLOGY | Facility: HOSPITAL | Age: 79
Discharge: HOME | End: 2024-07-08
Attending: EMERGENCY MEDICINE
Payer: MEDICARE

## 2024-07-08 VITALS
HEIGHT: 62 IN | HEART RATE: 70 BPM | SYSTOLIC BLOOD PRESSURE: 120 MMHG | BODY MASS INDEX: 28.72 KG/M2 | WEIGHT: 156.06 LBS | DIASTOLIC BLOOD PRESSURE: 77 MMHG

## 2024-07-08 DIAGNOSIS — H52.7 REFRACTIVE ERRORS: ICD-10-CM

## 2024-07-08 DIAGNOSIS — H16.223 KERATOCONJUNCTIVITIS SICCA NOT SPECIFIED AS SJOGREN'S, BILATERAL: ICD-10-CM

## 2024-07-08 DIAGNOSIS — G58.8 INTERCOSTAL NEURALGIA: Primary | ICD-10-CM

## 2024-07-08 DIAGNOSIS — M94.0 COSTOCHONDRITIS: ICD-10-CM

## 2024-07-08 DIAGNOSIS — H04.123 BILATERAL DRY EYES: Primary | ICD-10-CM

## 2024-07-08 PROCEDURE — 99204 OFFICE O/P NEW MOD 45 MIN: CPT | Mod: S$PBB,,, | Performed by: EMERGENCY MEDICINE

## 2024-07-08 PROCEDURE — 99215 OFFICE O/P EST HI 40 MIN: CPT | Mod: PBBFAC,PN | Performed by: EMERGENCY MEDICINE

## 2024-07-08 PROCEDURE — 71110 X-RAY EXAM RIBS BIL 3 VIEWS: CPT | Mod: TC

## 2024-07-08 PROCEDURE — 71110 X-RAY EXAM RIBS BIL 3 VIEWS: CPT | Mod: 26,,, | Performed by: RADIOLOGY

## 2024-07-08 PROCEDURE — 99999 PR PBB SHADOW E&M-EST. PATIENT-LVL V: CPT | Mod: PBBFAC,,, | Performed by: EMERGENCY MEDICINE

## 2024-07-08 PROCEDURE — 99999 PR PBB SHADOW E&M-EST. PATIENT-LVL III: CPT | Mod: PBBFAC,,, | Performed by: OPHTHALMOLOGY

## 2024-07-08 PROCEDURE — 71045 X-RAY EXAM CHEST 1 VIEW: CPT | Mod: 26,59,, | Performed by: RADIOLOGY

## 2024-07-08 PROCEDURE — 71045 X-RAY EXAM CHEST 1 VIEW: CPT | Mod: TC

## 2024-07-08 PROCEDURE — 99213 OFFICE O/P EST LOW 20 MIN: CPT | Mod: PBBFAC,25,27 | Performed by: OPHTHALMOLOGY

## 2024-07-08 RX ORDER — CAPSAICIN 0 G/G
1 CREAM TOPICAL 2 TIMES DAILY
Qty: 56.6 G | Refills: 0 | Status: SHIPPED | OUTPATIENT
Start: 2024-07-08 | End: 2024-08-07

## 2024-07-08 RX ORDER — DICLOFENAC SODIUM 10 MG/G
2 GEL TOPICAL DAILY
Qty: 200 G | Refills: 0 | Status: SHIPPED | OUTPATIENT
Start: 2024-07-08 | End: 2024-08-07

## 2024-07-08 NOTE — PROGRESS NOTES
..  Chief Complaint   Patient presents with    Abdominal Pain    Back Pain        Original HPI 07/08/2024: Lissy Palencia is a 79 y.o. year old female patient who has a past medical history of Allergy, Arthritis, Coronary artery disease, Cystocele, Diabetes mellitus, GERD (gastroesophageal reflux disease), Hemorrhoids, History of cholelithiasis, Hypothyroidism, Obesity, Right shoulder pain, Sleep apnea, Trigger finger, right 3rd finger, Vaginal atrophy, and Vaginal vault prolapse. She presents in referral from Dr. Red Mann for chest wall pain.     Original Pain Description:  The pain is located in the bilateral rib area and is axial in nature. The pain is described as burning and stinging . Exacerbating factors: Palpation is the main cause of her pain, otherwise she feels it present but it is not painful. Mitigating factors nothing. Symptoms interfere with daily activity. The patient feels like symptoms have been worsening. Patient denies loss of sensations. Pain has been present for the past few years.  Patient also reports periumbilical pain that has been present for the same amount of time.  Patient believes the pain started after lap choly.  States that the pain she is having feels similar to the pain she was having around the time of the lap clifford.      PAIN SCORES:  Best: Pain is 2  Current: Pain is 2  Worst: Pain is 5        7/8/2024    10:39 AM   Last 3 PDI Scores   Pain Disability Index (PDI) 14       6 weeks of Conservative therapy:  PT: Not yet   Chiro:  HEP: Not yet    Treatments / Medications:   Lidoderm 5% patch  Meloxicam 7.5 mg  Zanaflex 4 mg    Antiplatelets/Anticoagulants:  ASA 81 mg    Interventional Pain Procedures:   NA    IMAGING:      Past Surgical History:   Procedure Laterality Date    ABLATION COLPOCLESIS      ADENOIDECTOMY      CATARACT EXTRACTION W/  INTRAOCULAR LENS IMPLANT Right 11/23/2022    Procedure: EXTRACTION, CATARACT, WITH IOL INSERTION;  Surgeon: Ciara Mclaughlin MD;   "Location: [facility] OR;  Service: Ophthalmology;  Laterality: Right;    CATARACT EXTRACTION W/  INTRAOCULAR LENS IMPLANT Left 12/22/2022    Procedure: EXTRACTION, CATARACT, WITH IOL INSERTION;  Surgeon: Ciara Mclaughlin MD;  Location: [facility] OR;  Service: Ophthalmology;  Laterality: Left;    CHOLECYSTECTOMY      COLONOSCOPY N/A 11/28/2018    Procedure: COLONOSCOPY;  Surgeon: Richi Mcintosh MD;  Location: [facility] ENDO (4TH FLR);  Service: Endoscopy;  Laterality: N/A;    CORONARY STENT PLACEMENT      ESOPHAGOGASTRODUODENOSCOPY N/A 11/28/2018    Procedure: ESOPHAGOGASTRODUODENOSCOPY (EGD);  Surgeon: Richi Mcintosh MD;  Location: [facility] ENDO (4TH FLR);  Service: Endoscopy;  Laterality: N/A;    heart cath  2005    non-obstructive disease    INTRAOCULAR LENS EXCHANGE Left 6/14/2023    Procedure: REPLACEMENT, IOL;  Surgeon: Ciara Mclaughlin MD;  Location: [facility] OR;  Service: Ophthalmology;  Laterality: Left;    OOPHORECTOMY      SHOULDER ARTHROSCOPY W/ ROTATOR CUFF REPAIR      Right    TONSILLECTOMY      TOTAL ABDOMINAL HYSTERECTOMY      with BSO    TRIGGER FINGER RELEASE Right 10/16/2023    Procedure: RELEASE, TRIGGER FINGER,RIGHT LONG AND RING FINGER;  Surgeon: Shaneka Richardson MD;  Location: [facility] OR;  Service: Orthopedics;  Laterality: Right;    TUBAL LIGATION         Social History     Socioeconomic History    Marital status:    Occupational History     Employer: [employer]   Tobacco Use    Smoking status: Never    Smokeless tobacco: Never   Substance and Sexual Activity    Alcohol use: Yes     Alcohol/week: 1.0 standard drink of alcohol     Types: 1 Glasses of wine per week     Comment: "maybe a glass of wine every 6 months"    Drug use: No    Sexual activity: Not Currently     Partners: Male     Birth control/protection: None     Social Determinants of Health     Financial Resource Strain: Low Risk  (1/3/2022)    Overall Financial Resource Strain (CARDIA)     Difficulty of Paying Living Expenses: Not " "hard at all   Food Insecurity: No Food Insecurity (1/3/2022)    Hunger Vital Sign     Worried About Running Out of Food in the Last Year: Never true     Ran Out of Food in the Last Year: Never true   Transportation Needs: No Transportation Needs (1/3/2022)    PRAPARE - Transportation     Lack of Transportation (Medical): No     Lack of Transportation (Non-Medical): No   Physical Activity: Sufficiently Active (8/10/2020)    Exercise Vital Sign     Days of Exercise per Week: 6 days     Minutes of Exercise per Session: 60 min   Stress: No Stress Concern Present (1/3/2022)    [employer] [place] [employer] - Occupational Stress Questionnaire     Feeling of Stress : Not at all   Housing Stability: Low Risk  (1/3/2022)    Housing Stability Vital Sign     Unable to Pay for Housing in the Last Year: No     Number of Places Lived in the Last Year: 1     Unstable Housing in the Last Year: No       Medications/Allergies: See med card    ROS:  GENERAL: No fever. No chills. No fatigue. Denies weight loss. Denies weight gain.  Back / musculoskeletal / neuro : See HPI    VITALS:   Vitals:    07/08/24 1039   BP: 120/77   Pulse: 70   Weight: 70.8 kg (156 lb 1.4 oz)   Height: 5' 2" (1.575 m)   PainSc:   2   PainLoc: Rib Cage     Body mass index is 28.55 kg/m².      7/8/2024    10:39 AM 11/23/2018    10:41 AM 10/11/2018    11:20 AM   Last 3 PDI Scores   Pain Disability Index (PDI) 14 28 30       PHYSICAL EXAM:   GENERAL: Well appearing, in no acute distress, alert and oriented x3.  PSYCH:  Mood and affect appropriate.  SKIN: Skin color, texture, turgor normal, no rashes or lesions.  HEENT:  Normocephalic, atraumatic. Cranial nerves grossly intact.  NECK: No pain to palpation over the cervical paraspinous muscles. No pain to palpation over facets. No pain with neck flexion, extension, or lateral flexion.   PULM: No evidence of respiratory difficulty, symmetric chest rise.  Tenderness to palpation along right and left 6/7 rib " in the mid clavicular line.  No step-off.  GI:  Non-distended.  No tenderness to palpation or percussion of abdomen, soft nontender.  EXTREMITIES: No deformities, edema, or skin discoloration.   MUSCULOSKELETAL: Shoulder, hip, and knee provocative maneuvers are negative. No atrophy is noted.  NEURO: Sensation is equal and appropriate bilaterally. Bilateral upper and lower extremity strength is normal and symmetric. Bilateral upper and lower extremity coordination and muscle stretch reflexes are physiologic and symmetric. Plantar response are downgoing. Straight leg raising in the supine position is negative to radicular pain.   GAIT: normal.      LABS:    Lab Results   Component Value Date    HGBA1C 6.6 (H) 06/07/2024       Lab Results   Component Value Date    WBC 6.52 06/18/2024    HGB 14.7 06/18/2024    HCT 44.0 06/18/2024    MCV 86 06/18/2024     06/18/2024           ASSESSMENT: 79 y.o. year old female with pain, consistent with:    Encounter Diagnoses   Name Primary?    Costochondritis     Intercostal neuralgia Yes       DISCUSSION: Lissy Palencia is a patient who does not like to take medications who comes to us with persistent bilateral anterior rib pain and periumbilical pain of several years duration.  Patient states that her goal is to identify the cause of her pain and is less interested in interventions.  As such, we will obtain imaging and start conservative therapy with topical medications.    PLAN:  - I have stressed the importance of physical activity and a home exercise plan to help with pain and improve health.  - Patient can continue with medications for now since they are providing benefits, using them appropriately, and without side effects.  - Counseled patient regarding the importance of activity modification and constant sleeping habits.  - Medications: Voltaren gel 1%    Capsaicin 0.5%   - Therapy: Referral to Physical therapy for chest wall stretching, strengthening, and a home  exercise program.  - Imaging: Reviewed available imaging with patient and answered any questions they had regarding study. Bilateral ribs x-rays and CXR  - The patient's pathophysiology was explained in detail with reference to x-rays, models, other visual aids as appropriate.   - Follow up visit: return to clinic in 4-6 weeks      I would like to thank Red Mann MD for the opportunity to assist in the care of this patient. We had a very nice visit and I look forward to continuing their care. Please let me know if I can be of further assistance.     Joel Olivier MD  07/08/2024

## 2024-07-08 NOTE — PROGRESS NOTES
HPI    Dr Mcintosh/ Dr Conner / Dr. Jack      ZACK   Glaucoma suspect OS   ZACK OU   S/P phaco OS 12/22/2022 - optiblue, S/P REPLACEMENT, IOL OS 06/14/2023 -   panoptix   S/p phaco OD (optiblue) 11/23/2022      Gtts: Restasis BID OU   Lubricating drops am and pm     Pt states OU still feel very dry especially in the corners of her eyes.   Last edited by Gisel Acosta on 7/8/2024 11:50 AM.            Assessment /Plan     For exam results, see Encounter Report.    Bilateral dry eyes    Keratoconjunctivitis sicca not specified as Sjogren's, bilateral    Refractive errors        S/P phaco OS 12/22/2022 - optiblue, S/P REPLACEMENT, IOL OS 06/14/2023 -   panoptix  S/p phaco OD (optiblue) 11/23/2022    S/p yag cap OU    Pt notes improved va however still battling dry eye -- VA improved today however with increased lubrication    Okay to use xiidra and restasis together, ATs prn.    F/up for itrace guided mrx OU at [facility] -- done today     RE: Mrx = Rx    F/up 6 mo AD dry eye f/up.    Today's visit is associated with current and anticipated ongoing medical care related to this patient's single serious/complex condition (cornea/dry eye). Follow up is to be continued indefinitely to monitor the condition.

## 2024-07-09 ENCOUNTER — TELEPHONE (OUTPATIENT)
Dept: ENDOSCOPY | Facility: HOSPITAL | Age: 79
End: 2024-07-09
Payer: MEDICARE

## 2024-07-10 ENCOUNTER — ANESTHESIA EVENT (OUTPATIENT)
Dept: ENDOSCOPY | Facility: HOSPITAL | Age: 79
End: 2024-07-10
Payer: MEDICARE

## 2024-07-10 ENCOUNTER — ANESTHESIA (OUTPATIENT)
Dept: ENDOSCOPY | Facility: HOSPITAL | Age: 79
End: 2024-07-10
Payer: MEDICARE

## 2024-07-10 ENCOUNTER — HOSPITAL ENCOUNTER (OUTPATIENT)
Facility: HOSPITAL | Age: 79
Discharge: HOME | End: 2024-07-10
Attending: INTERNAL MEDICINE | Admitting: INTERNAL MEDICINE
Payer: MEDICARE

## 2024-07-10 VITALS
WEIGHT: 158 LBS | BODY MASS INDEX: 29.08 KG/M2 | RESPIRATION RATE: 16 BRPM | HEART RATE: 53 BPM | DIASTOLIC BLOOD PRESSURE: 71 MMHG | HEIGHT: 62 IN | TEMPERATURE: 98 F | SYSTOLIC BLOOD PRESSURE: 134 MMHG | OXYGEN SATURATION: 95 %

## 2024-07-10 DIAGNOSIS — K83.8 DILATED BILE DUCT: ICD-10-CM

## 2024-07-10 LAB — POCT GLUCOSE: 114 MG/DL (ref 70–110)

## 2024-07-10 PROCEDURE — 37000008 HC ANESTHESIA 1ST 15 MINUTES: Performed by: INTERNAL MEDICINE

## 2024-07-10 PROCEDURE — 37000009 HC ANESTHESIA EA ADD 15 MINS: Performed by: INTERNAL MEDICINE

## 2024-07-10 PROCEDURE — 63600175 PHARM REV CODE 636 W HCPCS: Performed by: NURSE ANESTHETIST, CERTIFIED REGISTERED

## 2024-07-10 PROCEDURE — 25000003 PHARM REV CODE 250: Performed by: INTERNAL MEDICINE

## 2024-07-10 PROCEDURE — 25000003 PHARM REV CODE 250: Performed by: NURSE ANESTHETIST, CERTIFIED REGISTERED

## 2024-07-10 PROCEDURE — 43259 EGD US EXAM DUODENUM/JEJUNUM: CPT | Performed by: INTERNAL MEDICINE

## 2024-07-10 PROCEDURE — 43259 EGD US EXAM DUODENUM/JEJUNUM: CPT | Mod: ,,, | Performed by: INTERNAL MEDICINE

## 2024-07-10 PROCEDURE — 82962 GLUCOSE BLOOD TEST: CPT | Performed by: INTERNAL MEDICINE

## 2024-07-10 RX ORDER — GLUCAGON 1 MG
1 KIT INJECTION
Status: DISCONTINUED | OUTPATIENT
Start: 2024-07-10 | End: 2024-07-10 | Stop reason: HOSPADM

## 2024-07-10 RX ORDER — HALOPERIDOL 5 MG/ML
0.5 INJECTION INTRAMUSCULAR EVERY 10 MIN PRN
Status: DISCONTINUED | OUTPATIENT
Start: 2024-07-10 | End: 2024-07-10 | Stop reason: HOSPADM

## 2024-07-10 RX ORDER — SODIUM CHLORIDE 0.9 % (FLUSH) 0.9 %
10 SYRINGE (ML) INJECTION
Status: DISCONTINUED | OUTPATIENT
Start: 2024-07-10 | End: 2024-07-10 | Stop reason: HOSPADM

## 2024-07-10 RX ORDER — SODIUM CHLORIDE 0.9 % (FLUSH) 0.9 %
3 SYRINGE (ML) INJECTION
Status: DISCONTINUED | OUTPATIENT
Start: 2024-07-10 | End: 2024-07-10 | Stop reason: HOSPADM

## 2024-07-10 RX ORDER — SODIUM CHLORIDE 9 MG/ML
INJECTION, SOLUTION INTRAVENOUS CONTINUOUS
Status: DISCONTINUED | OUTPATIENT
Start: 2024-07-10 | End: 2024-07-10 | Stop reason: HOSPADM

## 2024-07-10 RX ORDER — PROPOFOL 10 MG/ML
INJECTION, EMULSION INTRAVENOUS
Status: DISCONTINUED | OUTPATIENT
Start: 2024-07-10 | End: 2024-07-10

## 2024-07-10 RX ORDER — HYDROMORPHONE HYDROCHLORIDE 1 MG/ML
0.2 INJECTION, SOLUTION INTRAMUSCULAR; INTRAVENOUS; SUBCUTANEOUS EVERY 5 MIN PRN
Status: DISCONTINUED | OUTPATIENT
Start: 2024-07-10 | End: 2024-07-10 | Stop reason: HOSPADM

## 2024-07-10 RX ORDER — SODIUM CHLORIDE, SODIUM LACTATE, POTASSIUM CHLORIDE, CALCIUM CHLORIDE 600; 310; 30; 20 MG/100ML; MG/100ML; MG/100ML; MG/100ML
INJECTION, SOLUTION INTRAVENOUS CONTINUOUS PRN
Status: DISCONTINUED | OUTPATIENT
Start: 2024-07-10 | End: 2024-07-10

## 2024-07-10 RX ORDER — LIDOCAINE HYDROCHLORIDE 20 MG/ML
INJECTION INTRAVENOUS
Status: DISCONTINUED | OUTPATIENT
Start: 2024-07-10 | End: 2024-07-10

## 2024-07-10 RX ORDER — ONDANSETRON HYDROCHLORIDE 2 MG/ML
4 INJECTION, SOLUTION INTRAVENOUS ONCE AS NEEDED
Status: DISCONTINUED | OUTPATIENT
Start: 2024-07-10 | End: 2024-07-10 | Stop reason: HOSPADM

## 2024-07-10 RX ADMIN — SODIUM CHLORIDE, SODIUM LACTATE, POTASSIUM CHLORIDE, AND CALCIUM CHLORIDE: 600; 310; 30; 20 INJECTION, SOLUTION INTRAVENOUS at 08:07

## 2024-07-10 RX ADMIN — LIDOCAINE HYDROCHLORIDE 50 MG: 20 INJECTION INTRAVENOUS at 08:07

## 2024-07-10 RX ADMIN — GLYCOPYRROLATE 0.2 MG: 0.2 INJECTION, SOLUTION INTRAMUSCULAR; INTRAVENOUS at 08:07

## 2024-07-10 RX ADMIN — SODIUM CHLORIDE: 9 INJECTION, SOLUTION INTRAVENOUS at 08:07

## 2024-07-10 RX ADMIN — PROPOFOL 150 MCG/KG/MIN: 10 INJECTION, EMULSION INTRAVENOUS at 08:07

## 2024-07-10 NOTE — H&P
Short Stay Endoscopy History and Physical    PCP - Drew Nicole MD  Referring Physician - Red Mann MD  [address]  [address]  [place] [address]    Procedure - eus  ASA - per anesthesia  Mallampati - per anesthesia  History of Anesthesia problems - no  Family history Anesthesia problems -  no   Plan of anesthesia - General    HPI:  This is a 79 y.o. female here for evaluation of: dilated cbd    Reflux - no  Dysphagia - no  Abdominal pain - no  Diarrhea - no    ROS:  Constitutional: No fevers, chills, No weight loss  CV: No chest pain  Pulm: No cough, No shortness of breath  Ophtho: No vision changes  GI: see HPI  Derm: No rash    Medical History:  has a past medical history of Allergy, Arthritis, Cancer, Coronary artery disease, Cystocele (01/19/2016), Diabetes mellitus, GERD (gastroesophageal reflux disease), Hemorrhoids, History of cholelithiasis, Hypothyroidism, Liver disease, Obesity, Right shoulder pain (10/09/2012), Sleep apnea, Trigger finger, right 3rd finger (07/30/2014), Vaginal atrophy (01/19/2016), and Vaginal vault prolapse (01/19/2016).    Surgical History:  has a past surgical history that includes Tonsillectomy; Shoulder arthroscopy w/ rotator cuff repair; Total abdominal hysterectomy; Adenoidectomy; Tubal ligation; heart cath (2005); Cholecystectomy; Oophorectomy; Ablation colpoclesis; Coronary stent placement; Esophagogastroduodenoscopy (N/A, 11/28/2018); Colonoscopy (N/A, 11/28/2018); Cataract extraction w/  intraocular lens implant (Right, 11/23/2022); Cataract extraction w/  intraocular lens implant (Left, 12/22/2022); Intraocular lens exchange (Left, 6/14/2023); and Trigger finger release (Right, 10/16/2023).    Family History: family history is not on file..    Social History:  reports that she has never smoked. She has never used smokeless tobacco. She reports current alcohol use of about 1.0 standard drink of alcohol per week. She reports that she does not  use drugs.    Review of patient's allergies indicates:   Allergen Reactions    Compazine  [prochlorperazine edisylate]      Other reaction(s): SPASMS    Parafon forte      Other reaction(s): Hives       Medications:   Medications Prior to Admission   Medication Sig Dispense Refill Last Dose    alendronate (FOSAMAX) 70 MG tablet Take 1 tablet (70 mg total) by mouth every 7 days. 12 tablet 0 Past Week    aspirin (ECOTRIN) 81 MG EC tablet Take 81 mg by mouth once daily.   Past Week    atorvastatin (LIPITOR) 80 MG tablet TAKE 1 TABLET DAILY 90 tablet 3 7/9/2024    cycloSPORINE (RESTASIS) 0.05 % ophthalmic emulsion Place 1 drop into both eyes 2 (two) times daily. 90 each 11 7/10/2024 at 0500    levothyroxine (SYNTHROID) 88 MCG tablet TAKE FIRST THING IN THE MORNING ON AN EMPTY STOMACH 90 tablet 3 7/9/2024    meloxicam (MOBIC) 7.5 MG tablet Take 1 tablet (7.5 mg total) by mouth once daily. 14 tablet 0 Past Month    pantoprazole (PROTONIX) 40 MG tablet TAKE 1 TABLET DAILY 90 tablet 2 7/9/2024    RESTASIS 0.05 % ophthalmic emulsion INSTILL 1 DROP IN BOTH EYES TWICE A DAY 60 each 11 7/10/2024 at 0500    tiZANidine (ZANAFLEX) 4 MG tablet Take 1-2 tablets (4-8 mg total) by mouth every 8 (eight) hours as needed (muscle spasms). 60 tablet 2 Past Month    vitamin D (VITAMIN D3) 1000 units Tab Take 1 tablet (1,000 Units total) by mouth once daily. 90 tablet 3 Past Month    azelastine (ASTELIN) 137 mcg (0.1 %) nasal spray 1 spray (137 mcg total) by Nasal route 2 (two) times daily. 30 mL 3     blood sugar diagnostic Strp To check BG ONCE daily, to use with insurance preferred meter 100 strip 3     blood-glucose meter kit To check BG ONCE THE daily, to use with insurance preferred meter 1 each 1     capsaicin 0.1 % Crea Apply 1 Application topically 2 (two) times a day. 56.6 g 0     diclofenac sodium (VOLTAREN ARTHRITIS PAIN) 1 % Gel Apply 2 g topically once daily. 200 g 0     ezetimibe (ZETIA) 10 mg tablet Take 1 tablet (10 mg  total) by mouth once daily. 90 tablet 3     flu vac 2022 [id number],PF, (FLUAD QUAD 2022-23,65Y UP,,PF,) 60 mcg (15 mcg x 4)/0.5 mL Syrg Inject into the muscle. 0.5 mL 0     hyoscyamine (LEVSIN) 0.125 mg Subl Place 1 tablet (0.125 mg total) under the tongue every 6 (six) hours as needed. 30 tablet 1     ketorolac 0.5% (ACULAR) 0.5 % Drop Place 1 drop into the left eye 3 (three) times daily. (Patient not taking: Reported on 7/8/2024) 5 mL 3     lancets Misc To check BG ONCE daily, to use with insurance preferred meter 100 each 3     LIDOcaine (LIDODERM) 5 % Place onto the skin once daily. 30 patch 0     neomycin-polymyxin-hydrocortisone (CORTISPORIN) 3.5-10,000-1 mg/mL-unit/mL-% otic suspension Place 3 drops into the right ear 3 (three) times daily. 10 mL 0     nitroGLYCERIN (NITROSTAT) 0.4 MG SL tablet Place 1 tablet (0.4 mg total) under the tongue every 5 (five) minutes as needed for Chest pain. You can take up to 3 doses at home. If chest pain persists after second dose, go the ER. 25 tablet 4     prednisolon/gatiflox/bromfenac (PREDNISOL ACE-GATIFLOX-BROMFEN) 1-0.5-0.075 % DrpS Apply 1 drop to eye 3 (three) times daily. One drop 3 times a day in surgical eye (Patient not taking: Reported on 7/8/2024) 5 mL 3        Physical Exam:    Vital Signs:   Vitals:    07/10/24 0825   BP: (!) 157/72   Pulse:    Resp:    Temp:        General Appearance: Well appearing in no acute distress    Labs:  Lab Results   Component Value Date    WBC 6.52 06/18/2024    HGB 14.7 06/18/2024    HCT 44.0 06/18/2024     06/18/2024    CHOL 165 06/18/2024    TRIG 147 06/18/2024    HDL 45 06/18/2024    ALT 24 06/18/2024    AST 23 06/18/2024     06/18/2024    K 5.0 06/18/2024     06/18/2024    CREATININE 0.9 06/18/2024    BUN 16 06/18/2024    CO2 27 06/18/2024    TSH 0.229 (L) 06/18/2024    INR 1.0 07/27/2005    HGBA1C 6.6 (H) 06/07/2024       I have explained the risks and benefits of this endoscopic procedure to the  patient including but not limited to bleeding, inflammation, infection, perforation, and death.      Red Mann MD

## 2024-07-10 NOTE — [employer] PATIENT INSTRUCTIONS
Discharge Summary/Instructions after an Endoscopic Procedure  Patient Name: Lissy Palencia  Patient MRN: [id number]  Patient YOB: 1945  Wednesday, July 10, 2024  Red Mann MD  Dear patient,  As a result of recent federal legislation (The Federal Cures Act), you may   receive lab or pathology results from your procedure in your [facility]   account before your physician is able to contact you. Your physician or   their representative will relay the results to you with their   recommendations at their soonest availability.  Thank you,  RESTRICTIONS:  During your procedure today, you received medications for sedation.  These   medications may affect your judgment, balance and coordination.  Therefore,   for 24 hours, you have the following restrictions:   - DO NOT drive a car, operate machinery, make legal/financial decisions,   sign important papers or drink alcohol.    ACTIVITY:  Today: no heavy lifting, straining or running due to procedural   sedation/anesthesia.  The following day: return to full activity including work.  DIET:  Eat and drink normally unless instructed otherwise.     TREATMENT FOR COMMON SIDE EFFECTS:  - Mild abdominal pain, nausea, belching, bloating or excessive gas:  rest,   eat lightly and use a heating pad.  - Sore Throat: treat with throat lozenges and/or gargle with warm salt   water.  - Because air was used during the procedure, expelling large amounts of air   from your rectum or belching is normal.  - If a bowel prep was taken, you may not have a bowel movement for 1-3 days.    This is normal.  SYMPTOMS TO WATCH FOR AND REPORT TO YOUR PHYSICIAN:  1. Abdominal pain or bloating, other than gas cramps.  2. Chest pain.  3. Back pain.  4. Signs of infection such as: chills or fever occurring within 24 hours   after the procedure.  5. Rectal bleeding, which would show as bright red, maroon, or black stools.   (A tablespoon of blood from the rectum is not serious, especially if    hemorrhoids are present.)  6. Vomiting.  7. Weakness or dizziness.  GO DIRECTLY TO THE NEAREST EMERGENCY ROOM IF YOU HAVE ANY OF THE FOLLOWING:      Difficulty breathing              Chills and/or fever over 101 F   Persistent vomiting and/or vomiting blood   Severe abdominal pain   Severe chest pain   Black, tarry stools   Bleeding- more than one tablespoon   Any other symptom or condition that you feel may need urgent attention  Your doctor recommends these additional instructions:  If any biopsies were taken, your doctors clinic will contact you in 1 to 2   weeks with any results.  - Discharge patient to home.   - Resume previous diet.   - Continue present medications.   - Return to primary care physician at appointment to be scheduled.  For questions, problems or results please call your physician - Red Mann MD at Work:  [phone].  [facility], EMERGENCY ROOM PHONE NUMBER: [phone]  IF A COMPLICATION OR EMERGENCY SITUATION ARISES AND YOU ARE UNABLE TO REACH   YOUR PHYSICIAN - GO DIRECTLY TO THE EMERGENCY ROOM.  Red Mann MD  7/10/2024 8:51:52 AM  This report has been verified and signed electronically.  Dear patient,  As a result of recent federal legislation (The Federal Cures Act), you may   receive lab or pathology results from your procedure in your [facility]   account before your physician is able to contact you. Your physician or   their representative will relay the results to you with their   recommendations at their soonest availability.  Thank you,  PROVATION

## 2024-07-10 NOTE — TRANSFER OF CARE
"Anesthesia Transfer of Care Note    Patient: Lissy Palencia    Procedure(s) Performed: Procedure(s) (LRB):  EGD (ESOPHAGOGASTRODUODENOSCOPY) (N/A)  ULTRASOUND, UPPER GI TRACT, ENDOSCOPIC (N/A)    Patient location: [facility]    Anesthesia Type: MAC and general    Transport from OR: Transported from OR on room air with adequate spontaneous ventilation    Post pain: adequate analgesia    Post assessment: no apparent anesthetic complications    Post vital signs: stable    Level of consciousness: awake, alert and oriented    Nausea/Vomiting: no nausea/vomiting    Complications: none    Transfer of care protocol was followed      Last vitals: Visit Vitals  BP (!) 157/72   Pulse 61   Temp 36.6 °C (97.8 °F) (Oral)   Resp 16   Ht 5' 2" (1.575 m)   Wt 71.7 kg (158 lb)   SpO2 96%   BMI 28.90 kg/m²     "

## 2024-07-10 NOTE — ANESTHESIA PREPROCEDURE EVALUATION
07/10/2024  Lissy Palencia is a 79 y.o., female.      Pre-op Assessment    I have reviewed the Patient Summary Reports.     I have reviewed the Nursing Notes. I have reviewed the NPO Status.   I have reviewed the Medications.     Review of Systems  Anesthesia Hx:  No problems with previous Anesthesia   History of prior surgery of interest to airway management or planning:  Previous anesthesia: General          Denies Personal Hx of Anesthesia complications.                    Cardiovascular:  Cardiovascular Normal       CAD                                        Pulmonary:        Sleep Apnea                Renal/:  Renal/ Normal                 Hepatic/GI:     GERD Liver Disease,            Musculoskeletal:  Arthritis               Neurological:    Neuromuscular Disease,                                   Endocrine:  Diabetes, type 2 Hypothyroidism            Patient Active Problem List   Diagnosis    Hyperlipidemia associated with type 2 diabetes mellitus    Chronic low back pain    Osteoarthritis    Coronary artery disease involving native coronary artery of native heart without angina pectoris    Hand arthritis    Trigger finger of right hand    Primary hypothyroidism    Rectocele    HEMAL (obstructive sleep apnea)    Fatty liver    Essential tremor, slight head vane    Impacted cerumen of right ear    Lateral femoral cutaneous neuropathy, right    Constipation    GERD (gastroesophageal reflux disease)    Aortic atherosclerosis    Chronic RUQ pain    Type 2 diabetes mellitus without complication, without long-term current use of insulin    Osteoporosis    Fx sacrum/coccyx-closed    Neck pain    Back pain of thoracolumbar region    Diffuse abdominal pain     Past Medical History:   Diagnosis Date    Allergy     Arthritis     Coronary artery disease     Cystocele 01/19/2016    Diabetes mellitus     GERD  (gastroesophageal reflux disease)     Hemorrhoids     History of cholelithiasis     Hypothyroidism     Obesity     Right shoulder pain 10/09/2012    Sleep apnea     Trigger finger, right 3rd finger 07/30/2014    Vaginal atrophy 01/19/2016    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     Past Surgical History:   Procedure Laterality Date    ABLATION COLPOCLESIS      ADENOIDECTOMY      CATARACT EXTRACTION W/  INTRAOCULAR LENS IMPLANT Right 11/23/2022    Procedure: EXTRACTION, CATARACT, WITH IOL INSERTION;  Surgeon: Ciara Mclaughlin MD;  Location: [facility] OR;  Service: Ophthalmology;  Laterality: Right;    CATARACT EXTRACTION W/  INTRAOCULAR LENS IMPLANT Left 12/22/2022    Procedure: EXTRACTION, CATARACT, WITH IOL INSERTION;  Surgeon: Ciara Mclaughlin MD;  Location: [facility] OR;  Service: Ophthalmology;  Laterality: Left;    CHOLECYSTECTOMY      COLONOSCOPY N/A 11/28/2018    Procedure: COLONOSCOPY;  Surgeon: Richi Mcintosh MD;  Location: [facility] ENDO (4TH FLR);  Service: Endoscopy;  Laterality: N/A;    CORONARY STENT PLACEMENT      ESOPHAGOGASTRODUODENOSCOPY N/A 11/28/2018    Procedure: ESOPHAGOGASTRODUODENOSCOPY (EGD);  Surgeon: Richi cMintosh MD;  Location: [facility] ENDO (4TH FLR);  Service: Endoscopy;  Laterality: N/A;    heart cath  2005    non-obstructive disease    INTRAOCULAR LENS EXCHANGE Left 6/14/2023    Procedure: REPLACEMENT, IOL;  Surgeon: Ciara Mclaughlin MD;  Location: [facility] OR;  Service: Ophthalmology;  Laterality: Left;    OOPHORECTOMY      SHOULDER ARTHROSCOPY W/ ROTATOR CUFF REPAIR      Right    TONSILLECTOMY      TOTAL ABDOMINAL HYSTERECTOMY      with BSO    TRIGGER FINGER RELEASE Right 10/16/2023    Procedure: RELEASE, TRIGGER FINGER,RIGHT LONG AND RING FINGER;  Surgeon: Shaneka Richardson MD;  Location: [facility] OR;  Service: Orthopedics;  Laterality: Right;    TUBAL LIGATION           Physical Exam  General: Well nourished, Cooperative and Alert    Airway:  Mallampati: II   Mouth Opening: Normal  TM Distance:  Normal  Tongue: Normal  Neck ROM: Normal ROM    Dental:  Intact    Chest/Lungs:  Normal Respiratory Rate    Heart:  Rate: Normal  Rhythm: Regular Rhythm      Lab Results   Component Value Date    WBC 6.52 06/18/2024    HGB 14.7 06/18/2024    HCT 44.0 06/18/2024    MCV 86 06/18/2024     06/18/2024       BMP  Lab Results   Component Value Date     06/18/2024    K 5.0 06/18/2024     06/18/2024    CO2 27 06/18/2024    BUN 16 06/18/2024    CREATININE 0.9 06/18/2024    CALCIUM 9.0 06/18/2024    ANIONGAP 8 06/18/2024    EGFRNORACEVR >60.0 06/18/2024         Anesthesia Plan  Type of Anesthesia, risks & benefits discussed:    Anesthesia Type: Gen Natural Airway, Gen ETT, MAC  Intra-op Monitoring Plan: Standard ASA Monitors  Post Op Pain Control Plan: multimodal analgesia  Induction:  IV  Airway Plan: Direct, Post-Induction  Informed Consent: Informed consent signed with the Patient and all parties understand the risks and agree with anesthesia plan.  All questions answered.   ASA Score: 2  Day of Surgery Review of History & Physical: H&P Update referred to the surgeon/provider.    Ready For Surgery From Anesthesia Perspective.     .

## 2024-07-10 NOTE — ANESTHESIA PREPROCEDURE EVALUATION
07/10/2024  Lissy Palencia is a 79 y.o., female.        Pre-op Assessment          Review of Systems          .

## 2024-07-10 NOTE — PLAN OF CARE
Pt meets criteria for discharge. MD spoke w/ both  VS WNL; respirations even and unlabored. No signs of acute distress. IV removed; pt tolerated well. D/c instructions reviewed w/ pt and spouse. Both verbalized understanding. No questions at this time. Pt transferred off unit to private vehicle via wheelchair accompanied by her spouse.

## 2024-07-15 ENCOUNTER — TELEPHONE (OUTPATIENT)
Dept: PAIN MEDICINE | Facility: CLINIC | Age: 79
End: 2024-07-15
Payer: MEDICARE

## 2024-07-16 ENCOUNTER — OFFICE VISIT (OUTPATIENT)
Dept: OTOLARYNGOLOGY | Facility: CLINIC | Age: 79
End: 2024-07-16
Payer: MEDICARE

## 2024-07-16 VITALS
DIASTOLIC BLOOD PRESSURE: 77 MMHG | BODY MASS INDEX: 28.99 KG/M2 | HEART RATE: 73 BPM | WEIGHT: 158.5 LBS | SYSTOLIC BLOOD PRESSURE: 150 MMHG

## 2024-07-16 DIAGNOSIS — R09.A2 GLOBUS SENSATION: ICD-10-CM

## 2024-07-16 DIAGNOSIS — M27.0 TORUS PALATINUS: Primary | ICD-10-CM

## 2024-07-16 DIAGNOSIS — J31.0 CHRONIC RHINITIS: ICD-10-CM

## 2024-07-16 DIAGNOSIS — R13.10 DYSPHAGIA, UNSPECIFIED TYPE: ICD-10-CM

## 2024-07-16 PROCEDURE — 99215 OFFICE O/P EST HI 40 MIN: CPT | Mod: PBBFAC | Performed by: PHYSICIAN ASSISTANT

## 2024-07-16 PROCEDURE — 99999 PR PBB SHADOW E&M-EST. PATIENT-LVL V: CPT | Mod: PBBFAC,,, | Performed by: PHYSICIAN ASSISTANT

## 2024-07-16 PROCEDURE — 31575 DIAGNOSTIC LARYNGOSCOPY: CPT | Mod: PBBFAC | Performed by: PHYSICIAN ASSISTANT

## 2024-07-16 PROCEDURE — 99214 OFFICE O/P EST MOD 30 MIN: CPT | Mod: 25,S$PBB,, | Performed by: PHYSICIAN ASSISTANT

## 2024-07-16 PROCEDURE — 31575 DIAGNOSTIC LARYNGOSCOPY: CPT | Mod: S$PBB,,, | Performed by: PHYSICIAN ASSISTANT

## 2024-07-16 RX ORDER — FLUTICASONE FUROATE 27.5 UG/1
2 SPRAY, METERED NASAL DAILY
Qty: 6.6 ML | Refills: 3 | Status: SHIPPED | OUTPATIENT
Start: 2024-07-16

## 2024-07-16 NOTE — PATIENT INSTRUCTIONS
[facility] Sinus rinse   Try purchasing this nasal rinse below.  Its over the counter and can use several times daily without any side effects, but please use at least 1-2 times daily.  Use it before applying your nasal spray medications.  This rinse can help wash away mucus and crusting and allow for better absorption of the medications.  Please use the nasal saline wash about 15 minutes before applying your medicated nasal sprays. This bottle uses distilled water (NOT tap water).  The Instructions in the box are detailed so please read them before using.        Nasal Steroid Spray (Flonase, Nasaocort, Rhinocort, Sensimyst)  Generic names for over the counter sprays: fluticasone propionate, triamcinolone acetonide, budesonide  You have been prescribed or instructed to take a nasal steroid spray (Flonase). Some symptoms will experience relief within a few days; however, it may take 2-3 weeks to begin to see improvement. This medication needs to be taken consistently to see results.    Use as directed and please be aware the Flonase takes 7-10 days of consistent use before becoming effective- so try to be patient :)    Helpful hints for maximizing nasal spray medication into the nose  - Use the opposite hand to spray the nostril (example: right hand for left nostril). This will help avoid spraying the medication onto the septum (the area that divides the left and right nasal cavity.)  - Tilt the bottle so that it is facing at a slight angle up or straight back, but avoid pointing the bottle straight up while spraying.   - Gently sniff (do not snort) a few seconds after spraying.

## 2024-07-16 NOTE — PROGRESS NOTES
"Subjective:     HPI: Lissy Palencia is a 79 y.o. female who was referred to me by Dr. Drew Ponce* in consultation for nasal congestion.    Patient reports irritation to the roof of her mouth for the last 8 days following eating chips and taco.  Since then she has been eating softer foods and cold liquids/food to improve her symptoms but then underwent an upper GI which exacerbated her symptoms.  Patient was seen by her dentist which showed an irritated part of her mouth.  Patient denies previous similar symptoms.  Patient does report dry mouth at times but not often.  She has been diagnosed with sleep apnea in the past but did not tolerate CPAP.    Patient also reports a recurrent globus sensation like thick mucus or "an oyster".  She reports recurrent throat clearing sometimes with green mucus sometimes nonproductive.  This is sometimes triggered with eating.  She does report some issues swallowing for which she recently underwent an upper GI.   She denies any other triggers including lying down, sleeping, postprandial, waking up, cold liquids, strong odors, laughing, deep breathing, or talking. She denies nasal obstruction, rhinorrhea, hyposmia or notable sinus infections.    Current sinonasal medications include azelastine and flonase prn.  The last course of antibiotics was a long time ago.    She does not recall previously having allergy testing.  She denies a history of asthma.  She relates a history of reflux symptoms which is currently managed with pantoprazole PO daily    She does not recall a prior history of nasal trauma.  She has not had sinonasal surgery.    She has not had a tonsillectomy.  She is not a tobacco smoker.     Past Medical/Past Surgical History  Past Medical History:   Diagnosis Date    Allergy     Arthritis     Cancer     skin cancer on hand    Coronary artery disease     stent in heart    Cystocele 01/19/2016    Diabetes mellitus     GERD (gastroesophageal reflux disease)     " 77 Hemorrhoids     History of cholelithiasis     Hypothyroidism     Liver disease     fatty liver    Obesity     Right shoulder pain 10/09/2012    Sleep apnea     Trigger finger, right 3rd finger 07/30/2014    Vaginal atrophy 01/19/2016    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     She has a past surgical history that includes Tonsillectomy; Shoulder arthroscopy w/ rotator cuff repair; Total abdominal hysterectomy; Adenoidectomy; Tubal ligation; heart cath (2005); Cholecystectomy; Oophorectomy; Ablation colpoclesis; Coronary stent placement; Esophagogastroduodenoscopy (N/A, 11/28/2018); Colonoscopy (N/A, 11/28/2018); Cataract extraction w/  intraocular lens implant (Right, 11/23/2022); Cataract extraction w/  intraocular lens implant (Left, 12/22/2022); Intraocular lens exchange (Left, 6/14/2023); Trigger finger release (Right, 10/16/2023); Esophagogastroduodenoscopy (N/A, 7/10/2024); and Endoscopic ultrasound of upper gastrointestinal tract (N/A, 7/10/2024).    Family History/Social History  Her family history is not on file.  She reports that she has never smoked. She has never used smokeless tobacco. She reports current alcohol use of about 1.0 standard drink of alcohol per week. She reports that she does not use drugs.    Allergies/Immunizations  She is allergic to compazine  [prochlorperazine edisylate] and parafon forte.  Immunization History   Administered Date(s) Administered    COVID-19, MRNA, LN-S, PF (Pfizer) (Purple Cap) 03/07/2021, 11/30/2021    COVID-19, mRNA, LNP-S, PF (Moderna 2023)Ages 12+ 11/08/2023    Influenza (FLUAD) - Quadrivalent - Adjuvanted - PF *Preferred* (65+) 10/14/2021, 11/08/2023    Influenza - High Dose - PF (65 years and older) 10/09/2012, 12/26/2014, 10/05/2016, 01/03/2018, 10/23/2018    Influenza Split 12/01/2008, 11/06/2009, 01/28/2011    Pneumococcal Conjugate - 13 Valent 12/26/2014    Pneumococcal Polysaccharide - 23 Valent 10/05/2016    Tdap 01/03/2018    Zoster 12/26/2014    Zoster  Recombinant 09/15/2021, 11/15/2021        Medications   alendronate  aspirin  atorvastatin  azelastine  balanced salt irrigation  blood sugar diagnostic Strp  capsaicin Crea  cycloSPORINE  diclofenac sodium Gel  ezetimibe  FLUAD QUAD 2022-23(65Y UP)(PF) Syrg  hyoscyamine Subl  ketorolac 0.5% Drop  lancets Misc  levothyroxine  LIDOcaine  meloxicam  mupirocin  neomycin-polymyxin-hydrocortisone  nitroGLYCERIN  pantoprazole  phenylephrine HCL 10%  prednisol ace-gatiflox-bromfen Drps  RESTASIS Dpet  sodium chloride 0.9%  tiZANidine  vitamin D Tab     Review of Systems   HENT: Positive for mouth sores and postnasal drip.            Objective:     There were no vitals taken for this visit.     Physical Exam  Vitals reviewed.   Constitutional:       Appearance: Normal appearance. She is well-developed.   HENT:      Head: Normocephalic and atraumatic.      Right Ear: External ear normal.      Left Ear: External ear normal.      Nose: No septal deviation, mucosal edema or rhinorrhea.      Right Nostril: No epistaxis.      Left Nostril: No epistaxis.      Right Turbinates: Not enlarged.      Left Turbinates: Not enlarged.      Right Sinus: No maxillary sinus tenderness or frontal sinus tenderness.      Left Sinus: No maxillary sinus tenderness or frontal sinus tenderness.      Mouth/Throat:      Lips: Pink. No lesions.      Tongue: No lesions. Tongue does not deviate from midline.      Palate: No mass and lesions.      Pharynx: Oropharynx is clear. Uvula midline. No pharyngeal swelling, oropharyngeal exudate, posterior oropharyngeal erythema or uvula swelling.      Tonsils: No tonsillar exudate or tonsillar abscesses. 1+ on the right. 1+ on the left.      Comments: + torus palatinus with localized erythema and TTP  Eyes:      Extraocular Movements: Extraocular movements intact.      Conjunctiva/sclera: Conjunctivae normal.   Neck:      Thyroid: No thyromegaly or thyroid tenderness.   Lymphadenopathy:      Cervical: No cervical  "adenopathy.   Psychiatric:         Mood and Affect: Mood normal.         Behavior: Behavior normal. Behavior is cooperative.          Procedure    Flexible laryngoscopy performed.  See procedure note.     L NV with pale IT     L MT     L posterior It with thick mucus     R NV     R MT     R ET     Hypopharynx/larynx     VF mobility       Data Reviewed  I personally reviewed the chart, including any outside records, and pertinent data below:    I reviewed the following notes Internal Medicine and ENT     WBC (K/uL)   Date Value   06/18/2024 6.52     Eosinophil % (%)   Date Value   06/18/2024 4.4     Eos # (K/uL)   Date Value   06/18/2024 0.3     Platelets (K/uL)   Date Value   06/18/2024 168     Glucose (mg/dL)   Date Value   06/18/2024 128 (H)     No results found for: "IGE"      Upper EUS 7/10/24  "Impression:            - Normal esophagus.                          - Normal stomach.                          - Normal ampulla.                          - There was dilation in the middle third of the                          main bile duct and in the upper third of the main                          bile duct which measured up to 11 mm.                          - No specimens collected. "    I independently reviewed the images of the CT sinuses dated 9/21/22. Pertinent sinus findings include R deviated septum, scant inferior maxillary sinus mucosal thickening.    Assessment & Plan:     1. Torus palatinus   - patient with evidence of erythema to anterior portion of her torus palatinus.  This is the etiology for patient's mouth symptoms and advised supportive care.  2. Globus sensation  3. Chronic rhinitis  -    laryngoscopy with evidence of thick postnasal drip which is likely the etiology for patient's globus sensation.  Differential includes LPR and dysphagia but patient is treating her LPR with PPIs currently  -  START saline rinse daily and START INCS  -     fluticasone (FLONASE SENSIMIST) 27.5 mcg/actuation nasal " spray; 2 sprays by Nasal route once daily.  Dispense: 6.6 mL; Refill: 3    4. Dysphagia, unspecified type   - consider MBSS    She will  follow up in 4 weeks  I had a discussion with the patient regarding her condition and the further workup and management options.    All questions were answered, and the patient is in agreement with the above.     Disclaimer:  This note may have been prepared utilizing voice recognition software which may result in occasional typographical errors in the text such as sound alike words.   If further clarification is needed, please contact the ENT department of [facility].

## 2024-07-16 NOTE — PROCEDURES
"Laryngoscopy    Date/Time: 7/16/2024 2:00 PM    Performed by: Caesar Rodríguez PA-C  Authorized by: Caesar Rodríguez PA-C    Time out: Immediately prior to procedure a "time out" was called to verify the correct patient, procedure, equipment, support staff and site/side marked as required.    Anesthesia:     Local anesthetic:  Lidocaine 4% and Cullen-Synephrine 1/2%    Patient tolerance:  Patient tolerated the procedure well with no immediate complications  Laryngoscopy:     Areas examined:  Nasal cavities, nasopharynx, oropharynx, hypopharynx, larynx and vocal cords    Laryngoscope size:  4 mm  Nose Intranasal:      Mucosa no polyps     Mucosa ulcers not present     No mucosa lesions present     No septum gross deformity     Enlarged turbinates (pale)  Nasopharynx:      No mucosa lesions     Adenoids not present     Posterior choanae patent     Eustachian tube patent  Larynx/hypopharynx:      No epiglottis lesions     No epiglottis edema     No AE folds lesions     No vocal cord polyps     Equal and normal bilateral     No hypopharynx lesions     No piriform sinus pooling     No piriform sinus lesions     Post cricoid edema     No post cricoid erythema     Thick mucus from left posterior IT    "

## 2024-07-22 ENCOUNTER — CLINICAL SUPPORT (OUTPATIENT)
Dept: REHABILITATION | Facility: HOSPITAL | Age: 79
End: 2024-07-22
Attending: EMERGENCY MEDICINE
Payer: MEDICARE

## 2024-07-22 DIAGNOSIS — G58.8 INTERCOSTAL NEURALGIA: ICD-10-CM

## 2024-07-22 DIAGNOSIS — M53.84 DECREASED ROM OF THORACIC SPINE: Primary | ICD-10-CM

## 2024-07-22 DIAGNOSIS — R07.81 RIB PAIN: ICD-10-CM

## 2024-07-22 PROCEDURE — 97110 THERAPEUTIC EXERCISES: CPT

## 2024-07-22 PROCEDURE — 97161 PT EVAL LOW COMPLEX 20 MIN: CPT

## 2024-07-22 NOTE — PLAN OF CARE
[facility]   Physical Therapy Initial Evaluation      Name: Lissy Palencia  Clinic Number: [id number]    Therapy Diagnosis:   Encounter Diagnoses   Name Primary?    Intercostal neuralgia     Decreased ROM of thoracic spine Yes    Rib pain         Physician: Joel Olivier MD    Physician Orders: PT Eval and Treat   Medical Diagnosis from Referral: G58.8 (ICD-10-CM) - Intercostal neuralgia   Evaluation Date: 7/22/2024  Authorization Period Expiration: 12/31/24  Plan of Care Expiration: 7/22/24  Progress Note Due: 7/22/24  Visit # / Visits authorized: 1/ 1   FOTO: 1/ 3    Precautions: Standard and Diabetes     Time In: 830am  Time Out: 915am  Total Appointment Time (timed & untimed codes): 45 minutes    Subjective     Date of onset: 10 years ago     History of current condition - Lissy reports: her central low back and R ribs have been hurting for years. She had her gallbladder removed 10 years ago, but the pain returned. She goes to [employer] to walk on the treadmill and stretch 2x/week. She knows she needs to do her therapy exercises more often. She takes medication for frequent cramping in her legs.     Falls: none    Imaging: Xray:    FINDINGS:  No definite acute rib fractures identified.  No bone destruction.    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   Prior Level of Function: (I)  Current Level of Function: (I)    Pain:  Current 5/10, worst 8/10, best 5/10   Location: right ribs and central low back     Description: Aching and Tight  Aggravating Factors: turning, using the bathroom  Easing Factors:  stretching,     Patients goals: to get some more advice on exercises to do on her own      Medical History:   Past Medical History:   Diagnosis Date    Allergy     Arthritis     Cancer     skin cancer on hand    Coronary artery disease     stent in heart    Cystocele  01/19/2016    Diabetes mellitus     GERD (gastroesophageal reflux disease)     Hemorrhoids     History of cholelithiasis     Hypothyroidism     Liver disease     fatty liver    Obesity     Right shoulder pain 10/09/2012    Sleep apnea     Trigger finger, right 3rd finger 07/30/2014    Vaginal atrophy 01/19/2016    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       Surgical History:   Lissy Palencia  has a past surgical history that includes Tonsillectomy; Shoulder arthroscopy w/ rotator cuff repair; Total abdominal hysterectomy; Adenoidectomy; Tubal ligation; heart cath (2005); Cholecystectomy; Oophorectomy; Ablation colpoclesis; Coronary stent placement; Esophagogastroduodenoscopy (N/A, 11/28/2018); Colonoscopy (N/A, 11/28/2018); Cataract extraction w/  intraocular lens implant (Right, 11/23/2022); Cataract extraction w/  intraocular lens implant (Left, 12/22/2022); Intraocular lens exchange (Left, 6/14/2023); Trigger finger release (Right, 10/16/2023); Esophagogastroduodenoscopy (N/A, 7/10/2024); and Endoscopic ultrasound of upper gastrointestinal tract (N/A, 7/10/2024).    Medications:   Lissy has a current medication list which includes the following prescription(s): alendronate, aspirin, atorvastatin, azelastine, blood sugar diagnostic, blood-glucose meter, capsaicin, cyclosporine, diclofenac sodium, ezetimibe, fluad quad 2022-23(65y up)(pf), flonase sensimist, hyoscyamine, ketorolac 0.5%, lancets, levothyroxine, lidocaine, meloxicam, neomycin-polymyxin-hydrocortisone, nitroglycerin, pantoprazole, prednisol ace-gatiflox-bromfen, restasis, tizanidine, and vitamin d, and the following Facility-Administered Medications: balanced salt irrigation, balanced salt irrigation, mupirocin, phenylephrine hcl 10%, phenylephrine hcl 10%, sodium chloride 0.9%, and sodium chloride 0.9%.    Allergies:   Review of patient's allergies indicates:   Allergen Reactions    Compazine  [prochlorperazine edisylate]      Other reaction(s): SPASMS     Parafon forte      Other reaction(s): Hives        Objective        POSTURE    Postural examination/scapula alignment: Rounded shoulder and Slouched posture          NEUROLOGICAL SCREENING     Sensory deficit: WFL BUE     Palpation: TTP B lower anterior ribs       Range of Motion/Strength:       Thoracolumbar AROM Pain/Dysfunction with Movement   Right side bending 50% limited Pain in L low back    Left side bending 50 % limited     Right rotation 50% limited  Pain in low back and R anterior ribs    Left rotation 75% limited      Shoulder Right MMT Left MMT Pain/Dysfunction with Movement (pain=!)   AROM        flexion  WFL 3+/5  WFL 3+/5            abduction  WFL 3+/5  WFL 3+/5    Internal rotation  WFL 3+/5  WFL 3+/5    ER at 0° abd  WFL 3+/5  WFL 3+/5    rhomboids  3+/5  3+/5    Mid trap  3+/5  3+/5    Lower trap   3+/5  3+/5          Hip Right MMT Left MMT Pain/Dysfunction with Movement (pain=!)   AROM        flexion  WFL 4-/5  WFL 4/5    extension     Able to perform good bridge pain free    abduction  WFL 3/5  WFL 3+/5 Cramping in R gluteal with MMT     Gait Analysis:Without AD  Deviations: steady, non-antalgic     Intake Outcome Measure for FOTO  Survey    Therapist reviewed FOTO scores for Lissy Palencia on 7/22/2024.   FOTO documents entered into [employer] - see Media section or FOTO account episode details.     Intake Score: n/a%         Treatment     Total Treatment time (time-based codes) separate from Evaluation: 10 minutes     Lissy received the treatments listed below:      therapeutic exercises to develop strength, endurance, ROM, flexibility, and posture for 10 minutes including:  HEP instruction & return demonstration:   [x] simón pose  [x] trA brace in supine with ball press   [x] seated diaphragmatic breathing  [x] LTR  []  []      Patient Education and Home Exercises     Education provided:   - HEP    Written Home Exercises Provided: yes. Exercises were reviewed and Lissy was able to demonstrate them  prior to the end of the session.  Lissy demonstrated good  understanding of the education provided. See EMR under Patient Instructions for exercises provided during therapy sessions.    Assessment     Lissy is a 79 y.o. female referred to outpatient Physical Therapy with a medical diagnosis of intercostal neuralgic. Patient presents with limited thoracic rotation and lower rib mobility with rib and low back pain affecting her functional mobility. Pt prefers to use HEP (I) and continue her previous PT exercises and breathing exercise reading materials at home.     Patient prognosis is Good.   Patient will benefit from skilled outpatient Physical Therapy to address the deficits stated above and in the chart below, provide patient /family education, and to maximize patientt's level of independence.     Plan of care discussed with patient: Yes  Patient's spiritual, cultural and educational needs considered and patient is agreeable to the plan of care and goals as stated below:     Anticipated Barriers for therapy: chronicity of pain   Medical Necessity is demonstrated by the following  History  Co-morbidities and personal factors that may impact the plan of care [] LOW: no personal factors / co-morbidities  [x] MODERATE: 1-2 personal factors / co-morbidities  [] HIGH: 3+ personal factors / co-morbidities    Moderate / High Support Documentation:   Co-morbidities affecting plan of care: lateral femoral cutaneous neuropathy, decreased range of motion of thoracic spine, type 2 diabetes, osteoporosis    Personal Factors:   age  coping style  lifestyle     Examination  Body Structures and Functions, activity limitations and participation restrictions that may impact the plan of care [] LOW: addressing 1-2 elements  [x] MODERATE: 3+ elements  [] HIGH: 4+ elements (please support below)    Moderate / High Support Documentation: impacts ability to perform ADLs, household chores, sleeping, driving      Clinical Presentation [x]  LOW: stable  [] MODERATE: Evolving  [] HIGH: Unstable     Decision Making/ Complexity Score: low       Goals:    Long Term Goals: 1 visits   1. Pt will be (I) /c HEP to supplement PT in order to improve functional tasks    Plan     Plan of care Certification: 7/22/2024 to 7/22/24.    Outpatient Physical Therapy 1 times weekly for 1 visits to include the following interventions: Patient Education and Therapeutic Exercise, E-stim and Dry Needling as needed.     Deya Escobar, PT

## 2024-08-06 ENCOUNTER — TELEPHONE (OUTPATIENT)
Dept: PAIN MEDICINE | Facility: CLINIC | Age: 79
End: 2024-08-06
Payer: MEDICARE

## 2024-08-06 NOTE — PROGRESS NOTES
HPI     76 y/o [facility] patient presents for evaluation for blurred vision with   redness and itching with some discharge that is affecting her vision,   patient was seen 05/05/2020 for the same issue. She was told that she has   allergic conjunctivitis in both eyes.  Both eyes are constantly watering   and around the eye is itching a lot.      Last edited by Radames Morrell on 7/1/2020  2:58 PM. (History)            Assessment /Plan     For exam results, see Encounter Report.    Nuclear sclerosis, bilateral    No improvement with gtts.    Schedule CAT eval                  no

## 2024-08-13 ENCOUNTER — NURSE TRIAGE (OUTPATIENT)
Dept: ADMINISTRATIVE | Facility: CLINIC | Age: 79
End: 2024-08-13
Payer: MEDICARE

## 2024-08-13 NOTE — TELEPHONE ENCOUNTER
Patient states she had a pedicure four (4) weeks ago and her left toenail was clipped. Patient states c/o toe pain. Patient states left toes pain happens when area is touched, puts on shoes or something touches. Patient states her toe does not show signs of infection and denies redness, swelling and pus/drainage.     Care Advice given per Toe Pain - Adult Guideline. Patient advised to See PCP within Three (3) Days or visit an Urgent Care Center if no appointments available. Patient also advised to contact the [facility] for any worsening symptoms. Patient states understanding of care advice.     Reason for Disposition   MODERATE pain (e.g., interferes with normal activities, limping) and present > 3 days    Additional Information   Negative: Followed an injury   Negative: Wound looks infected   Negative: Caused by an animal bite   Negative: Caused by frostbite   Negative: Athlete's Foot suspected (i.e., itchy red rash in web space between toes)   Negative: Foot pain is main symptom   Negative: Foot is cool or blue in comparison to other foot   Negative: Purple or black skin on toe  (Exception: Person remembers bruising the toe from an injury.)   Negative: Patient sounds very sick or weak to the triager   Negative: SEVERE pain (e.g., excruciating, unable to do any normal activities) and not improved after 2 hours of pain medicine   Negative: Looks like a boil, infected sore, deep ulcer, or other infected rash (spreading redness, pus)   Negative: Yellow pus seen in skin around toenail (cuticle area), or pus seen under toenail   Negative: Numbness in one foot (i.e., loss of sensation)    Protocols used: Toe Pain-A-[place]

## 2024-08-14 ENCOUNTER — OFFICE VISIT (OUTPATIENT)
Dept: PODIATRY | Facility: CLINIC | Age: 79
End: 2024-08-14
Payer: MEDICARE

## 2024-08-14 VITALS
DIASTOLIC BLOOD PRESSURE: 75 MMHG | RESPIRATION RATE: 18 BRPM | HEIGHT: 62 IN | SYSTOLIC BLOOD PRESSURE: 126 MMHG | BODY MASS INDEX: 29.08 KG/M2 | WEIGHT: 158 LBS | HEART RATE: 67 BPM

## 2024-08-14 DIAGNOSIS — M20.41 HAMMER TOES OF BOTH FEET: ICD-10-CM

## 2024-08-14 DIAGNOSIS — M20.11 VALGUS DEFORMITY OF BOTH GREAT TOES: ICD-10-CM

## 2024-08-14 DIAGNOSIS — E11.9 ENCOUNTER FOR DIABETIC FOOT EXAM: ICD-10-CM

## 2024-08-14 DIAGNOSIS — E11.9 TYPE 2 DIABETES MELLITUS WITHOUT COMPLICATION, WITHOUT LONG-TERM CURRENT USE OF INSULIN: Primary | ICD-10-CM

## 2024-08-14 DIAGNOSIS — M20.42 HAMMER TOES OF BOTH FEET: ICD-10-CM

## 2024-08-14 DIAGNOSIS — M20.12 VALGUS DEFORMITY OF BOTH GREAT TOES: ICD-10-CM

## 2024-08-14 DIAGNOSIS — L60.0 INGROWN NAIL: ICD-10-CM

## 2024-08-14 PROCEDURE — 99999 PR PBB SHADOW E&M-EST. PATIENT-LVL IV: CPT | Mod: PBBFAC,,, | Performed by: PODIATRIST

## 2024-08-14 PROCEDURE — 99214 OFFICE O/P EST MOD 30 MIN: CPT | Mod: S$PBB,,, | Performed by: PODIATRIST

## 2024-08-14 PROCEDURE — 99214 OFFICE O/P EST MOD 30 MIN: CPT | Mod: PBBFAC,PN | Performed by: PODIATRIST

## 2024-08-14 NOTE — PROGRESS NOTES
Subjective:      Patient ID: Lissy Palencia is a 79 y.o. female.    Chief Complaint:   Toe Pain (Left great toe sore after a pedicure x 6 wks )    Lissy is a 79 y.o. female who presents to the podiatry clinic  with complaint of  left toe /toenail pain.   Pt relates it started after a pedicure about 6 weeks ago.  This is her shorter nail because it fell off in the past.   Only pain to touch and when the 2nd toe sits on it.   No drainage or bleeding   Using neosporin     Denies n/t  DM     Pt new to me.   Saw Dr. Goss in 2021 6/11/24  Pcp  Lacy.   Type 2 diabetes mellitus without complication, without long-term current use of insulin      Overview       Off  metfornin .  A1c 6.6 . Cont diet only             Hemoglobin A1C   Date Value Ref Range Status   06/07/2024 6.6 (H) 4.0 - 5.6 % Final     Comment:     ADA Screening Guidelines:  5.7-6.4%  Consistent with prediabetes  >or=6.5%  Consistent with diabetes    High levels of fetal hemoglobin interfere with the HbA1C  assay. Heterozygous hemoglobin variants (HbS, HgC, etc)do  not significantly interfere with this assay.   However, presence of multiple variants may affect accuracy.     12/04/2023 6.3 (H) 4.0 - 5.6 % Final     Comment:     ADA Screening Guidelines:  5.7-6.4%  Consistent with prediabetes  >or=6.5%  Consistent with diabetes    High levels of fetal hemoglobin interfere with the HbA1C  assay. Heterozygous hemoglobin variants (HbS, HgC, etc)do  not significantly interfere with this assay.   However, presence of multiple variants may affect accuracy.     09/19/2023 6.2 (H) 4.0 - 5.6 % Final     Comment:     ADA Screening Guidelines:  5.7-6.4%  Consistent with prediabetes  >or=6.5%  Consistent with diabetes    High levels of fetal hemoglobin interfere with the HbA1C  assay. Heterozygous hemoglobin variants (HbS, HgC, etc)do  not significantly interfere with this assay.   However, presence of multiple variants may affect accuracy.        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     Past Surgical History:   Procedure Laterality Date    ABLATION COLPOCLESIS      ADENOIDECTOMY      CATARACT EXTRACTION W/  INTRAOCULAR LENS IMPLANT Right 11/23/2022    Procedure: EXTRACTION, CATARACT, WITH IOL INSERTION;  Surgeon: Ciara Mclaughlin MD;  Location: [facility] OR;  Service: Ophthalmology;  Laterality: Right;    CATARACT EXTRACTION W/  INTRAOCULAR LENS IMPLANT Left 12/22/2022    Procedure: EXTRACTION, CATARACT, WITH IOL INSERTION;  Surgeon: Ciara Mclaughlin MD;  Location: [facility] OR;  Service: Ophthalmology;  Laterality: Left;    CHOLECYSTECTOMY      COLONOSCOPY N/A 11/28/2018    Procedure: COLONOSCOPY;  Surgeon: Richi Mcintosh MD;  Location: [facility] (4TH FLR);  Service: Endoscopy;  Laterality: N/A;    CORONARY STENT PLACEMENT      ENDOSCOPIC ULTRASOUND OF UPPER GASTROINTESTINAL TRACT N/A 7/10/2024    Procedure: ULTRASOUND, UPPER GI TRACT, ENDOSCOPIC;  Surgeon: Red Mann MD;  Location: [facility] (2ND FLR);  Service: Endoscopy;  Laterality: N/A;  6/20 mail-EGD and EUS for dysphagia and diled CBD. [id number]    ESOPHAGOGASTRODUODENOSCOPY N/A 11/28/2018    Procedure: ESOPHAGOGASTRODUODENOSCOPY (EGD);  Surgeon: Richi Mcintosh MD;  Location: [facility] (4TH FLR);  Service: Endoscopy;  Laterality: N/A;    ESOPHAGOGASTRODUODENOSCOPY N/A 7/10/2024    Procedure: EGD (ESOPHAGOGASTRODUODENOSCOPY);  Surgeon: Red Mann MD;  Location: [facility] (2ND FLR);  Service: Endoscopy;  Laterality: N/A;  7/2-lvm for pre  call-tb  7/8-pt lvm confirming appt-tb    heart cath  2005    non-obstructive disease    INTRAOCULAR LENS EXCHANGE Left 6/14/2023    Procedure: REPLACEMENT, IOL;  Surgeon: Ciara Mclaughlin MD;  Location: [facility] OR;  Service: Ophthalmology;  Laterality: Left;    OOPHORECTOMY      SHOULDER ARTHROSCOPY W/ ROTATOR CUFF REPAIR      Right    TONSILLECTOMY      TOTAL ABDOMINAL HYSTERECTOMY      with BSO    TRIGGER FINGER RELEASE Right 10/16/2023    Procedure: RELEASE, TRIGGER FINGER,RIGHT LONG AND RING FINGER;  Surgeon: Shaneka Richardson MD;  Location: [facility] OR;  Service: Orthopedics;  Laterality: Right;    TUBAL LIGATION       Current Outpatient Medications on File Prior to Visit   Medication Sig Dispense Refill    alendronate (FOSAMAX) 70 MG tablet Take 1 tablet (70 mg total) by mouth every 7 days. 12 tablet 0    aspirin (ECOTRIN) 81 MG EC tablet Take 81 mg by mouth once daily.      atorvastatin (LIPITOR) 80 MG tablet TAKE 1 TABLET DAILY 90 tablet 3    azelastine (ASTELIN) 137 mcg (0.1 %) nasal spray 1 spray (137 mcg total) by Nasal route 2 (two) times daily. 30 mL 3    blood sugar diagnostic Strp To check BG ONCE daily, to use with insurance preferred meter 100 strip 3    cycloSPORINE (RESTASIS) 0.05 % ophthalmic emulsion Place 1 drop into both eyes 2 (two) times daily. 90 each 11    ezetimibe (ZETIA) 10 mg tablet Take 1 tablet (10 mg total) by mouth once daily. 90 tablet 3    flu vac 2022 [id number],PF, (FLUAD QUAD 2022-23,65Y UP,,PF,) 60 mcg (15 mcg x 4)/0.5 mL Syrg Inject into the muscle. 0.5 mL 0    fluticasone (FLONASE SENSIMIST) 27.5 mcg/actuation nasal spray 2 sprays by Nasal route once daily. 6.6 mL 3    hyoscyamine (LEVSIN) 0.125 mg Subl Place 1 tablet (0.125 mg total) under the tongue every 6 (six) hours as needed. 30 tablet 1    lancets Misc To check BG ONCE daily, to use with insurance preferred meter 100 each 3    levothyroxine (SYNTHROID) 88 MCG tablet TAKE FIRST THING IN THE MORNING ON AN EMPTY  STOMACH 90 tablet 3    meloxicam (MOBIC) 7.5 MG tablet Take 1 tablet (7.5 mg total) by mouth once daily. 14 tablet 0    neomycin-polymyxin-hydrocortisone (CORTISPORIN) 3.5-10,000-1 mg/mL-unit/mL-% otic suspension Place 3 drops into the right ear 3 (three) times daily. 10 mL 0    pantoprazole (PROTONIX) 40 MG tablet TAKE 1 TABLET DAILY 90 tablet 2    vitamin D (VITAMIN D3) 1000 units Tab Take 1 tablet (1,000 Units total) by mouth once daily. 90 tablet 3    blood-glucose meter kit To check BG ONCE THE daily, to use with insurance preferred meter 1 each 1    diclofenac sodium (VOLTAREN ARTHRITIS PAIN) 1 % Gel Apply 2 g topically once daily. 200 g 0    ketorolac 0.5% (ACULAR) 0.5 % Drop Place 1 drop into the left eye 3 (three) times daily. (Patient not taking: Reported on 8/14/2024) 5 mL 3    LIDOcaine (LIDODERM) 5 % Place onto the skin once daily. (Patient not taking: Reported on 8/14/2024) 30 patch 0    nitroGLYCERIN (NITROSTAT) 0.4 MG SL tablet Place 1 tablet (0.4 mg total) under the tongue every 5 (five) minutes as needed for Chest pain. You can take up to 3 doses at home. If chest pain persists after second dose, go the ER. (Patient not taking: Reported on 8/14/2024) 25 tablet 4    prednisolon/gatiflox/bromfenac (PREDNISOL ACE-GATIFLOX-BROMFEN) 1-0.5-0.075 % DrpS Apply 1 drop to eye 3 (three) times daily. One drop 3 times a day in surgical eye (Patient not taking: Reported on 8/14/2024) 5 mL 3    RESTASIS 0.05 % ophthalmic emulsion INSTILL 1 DROP IN BOTH EYES TWICE A DAY (Patient not taking: Reported on 8/14/2024) 60 each 11    tiZANidine (ZANAFLEX) 4 MG tablet Take 1-2 tablets (4-8 mg total) by mouth every 8 (eight) hours as needed (muscle spasms). (Patient not taking: Reported on 8/14/2024) 60 tablet 2     Current Facility-Administered Medications on File Prior to Visit   Medication Dose Route Frequency Provider Last Rate Last Admin    balanced salt irrigation intra-ocular solution 1 drop  1 drop Left Eye On Call  Procedure Ciara Mclaughlin MD        balanced salt irrigation intra-ocular solution 1 drop  1 drop Left Eye On Call Procedure Ciara Mclaughlin MD        mupirocin 2 % ointment   Nasal On Call Procedure Caesar Finch MD   Given at 10/16/23 0752    phenylephrine HCL 10% ophthalmic solution 1 drop  1 drop Left Eye PRN Ciara Mclaughlin MD        phenylephrine HCL 10% ophthalmic solution 1 drop  1 drop Left Eye PRN Ciara Mclaughlin MD        sodium chloride 0.9% flush 2 mL  2 mL Intravenous PRN Ciara Mclaughlin MD        sodium chloride 0.9% flush 2 mL  2 mL Intravenous PRN Ciara Mclaughlin MD         Review of patient's allergies indicates:   Allergen Reactions    Compazine  [prochlorperazine edisylate]      Other reaction(s): SPASMS    Parafon forte      Other reaction(s): Hives       Review of Systems   Constitutional: Negative for chills, decreased appetite, fever, malaise/fatigue, night sweats, weight gain and weight loss.   Cardiovascular:  Negative for chest pain, claudication, dyspnea on exertion, leg swelling, palpitations and syncope.   Respiratory:  Negative for cough and shortness of breath.    Endocrine: Negative for cold intolerance and heat intolerance.   Hematologic/Lymphatic: Negative for bleeding problem. Does not bruise/bleed easily.   Skin:  Positive for nail changes. Negative for color change, dry skin, flushing, itching, poor wound healing, rash, skin cancer, suspicious lesions and unusual hair distribution.   Musculoskeletal:  Negative for arthritis, back pain, falls, gout, joint pain, joint swelling, muscle cramps, muscle weakness, myalgias, neck pain and stiffness.        Toe nail pain   Gastrointestinal:  Negative for diarrhea, nausea and vomiting.   Neurological:  Negative for dizziness, focal weakness, light-headedness, numbness, paresthesias, tremors, vertigo and weakness.   Psychiatric/Behavioral:  Negative for altered mental status and depression. The patient does not have  "insomnia.    Allergic/Immunologic: Negative.            Objective:       Vitals:    08/14/24 1254   BP: 126/75   Pulse: 67   Resp: 18   Weight: 71.7 kg (158 lb)   Height: 5' 2" (1.575 m)   PainSc:   5   PainLoc: Toe   71.7 kg (158 lb)     Physical Exam  Vitals reviewed.   Constitutional:       General: She is not in acute distress.     Appearance: She is well-developed. She is not ill-appearing, toxic-appearing or diaphoretic.      Comments: Proper supportive shoegear      Cardiovascular:      Pulses:           Dorsalis pedis pulses are 2+ on the right side and 2+ on the left side.        Posterior tibial pulses are 2+ on the right side and 2+ on the left side.   Musculoskeletal:      Right lower leg: No edema.      Left lower leg: No edema.      Right ankle: Normal.      Right Achilles Tendon: Normal.      Left ankle: Normal.      Left Achilles Tendon: Normal.      Right foot: Decreased range of motion. Deformity, bunion, prominent metatarsal heads and tenderness present. No bony tenderness.      Left foot: Decreased range of motion. Deformity, bunion and prominent metatarsal heads present. No tenderness or bony tenderness.      Comments: Reducible extensor and flexor contractures at the MTPJ and PIPJ of toes 2-5, bilat.    Hav b/l    Mild pop to distal lateral hallux nail border    Feet:      Right foot:      Protective Sensation: 10 sites tested.  10 sites sensed.      Skin integrity: No ulcer, blister, skin breakdown, erythema, warmth, callus or dry skin.      Toenail Condition: Right toenails are ingrown.      Left foot:      Protective Sensation: 10 sites tested.  10 sites sensed.      Skin integrity: No ulcer, blister, skin breakdown, erythema, warmth, callus or dry skin.      Toenail Condition: Left toenails are normal.      Comments: SWM slightly diminished     lateralhallux nail margin of leftfoot with ingrown nail plate.   minimal edema is noted there is Negative granuloma formation noted. Negative " malodor    Negative Purulence/drainage     Skin:     General: Skin is warm.      Capillary Refill: Capillary refill takes 2 to 3 seconds.      Coloration: Skin is not pale.      Findings: No erythema or rash.   Neurological:      Mental Status: She is alert and oriented to person, place, and time.      Sensory: No sensory deficit.      Gait: Gait is intact.   Psychiatric:         Attention and Perception: Attention normal.         Mood and Affect: Mood normal.         Speech: Speech normal.         Behavior: Behavior normal.         Thought Content: Thought content normal.         Cognition and Memory: Cognition normal.         Judgment: Judgment normal.               Assessment:       Encounter Diagnoses   Name Primary?    Type 2 diabetes mellitus without complication, without long-term current use of insulin Yes    Ingrown nail     Valgus deformity of both great toes     Hammer toes of both feet     Encounter for diabetic foot exam          Plan:       Lissy was seen today for toe pain.    Diagnoses and all orders for this visit:    Type 2 diabetes mellitus without complication, without long-term current use of insulin    Ingrown nail    Valgus deformity of both great toes    Hammer toes of both feet    Encounter for diabetic foot exam      I counseled the patient on her conditions, their implications and medical management.    DM foot exam     - Shoe inspection. Diabetic Foot Education. Patient reminded of the importance of good nutrition and blood sugar control to help prevent podiatric complications of diabetes. Patient instructed on proper foot hygeine. We discussed wearing proper shoe gear, daily foot inspections, never walking without protective shoe gear, never putting sharp instruments to feet, routine podiatric nail visits every 2-3 months.       Utilizing sterile toenail clippers I aggressively debrided the offending nail border approximately 3 mm from its edge and carried the nail plate incision down at  an angle in order to wedge out the offending cryptotic portion of the nail plate. The offending border was then removed in toto. The area was cleansed with alcohol. Patient tolerated the procedure well     No soi    Spacer dispensed    Pedicure precautions recommend avoid     Recommend rtc 3 weeks for re-eval  Pt relates she will call if needed    Will at least need yearly dm foot exams           Follow up if symptoms worsen or fail to improve.

## 2024-08-15 NOTE — PROGRESS NOTES
"  Subjective:      Lissy is a 79 y.o. female who comes for follow-up of rhinitis.  Her last visit with me was on 7/16/2024.      Patient has been compliant with saline rinse and Flonase without significant improvement in throat clearing and phlegm in her throat.  She reports it is usually worse at night when she lays down.  Also worse when she eats.  Liquids cause her more dysphagia then food or pills.     She also reports waking up with numbness to her right more than left ear and decreased hearing.  During exam patient reports her ear popped in her hearing improved significantly.  She denies any ear pain or recent swimming.  She last use hold drops in her ear 4 days ago.  Patient denies a history of ear surgery.    Per last office visit 7/16/2024 :  Patient reports irritation to the roof of her mouth for the last 8 days following eating chips and taco.  Since then she has been eating softer foods and cold liquids/food to improve her symptoms but then underwent an upper GI which exacerbated her symptoms.  Patient was seen by her dentist which showed an irritated part of her mouth.  Patient denies previous similar symptoms.  Patient does report dry mouth at times but not often.  She has been diagnosed with sleep apnea in the past but did not tolerate CPAP.     Patient also reports a recurrent globus sensation like thick mucus or "an oyster".  She reports recurrent throat clearing sometimes with green mucus sometimes nonproductive.  This is sometimes triggered with eating.  She does report some issues swallowing for which she recently underwent an upper GI.   She denies any other triggers including lying down, sleeping, postprandial, waking up, cold liquids, strong odors, laughing, deep breathing, or talking. She denies nasal obstruction, rhinorrhea, hyposmia or notable sinus infections.     Current sinonasal medications include azelastine and flonase prn.  The last course of antibiotics was a long time ago.    She " does not recall previously having allergy testing.  She denies a history of asthma.  She relates a history of reflux symptoms which is currently managed with pantoprazole PO daily     She does not recall a prior history of nasal trauma.  She has not had sinonasal surgery.    She has not had a tonsillectomy.  She is not a tobacco smoker.     1. Torus palatinus              - patient with evidence of erythema to anterior portion of her torus palatinus.  This is the etiology for patient's mouth symptoms and advised supportive care.  2. Globus sensation  3. Chronic rhinitis  -    laryngoscopy with evidence of thick postnasal drip which is likely the etiology for patient's globus sensation.  Differential includes LPR and dysphagia but patient is treating her LPR with PPIs currently  -     START saline rinse daily and START INCS  -     fluticasone (FLONASE SENSIMIST) 27.5 mcg/actuation nasal spray; 2 sprays by Nasal route once daily.  Dispense: 6.6 mL; Refill: 3     4. Dysphagia, unspecified type              - consider MBSS    The patient's medications, allergies, past medical, surgical, social and family histories were reviewed and updated as appropriate.    A detailed review of systems was obtained with pertinent positives as per the above HPI, and otherwise negative.        Objective:     BP (!) 177/82 (BP Location: Right arm, Patient Position: Sitting, BP Method: Medium (Automatic))   Pulse (!) 58   Wt 71.4 kg (157 lb 6.5 oz)   BMI 28.79 kg/m²      Physical Exam  Vitals reviewed.   Constitutional:       Appearance: Normal appearance.   HENT:      Head: Normocephalic and atraumatic.      Jaw: No trismus, tenderness or pain on movement.      Right Ear: External ear normal. There is impacted cerumen.      Left Ear: External ear normal. There is impacted cerumen.      Ears:      Comments: Mild decreased sensation to R>L auricle  Narrow EACs     Nose: No septal deviation.      Right Turbinates: Not enlarged.      Left  "Turbinates: Not enlarged.      Mouth/Throat:      Lips: Pink.      Dentition: Has dentures.      Pharynx: Oropharynx is clear. Uvula midline.      Tonsils: 1+ on the right. 1+ on the left.   Pulmonary:      Effort: Pulmonary effort is normal.   Neurological:      Mental Status: She is alert.          Procedure    None    Data Reviewed    WBC (K/uL)   Date Value   06/18/2024 6.52     Eosinophil % (%)   Date Value   06/18/2024 4.4     Eos # (K/uL)   Date Value   06/18/2024 0.3     Platelets (K/uL)   Date Value   06/18/2024 168     Glucose (mg/dL)   Date Value   06/18/2024 128 (H)     No results found for: "IGE"    Assessment:     1. Bilateral impacted cerumen    2. Dysphagia, unspecified type    3. Gastroesophageal reflux disease, unspecified whether esophagitis present    4. Globus sensation    5. Xerostomia         Plan:     Suspect ear symptoms likely secondary to impacted cerumen.  Will have patient scheduled for ear cleaning with microscopy.  Due to persistent globus sensation and dysphagia, MBSS ordered  Advised Xylimelts and Biotene for xerostomia  Continue INCS    She will Follow up if symptoms worsen or fail to improve.  I had a discussion with the patient regarding her condition and the further workup and management options.    All questions were answered, and the patient is in agreement with the above.     Disclaimer:  This note may have been prepared utilizing voice recognition software which may result in occasional typographical errors in the text such as sound alike words.   If further clarification is needed, please contact the ENT department of [facility].    "

## 2024-08-16 ENCOUNTER — OFFICE VISIT (OUTPATIENT)
Dept: OTOLARYNGOLOGY | Facility: CLINIC | Age: 79
End: 2024-08-16
Payer: MEDICARE

## 2024-08-16 VITALS
DIASTOLIC BLOOD PRESSURE: 82 MMHG | WEIGHT: 157.44 LBS | HEART RATE: 58 BPM | BODY MASS INDEX: 28.79 KG/M2 | SYSTOLIC BLOOD PRESSURE: 177 MMHG

## 2024-08-16 DIAGNOSIS — K11.7 XEROSTOMIA: ICD-10-CM

## 2024-08-16 DIAGNOSIS — H61.23 BILATERAL IMPACTED CERUMEN: Primary | ICD-10-CM

## 2024-08-16 DIAGNOSIS — K21.9 GASTROESOPHAGEAL REFLUX DISEASE, UNSPECIFIED WHETHER ESOPHAGITIS PRESENT: ICD-10-CM

## 2024-08-16 DIAGNOSIS — R09.A2 GLOBUS SENSATION: ICD-10-CM

## 2024-08-16 DIAGNOSIS — R13.10 DYSPHAGIA, UNSPECIFIED TYPE: ICD-10-CM

## 2024-08-16 PROCEDURE — 99213 OFFICE O/P EST LOW 20 MIN: CPT | Mod: PBBFAC | Performed by: PHYSICIAN ASSISTANT

## 2024-08-16 PROCEDURE — 99999 PR PBB SHADOW E&M-EST. PATIENT-LVL III: CPT | Mod: PBBFAC,,, | Performed by: PHYSICIAN ASSISTANT

## 2024-08-16 NOTE — PATIENT INSTRUCTIONS
Please contact central scheduling at [phone] or [phone] to schedule your swallow study    Xylimelts at night, Biotene mouth wash during the day for dry mouth (these are over the counter)  Increase your water intake to 64-80 oz daily to help thin mucus    Someone will contact you to schedule an ear cleaning. If not contacted soon, please reach out to the number above.

## 2024-08-22 ENCOUNTER — TELEPHONE (OUTPATIENT)
Dept: PRIMARY CARE CLINIC | Facility: CLINIC | Age: 79
End: 2024-08-22
Payer: MEDICARE

## 2024-08-22 ENCOUNTER — OFFICE VISIT (OUTPATIENT)
Dept: OTOLARYNGOLOGY | Facility: CLINIC | Age: 79
End: 2024-08-22
Payer: MEDICARE

## 2024-08-22 DIAGNOSIS — H61.23 BILATERAL IMPACTED CERUMEN: Primary | ICD-10-CM

## 2024-08-22 DIAGNOSIS — Z12.31 SCREENING MAMMOGRAM FOR BREAST CANCER: Primary | ICD-10-CM

## 2024-08-22 PROCEDURE — 99213 OFFICE O/P EST LOW 20 MIN: CPT | Mod: PBBFAC | Performed by: NURSE PRACTITIONER

## 2024-08-22 PROCEDURE — 99999 PR PBB SHADOW E&M-EST. PATIENT-LVL III: CPT | Mod: PBBFAC,,, | Performed by: NURSE PRACTITIONER

## 2024-08-22 NOTE — PROGRESS NOTES
Subjective:   Lissy Palencia is a 79 y.o. female presents for ear fullness. 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 She reports some bilateral fullness and muffled hearing that has been present for years. She denies any otalgia, otorrhea, pressure, tinnitus or vertigo. Reports some remotes vertigo episodes over 1 year ago. She has been told for years she has had cerumen impactions, but never had a cleaning. She feels like her muffled hearing is related to this issue. There is not a family history of hearing loss at a young age. There is not a prior history of ear surgery. There is not a prior history of ear infections. There is not a history of ear trauma. She denies a history of significant noise exposure.     Past Medical History  She has a past medical history of Allergy, Arthritis, Cancer, Coronary artery disease, Cystocele, Diabetes mellitus, GERD (gastroesophageal reflux disease), Hemorrhoids, History of cholelithiasis, Hypothyroidism, Liver disease, Obesity, Right shoulder pain, Sleep apnea, Trigger finger, right 3rd finger, Vaginal atrophy, and Vaginal vault prolapse.    Past Surgical History  She has a past surgical history that includes Tonsillectomy; Shoulder arthroscopy w/ rotator cuff repair; Total abdominal hysterectomy; Adenoidectomy; Tubal ligation; heart cath (2005); Cholecystectomy; Oophorectomy; Ablation colpoclesis; Coronary stent placement; Esophagogastroduodenoscopy (N/A, 11/28/2018); Colonoscopy (N/A, 11/28/2018); Cataract extraction w/  intraocular lens implant (Right, 11/23/2022); Cataract extraction w/  intraocular lens  implant (Left, 12/22/2022); Intraocular lens exchange (Left, 6/14/2023); Trigger finger release (Right, 10/16/2023); Esophagogastroduodenoscopy (N/A, 7/10/2024); and Endoscopic ultrasound of upper gastrointestinal tract (N/A, 7/10/2024).    Family History  Her family history is not on file.    Social History  She reports that she has never smoked. She has never used smokeless tobacco. She reports current alcohol use of about 1.0 standard drink of alcohol per week. She reports that she does not use drugs.    Allergies  She is allergic to compazine  [prochlorperazine edisylate] and parafon forte.    Medications  She has a current medication list which includes the following prescription(s): alendronate, aspirin, atorvastatin, azelastine, blood sugar diagnostic, blood-glucose meter, cyclosporine, diclofenac sodium, ezetimibe, fluad quad 2022-23(65y up)(pf), flonase sensimist, hyoscyamine, ketorolac 0.5%, lancets, levothyroxine, lidocaine, meloxicam, neomycin-polymyxin-hydrocortisone, nitroglycerin, pantoprazole, prednisol ace-gatiflox-bromfen, restasis, tizanidine, and vitamin d, and the following Facility-Administered Medications: balanced salt irrigation, balanced salt irrigation, mupirocin, phenylephrine hcl 10%, phenylephrine hcl 10%, sodium chloride 0.9%, and sodium chloride 0.9%.  Review of Systems     Constitutional: Negative for chills and fever.      HENT: Negative for ear discharge, ear infection, ear pain, hearing loss and ringing in the ears.      Neurological: Negative for dizziness and light-headedness.          Objective:     Constitutional:   She is oriented to person, place, and time. She appears well-developed and well-nourished. She appears alert. She is cooperative.  Non-toxic appearance. She does not have a sickly appearance. She does not appear ill. Normal speech.      Head:  Normocephalic and atraumatic. Not macrocephalic and not microcephalic. Head is without abrasion, without right periorbital  erythema, without left periorbital erythema and without TMJ tenderness.     Ears:    Right Ear: No drainage, swelling or tenderness. No mastoid tenderness. Tympanic membrane is not scarred, not perforated, not erythematous, not retracted and not bulging. No middle ear effusion.   Left Ear: No drainage, swelling or tenderness. No mastoid tenderness. Tympanic membrane is not scarred, not perforated, not erythematous, not retracted and not bulging.  No middle ear effusion.   Ceruminous debris obstructing bilateral TMs removed under microscopy    Pulmonary/Chest:   Effort normal.     Psychiatric:   She has a normal mood and affect. Her speech is normal and behavior is normal.     Neurological:   She is alert and oriented to person, place, and time.     Procedure  Cerumen removal performed.  See procedure note.  Procedure Note:  The patient was brought to the minor procedure room and placed under the operating microscope of the left ear canal which was cleaned of ceruminous debris. Using a combination of suction, curettes and cup forceps the patient's cerumen was removed. The patient tolerated the procedure well. There were no complications.  Procedure Note:  The patient was brought to the minor procedure room and placed under the operating microscope of the right ear canal which was cleaned of ceruminous debris. Using a combination of suction, curettes and cup forceps the patient's cerumen was removed. The patient tolerated the procedure well. There were no complications.    Assessment:     1. Bilateral impacted cerumen      Plan:     Bilateral impacted cerumen  Bilateral cerumen impaction removed under microscopy. Patient tolerated procedure well and noted improvement upon impaction removal. Education about normal ear hygiene and the avoidance of Q-tips was reviewed.  RTC every year for regular ear cleanings.     Hearing has improved. She will monitor her hearing and if she still perceives a hearing loss then she will  follow-up for audio.

## 2024-08-22 NOTE — TELEPHONE ENCOUNTER
----- Message from Maribell Joseph sent at 8/22/2024 10:13 AM CDT -----  Type:  Mammogram    Caller is requesting to schedule their annual mammogram appointment.  Order is not listed in EPIC.  Please enter order and contact patient to schedule.    Name of Caller:WILBERT MAYEN GURJIT [[id number]]    Where would they like the mammogram performed?[facility]     Would the patient rather a call back or a response via [facility]? Call back     Best Call Back Number: [phone]    Additional Information: patient states she is due for her annual mammogram soon. Patient states she would like to have her annual  mammogram order put in. Patient would like to have this placed to schedule for when she is due 11/24. Patient would like a call back with assistance.

## 2024-08-23 ENCOUNTER — TELEPHONE (OUTPATIENT)
Dept: SPEECH THERAPY | Facility: HOSPITAL | Age: 79
End: 2024-08-23
Payer: MEDICARE

## 2024-08-23 RX ORDER — ALENDRONATE SODIUM 70 MG/1
TABLET ORAL
Qty: 12 TABLET | Refills: 3 | Status: SHIPPED | OUTPATIENT
Start: 2024-08-23

## 2024-08-23 NOTE — TELEPHONE ENCOUNTER
----- Message from Jeanette Quinones sent at 8/23/2024  2:34 PM CDT -----  Contact: [phone]  Requesting an RX refill or new RX.    Is this a refill or new RX: refill    RX name and strength (copy/paste from chart):  alendronate (FOSAMAX) 70 MG tablet    Is this a 30 day or 90 day RX: 90    Pharmacy name and phone # (copy/paste from chart):    [facility]  [address] [address]  Phone: [phone] Fax: [phone]    The doctors have asked that we provide their patients with the following 2 reminders -- prescription refills can take up to 72 hours, and a friendly reminder that in the future you can use your [facility] account to request refills: yes pts  is asking if the dr can send this to the pharmacy  he states she is out

## 2024-08-23 NOTE — TELEPHONE ENCOUNTER
Care Due:                  Date            Visit Type   Department     Provider  --------------------------------------------------------------------------------                                EP -                              PRIMARY      [facility]  Last Visit: 06-      CARE ([facility])   AISSATOU Nicole                              EP -                              PRIMARY      [facility]  Next Visit: 12-      CARE ([facility])   AISSATOU Nicole                                                            Last  Test          Frequency    Reason                     Performed    Due Date  --------------------------------------------------------------------------------    Mg Level....  12 months..  alendronate..............  Not Found    Overdue    Phosphate...  12 months..  alendronate..............  Not Found    Overdue    [employer] Embedded Care Due Messages. Reference number: [id number].   8/23/2024 2:32:46 PM CDT

## 2024-08-29 ENCOUNTER — OFFICE VISIT (OUTPATIENT)
Dept: GASTROENTEROLOGY | Facility: CLINIC | Age: 79
End: 2024-08-29
Payer: MEDICARE

## 2024-08-29 ENCOUNTER — TELEPHONE (OUTPATIENT)
Dept: DERMATOLOGY | Facility: CLINIC | Age: 79
End: 2024-08-29
Payer: MEDICARE

## 2024-08-29 ENCOUNTER — OFFICE VISIT (OUTPATIENT)
Dept: DERMATOLOGY | Facility: CLINIC | Age: 79
End: 2024-08-29
Payer: MEDICARE

## 2024-08-29 VITALS
WEIGHT: 158.06 LBS | SYSTOLIC BLOOD PRESSURE: 130 MMHG | HEART RATE: 63 BPM | HEIGHT: 62 IN | BODY MASS INDEX: 29.08 KG/M2 | DIASTOLIC BLOOD PRESSURE: 62 MMHG

## 2024-08-29 DIAGNOSIS — L50.3 DERMATOGRAPHISM: ICD-10-CM

## 2024-08-29 DIAGNOSIS — R20.2 NOTALGIA PARESTHETICA: ICD-10-CM

## 2024-08-29 DIAGNOSIS — L57.0 ACTINIC KERATOSIS: Primary | ICD-10-CM

## 2024-08-29 DIAGNOSIS — R10.84 DIFFUSE ABDOMINAL PAIN: Primary | ICD-10-CM

## 2024-08-29 PROCEDURE — 99213 OFFICE O/P EST LOW 20 MIN: CPT | Mod: PBBFAC,27 | Performed by: STUDENT IN AN ORGANIZED HEALTH CARE EDUCATION/TRAINING PROGRAM

## 2024-08-29 PROCEDURE — 17003 DESTRUCT PREMALG LES 2-14: CPT | Mod: S$PBB,,, | Performed by: STUDENT IN AN ORGANIZED HEALTH CARE EDUCATION/TRAINING PROGRAM

## 2024-08-29 PROCEDURE — 99213 OFFICE O/P EST LOW 20 MIN: CPT | Mod: PBBFAC | Performed by: INTERNAL MEDICINE

## 2024-08-29 PROCEDURE — 99214 OFFICE O/P EST MOD 30 MIN: CPT | Mod: S$PBB,,, | Performed by: INTERNAL MEDICINE

## 2024-08-29 PROCEDURE — 99204 OFFICE O/P NEW MOD 45 MIN: CPT | Mod: 25,S$PBB,, | Performed by: STUDENT IN AN ORGANIZED HEALTH CARE EDUCATION/TRAINING PROGRAM

## 2024-08-29 PROCEDURE — 99999 PR PBB SHADOW E&M-EST. PATIENT-LVL III: CPT | Mod: PBBFAC,,, | Performed by: INTERNAL MEDICINE

## 2024-08-29 PROCEDURE — 17003 DESTRUCT PREMALG LES 2-14: CPT | Mod: PBBFAC | Performed by: STUDENT IN AN ORGANIZED HEALTH CARE EDUCATION/TRAINING PROGRAM

## 2024-08-29 PROCEDURE — 99999 PR PBB SHADOW E&M-EST. PATIENT-LVL III: CPT | Mod: PBBFAC,,, | Performed by: STUDENT IN AN ORGANIZED HEALTH CARE EDUCATION/TRAINING PROGRAM

## 2024-08-29 PROCEDURE — 17000 DESTRUCT PREMALG LESION: CPT | Mod: S$PBB,,, | Performed by: STUDENT IN AN ORGANIZED HEALTH CARE EDUCATION/TRAINING PROGRAM

## 2024-08-29 PROCEDURE — 17000 DESTRUCT PREMALG LESION: CPT | Mod: PBBFAC | Performed by: STUDENT IN AN ORGANIZED HEALTH CARE EDUCATION/TRAINING PROGRAM

## 2024-08-29 RX ORDER — LEVOCETIRIZINE DIHYDROCHLORIDE 5 MG/1
5 TABLET, FILM COATED ORAL NIGHTLY
Qty: 90 TABLET | Refills: 3 | Status: SHIPPED | OUTPATIENT
Start: 2024-08-29 | End: 2025-08-29

## 2024-08-29 NOTE — PROGRESS NOTES
Subjective:      Patient ID:  Lissy Palencia is a 79 y.o. female who presents for   Chief Complaint   Patient presents with    Lesion     Pt here for itching all over body     Pt reports having itching all over body for over 5 years. Worst itching is located on upper back.     Pt has only tried olive oil and witch hazel on skin to moisturize     Patient with new area of concern:   Location: red lesions on nose  Previous treatments: witch hazel      Lesion      Itching is primarily on her back. Bilateral on each shoulder blade area. That happens daily. She gets intermittently itching on scalp, arms and legs that is episodic    Review of Systems   Skin:  Positive for itching and dry skin.       Objective:   Physical Exam   Constitutional: She appears well-developed and well-nourished.   Neurological: She is alert and oriented to person, place, and time.   Psychiatric: She has a normal mood and affect.   Skin:   Areas Examined (abnormalities noted in diagram):   Scalp / Hair Palpated and Inspected  Head / Face Inspection Performed  Chest / Axilla Inspection Performed  Abdomen Inspection Performed  Back Inspection Performed  RUE Inspected  LUE Inspection Performed  RLE Inspected  LLE Inspection Performed                 Diagram Legend     Erythematous scaling macule/papule c/w actinic keratosis       Vascular papule c/w angioma      Pigmented verrucoid papule/plaque c/w seborrheic keratosis      Yellow umbilicated papule c/w sebaceous hyperplasia      Irregularly shaped tan macule c/w lentigo     1-2 mm smooth white papules consistent with Milia      Movable subcutaneous cyst with punctum c/w epidermal inclusion cyst      Subcutaneous movable cyst c/w pilar cyst      Firm pink to brown papule c/w dermatofibroma      Pedunculated fleshy papule(s) c/w skin tag(s)      Evenly pigmented macule c/w junctional nevus     Mildly variegated pigmented, slightly irregular-bordered macule c/w mildly atypical nevus      Flesh  colored to evenly pigmented papule c/w intradermal nevus       Pink pearly papule/plaque c/w basal cell carcinoma      Erythematous hyperkeratotic cursted plaque c/w SCC      Surgical scar with no sign of skin cancer recurrence      Open and closed comedones      Inflammatory papules and pustules      Verrucoid papule consistent consistent with wart     Erythematous eczematous patches and plaques     Dystrophic onycholytic nail with subungual debris c/w onychomycosis     Umbilicated papule    Erythematous-base heme-crusted tan verrucoid plaque consistent with inflamed seborrheic keratosis     Erythematous Silvery Scaling Plaque c/w Psoriasis     See annotation      Assessment / Plan:        Actinic keratosis  Cryosurgery Procedure Note    Verbal consent from the patient is obtained including, but not limited to, risk of hypopigmentation/hyperpigmentation, scar, recurrence of lesion. The patient is aware of the precancerous quality and need for treatment of these lesions. Liquid nitrogen cryosurgery is applied to the 2 actinic keratoses, as detailed in the physical exam, to produce a freeze injury. The patient is aware that blisters may form and is instructed on wound care with gentle cleansing and use of vaseline ointment to keep moist until healed. The patient is supplied a handout on cryosurgery and is instructed to call if lesions do not completely resolve.    Dermatographism  -     levocetirizine (XYZAL) 5 MG tablet; Take 1 tablet (5 mg total) by mouth every evening.  Dispense: 90 tablet; Refill: 3    Notalgia paresthetica  Notalgia paresthetica is usually caused by partial compression, impingement, or other injury to a spinal nerve.     For symptomatic relief, recommended CeraVe Itch Relief cream/lotion, Sarna sensitive lotion or Dermeleve. Can store these in the refrigerator for an added cooling effect.     Xerosis  - stop long hot showers  - cerave cream bid         Follow up in about 1 year (around  8/29/2025) for TBSE.

## 2024-08-29 NOTE — PATIENT INSTRUCTIONS
Over the counter creams for itching:  Cerave itch relief cream  Sarna  Capsaicin  Dermeleve      XEROSIS (DRY SKIN)        Definition    Xerosis is the term for dry skin.  We all have a natural oil coating over our skin produced by the skin oil glands.  If this oil is removed, the skin becomes dry which can lead to cracking, which can lead to inflammation.  Xerosis is usually a long-term problem that recurs often, especially in the winter.    Cause    Long hot baths or showers can remove our natural oil and lead to xerosis.  One should never take more than one bath or shower a day and for no longer than ten minutes.  Use of harsh soaps such as Zest, Dial, and Ivory can worsen and cause xerosis.  Cold winter weather worsens xerosis because the amount of moisture contained in cold air is much less than the amount of moisture in warm air.    Treatment    Treatment is intended to restore the natural oil to your skin.  Keep the skin lubricated.    Do not take more than one bath or shower a day.  Use lukewarm water, not hot.  Hot water dries out the skin.    Use a gentle moisturizing soap such as Cetaphil soap, Oil of Olay, Dove, Basis, Ivory moisture care, Restoraderm cleanser.    When toweling dry, dont rub.  Blot the skin so there is still some water left on the skin.  You should apply a moisturizing cream to all of the skin such as Cerave cream, Cetaphil cream, Lipikar [place] AP+ Intense Repair Moisturizing Cream or Restoraderm or Eucerin Original Formula cream.   Alpha hydroxyacid lotions, i.e., AmLactin, also work very well for preventing dry skin, but may burn when used on inflamed or reddened skin.    If you like to swim during the winter months, you should not use soap when getting out of the pool.  When you have finished swimming, rinse off the chlorine with cool to warm water.  If this will be the only shower of the day, then you may use Cetaphil or another mild soap to cleanse your skin.  After the shower,  apply a moisturizing cream to all of the skin as above.        [address] [place] [address]/ [phone] FAX/ [email or web address]         CRYOSURGERY      Your doctor has used a method called cryosurgery to treat your skin condition. Cryosurgery refers to the use of very cold substances to treat a variety of skin conditions such as warts, pre-skin cancers, molluscum contagiosum, sun spots, and several benign growths. The substance we use in cryosurgery is liquid nitrogen and is so cold (-195 degrees Celsius) that is burns when administered.     Following treatment in the office, the skin may immediately burn and become red. You may find the area around the lesion is affected as well. It is sometimes necessary to treat not only the lesion, but a small area of the surrounding normal skin to achieve a good response.     A blister, and even a blood filled blister, may form after treatment.   This is a normal response. If the blister is painful, it is acceptable to sterilize a needle and with rubbing alcohol and gently pop the blister. It is important that you gently wash the area with soap and warm water as the blister fluid may contain wart virus if a wart was treated. Do no remove the roof of the blister.     The area treated can take anywhere from 1-3 weeks to heal. Healing time depends on the kind skin lesion treated, the location, and how aggressively the lesion was treated. It is recommended that the areas treated are covered with Vaseline or bacitracin ointment and a band-aid. If a band-aid is not practical, just ointment applied several times per day will do. Keeping these areas moist will speed the healing time.    Treatment with liquid nitrogen can leave a scar. In dark skin, it may be a light or dark scar, in light skin it may be a white or pink scar. These will generally fade with time, but may never go away completely.     If you have any concerns after your treatment, please  feel free to call the office.       [address] [place] [address]/ [phone] FAX/ [email or web address]

## 2024-08-29 NOTE — PROGRESS NOTES
SUBJECTIVE:         Chief Complaint:   Chief Complaint   Patient presents with    Abdominal Pain       History of Present Illness:  Patient is a 79 y.o. female presents with chronic abdominal issues.  I had done an endoscopic ultrasound for her while she was an inpatient.  This was essentially normal.  She was seen by her primary care physician and referred for costochondritis.  They suggested some stretching exercises however she is not interested in pursuing physical therapy or taking medications.  She feels like many of her issues are related to her diet.    OBJECTIVE:     Vital Signs (Most Recent)  Pulse: 63 (08/29/24 0925)  BP: 130/62 (08/29/24 0925)    General: well developed, well nourished    Diagnostic Results:  CT: Reviewed  EUS      ASSESSMENT/PLAN:     I think it would be prudent for her to see a nutritionist.  We will set this up to our clinic she can discuss with them proper diet habits.  I did suggest that physical therapy further evaluation of her costochondritis give her benefit.  She will consider this advice.

## 2024-09-23 NOTE — TELEPHONE ENCOUNTER
No care due was identified.  [facility] Embedded Care Due Messages. Reference number: [id number].   9/23/2024 5:34:35 PM CDT

## 2024-09-24 RX ORDER — METFORMIN HYDROCHLORIDE 500 MG/1
TABLET ORAL
Refills: 0 | OUTPATIENT
Start: 2024-09-24

## 2024-09-24 NOTE — TELEPHONE ENCOUNTER
Refill Decision Note   Lissy Palencia  is requesting a refill authorization.  Brief Assessment and Rationale for Refill:  Quick Discontinue     Medication Therapy Plan: Pharmacy is requesting new scripts for the following medications without required information, (sig/ frequency/qty/etc)     Medication Reconciliation Completed: No   Comments:     No Care Gaps recommended.     Note composed:11:01 AM 09/24/2024

## 2024-09-25 ENCOUNTER — CLINICAL SUPPORT (OUTPATIENT)
Dept: AUDIOLOGY | Facility: CLINIC | Age: 79
End: 2024-09-25
Payer: MEDICARE

## 2024-09-25 DIAGNOSIS — H90.3 SENSORINEURAL HEARING LOSS OF BOTH EARS: Primary | ICD-10-CM

## 2024-09-25 DIAGNOSIS — H93.13 TINNITUS OF BOTH EARS: ICD-10-CM

## 2024-09-25 PROCEDURE — 92557 COMPREHENSIVE HEARING TEST: CPT | Mod: PBBFAC | Performed by: AUDIOLOGIST

## 2024-09-25 PROCEDURE — 92567 TYMPANOMETRY: CPT | Mod: PBBFAC | Performed by: AUDIOLOGIST

## 2024-09-25 NOTE — PROGRESS NOTES
Ms. Lissy Palencia was seen in the clinic today for an audiological evaluation.  Ms. Palencia reported difficulty hearing, especially her TV.  She also reported bilateral intermittent tinnitus.  Patient denied aural fullness, otalgia, and dizziness/imbalance.    Audiological testing revealed mild sensorineural hearing loss (SNHL) rising to normal hearing sloping to moderately-severe SNHL for the right ear and mild sensorineural hearing loss (SNHL) rising to normal hearing sloping to moderately-severe SNHL for the left ear.  A speech reception threshold was obtained at 35 dBHL for the right ear and at 30 dBHL for the left ear.  Speech discrimination was 100% for the right ear and 100% for the left ear.      Tympanometry testing revealed a Type A tympanogram for the right ear and a Type A tympanogram for the left ear.     Recommendations:  Annual audiological evaluation or sooner if change in hearing is perceived  Hearing protection when in noise    Hearing aid consultation

## 2024-10-10 ENCOUNTER — TELEPHONE (OUTPATIENT)
Dept: SPEECH THERAPY | Facility: HOSPITAL | Age: 79
End: 2024-10-10
Payer: MEDICARE

## 2024-11-25 ENCOUNTER — HOSPITAL ENCOUNTER (OUTPATIENT)
Dept: RADIOLOGY | Facility: HOSPITAL | Age: 79
Discharge: HOME | End: 2024-11-25
Attending: INTERNAL MEDICINE
Payer: MEDICARE

## 2024-11-25 DIAGNOSIS — Z12.31 SCREENING MAMMOGRAM FOR BREAST CANCER: ICD-10-CM

## 2024-11-25 PROCEDURE — 77063 BREAST TOMOSYNTHESIS BI: CPT | Mod: 26,,, | Performed by: RADIOLOGY

## 2024-11-25 PROCEDURE — 77067 SCR MAMMO BI INCL CAD: CPT | Mod: 26,,, | Performed by: RADIOLOGY

## 2024-11-25 PROCEDURE — 77063 BREAST TOMOSYNTHESIS BI: CPT | Mod: TC

## 2024-12-06 ENCOUNTER — TELEPHONE (OUTPATIENT)
Dept: CARDIOLOGY | Facility: CLINIC | Age: 79
End: 2024-12-06
Payer: MEDICARE

## 2024-12-09 ENCOUNTER — OFFICE VISIT (OUTPATIENT)
Dept: CARDIOLOGY | Facility: CLINIC | Age: 79
End: 2024-12-09
Payer: MEDICARE

## 2024-12-09 ENCOUNTER — TELEPHONE (OUTPATIENT)
Dept: PRIMARY CARE CLINIC | Facility: CLINIC | Age: 79
End: 2024-12-09
Payer: MEDICARE

## 2024-12-09 VITALS
BODY MASS INDEX: 27.83 KG/M2 | OXYGEN SATURATION: 100 % | HEART RATE: 64 BPM | DIASTOLIC BLOOD PRESSURE: 67 MMHG | WEIGHT: 151.25 LBS | SYSTOLIC BLOOD PRESSURE: 143 MMHG | HEIGHT: 62 IN

## 2024-12-09 DIAGNOSIS — E03.9 PRIMARY HYPOTHYROIDISM: ICD-10-CM

## 2024-12-09 DIAGNOSIS — E11.69 HYPERLIPIDEMIA ASSOCIATED WITH TYPE 2 DIABETES MELLITUS: ICD-10-CM

## 2024-12-09 DIAGNOSIS — I10 PRIMARY HYPERTENSION: ICD-10-CM

## 2024-12-09 DIAGNOSIS — I25.10 CORONARY ARTERY DISEASE INVOLVING NATIVE CORONARY ARTERY OF NATIVE HEART WITHOUT ANGINA PECTORIS: Primary | ICD-10-CM

## 2024-12-09 DIAGNOSIS — E78.5 HYPERLIPIDEMIA ASSOCIATED WITH TYPE 2 DIABETES MELLITUS: ICD-10-CM

## 2024-12-09 DIAGNOSIS — E11.9 TYPE 2 DIABETES MELLITUS WITHOUT COMPLICATION, WITHOUT LONG-TERM CURRENT USE OF INSULIN: Primary | ICD-10-CM

## 2024-12-09 DIAGNOSIS — E11.9 TYPE 2 DIABETES MELLITUS WITHOUT COMPLICATION, WITHOUT LONG-TERM CURRENT USE OF INSULIN: ICD-10-CM

## 2024-12-09 DIAGNOSIS — I70.0 AORTIC ATHEROSCLEROSIS: ICD-10-CM

## 2024-12-09 PROCEDURE — 99214 OFFICE O/P EST MOD 30 MIN: CPT | Mod: S$PBB,,, | Performed by: PHYSICIAN ASSISTANT

## 2024-12-09 PROCEDURE — 99999 PR PBB SHADOW E&M-EST. PATIENT-LVL V: CPT | Mod: PBBFAC,,, | Performed by: PHYSICIAN ASSISTANT

## 2024-12-09 PROCEDURE — 99215 OFFICE O/P EST HI 40 MIN: CPT | Mod: PBBFAC | Performed by: PHYSICIAN ASSISTANT

## 2024-12-09 RX ORDER — EZETIMIBE 10 MG/1
10 TABLET ORAL DAILY
Qty: 90 TABLET | Refills: 3 | Status: SHIPPED | OUTPATIENT
Start: 2024-12-09 | End: 2025-12-04

## 2024-12-09 RX ORDER — LOSARTAN POTASSIUM 25 MG/1
25 TABLET ORAL DAILY
Qty: 30 TABLET | Refills: 11 | Status: SHIPPED | OUTPATIENT
Start: 2024-12-09 | End: 2025-12-09

## 2024-12-09 NOTE — PROGRESS NOTES
"    General Cardiology Clinic Note  Reason for Visit: Annual   Last Clinic Visit: 11/30/2023  General Cardiologist: Dr. Brown    HPI:   Lissy Palencia is a 79 y.o. female who presents for follow up.     Problems:   CAD s/p PCI to LAD (1997)  Dyslipidemia  Aortic calcification (CXR)  DM    Hypothyroidism  GERD    Interval HPI   Patient presents for routine follow up. Primary complaint is of pain all over her body, mainly in joints, and worse first thing in the morning. She reports some heaviness in chest with exercise, but is able to keep exercising. This has been stable for a long time. She reports swelling her ankles. Denies VALLE, orthopnea, PND. Weight is stable on chart review. She denies syncope, near syncope. She is not checking BP consistently. She does exercise, mostly stretching, and will walk on treadmill for 20 minutes three days a week.     11/30/2023 HPI  Patient presents for chest pain. She reports "sticking" pains under her left breast for the past couple months. CP occurs with exertion when walking on the treadmill. Also seems to occur at rest. She has difficulty remembering whether it's exclusively exertional. She also reports worsening VALLE when walking fast and climbing up stairs. She is having difficulty staying on the treadmill for 20 minutes. She reports similar symptoms prior to PCI in 1997. Of note, she described similar symptoms last year and had a negative stress test at that time. She also reports swelling in her ankles. She denies syncope, near syncope, orthopnea, PND, sustained palpitations, TIA. She has been checking her BP at home and states it is high but does not know the numbers.     1/23/2023 HPI  Pt continues to have the chest pain reported at last visit. It is a cramping pain that radiates from the center of her chest through to her back. Often occurs in the morning when she first sits up in bed. It lasts 5 minutes and then suddenly resolves. It occurs maybe twice a week. It is " not worsening. She thinks sometimes she gets it on the treadmill too, but it doesn't keep her from exercising. She has mild VALLE when walking on the treadmill. She reports constant fatigue for the past 2 months, but also states she has not been sleeping well at night because she naps during the day. She reports chronic cough and chest congestion. She denies pedal edema, orthopnea, PND, sudden weight gain, sustained palpitations, lightheadedness, syncope. She has resumed taking Aspirin since her last visit. She is not sure if she increased the Atorvastatin or not. She exercises on the treadmill for 20 minutes, every other day. She also does strengthening exercises at the gym. She has HEMAL but cannot tolerate the CPAP.     ROS:      Review of Systems   Constitutional: Negative for diaphoresis, malaise/fatigue, weight gain and weight loss.   HENT:  Negative for congestion and nosebleeds.    Eyes:  Negative for vision loss in left eye, vision loss in right eye and visual disturbance.   Cardiovascular:  Positive for chest pain and leg swelling. Negative for claudication, dyspnea on exertion, irregular heartbeat, near-syncope, orthopnea, palpitations, paroxysmal nocturnal dyspnea and syncope.   Respiratory:  Negative for cough, shortness of breath, sleep disturbances due to breathing, snoring and wheezing.    Hematologic/Lymphatic: Negative for bleeding problem. Does not bruise/bleed easily.   Skin:  Negative for poor wound healing and rash.   Musculoskeletal:  Positive for joint pain. Negative for muscle cramps and myalgias.   Gastrointestinal:  Negative for bloating, abdominal pain, diarrhea, heartburn, melena, nausea and vomiting.   Genitourinary:  Negative for hematuria and nocturia.   Neurological:  Negative for brief paralysis, dizziness, headaches, light-headedness, numbness and weakness.   Psychiatric/Behavioral:  Negative for depression. The patient does not have insomnia.    Allergic/Immunologic: Negative for  hives.       PMH:     Past Medical History:   Diagnosis Date    Allergy     Arthritis     Cancer     skin cancer on hand    Coronary artery disease     stent in heart    Cystocele 01/19/2016    Diabetes mellitus     GERD (gastroesophageal reflux disease)     Hemorrhoids     History of cholelithiasis     Hypothyroidism     Liver disease     fatty liver    Obesity     Right shoulder pain 10/09/2012    Sleep apnea     Trigger finger, right 3rd finger 07/30/2014    Vaginal atrophy 01/19/2016    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     Past Surgical History:   Procedure Laterality Date    ABLATION COLPOCLESIS      ADENOIDECTOMY      CATARACT EXTRACTION W/  INTRAOCULAR LENS IMPLANT Right 11/23/2022    Procedure: EXTRACTION, CATARACT, WITH IOL INSERTION;  Surgeon: Ciara Mclaughlin MD;  Location: [facility] OR;  Service: Ophthalmology;  Laterality: Right;    CATARACT EXTRACTION W/  INTRAOCULAR LENS IMPLANT Left 12/22/2022    Procedure: EXTRACTION, CATARACT, WITH IOL INSERTION;  Surgeon: Ciara Mclaughlin MD;  Location: [facility] OR;  Service: Ophthalmology;  Laterality: Left;    CHOLECYSTECTOMY      COLONOSCOPY N/A 11/28/2018    Procedure: COLONOSCOPY;  Surgeon: Richi Mcintosh MD;  Location: [facility] (4TH FLR);  Service: Endoscopy;  Laterality: N/A;    CORONARY STENT PLACEMENT      ENDOSCOPIC ULTRASOUND OF UPPER GASTROINTESTINAL TRACT N/A 7/10/2024    Procedure: ULTRASOUND, UPPER GI TRACT, ENDOSCOPIC;  Surgeon: Red Mann MD;  Location: [facility] (2ND FLR);  Service: Endoscopy;  Laterality: N/A;  6/20 mail-EGD and EUS for dysphagia and diled CBD. ha-tt    ESOPHAGOGASTRODUODENOSCOPY N/A 11/28/2018    Procedure: ESOPHAGOGASTRODUODENOSCOPY (EGD);  Surgeon: Richi Mcintosh MD;  Location: [facility] (4TH FLR);  Service: Endoscopy;  Laterality: N/A;    ESOPHAGOGASTRODUODENOSCOPY N/A 7/10/2024    Procedure: EGD (ESOPHAGOGASTRODUODENOSCOPY);  Surgeon: Red Mann MD;  Location: [facility] (2ND FLR);  Service: Endoscopy;   Laterality: N/A;  7/2-lvm for pre call-tb  7/8-pt lvm confirming appt-tb    heart cath  2005    non-obstructive disease    INTRAOCULAR LENS EXCHANGE Left 6/14/2023    Procedure: REPLACEMENT, IOL;  Surgeon: Ciara Mclaughlin MD;  Location: [facility] OR;  Service: Ophthalmology;  Laterality: Left;    OOPHORECTOMY      SHOULDER ARTHROSCOPY W/ ROTATOR CUFF REPAIR      Right    TONSILLECTOMY      TOTAL ABDOMINAL HYSTERECTOMY      with BSO    TRIGGER FINGER RELEASE Right 10/16/2023    Procedure: RELEASE, TRIGGER FINGER,RIGHT LONG AND RING FINGER;  Surgeon: Shaneka Richardson MD;  Location: [facility] OR;  Service: Orthopedics;  Laterality: Right;    TUBAL LIGATION       Allergies:     Review of patient's allergies indicates:   Allergen Reactions    Compazine  [prochlorperazine edisylate]      Other reaction(s): SPASMS    Parafon forte      Other reaction(s): Hives     Medications:     Current Outpatient Medications on File Prior to Visit   Medication Sig Dispense Refill    alendronate (FOSAMAX) 70 MG tablet TAKE 1 TABLET EVERY 7 DAYS 12 tablet 3    aspirin (ECOTRIN) 81 MG EC tablet Take 81 mg by mouth once daily.      atorvastatin (LIPITOR) 80 MG tablet TAKE 1 TABLET DAILY 90 tablet 3    azelastine (ASTELIN) 137 mcg (0.1 %) nasal spray 1 spray (137 mcg total) by Nasal route 2 (two) times daily. 30 mL 3    blood sugar diagnostic Strp To check BG ONCE daily, to use with insurance preferred meter 100 strip 3    blood-glucose meter kit To check BG ONCE THE daily, to use with insurance preferred meter 1 each 1    cycloSPORINE (RESTASIS) 0.05 % ophthalmic emulsion Place 1 drop into both eyes 2 (two) times daily. 90 each 11    diclofenac sodium (VOLTAREN ARTHRITIS PAIN) 1 % Gel Apply 2 g topically once daily. 200 g 0    ezetimibe (ZETIA) 10 mg tablet Take 1 tablet (10 mg total) by mouth once daily. 90 tablet 3    flu vac 2022 65up-zrxGO11M,PF, (FLUAD QUAD 2022-23,65Y UP,,PF,) 60 mcg (15 mcg x 4)/0.5 mL Syrg Inject into the muscle. 0.5  mL 0    fluticasone (FLONASE SENSIMIST) 27.5 mcg/actuation nasal spray 2 sprays by Nasal route once daily. 6.6 mL 3    hyoscyamine (LEVSIN) 0.125 mg Subl Place 1 tablet (0.125 mg total) under the tongue every 6 (six) hours as needed. 30 tablet 1    ketorolac 0.5% (ACULAR) 0.5 % Drop Place 1 drop into the left eye 3 (three) times daily. 5 mL 3    lancets Misc To check BG ONCE daily, to use with insurance preferred meter 100 each 3    levocetirizine (XYZAL) 5 MG tablet Take 1 tablet (5 mg total) by mouth every evening. 90 tablet 3    levothyroxine (SYNTHROID) 88 MCG tablet TAKE FIRST THING IN THE MORNING ON AN EMPTY STOMACH 90 tablet 3    LIDOcaine (LIDODERM) 5 % Place onto the skin once daily. 30 patch 0    meloxicam (MOBIC) 7.5 MG tablet Take 1 tablet (7.5 mg total) by mouth once daily. 14 tablet 0    neomycin-polymyxin-hydrocortisone (CORTISPORIN) 3.5-10,000-1 mg/mL-unit/mL-% otic suspension Place 3 drops into the right ear 3 (three) times daily. 10 mL 0    nitroGLYCERIN (NITROSTAT) 0.4 MG SL tablet Place 1 tablet (0.4 mg total) under the tongue every 5 (five) minutes as needed for Chest pain. You can take up to 3 doses at home. If chest pain persists after second dose, go the ER. 25 tablet 4    pantoprazole (PROTONIX) 40 MG tablet TAKE 1 TABLET DAILY 90 tablet 2    prednisolon/gatiflox/bromfenac (PREDNISOL ACE-GATIFLOX-BROMFEN) 1-0.5-0.075 % DrpS Apply 1 drop to eye 3 (three) times daily. One drop 3 times a day in surgical eye 5 mL 3    RESTASIS 0.05 % ophthalmic emulsion INSTILL 1 DROP IN BOTH EYES TWICE A DAY 60 each 11    tiZANidine (ZANAFLEX) 4 MG tablet Take 1-2 tablets (4-8 mg total) by mouth every 8 (eight) hours as needed (muscle spasms). 60 tablet 2    vitamin D (VITAMIN D3) 1000 units Tab Take 1 tablet (1,000 Units total) by mouth once daily. 90 tablet 3     Current Facility-Administered Medications on File Prior to Visit   Medication Dose Route Frequency Provider Last Rate Last Admin    balanced salt  "irrigation intra-ocular solution 1 drop  1 drop Left Eye On Call Procedure Ciara Mclaughlin MD        balanced salt irrigation intra-ocular solution 1 drop  1 drop Left Eye On Call Procedure Ciara Mclaughlin MD        mupirocin 2 % ointment   Nasal On Call Procedure Caesar Finch MD   Given at 10/16/23 0752    phenylephrine HCL 10% ophthalmic solution 1 drop  1 drop Left Eye PRN Ciara Mclaughlin MD        phenylephrine HCL 10% ophthalmic solution 1 drop  1 drop Left Eye PRCiara Bales MD        sodium chloride 0.9% flush 2 mL  2 mL Intravenous PRN Ciara Mclaughlin MD        sodium chloride 0.9% flush 2 mL  2 mL Intravenous PRN Ciara Mclaughlin MD         Social History:     Social History     Tobacco Use    Smoking status: Never    Smokeless tobacco: Never   Substance Use Topics    Alcohol use: Yes     Alcohol/week: 1.0 standard drink of alcohol     Types: 1 Glasses of wine per week     Comment: "maybe a glass of wine every 6 months"     Family History:     Family History   Problem Relation Name Age of Onset    Breast cancer Neg Hx      Ovarian cancer Neg Hx      Glaucoma Neg Hx      Cataracts Neg Hx      Diabetes Neg Hx      Macular degeneration Neg Hx      Retinal detachment Neg Hx      Melanoma Neg Hx      Cervical cancer Neg Hx      Endometrial cancer Neg Hx      Vaginal cancer Neg Hx       Physical Exam:   BP (!) 143/67 (Patient Position: Sitting)   Pulse 64   Ht 5' 2" (1.575 m)   Wt 68.6 kg (151 lb 3.8 oz)   SpO2 100%   BMI 27.66 kg/m²        Physical Exam  Vitals and nursing note reviewed.   Constitutional:       General: She is not in acute distress.     Appearance: Normal appearance. She is not ill-appearing.   HENT:      Head: Normocephalic and atraumatic.   Eyes:      General: No scleral icterus.     Conjunctiva/sclera: Conjunctivae normal.   Neck:      Thyroid: No thyromegaly.      Vascular: No carotid bruit or JVD.   Cardiovascular:      Rate and Rhythm: Normal rate and regular " "rhythm.      Pulses: Normal pulses.      Heart sounds: Normal heart sounds. No murmur heard.     No friction rub. No gallop.   Pulmonary:      Effort: Pulmonary effort is normal.      Breath sounds: Normal breath sounds. No wheezing, rhonchi or rales.   Chest:      Chest wall: No tenderness.   Abdominal:      General: Bowel sounds are normal. There is no distension.      Palpations: Abdomen is soft.      Tenderness: There is no abdominal tenderness.   Musculoskeletal:         General: No swelling.      Cervical back: Neck supple.      Right lower leg: No edema.      Left lower leg: No edema.   Skin:     General: Skin is warm and dry.      Coloration: Skin is not pale.      Findings: No erythema or rash.      Nails: There is no clubbing.   Neurological:      Mental Status: She is alert and oriented to person, place, and time. Mental status is at baseline.   Psychiatric:         Mood and Affect: Mood and affect normal.         Behavior: Behavior normal.          Labs:     Lab Results   Component Value Date     06/18/2024    K 5.0 06/18/2024     06/18/2024    CO2 27 06/18/2024    BUN 16 06/18/2024    CREATININE 0.9 06/18/2024    ANIONGAP 8 06/18/2024     Lab Results   Component Value Date    HGBA1C 6.6 (H) 06/07/2024     No results found for: "BNP", "BNPTRIAGEBLO" Lab Results   Component Value Date    WBC 6.52 06/18/2024    HGB 14.7 06/18/2024    HCT 44.0 06/18/2024     06/18/2024    GRAN 3.6 06/18/2024    GRAN 55.6 06/18/2024     Lab Results   Component Value Date    CHOL 165 06/18/2024    HDL 45 06/18/2024    LDLCALC 90.6 06/18/2024    TRIG 147 06/18/2024          Imaging:      Echocardiogram  None     Stress testing  Exercise stress echo 1/10/2024    Left Ventricle: The left ventricle is normal in size. Normal wall thickness. Normal wall motion. There is normal systolic function with a visually estimated ejection fraction of 60 - 65%. There is normal diastolic function.    Right Ventricle: Normal " right ventricular cavity size. Wall thickness is normal. Right ventricle wall motion  is normal. Systolic function is normal.    Mitral Valve: There is no stenosis. The mean pressure gradient across the mitral valve is 1 mmHg at a heart rate of  bpm.    Pulmonary Artery: No pulmonary hypertension. The estimated pulmonary artery systolic pressure is 30 mmHg.    IVC/SVC: Normal venous pressure at 3 mmHg.    Stress Protocol: The patient exercised for 7 minutes 3 seconds on a high ramp protocol, corresponding to a functional capacity of 9 METS, achieving a peak heart rate of 139 bpm, which is 101 % of the age predicted maximum heart rate. Their exercise capacity was excellent. The patient reported no symptoms during the stress test. The test was stopped because the patient requested it.    Baseline ECG: The Baseline ECG reveals sinus rhythm. The axis is normal. The ST segments are normal.    Stress ECG: There are no ST segment deviation identified during the protocol. There are no arrhythmias during stress. There is normal blood pressure response with stress.    ECG Conclusion: The ECG portion of the study is negative for ischemia.  Exercise capacity is excellent    Post-stress Impression: The study is negative with no echocardiographic evidence of stress induced ischemia.     Normal stress echocardiogram with no evidence for myocardial ischemia.    Stress echo 10/28/2022  During stress, the following significant arrhythmias were observed: rare PACs, occasional PVCs.  The patient's exercise capacity was average.  The test was stopped because the patient experienced fatigue.  The ECG portion of this study is negative for myocardial ischemia.  The left ventricle is normal in size with normal systolic function.  The estimated ejection fraction is 58%.  Normal left ventricular diastolic function.  Normal right ventricular size with low normal right ventricular systolic function.  Normal central venous pressure (3  mmHg).  The estimated PA systolic pressure is 28 mmHg.  The stress echo portion of this study is negative for myocardial ischemia.    PET 16  CONCLUSIONS: NORMAL MYOCARDIAL PERFUSION PET STRESS TEST   1. The perfusion scan is free of evidence for myocardial ischemia or injury.   2. Resting wall motion is physiologic. Stress wall motion is physiologic.   3. Visually estimated LV function is normal at rest and normal at stress.   4. The ventricular volumes are normal at rest and stress.   5. The extracardiac distribution of radioactivity is normal.   6. There was no previous study available to compare.      MABEL 16  CONCLUSIONS     1 - Normal left ventricular diastolic function.     2 - Normal left ventricular diastolic function.     3 - Normal right ventricular systolic function .     4 - Trivial pericardial effusion.     Positive stress echocardiographic study demonstrating an ischemic response involving the mid anteroseptum, apical septum, apical inferior wall, mid inferior wall, basal inferior wall. Non-specific finding demonstrating failure to augment involving the   anterolateral wall which may be associated with ischemia; clinical correlation required.     These results suggest ischemia in a multi-vessel distribution.      MABEL 13  CONCLUSIONS     1 - Normal left ventricular function (EF 60%).     2 - Normal diastolic function.     3 - Normal right ventricular function .     4 - Trivial to mild mitral regurgitation.     5 - Trivial tricuspid regurgitation.     No evidence of stress induced myocardial ischemia.      Cath Lab  None     Other  CXR 22  There is calcification and tortuosity of the aorta.       US abd 1/10/18  The abdominal aorta is normal in caliber      EK/9/22 - NSR (personally reviewed)    Assessment:     1. Coronary artery disease involving native coronary artery of native heart without angina pectoris    2. Hyperlipidemia associated with type 2 diabetes mellitus    3.  Aortic atherosclerosis    4. Type 2 diabetes mellitus without complication, without long-term current use of insulin    5. Primary hypertension        Plan:     Coronary artery disease involving native coronary artery of native heart   Stable angina.  Exercise stress echo negative last year with excellent exercise capacity.   Continue ASA 81mg qd  Continue Lipitor 80 mg daily.      Aortic atherosclerosis  Hyperlipidemia associated with type 2 diabetes mellitus  LDL above goal. She has not been taking the Zetia. Discussed the importance of taking it. She states she will start. Will recheck lipids in 3 months.   Continue Atorvastatin 80 mg daily     Hypertension  She has never been treated for high blood pressure before. Her pressure is frequently elevated on chart review. Will start Losartan 25 mg daily. Instructed her to keep a home BP log and send me the readings after 2 weeks.     Type 2 diabetes mellitus without complication, without long-term current use of insulin  A1c at goal, 6.6%     Follow up in 6 months    Signed:  Melvi Warren PA-C  Cardiology   [phone] - General  12/9/2024 10:12 AM

## 2024-12-09 NOTE — TELEPHONE ENCOUNTER
"----- Message from Nikia sent at 12/9/2024  8:50 AM CST -----  Contact: [phone]  This SmartLink displays a printable calendar and can only be viewed in a rich text enabled field.type: Lab    Caller is requesting to schedule their Lab appointment prior to an appointment.    Order is not listed in EPIC.  Please enter order and contact patient to schedule.    Preferred Date and Time of Labs  tomorrow     Date of Appointment:12/12/2024    Where would they like the lab performed?[facility]     Would the patient rather a call back or a response via [facility]? Call back     Best Call Back Number: [phone]    Additional Information: Would like to know if she need Blood test before her appt.     "  "

## 2024-12-10 ENCOUNTER — LAB VISIT (OUTPATIENT)
Dept: LAB | Facility: HOSPITAL | Age: 79
End: 2024-12-10
Attending: INTERNAL MEDICINE
Payer: MEDICARE

## 2024-12-10 DIAGNOSIS — E11.9 TYPE 2 DIABETES MELLITUS WITHOUT COMPLICATION, WITHOUT LONG-TERM CURRENT USE OF INSULIN: ICD-10-CM

## 2024-12-10 DIAGNOSIS — E03.9 PRIMARY HYPOTHYROIDISM: ICD-10-CM

## 2024-12-10 LAB
ESTIMATED AVG GLUCOSE: 128 MG/DL (ref 68–131)
HBA1C MFR BLD: 6.1 % (ref 4–5.6)
T4 FREE SERPL-MCNC: 1.33 NG/DL (ref 0.71–1.51)
TSH SERPL DL<=0.005 MIU/L-ACNC: 0.06 UIU/ML (ref 0.4–4)

## 2024-12-10 PROCEDURE — 84439 ASSAY OF FREE THYROXINE: CPT | Performed by: INTERNAL MEDICINE

## 2024-12-10 PROCEDURE — 84443 ASSAY THYROID STIM HORMONE: CPT | Performed by: INTERNAL MEDICINE

## 2024-12-10 PROCEDURE — 83036 HEMOGLOBIN GLYCOSYLATED A1C: CPT | Performed by: INTERNAL MEDICINE

## 2024-12-12 ENCOUNTER — OFFICE VISIT (OUTPATIENT)
Dept: PRIMARY CARE CLINIC | Facility: CLINIC | Age: 79
End: 2024-12-12
Payer: MEDICARE

## 2024-12-12 VITALS
OXYGEN SATURATION: 95 % | HEART RATE: 62 BPM | SYSTOLIC BLOOD PRESSURE: 120 MMHG | RESPIRATION RATE: 18 BRPM | WEIGHT: 150.81 LBS | HEIGHT: 62 IN | BODY MASS INDEX: 27.75 KG/M2 | TEMPERATURE: 98 F | DIASTOLIC BLOOD PRESSURE: 80 MMHG

## 2024-12-12 DIAGNOSIS — E11.9 TYPE 2 DIABETES MELLITUS WITHOUT COMPLICATION, WITHOUT LONG-TERM CURRENT USE OF INSULIN: ICD-10-CM

## 2024-12-12 DIAGNOSIS — E03.9 PRIMARY HYPOTHYROIDISM: ICD-10-CM

## 2024-12-12 DIAGNOSIS — M19.90 OSTEOARTHRITIS, UNSPECIFIED OSTEOARTHRITIS TYPE, UNSPECIFIED SITE: ICD-10-CM

## 2024-12-12 DIAGNOSIS — I70.0 AORTIC ATHEROSCLEROSIS: Primary | ICD-10-CM

## 2024-12-12 DIAGNOSIS — I25.10 CORONARY ARTERY DISEASE INVOLVING NATIVE CORONARY ARTERY OF NATIVE HEART WITHOUT ANGINA PECTORIS: ICD-10-CM

## 2024-12-12 DIAGNOSIS — I10 PRIMARY HYPERTENSION: ICD-10-CM

## 2024-12-12 DIAGNOSIS — E11.69 HYPERLIPIDEMIA ASSOCIATED WITH TYPE 2 DIABETES MELLITUS: ICD-10-CM

## 2024-12-12 DIAGNOSIS — E78.5 HYPERLIPIDEMIA ASSOCIATED WITH TYPE 2 DIABETES MELLITUS: ICD-10-CM

## 2024-12-12 PROCEDURE — 99214 OFFICE O/P EST MOD 30 MIN: CPT | Mod: S$PBB,,, | Performed by: INTERNAL MEDICINE

## 2024-12-12 PROCEDURE — 99999 PR PBB SHADOW E&M-EST. PATIENT-LVL V: CPT | Mod: PBBFAC,,, | Performed by: INTERNAL MEDICINE

## 2024-12-12 PROCEDURE — 99215 OFFICE O/P EST HI 40 MIN: CPT | Mod: PBBFAC | Performed by: INTERNAL MEDICINE

## 2024-12-12 RX ORDER — METFORMIN HYDROCHLORIDE 500 MG/1
500 TABLET, EXTENDED RELEASE ORAL
Qty: 10 TABLET | Refills: 0 | Status: SHIPPED | OUTPATIENT
Start: 2024-12-12

## 2024-12-12 RX ORDER — METFORMIN HYDROCHLORIDE 500 MG/1
500 TABLET, EXTENDED RELEASE ORAL
Qty: 90 TABLET | Refills: 3 | Status: SHIPPED | OUTPATIENT
Start: 2024-12-12

## 2024-12-12 NOTE — PROGRESS NOTES
[facility] Note    Chief Complaint      Chief Complaint   Patient presents with    Follow-up     6m       History of Present Illness      Lissy Palencia is a 79 y.o. female who presents today for   Chief Complaint   Patient presents with    Follow-up     6m   .  Patient comes to appointment here for 6m checkup . She has started back on her metformin A1c is at 6.1 . She is uptodate with her cardiologist visit . She is now on low dose losartan and zetia was added to regimen . She requests thyroid u/s .     Problem List Items Addressed This Visit       Hyperlipidemia associated with type 2 diabetes mellitus    Overview     Cont lipitor and zetia          Osteoarthritis    Overview     Multiple joints and back . Cont current          Coronary artery disease involving native coronary artery of native heart without angina pectoris    Overview     Now seeing dr linder . Lipid panel reviewed  . Cont current regimen added zetia         Primary hypothyroidism    Overview     Cont replacement , need stsh          Aortic atherosclerosis - Primary    Overview     Stable cont lipitor         Type 2 diabetes mellitus without complication, without long-term current use of insulin    Overview     Back on  metfornin .  A1c 6.1  . Cont current          Primary hypertension    Overview     Bp well controlled start on losartan               Past Medical History:  Past Medical History:   Diagnosis Date    Allergy     Arthritis     Cancer     skin cancer on hand    Coronary artery disease     stent in heart    Cystocele 01/19/2016    Diabetes mellitus     GERD (gastroesophageal reflux disease)     Hemorrhoids     History of cholelithiasis     Hypothyroidism     Liver disease     fatty liver    Obesity     Primary hypertension 12/9/2024    Right shoulder pain 10/09/2012    Sleep apnea     Trigger finger, right 3rd finger 07/30/2014    Vaginal atrophy 01/19/2016    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       Past  Surgical History:  Past Surgical History:   Procedure Laterality Date    ABLATION COLPOCLESIS      ADENOIDECTOMY      CATARACT EXTRACTION W/  INTRAOCULAR LENS IMPLANT Right 11/23/2022    Procedure: EXTRACTION, CATARACT, WITH IOL INSERTION;  Surgeon: Ciara Mclaughlin MD;  Location: [facility] OR;  Service: Ophthalmology;  Laterality: Right;    CATARACT EXTRACTION W/  INTRAOCULAR LENS IMPLANT Left 12/22/2022    Procedure: EXTRACTION, CATARACT, WITH IOL INSERTION;  Surgeon: Ciara Mclaughlin MD;  Location: [facility] OR;  Service: Ophthalmology;  Laterality: Left;    CHOLECYSTECTOMY      COLONOSCOPY N/A 11/28/2018    Procedure: COLONOSCOPY;  Surgeon: Richi Mcintosh MD;  Location: [facility] ENDO (4TH FLR);  Service: Endoscopy;  Laterality: N/A;    CORONARY STENT PLACEMENT      ENDOSCOPIC ULTRASOUND OF UPPER GASTROINTESTINAL TRACT N/A 7/10/2024    Procedure: ULTRASOUND, UPPER GI TRACT, ENDOSCOPIC;  Surgeon: Red Bonner MD;  Location: [facility] ENDO (2ND FLR);  Service: Endoscopy;  Laterality: N/A;  6/20 mail-EGD and EUS for dysphagia and diled CBD. [id number]    ESOPHAGOGASTRODUODENOSCOPY N/A 11/28/2018    Procedure: ESOPHAGOGASTRODUODENOSCOPY (EGD);  Surgeon: Richi Mcintosh MD;  Location: [facility] ENDO (4TH FLR);  Service: Endoscopy;  Laterality: N/A;    ESOPHAGOGASTRODUODENOSCOPY N/A 7/10/2024    Procedure: EGD (ESOPHAGOGASTRODUODENOSCOPY);  Surgeon: Red Bonner MD;  Location: [facility] ENDO (2ND FLR);  Service: Endoscopy;  Laterality: N/A;  7/2-lvm for pre call-tb  7/8-pt lvm confirming appt-tb    heart cath  2005    non-obstructive disease    INTRAOCULAR LENS EXCHANGE Left 6/14/2023    Procedure: REPLACEMENT, IOL;  Surgeon: Ciara Mclaughlin MD;  Location: [facility];  Service: Ophthalmology;  Laterality: Left;    OOPHORECTOMY      SHOULDER ARTHROSCOPY W/ ROTATOR CUFF REPAIR      Right    TONSILLECTOMY      TOTAL ABDOMINAL HYSTERECTOMY      with BSO    TRIGGER FINGER RELEASE Right 10/16/2023    Procedure: RELEASE, TRIGGER FINGER,RIGHT LONG  "AND RING FINGER;  Surgeon: Shaneka Richardson MD;  Location: [facility];  Service: Orthopedics;  Laterality: Right;    TUBAL LIGATION         Family History:  family history is not on file.    Social History:  Social History     Socioeconomic History    Marital status:    Occupational History     Employer: [employer]   Tobacco Use    Smoking status: Never    Smokeless tobacco: Never   Substance and Sexual Activity    Alcohol use: Yes     Alcohol/week: 1.0 standard drink of alcohol     Types: 1 Glasses of wine per week     Comment: "maybe a glass of wine every 6 months"    Drug use: No    Sexual activity: Not Currently     Partners: Male     Birth control/protection: None     Social Drivers of Health     Financial Resource Strain: Low Risk  (1/3/2022)    Overall Financial Resource Strain (CARDIA)     Difficulty of Paying Living Expenses: Not hard at all   Food Insecurity: No Food Insecurity (1/3/2022)    Hunger Vital Sign     Worried About Running Out of Food in the Last Year: Never true     Ran Out of Food in the Last Year: Never true   Transportation Needs: No Transportation Needs (1/3/2022)    PRAPARE - Transportation     Lack of Transportation (Medical): No     Lack of Transportation (Non-Medical): No   Physical Activity: Sufficiently Active (8/10/2020)    Exercise Vital Sign     Days of Exercise per Week: 6 days     Minutes of Exercise per Session: 60 min   Stress: No Stress Concern Present (1/3/2022)    [employer] [employer] - Occupational Stress Questionnaire     Feeling of Stress : Not at all   Housing Stability: Low Risk  (1/3/2022)    Housing Stability Vital Sign     Unable to Pay for Housing in the Last Year: No     Number of Places Lived in the Last Year: 1     Unstable Housing in the Last Year: No       Review of Systems:   Review of Systems   Constitutional:  Positive for malaise/fatigue. Negative for fever and weight loss.   HENT:  Negative for congestion, hearing loss " and sore throat.    Eyes:  Negative for blurred vision.   Respiratory:  Negative for cough and shortness of breath.    Cardiovascular:  Negative for chest pain, palpitations, claudication and leg swelling.   Gastrointestinal:  Negative for abdominal pain, constipation, diarrhea and heartburn.   Genitourinary:  Negative for dysuria.   Musculoskeletal:  Negative for back pain and myalgias.   Skin:  Negative for rash.   Neurological:  Negative for focal weakness and headaches.   Psychiatric/Behavioral:  Negative for depression and suicidal ideas. The patient is not nervous/anxious.          Medications:  Outpatient Encounter Medications as of 12/12/2024   Medication Sig Note Dispense Refill    alendronate (FOSAMAX) 70 MG tablet TAKE 1 TABLET EVERY 7 DAYS  12 tablet 3    aspirin (ECOTRIN) 81 MG EC tablet Take 81 mg by mouth once daily. 6/8/2023: Take AM of surgrery      atorvastatin (LIPITOR) 80 MG tablet TAKE 1 TABLET DAILY  90 tablet 3    azelastine (ASTELIN) 137 mcg (0.1 %) nasal spray 1 spray (137 mcg total) by Nasal route 2 (two) times daily.  30 mL 3    blood-glucose meter kit To check BG ONCE THE daily, to use with insurance preferred meter  1 each 1    cycloSPORINE (RESTASIS) 0.05 % ophthalmic emulsion Place 1 drop into both eyes 2 (two) times daily.  90 each 11    diclofenac sodium (VOLTAREN ARTHRITIS PAIN) 1 % Gel Apply 2 g topically once daily.  200 g 0    ezetimibe (ZETIA) 10 mg tablet Take 1 tablet (10 mg total) by mouth once daily.  90 tablet 3    fluticasone (FLONASE SENSIMIST) 27.5 mcg/actuation nasal spray 2 sprays by Nasal route once daily.  6.6 mL 3    hyoscyamine (LEVSIN) 0.125 mg Subl Place 1 tablet (0.125 mg total) under the tongue every 6 (six) hours as needed.  30 tablet 1    lancets Misc To check BG ONCE daily, to use with insurance preferred meter  100 each 3    levocetirizine (XYZAL) 5 MG tablet Take 1 tablet (5 mg total) by mouth every evening.  90 tablet 3    levothyroxine (SYNTHROID) 88 MCG  tablet TAKE FIRST THING IN THE MORNING ON AN EMPTY STOMACH  90 tablet 3    LIDOcaine (LIDODERM) 5 % Place onto the skin once daily.  30 patch 0    losartan (COZAAR) 25 MG tablet Take 1 tablet (25 mg total) by mouth once daily.  30 tablet 11    meloxicam (MOBIC) 7.5 MG tablet Take 1 tablet (7.5 mg total) by mouth once daily.  14 tablet 0    nitroGLYCERIN (NITROSTAT) 0.4 MG SL tablet Place 1 tablet (0.4 mg total) under the tongue every 5 (five) minutes as needed for Chest pain. You can take up to 3 doses at home. If chest pain persists after second dose, go the ER. 6/14/2023: Pt never took med 25 tablet 4    pantoprazole (PROTONIX) 40 MG tablet TAKE 1 TABLET DAILY  90 tablet 2    RESTASIS 0.05 % ophthalmic emulsion INSTILL 1 DROP IN BOTH EYES TWICE A DAY  60 each 11    tiZANidine (ZANAFLEX) 4 MG tablet Take 1-2 tablets (4-8 mg total) by mouth every 8 (eight) hours as needed (muscle spasms).  60 tablet 2    vitamin D (VITAMIN D3) 1000 units Tab Take 1 tablet (1,000 Units total) by mouth once daily.  90 tablet 3    [DISCONTINUED] blood sugar diagnostic Strp To check BG ONCE daily, to use with insurance preferred meter  100 strip 3    blood sugar diagnostic Strp To check BG ONCE daily, to use with insurance preferred meter  100 strip 3    flu vac 2022 65up-capRY79I,PF, (FLUAD QUAD 2022-23,65Y UP,,PF,) 60 mcg (15 mcg x 4)/0.5 mL Syrg Inject into the muscle. (Patient not taking: Reported on 12/12/2024)  0.5 mL 0    metFORMIN (GLUCOPHAGE-XR) 500 MG ER 24hr tablet Take 1 tablet (500 mg total) by mouth daily with breakfast.  90 tablet 3    metFORMIN (GLUCOPHAGE-XR) 500 MG ER 24hr tablet Take 1 tablet (500 mg total) by mouth daily with breakfast.  10 tablet 0    [DISCONTINUED] ezetimibe (ZETIA) 10 mg tablet Take 1 tablet (10 mg total) by mouth once daily.  90 tablet 3    [DISCONTINUED] ketorolac 0.5% (ACULAR) 0.5 % Drop Place 1 drop into the left eye 3 (three) times daily. (Patient not taking: Reported on 12/12/2024)  5 mL 3     [DISCONTINUED] neomycin-polymyxin-hydrocortisone (CORTISPORIN) 3.5-10,000-1 mg/mL-unit/mL-% otic suspension Place 3 drops into the right ear 3 (three) times daily. (Patient not taking: Reported on 12/12/2024)  10 mL 0    [DISCONTINUED] prednisolon/gatiflox/bromfenac (PREDNISOL ACE-GATIFLOX-BROMFEN) 1-0.5-0.075 % DrpS Apply 1 drop to eye 3 (three) times daily. One drop 3 times a day in surgical eye (Patient not taking: Reported on 12/12/2024)  5 mL 3     Facility-Administered Encounter Medications as of 12/12/2024   Medication Dose Route Frequency Provider Last Rate Last Admin    balanced salt irrigation intra-ocular solution 1 drop  1 drop Left Eye On Call Procedure Ciara Mclaughlin MD        balanced salt irrigation intra-ocular solution 1 drop  1 drop Left Eye On Call Procedure Ciara Mclaughlin MD        mupirocin 2 % ointment   Nasal On Call Procedure Caesar Finch MD   Given at 10/16/23 0752    phenylephrine HCL 10% ophthalmic solution 1 drop  1 drop Left Eye PRN Ciara Mclaughlin MD        phenylephrine HCL 10% ophthalmic solution 1 drop  1 drop Left Eye PRN Ciara Mclaughlin MD        sodium chloride 0.9% flush 2 mL  2 mL Intravenous PRN Ciara Mclaughlin MD        sodium chloride 0.9% flush 2 mL  2 mL Intravenous PRN Ciara Mclaughlin MD            Allergies:  Review of patient's allergies indicates:   Allergen Reactions    Compazine  [prochlorperazine edisylate]      Other reaction(s): SPASMS    Parafon forte      Other reaction(s): Hives         Physical Exam         Vitals:    12/12/24 1314   BP: 120/80   Pulse: 62   Resp: 18   Temp: 98 °F (36.7 °C)         Physical Exam  Constitutional:       Appearance: She is well-developed.   Eyes:      Pupils: Pupils are equal, round, and reactive to light.   Neck:      Thyroid: No thyromegaly.   Cardiovascular:      Rate and Rhythm: Normal rate.      Heart sounds: Normal heart sounds. No murmur heard.     No friction rub. No gallop.   Pulmonary:       "Breath sounds: Normal breath sounds.   Abdominal:      General: Bowel sounds are normal.      Palpations: Abdomen is soft.   Musculoskeletal:         General: Normal range of motion.      Cervical back: Normal range of motion.   Lymphadenopathy:      Cervical: No cervical adenopathy.   Skin:     General: Skin is warm.      Findings: No rash.   Neurological:      Mental Status: She is alert and oriented to person, place, and time.      Cranial Nerves: No cranial nerve deficit.   Psychiatric:         Behavior: Behavior normal.          Laboratory:  CBC:  No results for input(s): "WBC", "RBC", "HGB", "HCT", "PLT", "MCV", "MCH", "MCHC" in the last 2160 hours.  CMP:  No results for input(s): "GLU", "CALCIUM", "ALBUMIN", "PROT", "NA", "K", "CO2", "CL", "BUN", "ALKPHOS", "ALT", "AST", "BILITOT" in the last 2160 hours.    Invalid input(s): "CREATININ"  URINALYSIS:  No results for input(s): "COLORU", "CLARITYU", "SPECGRAV", "PHUR", "PROTEINUA", "GLUCOSEU", "BILIRUBINCON", "BLOODU", "WBCU", "RBCU", "BACTERIA", "MUCUS", "NITRITE", "LEUKOCYTESUR", "UROBILINOGEN", "HYALINECASTS" in the last 2160 hours.   LIPIDS:  Recent Labs   Lab Result Units 12/10/24  1551   TSH uIU/mL 0.062*     TSH:  Recent Labs   Lab Result Units 12/10/24  1551   TSH uIU/mL 0.062*     A1C:  Recent Labs   Lab Result Units 12/10/24  1551   Hemoglobin A1C % 6.1*       Radiology:        Assessment:     Lissy Palencia is a 79 y.o.female with:    Aortic atherosclerosis    Type 2 diabetes mellitus without complication, without long-term current use of insulin  -     blood sugar diagnostic Strp; To check BG ONCE daily, to use with insurance preferred meter  Dispense: 100 strip; Refill: 3  -     metFORMIN (GLUCOPHAGE-XR) 500 MG ER 24hr tablet; Take 1 tablet (500 mg total) by mouth daily with breakfast.  Dispense: 90 tablet; Refill: 3    Coronary artery disease involving native coronary artery of native heart without angina pectoris    Hyperlipidemia associated with " type 2 diabetes mellitus    Primary hypothyroidism  -     US Thyroid; Future; Expected date: 12/12/2024    Primary hypertension    Osteoarthritis, unspecified osteoarthritis type, unspecified site    Other orders  -     metFORMIN (GLUCOPHAGE-XR) 500 MG ER 24hr tablet; Take 1 tablet (500 mg total) by mouth daily with breakfast.  Dispense: 10 tablet; Refill: 0          Plan:     Problem List Items Addressed This Visit       Hyperlipidemia associated with type 2 diabetes mellitus    Overview     Cont lipitor and zetia          Osteoarthritis    Overview     Multiple joints and back . Cont current          Coronary artery disease involving native coronary artery of native heart without angina pectoris    Overview     Now seeing dr linder . Lipid panel reviewed  . Cont current regimen added zetia         Primary hypothyroidism    Overview     Cont replacement , need stsh          Aortic atherosclerosis - Primary    Overview     Stable cont lipitor         Type 2 diabetes mellitus without complication, without long-term current use of insulin    Overview     Back on  metfornin .  A1c 6.1  . Cont current          Primary hypertension    Overview     Bp well controlled start on losartan             As above, continue current medications and maintain follow up with specialists.  Return to clinic in 6 months.      Frederick W Dantagnan [facility]

## 2024-12-23 ENCOUNTER — HOSPITAL ENCOUNTER (OUTPATIENT)
Dept: RADIOLOGY | Facility: HOSPITAL | Age: 79
Discharge: HOME | End: 2024-12-23
Attending: INTERNAL MEDICINE
Payer: MEDICARE

## 2024-12-23 DIAGNOSIS — E03.9 PRIMARY HYPOTHYROIDISM: ICD-10-CM

## 2024-12-23 PROCEDURE — 76536 US EXAM OF HEAD AND NECK: CPT | Mod: 26,,, | Performed by: STUDENT IN AN ORGANIZED HEALTH CARE EDUCATION/TRAINING PROGRAM

## 2024-12-23 PROCEDURE — 76536 US EXAM OF HEAD AND NECK: CPT | Mod: TC

## 2025-01-08 RX ORDER — ATORVASTATIN CALCIUM 80 MG/1
80 TABLET, FILM COATED ORAL
Qty: 90 TABLET | Refills: 3 | Status: SHIPPED | OUTPATIENT
Start: 2025-01-08

## 2025-01-28 ENCOUNTER — TELEPHONE (OUTPATIENT)
Dept: OPHTHALMOLOGY | Facility: CLINIC | Age: 80
End: 2025-01-28
Payer: MEDICARE

## 2025-01-28 NOTE — TELEPHONE ENCOUNTER
----- Message from Ni sent at 1/28/2025  9:20 AM CST -----  Regarding: Appt Request  Scheduling Request           Appt Type:     Date/Time Preference:     Treating Provider:Ciara Mclaughlin MD     Caller Name:Lissy Palencia     Contact Preference:[phone]     Comments/notes:Patient called about scheduling for laser.

## 2025-01-29 ENCOUNTER — TELEPHONE (OUTPATIENT)
Dept: OPTOMETRY | Facility: CLINIC | Age: 80
End: 2025-01-29
Payer: MEDICARE

## 2025-01-29 NOTE — TELEPHONE ENCOUNTER
----- Message from Med Assistant Su sent at 1/29/2025  2:40 PM CST -----  Per Dr. Mclaughlin's last note, Dr. Mclaughlin referring patient to Dr. Cruz for routine/ZACK follow up.  ----- Message -----  From: Opal Devine  Sent: 1/29/2025   2:27 PM CST  To: Lissette VAZQUEZ Staff    Type:  Sooner Apoointment Request  Caller is requesting a sooner appointment.  Name of Caller:Lissy  When is the first available appointment?soon  Symptoms:Annual  Would the patient rather a call back or a response via [facility]? call  Best Call Back Number:[phone]  Additional Information:

## 2025-02-22 DIAGNOSIS — Z00.00 ENCOUNTER FOR MEDICARE ANNUAL WELLNESS EXAM: ICD-10-CM

## 2025-03-03 ENCOUNTER — TELEPHONE (OUTPATIENT)
Dept: OPHTHALMOLOGY | Facility: CLINIC | Age: 80
End: 2025-03-03
Payer: MEDICARE

## 2025-03-03 ENCOUNTER — OFFICE VISIT (OUTPATIENT)
Dept: OPTOMETRY | Facility: CLINIC | Age: 80
End: 2025-03-03
Payer: MEDICARE

## 2025-03-03 DIAGNOSIS — H02.834 DERMATOCHALASIS OF BOTH UPPER EYELIDS: ICD-10-CM

## 2025-03-03 DIAGNOSIS — H16.223 KERATOCONJUNCTIVITIS SICCA NOT SPECIFIED AS SJOGREN'S, BILATERAL: Primary | ICD-10-CM

## 2025-03-03 DIAGNOSIS — H02.831 DERMATOCHALASIS OF BOTH UPPER EYELIDS: ICD-10-CM

## 2025-03-03 PROCEDURE — 99999 PR PBB SHADOW E&M-EST. PATIENT-LVL III: CPT | Mod: PBBFAC,,, | Performed by: OPTOMETRIST

## 2025-03-03 PROCEDURE — 99214 OFFICE O/P EST MOD 30 MIN: CPT | Mod: S$PBB,,, | Performed by: OPTOMETRIST

## 2025-03-03 PROCEDURE — 99213 OFFICE O/P EST LOW 20 MIN: CPT | Mod: PBBFAC | Performed by: OPTOMETRIST

## 2025-03-03 PROCEDURE — G2211 COMPLEX E/M VISIT ADD ON: HCPCS | Mod: S$PBB,,, | Performed by: OPTOMETRIST

## 2025-03-03 RX ORDER — CYCLOSPORINE 0.5 MG/ML
1 EMULSION OPHTHALMIC 2 TIMES DAILY
Qty: 180 EACH | Refills: 3 | Status: SHIPPED | OUTPATIENT
Start: 2025-03-03 | End: 2026-03-03

## 2025-03-03 NOTE — PROGRESS NOTES
HPI    Patient is here today dry eye eval per Dr. Mclaughlin. Denies pain, but   states that she is having discomfort due to dryness. She has not used any   drops today, so that her eyes are at their original dryness. States that   she occasionally has to use her drops more than twice a day on her really   dry days. Reports excessive itching, burning, glare and tearing. Notices a   difference in VA and several shadows when her eyes are dry. She is not   able to read without her glasses any longer and believes that this should   not be the case since after having her cataract sx, especially since she   paid extra for better lenses.   Restasis BID OU   Last edited by Luna Cruz, OD on 3/3/2025 10:32 AM.            Assessment /Plan     For exam results, see Encounter Report.    Keratoconjunctivitis sicca not specified as Sjogren's, bilateral  -     RESTASIS 0.05 % ophthalmic emulsion; Place 1 drop into both eyes 2 (two) times daily.  Dispense: 180 each; Refill: 3    Dermatochalasis of both upper eyelids      1. Refilled Restasis bid OU long term and added Refresh PF ATs as much as needed daily.    Today's visit is associated with current and anticipated ongoing medical care related to this patient's single serious/complex condition (cornea). Follow up is to be continued indefinitely to monitor the condition.     2. Patient wishes blepharoplasty consult--will reach out to Dr. Avalos's staff to get patient on waitlist. *May also consider Upneeq for patient    RTC x 1 month for dry eye recheck with refraction visit with me

## 2025-03-03 NOTE — TELEPHONE ENCOUNTER
Pt. was calling to see if she could have IOL replaced, but informed pt that since she already had Yag that is not possible. Pt stated that Dr. Cruz told her the same.   Pt. Does feel that when eye is wide open she is able to see better.  Advised pt to continue eye drops regiment and to wait for next appt with Dr. Cruz.    ----- Message from [employer] sent at 3/3/2025 10:07 AM CST -----  Patient would like a call in reference to her current IOL implants. She is currently seeing Dr. Cruz, so please call after noon.

## 2025-03-05 DIAGNOSIS — K21.9 GASTROESOPHAGEAL REFLUX DISEASE, UNSPECIFIED WHETHER ESOPHAGITIS PRESENT: ICD-10-CM

## 2025-03-05 RX ORDER — PANTOPRAZOLE SODIUM 40 MG/1
40 TABLET, DELAYED RELEASE ORAL
Qty: 90 TABLET | Refills: 3 | Status: SHIPPED | OUTPATIENT
Start: 2025-03-05

## 2025-03-05 NOTE — TELEPHONE ENCOUNTER
Care Due:                  Date            Visit Type   Department     Provider  --------------------------------------------------------------------------------                                EP -                              PRIMARY      [facility]  Last Visit: 12-      CARE ([facility])   AISSATOU Nicole                              EP -                              PRIMARY      [facility] PRIMARY  Next Visit: 06-      CARE ([facility])   AISSATOU Nicole                                                            Last  Test          Frequency    Reason                     Performed    Due Date  --------------------------------------------------------------------------------    Mg Level....  12 months..  alendronate..............  Not Found    Overdue    Phosphate...  12 months..  alendronate..............  Not Found    Overdue    [employer] Embedded Care Due Messages. Reference number: [id number].   3/05/2025 10:13:25 AM CST

## 2025-03-06 NOTE — TELEPHONE ENCOUNTER
Provider Staff:  Action required for this patient    Requires labs      Please see care gap opportunities below in Care Due Message.    Thanks!  [facility]     Appointments      Date Provider   Last Visit   12/12/2024 Drew Nicole MD   Next Visit   6/12/2025 Drew Nicole MD     Refill Decision Note   Lissy Palencia  is requesting a refill authorization.  Brief Assessment and Rationale for Refill:  Approve     Medication Therapy Plan:  FOVS      Comments:     Note composed:6:02 PM 03/05/2025

## 2025-04-03 ENCOUNTER — OFFICE VISIT (OUTPATIENT)
Dept: CARDIOLOGY | Facility: CLINIC | Age: 80
End: 2025-04-03
Payer: MEDICARE

## 2025-04-03 VITALS
HEIGHT: 62 IN | OXYGEN SATURATION: 95 % | DIASTOLIC BLOOD PRESSURE: 75 MMHG | WEIGHT: 152.13 LBS | SYSTOLIC BLOOD PRESSURE: 119 MMHG | BODY MASS INDEX: 27.99 KG/M2 | HEART RATE: 68 BPM

## 2025-04-03 DIAGNOSIS — I10 PRIMARY HYPERTENSION: ICD-10-CM

## 2025-04-03 DIAGNOSIS — E11.69 HYPERLIPIDEMIA ASSOCIATED WITH TYPE 2 DIABETES MELLITUS: ICD-10-CM

## 2025-04-03 DIAGNOSIS — E78.5 HYPERLIPIDEMIA ASSOCIATED WITH TYPE 2 DIABETES MELLITUS: ICD-10-CM

## 2025-04-03 DIAGNOSIS — I70.0 AORTIC ATHEROSCLEROSIS: ICD-10-CM

## 2025-04-03 DIAGNOSIS — I25.10 CORONARY ARTERY DISEASE INVOLVING NATIVE CORONARY ARTERY OF NATIVE HEART WITHOUT ANGINA PECTORIS: Primary | ICD-10-CM

## 2025-04-03 DIAGNOSIS — E11.9 TYPE 2 DIABETES MELLITUS WITHOUT COMPLICATION, WITHOUT LONG-TERM CURRENT USE OF INSULIN: ICD-10-CM

## 2025-04-03 PROCEDURE — 99214 OFFICE O/P EST MOD 30 MIN: CPT | Mod: PBBFAC | Performed by: INTERNAL MEDICINE

## 2025-04-03 PROCEDURE — 99999 PR PBB SHADOW E&M-EST. PATIENT-LVL IV: CPT | Mod: PBBFAC,,, | Performed by: INTERNAL MEDICINE

## 2025-04-03 PROCEDURE — 99214 OFFICE O/P EST MOD 30 MIN: CPT | Mod: S$PBB,,, | Performed by: INTERNAL MEDICINE

## 2025-04-03 RX ORDER — AMLODIPINE BESYLATE 2.5 MG/1
2.5 TABLET ORAL DAILY
Qty: 90 TABLET | Refills: 3 | Status: SHIPPED | OUTPATIENT
Start: 2025-04-03

## 2025-04-03 NOTE — ASSESSMENT & PLAN NOTE
Amlodipine 2.5 mg prescribed.    She never started losartan.  Today's blood pressure is normal.  Losartan 25 prescribed for blood pressure 145 systolic on previous office visit.    I ordered amlodipine based on previous recommendation for losartan as well as left arm pain, she thinks it might be angina.

## 2025-04-03 NOTE — ASSESSMENT & PLAN NOTE
Repatha recommended.  Most recent LDL 90.  She will price out the medication.  Leqvio is another good option for her which may not be very expensive.  She will continue Zetia and atorvastatin, well tolerated.

## 2025-04-03 NOTE — ASSESSMENT & PLAN NOTE
Her last heart catheterization was 2005 without any confirmed obstructive coronary disease.  She has history of stenting to LAD with atherectomy 1997.    Last stress test 2024 January.  She has normal ejection fraction.

## 2025-04-03 NOTE — PROGRESS NOTES
[facility] Cardiology     Subjective:    Patient ID:  Lissy Palencia is a 79 y.o. female who presents for follow-up of Hypertension, Hyperlipidemia, and Coronary Artery Disease    Review of patient's allergies indicates:   Allergen Reactions    Compazine  [prochlorperazine edisylate]      Other reaction(s): SPASMS    Parafon forte      Other reaction(s): Hives     She has a history of coronary disease with treatment to left anterior descending artery in 1997 with atherectomy and stenting.  A follow-up heart catheterization was done in 2005 for anginal symptoms with widely patent LAD and no progression of disease, stable coronary anatomy.  It is not documented she has ever had myocardial infarction but the report from 1997 is not reviewable currently.  I was able to review the 2005 heart catheterization.    On last visit she was advised to take losartan 25 mg for elevated blood pressures.  Since then she is reported some left arm discomfort intermittently.  She is concerned that it could be angina.  Last stress test was 2024 January, normal dobutamine stress echo.    Today's blood pressure is normal.  She did not tried nitroglycerin for her left arm pain but she has some at home.    She is on 80 atorvastatin plus Zetia.  Her LDLs are elevated currently 90 mg%.  She tries to eat a sensible diet.  She does eat cheese and ice cream some.  For Lent she gave up sugar.         Review of Systems   Constitutional: Negative for chills, decreased appetite, diaphoresis, fever, malaise/fatigue, night sweats, weight gain and weight loss.   HENT:  Negative for congestion, ear discharge, ear pain, hearing loss, hoarse voice, nosebleeds, odynophagia, sore throat, stridor and tinnitus.    Eyes:  Negative for blurred vision, discharge, double vision, pain, photophobia, redness, vision loss in left eye, vision loss in right eye, visual disturbance and visual halos.    Cardiovascular:  Negative for chest pain, claudication, cyanosis, dyspnea on exertion, irregular heartbeat, leg swelling, near-syncope, orthopnea, palpitations, paroxysmal nocturnal dyspnea and syncope.   Respiratory:  Negative for cough, hemoptysis, shortness of breath, sleep disturbances due to breathing, snoring, sputum production and wheezing.    Endocrine: Negative for cold intolerance, heat intolerance, polydipsia, polyphagia and polyuria.   Hematologic/Lymphatic: Negative for adenopathy and bleeding problem. Does not bruise/bleed easily.   Skin:  Negative for color change, dry skin, flushing, itching, nail changes, poor wound healing, rash, skin cancer, suspicious lesions and unusual hair distribution.   Musculoskeletal:  Negative for arthritis, back pain, falls, gout, joint pain, joint swelling, muscle cramps, muscle weakness, myalgias, neck pain and stiffness.        Left arm and shoulder tingling, discomfort intermittent, several episodes reported not activity related.   Gastrointestinal:  Negative for bloating, abdominal pain, anorexia, change in bowel habit, bowel incontinence, constipation, diarrhea, dysphagia, excessive appetite, flatus, heartburn, hematemesis, hematochezia, hemorrhoids, jaundice, melena, nausea and vomiting.   Genitourinary:  Negative for bladder incontinence, decreased libido, dysuria, flank pain, frequency, genital sores, hematuria, hesitancy, incomplete emptying, nocturia and urgency.   Neurological:  Negative for aphonia, brief paralysis, difficulty with concentration, disturbances in coordination, excessive daytime sleepiness, dizziness, focal weakness, headaches, light-headedness, loss of balance, numbness, paresthesias, seizures, sensory change, tremors, vertigo and weakness.   Psychiatric/Behavioral:  Negative for altered mental status, depression, hallucinations, memory loss, substance abuse, suicidal ideas and thoughts of violence. The patient does not have insomnia and is  "not nervous/anxious.    Allergic/Immunologic: Negative for hives and persistent infections.        Objective:       Vitals:    04/03/25 1439   BP: 119/75   BP Location: Left arm   Patient Position: Sitting   Pulse: 68   SpO2: 95%   Weight: 69 kg (152 lb 1.9 oz)   Height: 5' 2" (1.575 m)    Physical Exam  Constitutional:       General: She is not in acute distress.     Appearance: She is well-developed. She is not diaphoretic.   HENT:      Head: Normocephalic and atraumatic.      Nose: Nose normal.   Eyes:      General: No scleral icterus.        Right eye: No discharge.      Conjunctiva/sclera: Conjunctivae normal.      Pupils: Pupils are equal, round, and reactive to light.   Neck:      Thyroid: No thyromegaly.      Vascular: No JVD.      Trachea: No tracheal deviation.   Cardiovascular:      Rate and Rhythm: Normal rate and regular rhythm.      Pulses:           Carotid pulses are 2+ on the right side and 2+ on the left side.       Radial pulses are 2+ on the right side and 2+ on the left side.        Dorsalis pedis pulses are 2+ on the right side and 2+ on the left side.        Posterior tibial pulses are 2+ on the right side and 2+ on the left side.      Heart sounds: Normal heart sounds. No murmur heard.     No friction rub. No gallop.   Pulmonary:      Effort: Pulmonary effort is normal. No respiratory distress.      Breath sounds: Normal breath sounds. No stridor. No wheezing or rales.   Chest:      Chest wall: No tenderness.   Abdominal:      General: Bowel sounds are normal. There is no distension.      Palpations: Abdomen is soft. There is no mass.      Tenderness: There is no abdominal tenderness. There is no guarding or rebound.   Musculoskeletal:         General: No tenderness. Normal range of motion.      Cervical back: Normal range of motion and neck supple.   Lymphadenopathy:      Cervical: No cervical adenopathy.   Skin:     General: Skin is warm and dry.      Coloration: Skin is not pale.      " Findings: No erythema or rash.   Neurological:      Mental Status: She is alert and oriented to person, place, and time.      Cranial Nerves: No cranial nerve deficit.      Coordination: Coordination normal.   Psychiatric:         Behavior: Behavior normal.         Thought Content: Thought content normal.         Judgment: Judgment normal.           Assessment:       1. Coronary artery disease involving native coronary artery of native heart without angina pectoris    2. Hyperlipidemia associated with type 2 diabetes mellitus    3. Aortic atherosclerosis    4. Primary hypertension    5. Type 2 diabetes mellitus without complication, without long-term current use of insulin      Results for orders placed or performed in visit on 12/10/24   Microalbumin/Creatinine Ratio, Urine    Collection Time: 12/10/24  3:48 PM   Result Value Ref Range    Microalbumin, Urine <5.0 ug/mL    Creatinine, Urine 33.0 15.0 - 325.0 mg/dL    Microalb/Creat Ratio Unable to calculate 0.0 - 30.0 ug/mg     *Note: Due to a large number of results and/or encounters for the requested time period, some results have not been displayed. A complete set of results can be found in Results Review.       Current Medications[1]     Lab Results   Component Value Date    WBC 6.52 06/18/2024    RBC 5.11 06/18/2024    HGB 14.7 06/18/2024    HCT 44.0 06/18/2024    MCV 86 06/18/2024    MCH 28.8 06/18/2024    MCHC 33.4 06/18/2024    RDW 13.4 06/18/2024     06/18/2024    MPV 10.1 06/18/2024    GRAN 3.6 06/18/2024    GRAN 55.6 06/18/2024    LYMPH 2.1 06/18/2024    LYMPH 31.7 06/18/2024    MONO 0.5 06/18/2024    MONO 7.4 06/18/2024    EOS 0.3 06/18/2024    BASO 0.04 06/18/2024    EOSINOPHIL 4.4 06/18/2024    BASOPHIL 0.6 06/18/2024    MG 2.0 05/24/2008        CMP  Lab Results   Component Value Date     06/18/2024    K 5.0 06/18/2024     06/18/2024    CO2 27 06/18/2024     (H) 06/18/2024    BUN 16 06/18/2024    CREATININE 0.9 06/18/2024     CALCIUM 9.0 06/18/2024    PROT 6.7 06/18/2024    ALBUMIN 3.7 06/18/2024    BILITOT 0.7 06/18/2024    ALKPHOS 95 06/18/2024    AST 23 06/18/2024    ALT 24 06/18/2024    ANIONGAP 8 06/18/2024    ESTGFRAFRICA >60.0 09/16/2021    EGFRNONAA >60.0 09/16/2021        Lab Results   Component Value Date    LABURIN (A) 11/14/2019     STREPTOCOCCUS AGALACTIAE (GROUP B)  10,000 - 49,999 cfu/ml  Beta-hemolytic streptococci are routinely susceptible to   penicillins,cephalosporins and carbapenems.              Results for orders placed or performed in visit on 08/09/22   IN OFFICE EKG 12-LEAD (to [place])    Collection Time: 08/09/22  1:44 PM    Narrative    Test Reason : K21.9,R13.10,R94.31,    Vent. Rate : 063 BPM     Atrial Rate : 063 BPM     P-R Int : 130 ms          QRS Dur : 084 ms      QT Int : 410 ms       P-R-T Axes : 018 -10 074 degrees     QTc Int : 419 ms    Normal sinus rhythm  Normal ECG  When compared with ECG of 30-JUL-2022 18:47,  Nonspecific ST and/or T wave abnormalities No longer present  Confirmed by Lisa Meyer MD ([id number]) on 8/9/2022 4:32:59 PM    Referred By: [email or web address]   SELF           Confirmed By:Lisa Meyer MD                   Plan:       Problem List Items Addressed This Visit          Cardiac/Vascular    Hyperlipidemia associated with type 2 diabetes mellitus    Repatha recommended.  Most recent LDL 90.  She will price out the medication.  Leqvio is another good option for her which may not be very expensive.  She will continue Zetia and atorvastatin, well tolerated.         Relevant Medications    evolocumab (REPATHA SURECLICK) 140 mg/mL PnIj    Coronary artery disease involving native coronary artery of native heart without angina pectoris - Primary    Her last heart catheterization was 2005 without any confirmed obstructive coronary disease.  She has history of stenting to LAD with atherectomy 1997.    Last stress test 2024 January.  She has normal ejection fraction.         Relevant Medications     amLODIPine (NORVASC) 2.5 MG tablet    Aortic atherosclerosis    Condition stable.         Primary hypertension    Amlodipine 2.5 mg prescribed.    She never started losartan.  Today's blood pressure is normal.  Losartan 25 prescribed for blood pressure 145 systolic on previous office visit.    I ordered amlodipine based on previous recommendation for losartan as well as left arm pain, she thinks it might be angina.            Endocrine    Type 2 diabetes mellitus without complication, without long-term current use of insulin    She is on metformin.  Condition unchanged.               Reassurance provided regarding her left arm pain.  I am not convinced it is angina.  Because of intermittent high readings on blood pressure, mild elevation-amlodipine 2.5 mg prescribed.  I told her not to fill the losartan since she has not filled it since she was given the medication in prescription form.    I ordered Repatha for her.  She is interested.    Six-month follow-up recommended.             Reynaldo Tan MD  04/03/2025   3:15 PM               [1]   Current Outpatient Medications:     alendronate (FOSAMAX) 70 MG tablet, TAKE 1 TABLET EVERY 7 DAYS, Disp: 12 tablet, Rfl: 3    aspirin (ECOTRIN) 81 MG EC tablet, Take 81 mg by mouth once daily., Disp: , Rfl:     atorvastatin (LIPITOR) 80 MG tablet, TAKE 1 TABLET DAILY, Disp: 90 tablet, Rfl: 3    ezetimibe (ZETIA) 10 mg tablet, Take 1 tablet (10 mg total) by mouth once daily., Disp: 90 tablet, Rfl: 3    fluticasone (FLONASE SENSIMIST) 27.5 mcg/actuation nasal spray, 2 sprays by Nasal route once daily., Disp: 6.6 mL, Rfl: 3    levothyroxine (SYNTHROID) 88 MCG tablet, TAKE FIRST THING IN THE MORNING ON AN EMPTY STOMACH, Disp: 90 tablet, Rfl: 3    metFORMIN (GLUCOPHAGE-XR) 500 MG ER 24hr tablet, Take 1 tablet (500 mg total) by mouth daily with breakfast., Disp: 90 tablet, Rfl: 3    metFORMIN (GLUCOPHAGE-XR) 500 MG ER 24hr tablet, Take 1 tablet (500 mg total) by mouth daily with  breakfast., Disp: 10 tablet, Rfl: 0    nitroGLYCERIN (NITROSTAT) 0.4 MG SL tablet, Place 1 tablet (0.4 mg total) under the tongue every 5 (five) minutes as needed for Chest pain. You can take up to 3 doses at home. If chest pain persists after second dose, go the ER., Disp: 25 tablet, Rfl: 4    pantoprazole (PROTONIX) 40 MG tablet, TAKE 1 TABLET DAILY, Disp: 90 tablet, Rfl: 3    tiZANidine (ZANAFLEX) 4 MG tablet, Take 1-2 tablets (4-8 mg total) by mouth every 8 (eight) hours as needed (muscle spasms)., Disp: 60 tablet, Rfl: 2    vitamin D (VITAMIN D3) 1000 units Tab, Take 1 tablet (1,000 Units total) by mouth once daily., Disp: 90 tablet, Rfl: 3    amLODIPine (NORVASC) 2.5 MG tablet, Take 1 tablet (2.5 mg total) by mouth once daily., Disp: 90 tablet, Rfl: 3    azelastine (ASTELIN) 137 mcg (0.1 %) nasal spray, 1 spray (137 mcg total) by Nasal route 2 (two) times daily., Disp: 30 mL, Rfl: 3    blood sugar diagnostic Strp, To check BG ONCE daily, to use with insurance preferred meter, Disp: 100 strip, Rfl: 3    blood-glucose meter kit, To check BG ONCE THE daily, to use with insurance preferred meter, Disp: 1 each, Rfl: 1    evolocumab (REPATHA SURECLICK) 140 mg/mL PnIj, Inject 1 mL (140 mg total) into the skin every 14 (fourteen) days., Disp: 2 mL, Rfl: 3    flu vac 2022 65up-chwPD75V,PF, (FLUAD QUAD 2022-23,65Y UP,,PF,) 60 mcg (15 mcg x 4)/0.5 mL Syrg, Inject into the muscle., Disp: 0.5 mL, Rfl: 0    lancets Misc, To check BG ONCE daily, to use with insurance preferred meter, Disp: 100 each, Rfl: 3    LIDOcaine (LIDODERM) 5 %, Place onto the skin once daily., Disp: 30 patch, Rfl: 0    RESTASIS 0.05 % ophthalmic emulsion, Place 1 drop into both eyes 2 (two) times daily., Disp: 180 each, Rfl: 3  No current facility-administered medications for this visit.    Facility-Administered Medications Ordered in Other Visits:     balanced salt irrigation intra-ocular solution 1 drop, 1 drop, Left Eye, On Call Procedure, Lissette,  Ciara VAZQUEZ MD    balanced salt irrigation intra-ocular solution 1 drop, 1 drop, Left Eye, On Call Procedure, Ciara Mclaughlin MD    mupirocin 2 % ointment, , Nasal, On Call Procedure, Caesar Finch MD, Given at 10/16/23 0752    phenylephrine HCL 10% ophthalmic solution 1 drop, 1 drop, Left Eye, PRN, Ciara Mclaughlin MD    phenylephrine HCL 10% ophthalmic solution 1 drop, 1 drop, Left Eye, PRN, Ciara Mclaughlin MD    sodium chloride 0.9% flush 2 mL, 2 mL, Intravenous, PRN, Ciara Mclaughlin MD    sodium chloride 0.9% flush 2 mL, 2 mL, Intravenous, PRN, Ciara Mclaughlin MD

## 2025-04-07 ENCOUNTER — OFFICE VISIT (OUTPATIENT)
Dept: OPTOMETRY | Facility: CLINIC | Age: 80
End: 2025-04-07
Payer: MEDICARE

## 2025-04-07 DIAGNOSIS — H02.831 DERMATOCHALASIS OF BOTH UPPER EYELIDS: ICD-10-CM

## 2025-04-07 DIAGNOSIS — Z96.1 PSEUDOPHAKIA OF BOTH EYES: ICD-10-CM

## 2025-04-07 DIAGNOSIS — H16.223 KERATOCONJUNCTIVITIS SICCA NOT SPECIFIED AS SJOGREN'S, BILATERAL: Primary | ICD-10-CM

## 2025-04-07 DIAGNOSIS — H02.834 DERMATOCHALASIS OF BOTH UPPER EYELIDS: ICD-10-CM

## 2025-04-07 PROCEDURE — G2211 COMPLEX E/M VISIT ADD ON: HCPCS | Mod: S$PBB,,, | Performed by: OPTOMETRIST

## 2025-04-07 PROCEDURE — 99214 OFFICE O/P EST MOD 30 MIN: CPT | Mod: S$PBB,,, | Performed by: OPTOMETRIST

## 2025-04-07 PROCEDURE — 99999 PR PBB SHADOW E&M-EST. PATIENT-LVL III: CPT | Mod: PBBFAC,,, | Performed by: OPTOMETRIST

## 2025-04-07 PROCEDURE — 99213 OFFICE O/P EST LOW 20 MIN: CPT | Mod: PBBFAC | Performed by: OPTOMETRIST

## 2025-04-07 PROCEDURE — 92015 DETERMINE REFRACTIVE STATE: CPT | Mod: ,,, | Performed by: OPTOMETRIST

## 2025-04-07 RX ORDER — CYCLOSPORINE 0.5 MG/ML
1 EMULSION OPHTHALMIC 2 TIMES DAILY
Qty: 180 EACH | Refills: 3 | Status: SHIPPED | OUTPATIENT
Start: 2025-04-07 | End: 2026-04-07

## 2025-04-07 NOTE — PROGRESS NOTES
HPI    Patient is here today for dry eye eval and refraction. Denies pain.   Reports discomfort due to burning and itching. Feels as though va is   blurred because she always drops in her eyes which causes her to be all   over the place. States that her thinking is off now that her vision is   blurred and that it has a torturing effect. States that her glasses are no   longer working well for her although she can still see with them, just not   very sharp. She sees better without her glasses but still uses them to   read.  Refresh TID OU  Restasis BID OU   Last edited by Luna Cruz, OD on 4/7/2025  2:58 PM.            Assessment /Plan     For exam results, see Encounter Report.    Keratoconjunctivitis sicca not specified as Sjogren's, bilateral  -     RESTASIS 0.05 % ophthalmic emulsion; Place 1 drop into both eyes 2 (two) times daily.  Dispense: 180 each; Refill: 3    Pseudophakia of both eyes    Dermatochalasis of both upper eyelids      1. Refilled Restasis bid OU long term and added Refresh PF ATs as much as needed daily. Also begin short trial course of Eysuvis bid OU for conj hyperemia. Will check in with pt in 1 week via phone call to see if pt would like to continue on Eysuvis.    Today's visit is associated with current and anticipated ongoing medical care related to this patient's single serious/complex condition (cornea). Follow up is to be continued indefinitely to monitor the condition.     2. Educated pt on multifocal IOL non-adapt despite adequate visual outcome    Eyeglass Final Rx       Eyeglass Final Rx         Sphere Cylinder Axis Add    Right -0.50 +0.50 015 +2.50    Left -0.25 +0.50 020 +2.50      Type: PAL    Expiration Date: 4/7/2026                   3. Referred pt to Dr. Christianson for blepharoplasty consult. Pt does notice a vast improvement in clarity of vision/ease of focus with her multifocal IOLs when lids are raised manually and also when pt lifts her brow muscles.

## 2025-05-06 NOTE — PROGRESS NOTES
OTOLARYNGOLOGY- FACIAL PLASTIC & RECONSTRUCTIVE SURGERY      Lissy Palencia  [id number]    CC: Difficulty seeing    HISTORY OF PRESENT ILLNESS: Lissy Palencia  is a 80 y.o. female who was referred by Dr. Luna Cruz for ptosis and/or dermatochalasis.  The patient reports heaviness of her upper eyelids which has been present for many years but more worse recently.   She additionally reports difficulty driving at night (does not do so anymore), difficulty reading/using the computer, and having enough light in her visual fields. She states that she sees better when she manually lifts her eyelids or raises her eyebrows. She feels fatigued in her forehead due to excessive muscle activation..      She admits history of dry eyes and is currently on Restasis.  She has prior history of cataract surgery    Past Medical History  She has a past medical history of Allergy, Arthritis, Cancer, Cystocele, GERD (gastroesophageal reflux disease), Hemorrhoids, History of cholelithiasis, Hypothyroidism, Liver disease, Obesity, Primary hypertension, Right shoulder pain, Sleep apnea, Trigger finger, right 3rd finger, Vaginal atrophy, and Vaginal vault prolapse.    Past Surgical History  She has a past surgical history that includes Tonsillectomy; Shoulder arthroscopy w/ rotator cuff repair; Total abdominal hysterectomy; Adenoidectomy; Tubal ligation; heart cath (2005); Cholecystectomy; Oophorectomy; Ablation colpoclesis; Coronary stent placement; Esophagogastroduodenoscopy (N/A, 11/28/2018); Colonoscopy (N/A, 11/28/2018); Cataract extraction w/  intraocular lens implant (Right, 11/23/2022); Cataract extraction w/  intraocular lens implant (Left, 12/22/2022); Intraocular lens exchange (Left, 6/14/2023); Trigger finger release (Right, 10/16/2023); Esophagogastroduodenoscopy (N/A, 7/10/2024); and Endoscopic ultrasound of upper gastrointestinal tract (N/A, 7/10/2024).    Family History  Her family history is not on file.    Social  History  She reports that she has never smoked. She has never used smokeless tobacco. She reports current alcohol use of about 1.0 standard drink of alcohol per week. She reports that she does not use drugs.    Allergies  She is allergic to compazine  [prochlorperazine edisylate] and parafon forte.    Medications  She has a current medication list which includes the following prescription(s): alendronate, amlodipine, aspirin, atorvastatin, azelastine, blood sugar diagnostic, blood-glucose meter, evolocumab, ezetimibe, fluad quad 2022-23(65y up)(pf), flonase sensimist, lancets, levothyroxine, lidocaine, metformin, metformin, nitroglycerin, pantoprazole, restasis, tizanidine, and vitamin d, and the following Facility-Administered Medications: balanced salt irrigation, balanced salt irrigation, mupirocin, phenylephrine hcl 10%, phenylephrine hcl 10%, sodium chloride 0.9%, and sodium chloride 0.9%.      Review of Systems  See above    PHYSICAL EXAM:  /83 (BP Location: Left arm, Patient Position: Sitting)   Pulse 61   Wt 68.8 kg (151 lb 10.8 oz)   BMI 27.74 kg/m²     General: Alert and oriented in no acute distress  Head and Face: Normocephaic, atruamatic  Nose: Anterior rhinoscopy reveals overall normal appearing turbinates, septum midline, no obvious lesions or masses  Neurological: Cranial nerves are grossly intact  Skin: Skin texture/appearance is appropriate for age  Eyes:   EOMI, PERRLA. sclera clear.    severe, with excessive skin overhanging the upper lid margin  R > L dermatochalasis   MRD1:     OD: 2mm    OS: 2.5mm  Ears: Pinna are normal in shape and position  EACs healthy appearing bilaterally  TMs clear without AIDAN or perforation bilaterally  Oral cavity and Oropharynx: Mucous membranes moist without lesions present.  Tongue protrudes midline and palate elevates midline.  Neck: Supple without LAD.    Lungs:breathing comfortably  Psychiatric:  Mood and affect are  normal        Imaging:  N/A    ASSESSMENT:   1. Dermatochalasis of both upper eyelids            PLAN:     I had an elaborate discussion with the patient regarding her condition and the further workup and management options. They are in understanding of the above stated plan.    I will place a referral for visual field testing.      Given the patient's functional deficits and clinical history, they are a good candidate for functional blepharoplasty, which can be performed under local anesthesia in clinic.  The patient would likely benefit from a brow lift given her brow ptosis, however given her history of dry eyes I want to be conservative and minimalist in her surgical approach.  I stated we can always address the brow at a later date.  We will provide a Valium supplementation for additional anxiolysis.  I will have the patient return to clinic and further discuss surgery once her visual field test has been completed.    We discussed in detail the operative plan as outlined above. The risks, benefits, and alternatives of the procedure were discussed in detail including but not limited to bleeding, infection, pain, scar, vision change, worsening of dry eyes, failure to improve, asymmetry or irregularity, and need for repeat procedures. We also discussed the expected post operative course and time frame for final healing. Lissy expressed understanding of the procedure, had all questions answered.      Jerel Christianson MD  Facial Plastic and Reconstructive Surgeon  [facility] - [facility]

## 2025-05-07 ENCOUNTER — OFFICE VISIT (OUTPATIENT)
Dept: OTOLARYNGOLOGY | Facility: CLINIC | Age: 80
End: 2025-05-07
Payer: MEDICARE

## 2025-05-07 VITALS
BODY MASS INDEX: 27.74 KG/M2 | SYSTOLIC BLOOD PRESSURE: 137 MMHG | WEIGHT: 151.69 LBS | HEART RATE: 61 BPM | DIASTOLIC BLOOD PRESSURE: 83 MMHG

## 2025-05-07 DIAGNOSIS — H02.831 DERMATOCHALASIS OF BOTH UPPER EYELIDS: Primary | ICD-10-CM

## 2025-05-07 DIAGNOSIS — H02.834 DERMATOCHALASIS OF BOTH UPPER EYELIDS: Primary | ICD-10-CM

## 2025-05-07 PROCEDURE — 99999 PR PBB SHADOW E&M-EST. PATIENT-LVL II: CPT | Mod: PBBFAC,,, | Performed by: STUDENT IN AN ORGANIZED HEALTH CARE EDUCATION/TRAINING PROGRAM

## 2025-05-07 PROCEDURE — 99212 OFFICE O/P EST SF 10 MIN: CPT | Mod: PBBFAC | Performed by: STUDENT IN AN ORGANIZED HEALTH CARE EDUCATION/TRAINING PROGRAM

## 2025-05-07 NOTE — LETTER
May 7, 2025        Luna Cruz, OD  [address] [id number]       Dr. Cruz - thanks for sending Lissy over.  I am working on getting her visual field tests and she likely would benefit from functional upper eyelid blepharoplasty that I can perform in clinic under local anesthesia.  We did talk extensively about her history of dry eyes which we will monitor in the setting of upper lid surgery. She currently is doing fairly well on Restasis.  Look forward to hopefully meeting you in person sometime soon.       [facility]  [address] [address]  Phone: [phone]   Patient: Lissy Palencia   MR Number: [id number]   YOB: 1945   Date of Visit: 5/7/2025       Dear Dr. Cruz:    Thank you for referring Lissy Palencia to me for evaluation. Below are the relevant portions of my assessment and plan of care.            If you have questions, please do not hesitate to call me. I look forward to following Lissy along with you.    Sincerely,      Jerel Christianson MD           CC  No Recipients

## 2025-05-07 NOTE — LETTER
May 7, 2025        Sharlene Su - I have ordered Goldmann VFT, would you mind helping facilitate scheduling this test for her?  Thank you so much. - Jerel        [facility] [place] [facility]  [address]  [place] [address]  Phone: [phone]   Patient: Lissy Palencia   MR Number: [id number]   YOB: 1945   Date of Visit: 5/7/2025       Dear Dr. Summers:    Thank you for referring Lissy Palencia to me for evaluation. Below are the relevant portions of my assessment and plan of care.            If you have questions, please do not hesitate to call me. I look forward to following Lissy along with you.    Sincerely,      Jerel Christianson MD           CC  No Recipients      Consult Note - Wound   Pacheco Arringtono 80 y o  male MRN: [id number]  Unit/Bed#: [facility] 504-01 Encounter: [id number]        History and Present Illness:  Patient is 81 yo male admitted to [facility] with CHF  Patient has external urinary catheter and is incontinent of bowel  Patient is min assist with turning from side to side  Assessment Findings:     1  Right ear- small thickness wound with pink/red tissue from oxygen tubing rubbing on ear, small amount of sanguineous drainage  No induration, fluctuance, odor, warmth/temperature differences, redness, or purulence noted to the above noted wounds and skin areas assessed  New dressings applied per orders listed below  Patient tolerated well- no s/s of non-verbal pain or discomfort observed during the encounter  Bedside nurse aware of plan of care  See flow sheets for more detailed assessment findings  Wound care will sign off  Skin care plans:  1-Hydraguard to bilateral sacrum, buttock and heels BID and PRN  2-Elevate heels to offload pressure  3-Ehob cushion in chair when out of bed  4-Moisturize skin daily with skin nourishing cream   5-Turn/reposition q2h or when medically stable for pressure re-distribution on skin  6-Cleanse right ear wound with normal saline, apply Xeroform gauze to wound bed and cover with gauze, secure with oxygen tubing, change every other day       Wounds:  Wound 07/30/22 Pressure Injury Ear Right;Upper (Active)   Wound Image   08/01/22 1148   Wound Description Beefy red;Pink 08/01/22 1148   Pressure Injury Stage 3 08/01/22 1148   Ciarra-wound Assessment Clean;Dry; Intact 08/01/22 1148   Wound Length (cm) 0 8 cm 08/01/22 1148   Wound Width (cm) 0 2 cm 08/01/22 1148   Wound Depth (cm) 0 2 cm 08/01/22 1148   Wound Surface Area (cm^2) 0 16 cm^2 08/01/22 1148   Wound Volume (cm^3) 0 032 cm^3 08/01/22 1148   Calculated Wound Volume (cm^3) 0 03 cm^3 08/01/22 1148   Tunneling 0 cm 08/01/22 1148   Tunneling in depth located at 0 08/01/22 1148 Undermining 0 08/01/22 1148   Undermining is depth extending from 0 08/01/22 1148   Wound Site Closure BELINDA 08/01/22 1148   Drainage Amount Scant 08/01/22 1148   Drainage Description Sanguineous 08/01/22 1148   Non-staged Wound Description Full thickness 08/01/22 1148   Treatments Cleansed 08/01/22 1148   Dressing Gauze; Xeroform 08/01/22 1148   Wound packed? No 08/01/22 1148   Packing- # removed 0 08/01/22 1148   Packing- # inserted 0 08/01/22 1148   Dressing Changed New 08/01/22 1148   Patient Tolerance Tolerated well 08/01/22 1148   Dressing Status Dry;Clean; Intact 08/01/22 1148       Melissa Mayo RN, Fabio Bishop

## 2025-05-08 DIAGNOSIS — E78.5 HYPERLIPIDEMIA ASSOCIATED WITH TYPE 2 DIABETES MELLITUS: ICD-10-CM

## 2025-05-08 DIAGNOSIS — E11.69 HYPERLIPIDEMIA ASSOCIATED WITH TYPE 2 DIABETES MELLITUS: ICD-10-CM

## 2025-05-08 NOTE — TELEPHONE ENCOUNTER
----- Message from Susana sent at 5/7/2025  4:41 PM CDT -----  Regarding: Refill  Patient medication was sent to [facility] and she's asking for her refill evolocumab (REPATHA SURECLICK) 140 mg/mL PnIj be sent to [facility] - [facility] [place] [address] Phone: [phone]: [phone]

## 2025-05-21 ENCOUNTER — CLINICAL SUPPORT (OUTPATIENT)
Dept: OPHTHALMOLOGY | Facility: CLINIC | Age: 80
End: 2025-05-21
Attending: STUDENT IN AN ORGANIZED HEALTH CARE EDUCATION/TRAINING PROGRAM
Payer: MEDICARE

## 2025-05-21 DIAGNOSIS — H02.834 DERMATOCHALASIS OF BOTH UPPER EYELIDS: ICD-10-CM

## 2025-05-21 DIAGNOSIS — H02.831 DERMATOCHALASIS OF BOTH UPPER EYELIDS: ICD-10-CM

## 2025-05-21 NOTE — PROGRESS NOTES
Assessment /Plan     For exam results, see Encounter Report.    Dermatochalasis of both upper eyelids  -     Goldmann Perimetry - OU - Both Eyes      PTOSIS VF DONE OU     REL & FIX =  GOOD OU      COOP =     GOOD     PATIENT HAS NO ALLERGIES TO LATEX OR ADHESIVES AT THIS TIME    JOMAS

## 2025-06-12 ENCOUNTER — OFFICE VISIT (OUTPATIENT)
Dept: PRIMARY CARE CLINIC | Facility: CLINIC | Age: 80
End: 2025-06-12
Payer: MEDICARE

## 2025-06-12 ENCOUNTER — CLINICAL SUPPORT (OUTPATIENT)
Dept: PRIMARY CARE CLINIC | Facility: CLINIC | Age: 80
End: 2025-06-12
Attending: INTERNAL MEDICINE
Payer: MEDICARE

## 2025-06-12 VITALS
SYSTOLIC BLOOD PRESSURE: 120 MMHG | WEIGHT: 152.13 LBS | BODY MASS INDEX: 27.99 KG/M2 | DIASTOLIC BLOOD PRESSURE: 80 MMHG | HEIGHT: 62 IN | OXYGEN SATURATION: 98 % | RESPIRATION RATE: 18 BRPM | HEART RATE: 71 BPM

## 2025-06-12 DIAGNOSIS — E11.9 TYPE 2 DIABETES MELLITUS WITHOUT COMPLICATION, WITHOUT LONG-TERM CURRENT USE OF INSULIN: ICD-10-CM

## 2025-06-12 DIAGNOSIS — I10 PRIMARY HYPERTENSION: ICD-10-CM

## 2025-06-12 DIAGNOSIS — E03.4 HYPOTHYROIDISM DUE TO ACQUIRED ATROPHY OF THYROID: ICD-10-CM

## 2025-06-12 DIAGNOSIS — E11.69 HYPERLIPIDEMIA ASSOCIATED WITH TYPE 2 DIABETES MELLITUS: ICD-10-CM

## 2025-06-12 DIAGNOSIS — I70.0 AORTIC ATHEROSCLEROSIS: ICD-10-CM

## 2025-06-12 DIAGNOSIS — E03.9 PRIMARY HYPOTHYROIDISM: ICD-10-CM

## 2025-06-12 DIAGNOSIS — I25.10 CORONARY ARTERY DISEASE INVOLVING NATIVE CORONARY ARTERY OF NATIVE HEART WITHOUT ANGINA PECTORIS: Primary | ICD-10-CM

## 2025-06-12 DIAGNOSIS — G47.33 OSA (OBSTRUCTIVE SLEEP APNEA): ICD-10-CM

## 2025-06-12 DIAGNOSIS — R10.33 UMBILICAL PAIN: ICD-10-CM

## 2025-06-12 DIAGNOSIS — I11.9 HYPERTENSIVE ARTERIOSCLEROTIC CARDIOVASCULAR DISEASE: ICD-10-CM

## 2025-06-12 DIAGNOSIS — E78.5 HYPERLIPIDEMIA ASSOCIATED WITH TYPE 2 DIABETES MELLITUS: ICD-10-CM

## 2025-06-12 PROCEDURE — 92228 IMG RTA DETC/MNTR DS PHY/QHP: CPT | Mod: TC,PBBFAC

## 2025-06-12 PROCEDURE — 99999 PR PBB SHADOW E&M-EST. PATIENT-LVL IV: CPT | Mod: PBBFAC,,, | Performed by: INTERNAL MEDICINE

## 2025-06-12 PROCEDURE — 92228 IMG RTA DETC/MNTR DS PHY/QHP: CPT | Mod: PBBFAC

## 2025-06-12 PROCEDURE — 99214 OFFICE O/P EST MOD 30 MIN: CPT | Mod: PBBFAC | Performed by: INTERNAL MEDICINE

## 2025-06-12 RX ORDER — METFORMIN HYDROCHLORIDE 500 MG/1
500 TABLET, EXTENDED RELEASE ORAL
Qty: 90 TABLET | Refills: 3 | Status: SHIPPED | OUTPATIENT
Start: 2025-06-12

## 2025-06-12 NOTE — PROGRESS NOTES
[facility] Note    Chief Complaint      Chief Complaint   Patient presents with    Follow-up     6m       History of Present Illness      Lissy Palencia is a 80 y.o. female who presents today for   Chief Complaint   Patient presents with    Follow-up     6m   .  Patient comes to appointment here for 6m checkup for chronic issues as below . She needs full labs with this visit . She complains of rendness and pain in umbilical region ever since her lap clifford 10 yesrs ago . She needs eye cam for retina screen .     Problem List Items Addressed This Visit       Hyperlipidemia associated with type 2 diabetes mellitus    Overview   Cont lipitor and zetia          Coronary artery disease involving native coronary artery of native heart without angina pectoris - Primary    Overview   Now seeing dr linder . Lipid panel reviewed  . Cont current regimen added repatha         Hypothyroidism due to acquired atrophy of thyroid    Overview   Cont replacement , need stsh          HEMAL (obstructive sleep apnea)    Overview   Cannot tolerate cpap is on no treatment cu rrently and declines          Aortic atherosclerosis    Overview   Stable cont lipitor         Type 2 diabetes mellitus without complication, without long-term current use of insulin    Overview   Needs A1c . She is seeing          Hypertensive arteriosclerotic cardiovascular disease    Overview   Bp well controlled start on losartan           Other Visit Diagnoses         Umbilical pain                  Past Medical History:  Past Medical History:   Diagnosis Date    Allergy     Arthritis     Cancer     skin cancer on hand    Cystocele 01/19/2016    GERD (gastroesophageal reflux disease)     Hemorrhoids     History of cholelithiasis     Hypothyroidism     Liver disease     fatty liver    Obesity     Primary hypertension 12/09/2024    Right shoulder pain 10/09/2012    Sleep apnea     Trigger finger, right 3rd finger 07/30/2014    Vaginal  atrophy 01/19/2016    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       Past Surgical History:  Past Surgical History:   Procedure Laterality Date    ABLATION COLPOCLESIS      ADENOIDECTOMY      CATARACT EXTRACTION W/  INTRAOCULAR LENS IMPLANT Right 11/23/2022    Procedure: EXTRACTION, CATARACT, WITH IOL INSERTION;  Surgeon: Ciara Mclaughlin MD;  Location: [facility];  Service: Ophthalmology;  Laterality: Right;    CATARACT EXTRACTION W/  INTRAOCULAR LENS IMPLANT Left 12/22/2022    Procedure: EXTRACTION, CATARACT, WITH IOL INSERTION;  Surgeon: Ciara Mclaughlin MD;  Location: [facility] OR;  Service: Ophthalmology;  Laterality: Left;    CHOLECYSTECTOMY      COLONOSCOPY N/A 11/28/2018    Procedure: COLONOSCOPY;  Surgeon: Richi Mcintosh MD;  Location: [facility] (4TH FLR);  Service: Endoscopy;  Laterality: N/A;    CORONARY STENT PLACEMENT      ENDOSCOPIC ULTRASOUND OF UPPER GASTROINTESTINAL TRACT N/A 7/10/2024    Procedure: ULTRASOUND, UPPER GI TRACT, ENDOSCOPIC;  Surgeon: Red Bonner MD;  Location: [facility] (2ND FLR);  Service: Endoscopy;  Laterality: N/A;  6/20 mail-EGD and EUS for dysphagia and diled CBD. [email or web address]    ESOPHAGOGASTRODUODENOSCOPY N/A 11/28/2018    Procedure: ESOPHAGOGASTRODUODENOSCOPY (EGD);  Surgeon: Richi Mcintosh MD;  Location: [facility] (4TH FLR);  Service: Endoscopy;  Laterality: N/A;    ESOPHAGOGASTRODUODENOSCOPY N/A 7/10/2024    Procedure: EGD (ESOPHAGOGASTRODUODENOSCOPY);  Surgeon: Red Bonner MD;  Location: [facility] (2ND FLR);  Service: Endoscopy;  Laterality: N/A;  7/2-lvm for pre call-tb  7/8-pt lvm confirming appt-tb    heart cath  2005    non-obstructive disease    INTRAOCULAR LENS EXCHANGE Left 6/14/2023    Procedure: REPLACEMENT, IOL;  Surgeon: Ciara Mclaughlin MD;  Location: [facility];  Service: Ophthalmology;  Laterality: Left;    OOPHORECTOMY      SHOULDER ARTHROSCOPY W/ ROTATOR CUFF REPAIR      Right    TONSILLECTOMY      TOTAL ABDOMINAL HYSTERECTOMY      with BSO    TRIGGER FINGER  RELEASE Right 10/16/2023    Procedure: RELEASE, TRIGGER FINGER,RIGHT LONG AND RING FINGER;  Surgeon: Shaneka Richardson MD;  Location: [facility];  Service: Orthopedics;  Laterality: Right;    TUBAL LIGATION         Family History:  family history is not on file.    Social History:  Social History[1]    Review of Systems:   Review of Systems   Constitutional:  Negative for fever and weight loss.   HENT:  Negative for congestion, hearing loss and sore throat.    Eyes:  Negative for blurred vision.   Respiratory:  Negative for cough and shortness of breath.    Cardiovascular:  Negative for chest pain, palpitations, claudication and leg swelling.   Gastrointestinal:  Negative for abdominal pain, constipation, diarrhea and heartburn.   Genitourinary:  Negative for dysuria.   Musculoskeletal:  Positive for back pain and joint pain. Negative for myalgias.   Skin:  Negative for rash.   Neurological:  Negative for focal weakness and headaches.   Psychiatric/Behavioral:  Negative for depression and suicidal ideas. The patient is not nervous/anxious.          Medications:  Encounter Medications[2]     Allergies:  Review of patient's allergies indicates:   Allergen Reactions    Compazine  [prochlorperazine edisylate]      Other reaction(s): SPASMS    Parafon forte      Other reaction(s): Hives         Physical Exam         Vitals:    06/12/25 1356   BP: 120/80   Pulse: 71   Resp: 18         Physical Exam  Constitutional:       Appearance: She is well-developed.   Eyes:      Pupils: Pupils are equal, round, and reactive to light.   Neck:      Thyroid: No thyromegaly.   Cardiovascular:      Rate and Rhythm: Normal rate.      Heart sounds: Normal heart sounds. No murmur heard.     No friction rub. No gallop.   Pulmonary:      Breath sounds: Normal breath sounds.   Abdominal:      General: Bowel sounds are normal.      Palpations: Abdomen is soft.   Musculoskeletal:         General: Normal range of motion.      Cervical back:  "Normal range of motion.   Lymphadenopathy:      Cervical: No cervical adenopathy.   Skin:     General: Skin is warm.      Findings: No rash.   Neurological:      Mental Status: She is alert and oriented to person, place, and time.      Cranial Nerves: No cranial nerve deficit.   Psychiatric:         Behavior: Behavior normal.          Laboratory:  CBC:  No results for input(s): "WBC", "RBC", "HGB", "HCT", "PLT", "MCV", "MCH", "MCHC" in the last 2160 hours.  CMP:  No results for input(s): "GLU", "CALCIUM", "ALBUMIN", "PROT", "NA", "K", "CO2", "CL", "BUN", "ALKPHOS", "ALT", "AST", "BILITOT" in the last 2160 hours.    Invalid input(s): "CREATININ"  URINALYSIS:  No results for input(s): "COLORU", "CLARITYU", "SPECGRAV", "PHUR", "PROTEINUA", "GLUCOSEU", "BILIRUBINCON", "BLOODU", "WBCU", "RBCU", "BACTERIA", "MUCUS", "NITRITE", "LEUKOCYTESUR", "UROBILINOGEN", "HYALINECASTS" in the last 2160 hours.   LIPIDS:  No results for input(s): "TSH", "HDL", "CHOL", "TRIG", "LDLCALC", "CHOLHDL", "NONHDLCHOL", "TOTALCHOLEST" in the last 2160 hours.  TSH:  No results for input(s): "TSH" in the last 2160 hours.  A1C:  No results for input(s): "HGBA1C" in the last 2160 hours.    Radiology:        Assessment:     Lissy Palencia is a 80 y.o.female with:    Coronary artery disease involving native coronary artery of native heart without angina pectoris    Type 2 diabetes mellitus without complication, without long-term current use of insulin  -     metFORMIN (GLUCOPHAGE-XR) 500 MG ER 24hr tablet; Take 1 tablet (500 mg total) by mouth daily with breakfast.  Dispense: 90 tablet; Refill: 3  -     Hemoglobin A1C; Future; Expected date: 06/12/2025  -     Diabetic Eye Screening Photo; Future    Hyperlipidemia associated with type 2 diabetes mellitus  -     Comprehensive Metabolic Panel; Future; Expected date: 06/12/2025  -     Lipid Panel; Future; Expected date: 06/12/2025    Primary hypothyroidism  -     TSH; Future; Expected date: " "06/12/2025    Aortic atherosclerosis    HEMAL (obstructive sleep apnea)    Primary hypertension  -     CBC Auto Differential; Future; Expected date: 06/12/2025    Hypertensive arteriosclerotic cardiovascular disease    Hypothyroidism due to acquired atrophy of thyroid    Umbilical pain  -     Ambulatory referral/consult to General Surgery; Future; Expected date: 06/19/2025          Plan:     Problem List Items Addressed This Visit       Hyperlipidemia associated with type 2 diabetes mellitus    Overview   Cont lipitor and zetia          Coronary artery disease involving native coronary artery of native heart without angina pectoris - Primary    Overview   Now seeing dr linder . Lipid panel reviewed  . Cont current regimen added repatha         Hypothyroidism due to acquired atrophy of thyroid    Overview   Cont replacement , need stsh          HEMAL (obstructive sleep apnea)    Overview   Cannot tolerate cpap is on no treatment cu rrently and declines          Aortic atherosclerosis    Overview   Stable cont lipitor         Type 2 diabetes mellitus without complication, without long-term current use of insulin    Overview   Needs A1c . She is seeing          Hypertensive arteriosclerotic cardiovascular disease    Overview   Bp well controlled start on losartan           Other Visit Diagnoses         Umbilical pain                As above, continue current medications and maintain follow up with specialists.  Return to clinic in 6 months.      Frederick W Dantagnan [facility]                       [1]   Social History  Socioeconomic History    Marital status:    Occupational History     Employer: [employer]   Tobacco Use    Smoking status: Never    Smokeless tobacco: Never   Substance and Sexual Activity    Alcohol use: Yes     Alcohol/week: 1.0 standard drink of alcohol     Types: 1 Glasses of wine per week     Comment: "maybe a glass of wine every 6 months"    Drug " use: No    Sexual activity: Not Currently     Partners: Male     Birth control/protection: None     Social Drivers of Health     Financial Resource Strain: Low Risk  (1/3/2022)    Overall Financial Resource Strain (CARDIA)     Difficulty of Paying Living Expenses: Not hard at all   Food Insecurity: No Food Insecurity (1/3/2022)    Hunger Vital Sign     Worried About Running Out of Food in the Last Year: Never true     Ran Out of Food in the Last Year: Never true   Transportation Needs: No Transportation Needs (1/3/2022)    PRAPARE - Transportation     Lack of Transportation (Medical): No     Lack of Transportation (Non-Medical): No   Physical Activity: Sufficiently Active (8/10/2020)    Exercise Vital Sign     Days of Exercise per Week: 6 days     Minutes of Exercise per Session: 60 min   Stress: No Stress Concern Present (1/3/2022)    [employer] - Occupational Stress Questionnaire     Feeling of Stress : Not at all   Housing Stability: Low Risk  (1/3/2022)    Housing Stability Vital Sign     Unable to Pay for Housing in the Last Year: No     Number of Places Lived in the Last Year: 1     Unstable Housing in the Last Year: No   [2]   Outpatient Encounter Medications as of 6/12/2025   Medication Sig Note Dispense Refill    alendronate (FOSAMAX) 70 MG tablet TAKE 1 TABLET EVERY 7 DAYS  12 tablet 3    aspirin (ECOTRIN) 81 MG EC tablet Take 81 mg by mouth once daily. 6/8/2023: Take AM of surgrery      atorvastatin (LIPITOR) 80 MG tablet TAKE 1 TABLET DAILY  90 tablet 3    azelastine (ASTELIN) 137 mcg (0.1 %) nasal spray 1 spray (137 mcg total) by Nasal route 2 (two) times daily.  30 mL 3    blood sugar diagnostic Strp To check BG ONCE daily, to use with insurance preferred meter  100 strip 3    blood-glucose meter kit To check BG ONCE THE daily, to use with insurance preferred meter  1 each 1    evolocumab (REPATHA SURECLICK) 140 mg/mL PnIj Inject 1 mL (140 mg total) into the skin every 14  (fourteen) days.  6 mL 3    flu vac 2022 [id number],PF, (FLUAD QUAD 2022-23,65Y UP,,PF,) 60 mcg (15 mcg x 4)/0.5 mL Syrg Inject into the muscle.  0.5 mL 0    fluticasone (FLONASE SENSIMIST) 27.5 mcg/actuation nasal spray 2 sprays by Nasal route once daily.  6.6 mL 3    lancets Misc To check BG ONCE daily, to use with insurance preferred meter  100 each 3    levothyroxine (SYNTHROID) 88 MCG tablet TAKE FIRST THING IN THE MORNING ON AN EMPTY STOMACH  90 tablet 1    LIDOcaine (LIDODERM) 5 % Place onto the skin once daily.  30 patch 0    metFORMIN (GLUCOPHAGE-XR) 500 MG ER 24hr tablet Take 1 tablet (500 mg total) by mouth daily with breakfast.  10 tablet 0    nitroGLYCERIN (NITROSTAT) 0.4 MG SL tablet Place 1 tablet (0.4 mg total) under the tongue every 5 (five) minutes as needed for Chest pain. You can take up to 3 doses at home. If chest pain persists after second dose, go the ER. 6/14/2023: Pt never took med 25 tablet 4    pantoprazole (PROTONIX) 40 MG tablet TAKE 1 TABLET DAILY  90 tablet 3    RESTASIS 0.05 % ophthalmic emulsion Place 1 drop into both eyes 2 (two) times daily.  180 each 3    tiZANidine (ZANAFLEX) 4 MG tablet Take 1-2 tablets (4-8 mg total) by mouth every 8 (eight) hours as needed (muscle spasms).  60 tablet 2    vitamin D (VITAMIN D3) 1000 units Tab Take 1 tablet (1,000 Units total) by mouth once daily.  90 tablet 3    [DISCONTINUED] metFORMIN (GLUCOPHAGE-XR) 500 MG ER 24hr tablet Take 1 tablet (500 mg total) by mouth daily with breakfast.  90 tablet 3    metFORMIN (GLUCOPHAGE-XR) 500 MG ER 24hr tablet Take 1 tablet (500 mg total) by mouth daily with breakfast.  90 tablet 3    [DISCONTINUED] amLODIPine (NORVASC) 2.5 MG tablet Take 1 tablet (2.5 mg total) by mouth once daily. (Patient not taking: Reported on 6/12/2025)  90 tablet 3    [DISCONTINUED] ezetimibe (ZETIA) 10 mg tablet Take 1 tablet (10 mg total) by mouth once daily. (Patient not taking: Reported on 6/12/2025)  90 tablet 3     [DISCONTINUED] levothyroxine (SYNTHROID) 88 MCG tablet TAKE FIRST THING IN THE MORNING ON AN EMPTY STOMACH  90 tablet 3     Facility-Administered Encounter Medications as of 6/12/2025   Medication Dose Route Frequency Provider Last Rate Last Admin    balanced salt irrigation intra-ocular solution 1 drop  1 drop Left Eye On Call Procedure Ciara Mclaughlin MD        balanced salt irrigation intra-ocular solution 1 drop  1 drop Left Eye On Call Procedure Ciara Mclaughlin MD        mupirocin 2 % ointment   Nasal On Call Procedure Caesar Finch MD   Given at 10/16/23 0752    phenylephrine HCL 10% ophthalmic solution 1 drop  1 drop Left Eye PRN Ciara Mclaughlin MD        phenylephrine HCL 10% ophthalmic solution 1 drop  1 drop Left Eye PRN Ciara Mclaughlin MD        sodium chloride 0.9% flush 2 mL  2 mL Intravenous PRN Ciara Mclaughlin MD        sodium chloride 0.9% flush 2 mL  2 mL Intravenous PRN Ciara Mclaughlin MD

## 2025-06-12 NOTE — PROGRESS NOTES
Lissy Palencia is a 80 y.o. female here for a diabetic eye screening with non-dilated fundus photos per DR Nicole .    Patient cooperative?: Yes  Small pupils?: No  Last eye exam:  N/A    For exam results, see Encounter Report.

## 2025-06-13 NOTE — PROGRESS NOTES
OTOLARYNGOLOGY- FACIAL PLASTIC & RECONSTRUCTIVE SURGERY      Lissy Palencia  [id number]    CC: Difficulty seeing    HISTORY OF PRESENT ILLNESS: Lissy Palencia  is a 80 y.o. female who was referred by Dr. Luna Cruz for ptosis and/or dermatochalasis.  The patient reports heaviness of her upper eyelids which has been present for many years but more worse recently.   She additionally reports difficulty driving at night (does not do so anymore), difficulty reading/using the computer, and having enough light in her visual fields. She states that she sees better when she manually lifts her eyelids or raises her eyebrows. She feels fatigued in her forehead due to excessive muscle activation..      She admits history of dry eyes and is currently on Restasis.  She has prior history of cataract surgery    6/16/2025:  Patient returns to clinic for surgical discussion.  Patient had visual field test performed showing significant improvement in visual field with lid taping.    Past Medical History  She has a past medical history of Allergy, Arthritis, Cancer, Cystocele, GERD (gastroesophageal reflux disease), Hemorrhoids, History of cholelithiasis, Hypothyroidism, Liver disease, Obesity, Primary hypertension, Right shoulder pain, Sleep apnea, Trigger finger, right 3rd finger, Vaginal atrophy, and Vaginal vault prolapse.    Past Surgical History  She has a past surgical history that includes Tonsillectomy; Shoulder arthroscopy w/ rotator cuff repair; Total abdominal hysterectomy; Adenoidectomy; Tubal ligation; heart cath (2005); Cholecystectomy; Oophorectomy; Ablation colpoclesis; Coronary stent placement; Esophagogastroduodenoscopy (N/A, 11/28/2018); Colonoscopy (N/A, 11/28/2018); Cataract extraction w/  intraocular lens implant (Right, 11/23/2022); Cataract extraction w/  intraocular lens implant (Left, 12/22/2022); Intraocular lens exchange (Left, 6/14/2023); Trigger finger release (Right, 10/16/2023);  "Esophagogastroduodenoscopy (N/A, 7/10/2024); and Endoscopic ultrasound of upper gastrointestinal tract (N/A, 7/10/2024).    Family History  Her family history is not on file.    Social History  She reports that she has never smoked. She has never used smokeless tobacco. She reports current alcohol use of about 1.0 standard drink of alcohol per week. She reports that she does not use drugs.    Allergies  She is allergic to compazine  [prochlorperazine edisylate] and parafon forte.    Medications  She has a current medication list which includes the following prescription(s): alendronate, aspirin, atorvastatin, azelastine, blood sugar diagnostic, evolocumab, fluad quad 2022-23(65y up)(pf), flonase sensimist, lancets, levothyroxine, lidocaine, metformin, metformin, pantoprazole, restasis, tizanidine, vitamin d, blood-glucose meter, and nitroglycerin, and the following Facility-Administered Medications: balanced salt irrigation, balanced salt irrigation, mupirocin, phenylephrine hcl 10%, phenylephrine hcl 10%, sodium chloride 0.9%, and sodium chloride 0.9%.      Review of Systems  See above    PHYSICAL EXAM:  BP (!) 140/73 (BP Location: Left arm, Patient Position: Sitting)   Pulse (!) 57   Ht 5' 2" (1.575 m)   Wt 68.5 kg (151 lb)   BMI 27.62 kg/m²     General: Alert and oriented in no acute distress  Head and Face: Normocephaic, atruamatic  Nose: Anterior rhinoscopy reveals overall normal appearing turbinates, septum midline, no obvious lesions or masses  Neurological: Cranial nerves are grossly intact  Skin: Skin texture/appearance is appropriate for age  Eyes:   EOMI, PERRLA. sclera clear.    severe, with excessive skin overhanging the upper lid margin  R > L dermatochalasis.  Brow ptosis   MRD1:     OD: 2mm    OS: 2.5mm  Ears: Pinna are normal in shape and position  EACs healthy appearing bilaterally  TMs clear without AIDAN or perforation bilaterally  Oral cavity and Oropharynx: Mucous membranes moist without " lesions present.  Tongue protrudes midline and palate elevates midline.  Neck: Supple without LAD.    Lungs:breathing comfortably  Psychiatric:  Mood and affect are normal        Ear cleaning performed by Nithya Brian     ASSESSMENT:   1. Bilateral impacted cerumen    2. Dermatochalasis of both upper eyelids    3. Brow ptosis, bilateral              PLAN:     I had an elaborate discussion with the patient regarding her condition and the further workup and management options. They are in understanding of the above stated plan.    Given the patient's functional deficits and clinical history, they are a good candidate for functional blepharoplasty, which can be performed under local anesthesia in clinic.  The patient would likely benefit from a brow lift given her brow ptosis, however given her history of dry eyes I want to be conservative and minimalist in her surgical approach.  If need be, after she finishes healing from her upper eyelid blepharoplasty we could consider an in clinic direct temporal brow lift.  I stated we can always address the brow at a later date.  We will provide a Valium supplementation for additional anxiolysis.  I will have the patient return to clinic and further discuss surgery once her visual field test has been completed.    We discussed in detail the operative plan as outlined above. The risks, benefits, and alternatives of the procedure were discussed in detail including but not limited to bleeding, infection, pain, scar, vision change, worsening of dry eyes, failure to improve, asymmetry or irregularity, and need for repeat procedures. We also discussed the expected post operative course and time frame for final healing. Lissy expressed understanding of the procedure, had all questions answered.      Jerel Christianson MD  Facial Plastic and Reconstructive Surgeon  [facility] Otolaryngology - Head & Neck Surgery

## 2025-06-16 ENCOUNTER — OFFICE VISIT (OUTPATIENT)
Dept: OTOLARYNGOLOGY | Facility: CLINIC | Age: 80
End: 2025-06-16
Payer: MEDICARE

## 2025-06-16 ENCOUNTER — OFFICE VISIT (OUTPATIENT)
Dept: SURGERY | Facility: CLINIC | Age: 80
End: 2025-06-16
Attending: SPECIALIST
Payer: MEDICARE

## 2025-06-16 VITALS
SYSTOLIC BLOOD PRESSURE: 140 MMHG | BODY MASS INDEX: 27.79 KG/M2 | HEIGHT: 62 IN | DIASTOLIC BLOOD PRESSURE: 73 MMHG | HEART RATE: 57 BPM | WEIGHT: 151 LBS

## 2025-06-16 VITALS
HEIGHT: 62 IN | HEART RATE: 55 BPM | BODY MASS INDEX: 27.23 KG/M2 | WEIGHT: 148 LBS | SYSTOLIC BLOOD PRESSURE: 160 MMHG | OXYGEN SATURATION: 98 % | DIASTOLIC BLOOD PRESSURE: 70 MMHG

## 2025-06-16 DIAGNOSIS — T81.89XA SUTURE GRANULOMA, INITIAL ENCOUNTER: Primary | ICD-10-CM

## 2025-06-16 DIAGNOSIS — R10.33 UMBILICAL PAIN: ICD-10-CM

## 2025-06-16 DIAGNOSIS — H61.23 BILATERAL IMPACTED CERUMEN: Primary | ICD-10-CM

## 2025-06-16 DIAGNOSIS — H02.831 DERMATOCHALASIS OF BOTH UPPER EYELIDS: ICD-10-CM

## 2025-06-16 DIAGNOSIS — H57.813 BROW PTOSIS, BILATERAL: ICD-10-CM

## 2025-06-16 DIAGNOSIS — H02.834 DERMATOCHALASIS OF BOTH UPPER EYELIDS: ICD-10-CM

## 2025-06-16 PROCEDURE — 99999 PR PBB SHADOW E&M-EST. PATIENT-LVL V: CPT | Mod: PBBFAC,,, | Performed by: SPECIALIST

## 2025-06-16 PROCEDURE — 99215 OFFICE O/P EST HI 40 MIN: CPT | Mod: PBBFAC | Performed by: SPECIALIST

## 2025-06-16 PROCEDURE — 69210 REMOVE IMPACTED EAR WAX UNI: CPT | Mod: S$GLB,,,

## 2025-06-16 PROCEDURE — 99214 OFFICE O/P EST MOD 30 MIN: CPT | Mod: 25,S$GLB,, | Performed by: STUDENT IN AN ORGANIZED HEALTH CARE EDUCATION/TRAINING PROGRAM

## 2025-06-16 NOTE — PROCEDURES
Ear Cerumen Removal    Date/Time: 6/16/2025 1:00 PM    Performed by: Nithya Brian PA-C  Authorized by: Jerel Christianson MD    Consent Done?:  Yes (Verbal)    Local anesthetic:  None  Location details:  Both ears  Procedure type: curette    Cerumen  Removal Results:  Cerumen completely removed  Patient tolerance:  Patient tolerated the procedure well with no immediate complications

## 2025-06-16 NOTE — LETTER
June 16, 2025        JC Jean - this has a upper blepharoplasty I will be performing under local in clinic.  I am not exactly sure the process for insurance approval for clinic procedures but I would like us to have pre approval before performing this procedure.  The patient has photo documentation and visual field test in addition to my clinic notes supporting this procedure.  Please let me know if any questions.        [facility]  [address], [facility] [address]  [facility] [address]  Phone: [phone]  Fax: [phone]   Patient: Lissy Palencia   MR Number: [id number]   YOB: 1945   Date of Visit: 6/16/2025       Dear Dr. Peters:    Thank you for referring Lissy Palencia to me for evaluation. Below are the relevant portions of my assessment and plan of care.            If you have questions, please do not hesitate to call me. I look forward to following Lissy along with you.    Sincerely,      Jerel Christianson MD           CC  No Recipients

## 2025-06-19 NOTE — PROGRESS NOTES
History & Physical    Subjective     History of Present Illness:  Patient is a 80 y.o. female presents with tender umbilicus since Lap clifford in 2013.     Chief Complaint   Patient presents with    Hernia       Review of patient's allergies indicates:   Allergen Reactions    Compazine  [prochlorperazine edisylate]      Other reaction(s): SPASMS    Parafon forte      Other reaction(s): Hives       Current Medications[1]    Past Medical History:   Diagnosis Date    Allergy     Arthritis     Cancer     skin cancer on hand    Cystocele 01/19/2016    GERD (gastroesophageal reflux disease)     Hemorrhoids     History of cholelithiasis     Hypothyroidism     Liver disease     fatty liver    Obesity     Primary hypertension 12/09/2024    Right shoulder pain 10/09/2012    Sleep apnea     Trigger finger, right 3rd finger 07/30/2014    Vaginal atrophy 01/19/2016    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     Past Surgical History:   Procedure Laterality Date    ABLATION COLPOCLESIS      ADENOIDECTOMY      CATARACT EXTRACTION W/  INTRAOCULAR LENS IMPLANT Right 11/23/2022    Procedure: EXTRACTION, CATARACT, WITH IOL INSERTION;  Surgeon: Ciara Mclaughlin MD;  Location: [facility];  Service: Ophthalmology;  Laterality: Right;    CATARACT EXTRACTION W/  INTRAOCULAR LENS IMPLANT Left 12/22/2022    Procedure: EXTRACTION, CATARACT, WITH IOL INSERTION;  Surgeon: Ciara Mclaughlin MD;  Location: [facility] OR;  Service: Ophthalmology;  Laterality: Left;    CHOLECYSTECTOMY      COLONOSCOPY N/A 11/28/2018    Procedure: COLONOSCOPY;  Surgeon: Richi Mcintosh MD;  Location: [facility] ENDO (4TH FLR);  Service: Endoscopy;  Laterality: N/A;    CORONARY STENT PLACEMENT      ENDOSCOPIC ULTRASOUND OF UPPER GASTROINTESTINAL TRACT N/A 7/10/2024    Procedure: ULTRASOUND, UPPER GI TRACT, ENDOSCOPIC;  Surgeon: Red Mann MD;  Location: [facility] ENDO (2ND FLR);  Service: Endoscopy;  Laterality: N/A;  6/20 mail-EGD and EUS for dysphagia and  "marlined CBD. mann-tt    ESOPHAGOGASTRODUODENOSCOPY N/A 11/28/2018    Procedure: ESOPHAGOGASTRODUODENOSCOPY (EGD);  Surgeon: Richi Mcintosh MD;  Location: [facility] ENDO (4TH FLR);  Service: Endoscopy;  Laterality: N/A;    ESOPHAGOGASTRODUODENOSCOPY N/A 7/10/2024    Procedure: EGD (ESOPHAGOGASTRODUODENOSCOPY);  Surgeon: Red Mann MD;  Location: [facility] ENDO (2ND FLR);  Service: Endoscopy;  Laterality: N/A;  7/2-lvm for pre call-tb  7/8-pt lvm confirming appt-tb    heart cath  2005    non-obstructive disease    INTRAOCULAR LENS EXCHANGE Left 6/14/2023    Procedure: REPLACEMENT, IOL;  Surgeon: Ciara Mclaughlin MD;  Location: [facility] OR;  Service: Ophthalmology;  Laterality: Left;    OOPHORECTOMY      SHOULDER ARTHROSCOPY W/ ROTATOR CUFF REPAIR      Right    TONSILLECTOMY      TOTAL ABDOMINAL HYSTERECTOMY      with BSO    TRIGGER FINGER RELEASE Right 10/16/2023    Procedure: RELEASE, TRIGGER FINGER,RIGHT LONG AND RING FINGER;  Surgeon: Shaneka Richardson MD;  Location: [facility] OR;  Service: Orthopedics;  Laterality: Right;    TUBAL LIGATION       Family History   Problem Relation Name Age of Onset    Breast cancer Neg Hx      Ovarian cancer Neg Hx      Glaucoma Neg Hx      Cataracts Neg Hx      Diabetes Neg Hx      Macular degeneration Neg Hx      Retinal detachment Neg Hx      Melanoma Neg Hx      Cervical cancer Neg Hx      Endometrial cancer Neg Hx      Vaginal cancer Neg Hx       Social History[2]     Review of Systems:  Review of Systems   Constitutional:  Negative for chills and fever.        Objective     Vital Signs (Most Recent)  Pulse: (!) 55 (06/16/25 1407)  BP: (!) 160/70 (06/16/25 1407)  SpO2: 98 % (06/16/25 1407)  5' 2" (1.575 m)  67.1 kg (148 lb)     Physical Exam:  Physical Exam  Abdominal:      Comments: Raised red area about 1 cm at umbilicus.        Laboratory      Diagnostic Results:         Assessment and Plan     Suture granuloma    PLAN:    1 % with epi   Area opened- no pus "   Umbilicus cleaned  Local wound care   Return if not better                  [1]  Current Outpatient Medications   Medication Sig Dispense Refill    alendronate (FOSAMAX) 70 MG tablet TAKE 1 TABLET EVERY 7 DAYS 12 tablet 3    aspirin (ECOTRIN) 81 MG EC tablet Take 81 mg by mouth once daily.      atorvastatin (LIPITOR) 80 MG tablet TAKE 1 TABLET DAILY 90 tablet 3    azelastine (ASTELIN) 137 mcg (0.1 %) nasal spray 1 spray (137 mcg total) by Nasal route 2 (two) times daily. 30 mL 3    blood sugar diagnostic Strp To check BG ONCE daily, to use with insurance preferred meter 100 strip 3    blood-glucose meter kit To check BG ONCE THE daily, to use with insurance preferred meter 1 each 1    evolocumab (REPATHA SURECLICK) 140 mg/mL PnIj Inject 1 mL (140 mg total) into the skin every 14 (fourteen) days. 6 mL 3    flu vac 2022 65up-mdpOB19P,PF, (FLUAD QUAD 2022-23,65Y UP,,PF,) 60 mcg (15 mcg x 4)/0.5 mL Syrg Inject into the muscle. 0.5 mL 0    fluticasone (FLONASE SENSIMIST) 27.5 mcg/actuation nasal spray 2 sprays by Nasal route once daily. 6.6 mL 3    lancets Misc To check BG ONCE daily, to use with insurance preferred meter 100 each 3    levothyroxine (SYNTHROID) 88 MCG tablet TAKE FIRST THING IN THE MORNING ON AN EMPTY STOMACH 90 tablet 1    LIDOcaine (LIDODERM) 5 % Place onto the skin once daily. 30 patch 0    metFORMIN (GLUCOPHAGE-XR) 500 MG ER 24hr tablet Take 1 tablet (500 mg total) by mouth daily with breakfast. 10 tablet 0    metFORMIN (GLUCOPHAGE-XR) 500 MG ER 24hr tablet Take 1 tablet (500 mg total) by mouth daily with breakfast. 90 tablet 3    nitroGLYCERIN (NITROSTAT) 0.4 MG SL tablet Place 1 tablet (0.4 mg total) under the tongue every 5 (five) minutes as needed for Chest pain. You can take up to 3 doses at home. If chest pain persists after second dose, go the ER. (Patient not taking: Reported on 6/16/2025) 25 tablet 4    pantoprazole (PROTONIX) 40 MG tablet TAKE 1 TABLET DAILY 90 tablet 3  "   RESTASIS 0.05 % ophthalmic emulsion Place 1 drop into both eyes 2 (two) times daily. 180 each 3    tiZANidine (ZANAFLEX) 4 MG tablet Take 1-2 tablets (4-8 mg total) by mouth every 8 (eight) hours as needed (muscle spasms). 60 tablet 2    vitamin D (VITAMIN D3) 1000 units Tab Take 1 tablet (1,000 Units total) by mouth once daily. 90 tablet 3     No current facility-administered medications for this visit.     Facility-Administered Medications Ordered in Other Visits   Medication Dose Route Frequency Provider Last Rate Last Admin    balanced salt irrigation intra-ocular solution 1 drop  1 drop Left Eye On Call Procedure Ciara Mclaughlin MD        balanced salt irrigation intra-ocular solution 1 drop  1 drop Left Eye On Call Procedure Ciara Mclaughlin MD        mupirocin 2 % ointment   Nasal On Call Procedure Caesar Finch MD   Given at 10/16/23 0752    phenylephrine HCL 10% ophthalmic solution 1 drop  1 drop Left Eye PRN Ciara Mclaughlin MD        phenylephrine HCL 10% ophthalmic solution 1 drop  1 drop Left Eye PRN Ciara Mclaughlin MD        sodium chloride 0.9% flush 2 mL  2 mL Intravenous PRN Ciara Mclaughlin MD        sodium chloride 0.9% flush 2 mL  2 mL Intravenous PRN Ciara Mclaughlin MD       [2]  Social History  Tobacco Use    Smoking status: Never    Smokeless tobacco: Never   Substance Use Topics    Alcohol use: Yes     Alcohol/week: 1.0 standard drink of alcohol     Types: 1 Glasses of wine per week     Comment: "maybe a glass of wine every 6 months"    Drug use: No   "

## 2025-07-16 ENCOUNTER — TELEPHONE (OUTPATIENT)
Dept: PRIMARY CARE CLINIC | Facility: CLINIC | Age: 80
End: 2025-07-16
Payer: MEDICARE

## 2025-07-16 NOTE — TELEPHONE ENCOUNTER
Copied from CRM #[id number]. Topic: General Inquiry - Patient Advice  >> Jul 16, 2025  1:20 PM Debby wrote:  .1MEDICALADVICE     Patient is calling for Medical Advice regarding:Need Letter for insurance     How long has patient had these symptoms:STEVE    Pharmacy name and phone#:NA    Patient wants a call back or thru [facility], provide patient's call back phone number: call patient back at :[phone]     Comments:pt's  said that he is calling In regards to their insurance([employer] ) is requiring  her  to get a letter from the doctor stating that he feels that is okay for her  to drive pt would like for the letter to be emailed to her son  [email or web address] and also she would also like a hard copy so she can pick it up from the office Please advise       Please advise patient replies from provider may take up to 48 hours.

## 2025-07-16 NOTE — TELEPHONE ENCOUNTER
LMOVM need more info, was pt advised she could not drive previously? This is not something we typically have to do for already insured drivers

## 2025-07-29 RX ORDER — ALENDRONATE SODIUM 70 MG/1
TABLET ORAL
Qty: 12 TABLET | Refills: 3 | Status: SHIPPED | OUTPATIENT
Start: 2025-07-29

## 2025-07-29 NOTE — TELEPHONE ENCOUNTER
Care Due:                  Date            Visit Type   Department     Provider  --------------------------------------------------------------------------------                                EP -                              PRIMARY      [facility]  Last Visit: 06-      CARE ([facility])   [facility]           Drew Nicole                              EP -                              PRIMARY      [facility] PRIMARY  Next Visit: 12-      CARE ([facility])   [facility]           Drew Nicole                                                            Last  Test          Frequency    Reason                     Performed    Due Date  --------------------------------------------------------------------------------    CMP.........  12 months..  alendronate,               06- 06-                             atorvastatin, metFORMIN..    HBA1C.......  6 months...  metFORMIN................  12- 06-    Lipid Panel.  12 months..  atorvastatin.............  06- 06-    Mg Level....  12 months..  alendronate..............  Not Found    Overdue    Phosphate...  12 months..  alendronate..............  Not Found    Overdue    [employer] Embedded Care Due Messages. Reference number: [id number].   7/29/2025 2:13:10 AM CDT

## 2025-08-01 ENCOUNTER — LAB VISIT (OUTPATIENT)
Dept: LAB | Facility: HOSPITAL | Age: 80
End: 2025-08-01
Attending: INTERNAL MEDICINE
Payer: MEDICARE

## 2025-08-01 DIAGNOSIS — E03.9 PRIMARY HYPOTHYROIDISM: ICD-10-CM

## 2025-08-01 DIAGNOSIS — E11.69 HYPERLIPIDEMIA ASSOCIATED WITH TYPE 2 DIABETES MELLITUS: ICD-10-CM

## 2025-08-01 DIAGNOSIS — E78.5 HYPERLIPIDEMIA ASSOCIATED WITH TYPE 2 DIABETES MELLITUS: ICD-10-CM

## 2025-08-01 DIAGNOSIS — E11.9 TYPE 2 DIABETES MELLITUS WITHOUT COMPLICATION, WITHOUT LONG-TERM CURRENT USE OF INSULIN: ICD-10-CM

## 2025-08-01 DIAGNOSIS — I10 PRIMARY HYPERTENSION: ICD-10-CM

## 2025-08-01 LAB
ABSOLUTE EOSINOPHIL (OHS): 0.47 K/UL
ABSOLUTE MONOCYTE (OHS): 0.65 K/UL (ref 0.3–1)
ABSOLUTE NEUTROPHIL COUNT (OHS): 3.89 K/UL (ref 1.8–7.7)
ALBUMIN SERPL BCP-MCNC: 4.2 G/DL (ref 3.5–5.2)
ALP SERPL-CCNC: 74 UNIT/L (ref 40–150)
ALT SERPL W/O P-5'-P-CCNC: 21 UNIT/L (ref 0–55)
ANION GAP (OHS): 8 MMOL/L (ref 8–16)
AST SERPL-CCNC: 30 UNIT/L (ref 0–50)
BASOPHILS # BLD AUTO: 0.04 K/UL
BASOPHILS NFR BLD AUTO: 0.5 %
BILIRUB SERPL-MCNC: 1 MG/DL (ref 0.1–1)
BUN SERPL-MCNC: 12 MG/DL (ref 8–23)
CALCIUM SERPL-MCNC: 9.6 MG/DL (ref 8.7–10.5)
CHLORIDE SERPL-SCNC: 104 MMOL/L (ref 95–110)
CHOLEST SERPL-MCNC: 106 MG/DL (ref 120–199)
CHOLEST/HDLC SERPL: 2.5 {RATIO} (ref 2–5)
CO2 SERPL-SCNC: 28 MMOL/L (ref 23–29)
CREAT SERPL-MCNC: 0.8 MG/DL (ref 0.5–1.4)
EAG (OHS): 131 MG/DL (ref 68–131)
ERYTHROCYTE [DISTWIDTH] IN BLOOD BY AUTOMATED COUNT: 13.2 % (ref 11.5–14.5)
GFR SERPLBLD CREATININE-BSD FMLA CKD-EPI: >60 ML/MIN/1.73/M2
GLUCOSE SERPL-MCNC: 105 MG/DL (ref 70–110)
HBA1C MFR BLD: 6.2 % (ref 4–5.6)
HCT VFR BLD AUTO: 45.3 % (ref 37–48.5)
HDLC SERPL-MCNC: 42 MG/DL (ref 40–75)
HDLC SERPL: 39.6 % (ref 20–50)
HGB BLD-MCNC: 14.5 GM/DL (ref 12–16)
IMM GRANULOCYTES # BLD AUTO: 0.03 K/UL (ref 0–0.04)
IMM GRANULOCYTES NFR BLD AUTO: 0.4 % (ref 0–0.5)
LDLC SERPL CALC-MCNC: 20.2 MG/DL (ref 63–159)
LYMPHOCYTES # BLD AUTO: 2.77 K/UL (ref 1–4.8)
MCH RBC QN AUTO: 27.3 PG (ref 27–31)
MCHC RBC AUTO-ENTMCNC: 32 G/DL (ref 32–36)
MCV RBC AUTO: 85 FL (ref 82–98)
NONHDLC SERPL-MCNC: 64 MG/DL
NUCLEATED RBC (/100WBC) (OHS): 0 /100 WBC
PLATELET # BLD AUTO: 210 K/UL (ref 150–450)
PMV BLD AUTO: 10.5 FL (ref 9.2–12.9)
POTASSIUM SERPL-SCNC: 4.8 MMOL/L (ref 3.5–5.1)
PROT SERPL-MCNC: 7.4 GM/DL (ref 6–8.4)
RBC # BLD AUTO: 5.31 M/UL (ref 4–5.4)
RELATIVE EOSINOPHIL (OHS): 6 %
RELATIVE LYMPHOCYTE (OHS): 35.3 % (ref 18–48)
RELATIVE MONOCYTE (OHS): 8.3 % (ref 4–15)
RELATIVE NEUTROPHIL (OHS): 49.5 % (ref 38–73)
SODIUM SERPL-SCNC: 140 MMOL/L (ref 136–145)
T4 FREE SERPL-MCNC: 1.32 NG/DL (ref 0.71–1.51)
TRIGL SERPL-MCNC: 219 MG/DL (ref 30–150)
TSH SERPL-ACNC: 0.14 UIU/ML (ref 0.4–4)
WBC # BLD AUTO: 7.85 K/UL (ref 3.9–12.7)

## 2025-08-01 PROCEDURE — 84439 ASSAY OF FREE THYROXINE: CPT

## 2025-08-01 PROCEDURE — 83036 HEMOGLOBIN GLYCOSYLATED A1C: CPT

## 2025-08-01 PROCEDURE — 84443 ASSAY THYROID STIM HORMONE: CPT

## 2025-08-01 PROCEDURE — 36415 COLL VENOUS BLD VENIPUNCTURE: CPT

## 2025-08-01 PROCEDURE — 82040 ASSAY OF SERUM ALBUMIN: CPT

## 2025-08-01 PROCEDURE — 85025 COMPLETE CBC W/AUTO DIFF WBC: CPT

## 2025-08-01 PROCEDURE — 80061 LIPID PANEL: CPT

## 2025-08-05 ENCOUNTER — TELEPHONE (OUTPATIENT)
Dept: PRIMARY CARE CLINIC | Facility: CLINIC | Age: 80
End: 2025-08-05
Payer: MEDICARE

## 2025-08-05 NOTE — TELEPHONE ENCOUNTER
Contacted patient. She is aware that her labs are stable. She is unable to get into her MyChart. She wanted the results mailed to her address.

## 2025-08-05 NOTE — TELEPHONE ENCOUNTER
----- Message from Drew Nicole MD sent at 8/5/2025 12:23 PM CDT -----  Stable  labs  ----- Message -----  From: Lab, Background User  Sent: 8/1/2025   3:34 PM CDT  To: Drew Nicole MD

## 2025-08-21 ENCOUNTER — TELEPHONE (OUTPATIENT)
Dept: OTOLARYNGOLOGY | Facility: CLINIC | Age: 80
End: 2025-08-21
Payer: MEDICARE

## (undated) DEVICE — COVER LIGHT HANDLE 80/CA

## (undated) DEVICE — DRAPE STERI-DRAPE 1000 17X11IN

## (undated) DEVICE — SYR B-D DISP CONTROL 10CC100/C

## (undated) DEVICE — GLOVE BIOGEL ECLIPSE SZ 7

## (undated) DEVICE — TOURNIQUET SB QC DP 18X4IN

## (undated) DEVICE — SYR SLIP TIP 1CC

## (undated) DEVICE — Device

## (undated) DEVICE — NDL 22GA X1 1/2 REG BEVEL

## (undated) DEVICE — SUT 4/0 18IN ETHILON BL P3

## (undated) DEVICE — DRESSING TRANS 2X2 TEGADERM

## (undated) DEVICE — SOL WATER STRL IRR 1000ML

## (undated) DEVICE — GLOVE BIOGEL SKINSENSE PI 7.0

## (undated) DEVICE — GLOVE BIOGEL ECLIPSE SZ 6.5

## (undated) DEVICE — SOL POVIDONE SCRUB IODINE 4 OZ

## (undated) DEVICE — CANNULA IRR ANTERIOR 27GX4MM

## (undated) DEVICE — NDL 18GA X1 1/2 REG BEVEL

## (undated) DEVICE — SOL PVP-I SCRUB 7.5% 4OZ

## (undated) DEVICE — DRESSING LEUKOPLAST FLEX 1X3IN

## (undated) DEVICE — SOL BETADINE 5%

## (undated) DEVICE — DRAPE OPHTHALMIC 48X62 FEN

## (undated) DEVICE — GAUZE SPONGE 4X4 12PLY

## (undated) DEVICE — SOL IRR SOD CHL .9% POUR

## (undated) DEVICE — DRESSING N ADH OIL EMUL 3X3

## (undated) DEVICE — GLOVE BIOGEL SKINSENSE PI 7.5

## (undated) DEVICE — SOL POVIDONE PREP IODINE 4 OZ

## (undated) DEVICE — BANDAGE MATRIX HK LOOP 2IN 5YD